# Patient Record
Sex: MALE | Race: WHITE | NOT HISPANIC OR LATINO | Employment: OTHER | URBAN - NONMETROPOLITAN AREA
[De-identification: names, ages, dates, MRNs, and addresses within clinical notes are randomized per-mention and may not be internally consistent; named-entity substitution may affect disease eponyms.]

---

## 2017-01-10 RX ORDER — ALPRAZOLAM 0.25 MG/1
0.25 TABLET ORAL 2 TIMES DAILY PRN
Qty: 60 TABLET | Refills: 0
Start: 2017-01-10 | End: 2017-02-13 | Stop reason: SDUPTHER

## 2017-01-10 RX ORDER — HYDROCODONE BITARTRATE AND ACETAMINOPHEN 7.5; 325 MG/1; MG/1
1 TABLET ORAL 3 TIMES DAILY PRN
Qty: 90 TABLET | Refills: 0
Start: 2017-01-10 | End: 2017-02-13 | Stop reason: SDUPTHER

## 2017-01-10 NOTE — TELEPHONE ENCOUNTER
----- Message from Shauna Guzman sent at 1/10/2017 12:06 PM EST -----  Regarding: REFILL   WRITTEN  NORCO 7.5/325 MG TID PRN  XANAX 0.25 MG BID PRN

## 2017-01-12 ENCOUNTER — CLINICAL SUPPORT (OUTPATIENT)
Dept: CARDIOLOGY | Facility: CLINIC | Age: 72
End: 2017-01-12

## 2017-01-12 DIAGNOSIS — Z23 IMMUNIZATION DUE: Primary | ICD-10-CM

## 2017-01-12 PROCEDURE — 90670 PCV13 VACCINE IM: CPT | Performed by: INTERNAL MEDICINE

## 2017-01-12 PROCEDURE — G0009 ADMIN PNEUMOCOCCAL VACCINE: HCPCS | Performed by: INTERNAL MEDICINE

## 2017-01-23 ENCOUNTER — OFFICE VISIT (OUTPATIENT)
Dept: CARDIOLOGY | Facility: CLINIC | Age: 72
End: 2017-01-23

## 2017-01-23 VITALS
HEART RATE: 64 BPM | WEIGHT: 229 LBS | HEIGHT: 71 IN | BODY MASS INDEX: 32.06 KG/M2 | DIASTOLIC BLOOD PRESSURE: 78 MMHG | SYSTOLIC BLOOD PRESSURE: 130 MMHG | OXYGEN SATURATION: 96 %

## 2017-01-23 DIAGNOSIS — N18.2 TYPE 2 DIABETES MELLITUS WITH STAGE 2 CHRONIC KIDNEY DISEASE, WITHOUT LONG-TERM CURRENT USE OF INSULIN (HCC): ICD-10-CM

## 2017-01-23 DIAGNOSIS — E11.22 TYPE 2 DIABETES MELLITUS WITH STAGE 2 CHRONIC KIDNEY DISEASE, WITHOUT LONG-TERM CURRENT USE OF INSULIN (HCC): ICD-10-CM

## 2017-01-23 DIAGNOSIS — E78.2 MIXED HYPERLIPIDEMIA: Primary | ICD-10-CM

## 2017-01-23 DIAGNOSIS — R39.14 BENIGN PROSTATIC HYPERTROPHY (BPH) WITH INCOMPLETE BLADDER EMPTYING: ICD-10-CM

## 2017-01-23 DIAGNOSIS — N40.1 BENIGN PROSTATIC HYPERTROPHY (BPH) WITH INCOMPLETE BLADDER EMPTYING: ICD-10-CM

## 2017-01-23 DIAGNOSIS — N19 RENAL FAILURE: ICD-10-CM

## 2017-01-23 DIAGNOSIS — K21.9 GASTROESOPHAGEAL REFLUX DISEASE WITHOUT ESOPHAGITIS: ICD-10-CM

## 2017-01-23 DIAGNOSIS — I10 ESSENTIAL HYPERTENSION: ICD-10-CM

## 2017-01-23 DIAGNOSIS — F41.9 ANXIETY: ICD-10-CM

## 2017-01-23 PROCEDURE — 99214 OFFICE O/P EST MOD 30 MIN: CPT | Performed by: INTERNAL MEDICINE

## 2017-01-23 NOTE — MR AVS SNAPSHOT
Lei Stapleton   1/23/2017 10:15 AM   Office Visit    Dept Phone:  [phone]   Encounter #:  [id number]    Provider:  Antione De Santiago MD   Department:  [facility] CARDIOLOGY                Your Full Care Plan              Your Updated Medication List          This list is accurate as of: 1/23/17 10:54 AM.  Always use your most recent med list.                ALPRAZolam 0.25 MG tablet   Commonly known as:  XANAX   Take 1 tablet by mouth 2 (Two) Times a Day As Needed (as needed for anxiety).       amLODIPine 10 MG tablet   Commonly known as:  NORVASC       aspirin 81 MG tablet       cholecalciferol 1000 UNITS tablet   Commonly known as:  VITAMIN D3       CLARITIN 10 MG tablet   Generic drug:  loratadine       CloNIDine 0.1 MG tablet   Commonly known as:  CATAPRES       FLUoxetine 20 MG capsule   Commonly known as:  PROzac       folic acid 1 MG tablet   Commonly known as:  FOLVITE       hydrALAZINE 50 MG tablet   Commonly known as:  APRESOLINE       HYDROcodone-acetaminophen 7.5-325 MG per tablet   Commonly known as:  NORCO   Take 1 tablet by mouth 3 (Three) Times a Day As Needed for moderate pain (4-6).       hydrOXYzine 25 MG tablet   Commonly known as:  ATARAX       lansoprazole 15 MG capsule   Commonly known as:  PREVACID       levoFLOXacin 500 MG tablet   Commonly known as:  LEVAQUIN   Take 1 tablet by mouth Daily.       meclizine 25 MG tablet   Commonly known as:  ANTIVERT   Take 1 tablet by mouth 3 (three) times a day as needed for dizziness.       metoprolol tartrate 25 MG tablet   Commonly known as:  LOPRESSOR       pantoprazole 40 MG EC tablet   Commonly known as:  PROTONIX       rosuvastatin 10 MG tablet   Commonly known as:  CRESTOR       tamsulosin 0.4 MG capsule 24 hr capsule   Commonly known as:  FLOMAX       VITAMIN B-6 PO       VITAMIN B12 PO               You Were Diagnosed With        Codes Comments    Mixed hyperlipidemia    -  Primary ICD-10-CM:  "E78.2  ICD-9-CM: 272.2     Essential hypertension     ICD-10-CM: I10  ICD-9-CM: 401.9     Gastroesophageal reflux disease without esophagitis     ICD-10-CM: K21.9  ICD-9-CM: 530.81     Renal failure     ICD-10-CM: N19  ICD-9-CM: 586     Benign prostatic hypertrophy (BPH) with incomplete bladder emptying     ICD-10-CM: N40.1, R39.14  ICD-9-CM: 600.01, 788.21     Anxiety     ICD-10-CM: F41.9  ICD-9-CM: 300.00       Instructions     None    Patient Instructions History      Upcoming Appointments     Visit Type Date Time Department    FOLLOW UP 1/23/2017 10:15 AM [facility] CARDIOLOGY [place]    LAB 4/11/2017  8:50 AM [facility] CARDIOLOGY [place]    FOLLOW UP 4/12/2017 10:15 AM [facility] CARDIOLOGY [place]      MyChart Signup     Our records indicate that you have declined [facility] "[employer]t signup. If you would like to sign up for "[employer]t, please email [email or web address] or call [phone] to obtain an activation code.             Other Info from Your Visit           Your Appointments     Apr 11, 2017  8:50 AM EDT   Lab with [facility]   [facility] CARDIOLOGY (--)    [address] [place] [address]   [phone]            Apr 12, 2017 10:15 AM EDT   Follow Up with Antione De Santiago MD   [facility] CARDIOLOGY (--)    [address] [place] [address]   [phone]           Arrive 15 minutes prior to appointment.              Allergies     Bactrim [Sulfamethoxazole-trimethoprim]        Reason for Visit     Hypertension     Hyperlipidemia     Heartburn           Vital Signs     Blood Pressure Pulse Height Weight Oxygen Saturation Body Mass Index    130/78 (BP Location: Left arm, Patient Position: Sitting) 64 71\" (180.3 cm) 229 lb (104 kg) 96% 31.94 kg/m2    Smoking Status                   Never Smoker           Problems and Diagnoses Noted     Anxiety problem    Benign prostatic hypertrophy (BPH) with incomplete bladder emptying    High blood pressure    Acid " reflux disease    High cholesterol or triglycerides    Kidney failure

## 2017-01-23 NOTE — PROGRESS NOTES
subjective     Chief Complaint   Patient presents with   • Hypertension   • Hyperlipidemia   • Heartburn     History of Present Illness    HYPERTENSION  Lei Stapleton has long-standing history of essential hypertension.  he is taking medications regularly.  There are no medication side effects.  Blood pressure is very well controlled.  There has been no headache, nausea or chest pain.  There has been no syncopal or presyncopal episode.  he denies episodes of hypo-tension or accelerated hypertension.    Hyperlipidemia  Lei Stapleton has long-standing history of hyperlipidemia.  Has been trying to lose weight and trying to follow diet and activity recommandations.  Patient is tolerating medications very well.  There has been no side effects.  Latest lipid levels have been fluctuating.    GERD  Lei Stapleton has long-standing history of gastroesophageal reflux disorder however he significantly better on medications.  There is no nausea or vomiting.  No hematemesis or melena.  No abdominal pain.  Mild epigastric heartburns are noted.  Appetite is good bowel movements are normal.    Chronic pain syndrome  Back pain controlled with low-dose hydrocodone.     Renal failure  Creatinine is around 1.7 last visit .  patient has been drinking a lot of fluids and he has been avoiding NSAID therapy. Follow-up BMP will be checkedagain.    Hyperglycemia  Patient has no prior history of diabetes mellitus however lately his sugar has been running quite high.  Patient thinks that he can controlled with diet alone  Will check his sugar again.    Patient Active Problem List   Diagnosis   • Essential hypertension, labile   • GERD (gastroesophageal reflux disease)   • Renal failure   • Hyperlipidemia   • Anxiety   • Depression   • Benign prostatic hypertrophy (BPH) with incomplete bladder emptying       Social History   Substance Use Topics   • Smoking status: Never Smoker   • Smokeless tobacco: None   • Alcohol use Yes      Comment:  SOCIAL       Allergies   Allergen Reactions   • Bactrim [Sulfamethoxazole-Trimethoprim]          Current Outpatient Prescriptions:   •  ALPRAZolam (XANAX) 0.25 MG tablet, Take 1 tablet by mouth 2 (Two) Times a Day As Needed (as needed for anxiety)., Disp: 60 tablet, Rfl: 0  •  amLODIPine (NORVASC) 10 MG tablet, Take 10 mg by mouth daily., Disp: , Rfl:   •  aspirin 81 MG tablet, Take 81 mg by mouth daily., Disp: , Rfl:   •  cholecalciferol (VITAMIN D3) 1000 UNITS tablet, Take 1,000 Units by mouth daily., Disp: , Rfl:   •  cloNIDine (CATAPRES) 0.1 MG tablet, Take 0.05 mg by mouth As Needed. TID TO QID PRN , Disp: , Rfl:   •  Cyanocobalamin (VITAMIN B12 PO), Take  by mouth daily., Disp: , Rfl:   •  FLUoxetine (PROzac) 20 MG capsule, Take 20 mg by mouth daily., Disp: , Rfl:   •  folic acid (FOLVITE) 1 MG tablet, Take 1 mg by mouth daily., Disp: , Rfl:   •  hydrALAZINE (APRESOLINE) 50 MG tablet, Take 75 mg by mouth 3 (three) times a day., Disp: , Rfl:   •  HYDROcodone-acetaminophen (NORCO) 7.5-325 MG per tablet, Take 1 tablet by mouth 3 (Three) Times a Day As Needed for moderate pain (4-6)., Disp: 90 tablet, Rfl: 0  •  hydrOXYzine (ATARAX) 25 MG tablet, Take 25 mg by mouth at night as needed., Disp: , Rfl:   •  lansoprazole (PREVACID) 15 MG capsule, Take 15 mg by mouth As Needed., Disp: , Rfl:   •  levoFLOXacin (LEVAQUIN) 500 MG tablet, Take 1 tablet by mouth Daily., Disp: 10 tablet, Rfl: 0  •  loratadine (CLARITIN) 10 MG tablet, Take 10 mg by mouth daily as needed., Disp: , Rfl:   •  meclizine (ANTIVERT) 25 MG tablet, Take 1 tablet by mouth 3 (three) times a day as needed for dizziness., Disp: 90 tablet, Rfl: 1  •  metoprolol tartrate (LOPRESSOR) 25 MG tablet, Take 25 mg by mouth 2 (two) times a day., Disp: , Rfl:   •  pantoprazole (PROTONIX) 40 MG EC tablet, Take 40 mg by mouth 2 (Two) Times a Day., Disp: , Rfl:   •  Pyridoxine HCl (VITAMIN B-6 PO), Take  by mouth daily., Disp: , Rfl:   •  rosuvastatin (CRESTOR) 10 MG  "tablet, Take 10 mg by mouth daily., Disp: , Rfl:   •  tamsulosin (FLOMAX) 0.4 MG capsule 24 hr capsule, Take 1 capsule by mouth daily., Disp: , Rfl:       The following portions of the patient's history were reviewed and updated as appropriate: allergies, current medications, past family history, past medical history, past social history, past surgical history and problem list.    Review of Systems   Constitution: Negative.   HENT: Positive for congestion.    Eyes: Negative.    Cardiovascular: Negative.    Respiratory: Negative.    Hematologic/Lymphatic: Negative.    Musculoskeletal: Positive for arthritis and back pain.   Gastrointestinal: Negative.    Neurological: Negative.           Objective:     Visit Vitals   • /78 (BP Location: Left arm, Patient Position: Sitting)   • Pulse 64   • Ht 71\" (180.3 cm)   • Wt 229 lb (104 kg)   • SpO2 96%   • BMI 31.94 kg/m2     Physical Exam   Constitutional: He appears well-developed and well-nourished.   HENT:   Head: Normocephalic and atraumatic.   Mouth/Throat: Oropharynx is clear and moist.   Eyes: Conjunctivae and EOM are normal. Pupils are equal, round, and reactive to light. No scleral icterus.   Neck: Normal range of motion. Neck supple. No JVD present. No tracheal deviation present. No thyromegaly present.   Cardiovascular: Normal rate, regular rhythm, normal heart sounds and intact distal pulses.  Exam reveals no friction rub.    No murmur heard.  Pulmonary/Chest: Effort normal and breath sounds normal. No respiratory distress. He has no wheezes. He has no rales. He exhibits no tenderness.   Abdominal: Soft. Bowel sounds are normal. He exhibits no distension and no mass. There is no tenderness. There is no rebound and no guarding.   Musculoskeletal: Normal range of motion. He exhibits no edema, tenderness or deformity.   Lymphadenopathy:     He has no cervical adenopathy.   Neurological: He is alert. He has normal reflexes. No cranial nerve deficit. He exhibits " normal muscle tone. Coordination normal.   Skin: Skin is warm and dry.   Psychiatric: He has a normal mood and affect. His behavior is normal. Judgment and thought content normal.         Lab Review  Lab Results   Component Value Date     12/06/2016    K 4.2 12/06/2016     12/06/2016    BUN 22 (H) 12/06/2016    CREATININE 1.47 (H) 12/06/2016    GLUCOSE 137 (H) 12/06/2016    CALCIUM 9.4 12/06/2016    ALT 32 08/19/2016    ALKPHOS 47 08/19/2016    LABIL2 2.0 08/19/2016     Lab Results   Component Value Date    CKTOTAL 200 08/19/2016     Lab Results   Component Value Date    WBC 8.18 08/19/2016    HGB 14.3 08/19/2016    HCT 42.6 08/19/2016     08/19/2016     No results found for: INR  No results found for: MG  Lab Results   Component Value Date    PSA 3.00 05/26/2016    TSH 2.807 09/28/2015     No results found for: BNP  Lab Results   Component Value Date    CHLPL 227 (H) 03/07/2016     Lab Results   Component Value Date    CHOL 163 08/19/2016    TRIG 232 (H) 08/19/2016    HDL 43 (L) 08/19/2016    LDLCALC 74 08/19/2016    VLDL 46.4 08/19/2016    LDLHDL 1.71 08/19/2016         Procedures     I personally viewed and interpreted the patient's LAB data         Assessment:     1. Mixed hyperlipidemia    2. Essential hypertension    3. Gastroesophageal reflux disease without esophagitis    4. Renal failure    5. Benign prostatic hypertrophy (BPH) with incomplete bladder emptying    6. Anxiety          Plan:      Blood pressure is very well controlled with current medications  Lipids uncontrolled.  Triglyceride is significantly elevated.  However her cholesterol is better.  This is probably due to obesity and elevated blood sugar.  Healthy lifestyle dietary restrictions and exercise program was discussed.    Hyperglycemia  Patient has not had diabetes mellitus before but lately his sugar has been quite a bit high.  He wants to control it with diet.  Blood sugar is already significantly better.      Renal  failure  Stable.  Creatinine is 1.47  Pain is controlled with current medications      No Follow-up on file.

## 2017-01-28 PROBLEM — E11.9 TYPE 2 DIABETES MELLITUS (HCC): Status: ACTIVE | Noted: 2017-01-28

## 2017-02-13 RX ORDER — ALPRAZOLAM 0.25 MG/1
0.25 TABLET ORAL 2 TIMES DAILY PRN
Qty: 60 TABLET | Refills: 0 | Status: SHIPPED | OUTPATIENT
Start: 2017-02-13 | End: 2017-08-04 | Stop reason: SDUPTHER

## 2017-02-13 RX ORDER — HYDROCODONE BITARTRATE AND ACETAMINOPHEN 7.5; 325 MG/1; MG/1
1 TABLET ORAL 3 TIMES DAILY PRN
Qty: 90 TABLET | Refills: 0 | Status: SHIPPED | OUTPATIENT
Start: 2017-02-13 | End: 2017-03-15 | Stop reason: SDUPTHER

## 2017-02-13 NOTE — TELEPHONE ENCOUNTER
----- Message from Shauna Guzman sent at 2/13/2017  3:25 PM EST -----  Regarding: WRITTEN RX   NORCO 7.5/325 MG TID PRN  ALPRAZOLAM 0.25 MG BID PRN  FOR  TOMORROW

## 2017-02-28 RX ORDER — AMLODIPINE BESYLATE 10 MG/1
TABLET ORAL
Qty: 90 TABLET | Refills: 2 | Status: SHIPPED | OUTPATIENT
Start: 2017-02-28 | End: 2017-03-15 | Stop reason: SDUPTHER

## 2017-02-28 RX ORDER — TAMSULOSIN HYDROCHLORIDE 0.4 MG/1
CAPSULE ORAL
Qty: 90 CAPSULE | Refills: 2 | Status: SHIPPED | OUTPATIENT
Start: 2017-02-28 | End: 2017-03-15 | Stop reason: SDUPTHER

## 2017-02-28 RX ORDER — FLUOXETINE HYDROCHLORIDE 20 MG/1
CAPSULE ORAL
Qty: 90 CAPSULE | Refills: 2 | Status: SHIPPED | OUTPATIENT
Start: 2017-02-28 | End: 2017-03-15 | Stop reason: SDUPTHER

## 2017-02-28 RX ORDER — FOLIC ACID 1 MG/1
TABLET ORAL
Qty: 90 TABLET | Refills: 2 | Status: SHIPPED | OUTPATIENT
Start: 2017-02-28 | End: 2017-03-15 | Stop reason: SDUPTHER

## 2017-03-15 RX ORDER — FLUOXETINE HYDROCHLORIDE 20 MG/1
20 CAPSULE ORAL DAILY
Qty: 90 CAPSULE | Refills: 2 | Status: SHIPPED | OUTPATIENT
Start: 2017-03-15 | End: 2017-06-05 | Stop reason: SDUPTHER

## 2017-03-15 RX ORDER — HYDRALAZINE HYDROCHLORIDE 50 MG/1
75 TABLET, FILM COATED ORAL 3 TIMES DAILY
Qty: 405 TABLET | Refills: 2 | Status: CANCELLED | OUTPATIENT
Start: 2017-03-15

## 2017-03-15 RX ORDER — AMLODIPINE BESYLATE 10 MG/1
10 TABLET ORAL DAILY
Qty: 90 TABLET | Refills: 2 | Status: SHIPPED | OUTPATIENT
Start: 2017-03-15 | End: 2017-06-05 | Stop reason: SDUPTHER

## 2017-03-15 RX ORDER — HYDROCODONE BITARTRATE AND ACETAMINOPHEN 7.5; 325 MG/1; MG/1
1 TABLET ORAL 3 TIMES DAILY PRN
Qty: 90 TABLET | Refills: 0 | Status: SHIPPED | OUTPATIENT
Start: 2017-03-15 | End: 2017-04-18 | Stop reason: SDUPTHER

## 2017-03-15 RX ORDER — TAMSULOSIN HYDROCHLORIDE 0.4 MG/1
1 CAPSULE ORAL DAILY
Qty: 90 CAPSULE | Refills: 2 | Status: SHIPPED | OUTPATIENT
Start: 2017-03-15 | End: 2017-06-05 | Stop reason: SDUPTHER

## 2017-03-15 RX ORDER — FOLIC ACID 1 MG/1
1 TABLET ORAL DAILY
Qty: 90 TABLET | Refills: 2 | Status: SHIPPED | OUTPATIENT
Start: 2017-03-15 | End: 2017-06-05 | Stop reason: SDUPTHER

## 2017-03-15 NOTE — TELEPHONE ENCOUNTER
----- Message from Shauna Guzman sent at 3/14/2017  2:22 PM EDT -----  Regarding: REFILLS   MAIL ORDER  NORVASC 10 MG QD  FLUOXETINE 20 MG QD  FOLIC ACID 1 MG QD  METOPROLOL TART. 25 MG BID  HYDRALAZINE 50 MG TID  FLOMAX 0.4 MG QD     WRITTEN  NORCO 7.5/325 MG TID PRN

## 2017-04-11 ENCOUNTER — LAB (OUTPATIENT)
Dept: CARDIOLOGY | Facility: CLINIC | Age: 72
End: 2017-04-11

## 2017-04-11 DIAGNOSIS — E78.2 MIXED HYPERLIPIDEMIA: ICD-10-CM

## 2017-04-11 LAB
ALBUMIN SERPL-MCNC: 4.8 G/DL (ref 3.4–4.8)
ALBUMIN/GLOB SERPL: 2.3 G/DL (ref 1.5–2.5)
ALP SERPL-CCNC: 48 U/L (ref 40–129)
ALT SERPL W P-5'-P-CCNC: 39 U/L (ref 10–44)
ANION GAP SERPL CALCULATED.3IONS-SCNC: 3.4 MMOL/L (ref 3.6–11.2)
AST SERPL-CCNC: 34 U/L (ref 10–34)
BASOPHILS # BLD AUTO: 0.06 10*3/MM3 (ref 0–0.3)
BASOPHILS NFR BLD AUTO: 0.9 % (ref 0–2)
BILIRUB SERPL-MCNC: 0.6 MG/DL (ref 0.2–1.8)
BUN BLD-MCNC: 18 MG/DL (ref 7–21)
BUN/CREAT SERPL: 11.5 (ref 7–25)
CALCIUM SPEC-SCNC: 9.6 MG/DL (ref 7.7–10)
CHLORIDE SERPL-SCNC: 112 MMOL/L (ref 99–112)
CHOLEST SERPL-MCNC: 178 MG/DL (ref 0–200)
CK SERPL-CCNC: 372 U/L (ref 24–204)
CO2 SERPL-SCNC: 31.6 MMOL/L (ref 24.3–31.9)
CREAT BLD-MCNC: 1.56 MG/DL (ref 0.43–1.29)
DEPRECATED RDW RBC AUTO: 45.9 FL (ref 37–54)
EOSINOPHIL # BLD AUTO: 0.83 10*3/MM3 (ref 0–0.7)
EOSINOPHIL NFR BLD AUTO: 11.8 % (ref 0–7)
ERYTHROCYTE [DISTWIDTH] IN BLOOD BY AUTOMATED COUNT: 14 % (ref 11.5–14.5)
GFR SERPL CREATININE-BSD FRML MDRD: 44 ML/MIN/1.73
GLOBULIN UR ELPH-MCNC: 2.1 GM/DL
GLUCOSE BLD-MCNC: 107 MG/DL (ref 70–110)
HCT VFR BLD AUTO: 44.5 % (ref 42–52)
HDLC SERPL-MCNC: 45 MG/DL (ref 60–100)
HGB BLD-MCNC: 15 G/DL (ref 14–18)
IMM GRANULOCYTES # BLD: 0 10*3/MM3 (ref 0–0.03)
IMM GRANULOCYTES NFR BLD: 0 % (ref 0–0.5)
LDLC SERPL CALC-MCNC: 93 MG/DL (ref 0–100)
LDLC/HDLC SERPL: 2.07 {RATIO}
LYMPHOCYTES # BLD AUTO: 2.62 10*3/MM3 (ref 1–3)
LYMPHOCYTES NFR BLD AUTO: 37.4 % (ref 16–46)
MCH RBC QN AUTO: 31.5 PG (ref 27–33)
MCHC RBC AUTO-ENTMCNC: 33.7 G/DL (ref 33–37)
MCV RBC AUTO: 93.5 FL (ref 80–94)
MONOCYTES # BLD AUTO: 0.75 10*3/MM3 (ref 0.1–0.9)
MONOCYTES NFR BLD AUTO: 10.7 % (ref 0–12)
NEUTROPHILS # BLD AUTO: 2.75 10*3/MM3 (ref 1.4–6.5)
NEUTROPHILS NFR BLD AUTO: 39.2 % (ref 40–75)
OSMOLALITY SERPL CALC.SUM OF ELEC: 294.8 MOSM/KG (ref 273–305)
PLATELET # BLD AUTO: 123 10*3/MM3 (ref 130–400)
PMV BLD AUTO: 12.4 FL (ref 6–10)
POTASSIUM BLD-SCNC: 4.3 MMOL/L (ref 3.5–5.3)
PROT SERPL-MCNC: 6.9 G/DL (ref 6–8)
RBC # BLD AUTO: 4.76 10*6/MM3 (ref 4.7–6.1)
SODIUM BLD-SCNC: 147 MMOL/L (ref 135–153)
TRIGL SERPL-MCNC: 199 MG/DL (ref 0–150)
VLDLC SERPL-MCNC: 39.8 MG/DL
WBC NRBC COR # BLD: 7.01 10*3/MM3 (ref 4.5–12.5)

## 2017-04-11 PROCEDURE — 82550 ASSAY OF CK (CPK): CPT | Performed by: INTERNAL MEDICINE

## 2017-04-11 PROCEDURE — 85025 COMPLETE CBC W/AUTO DIFF WBC: CPT | Performed by: INTERNAL MEDICINE

## 2017-04-11 PROCEDURE — 80053 COMPREHEN METABOLIC PANEL: CPT | Performed by: INTERNAL MEDICINE

## 2017-04-11 PROCEDURE — 80061 LIPID PANEL: CPT | Performed by: INTERNAL MEDICINE

## 2017-04-12 ENCOUNTER — OFFICE VISIT (OUTPATIENT)
Dept: CARDIOLOGY | Facility: CLINIC | Age: 72
End: 2017-04-12

## 2017-04-12 VITALS
HEIGHT: 71 IN | DIASTOLIC BLOOD PRESSURE: 64 MMHG | OXYGEN SATURATION: 92 % | WEIGHT: 227.4 LBS | HEART RATE: 52 BPM | SYSTOLIC BLOOD PRESSURE: 122 MMHG | BODY MASS INDEX: 31.84 KG/M2

## 2017-04-12 DIAGNOSIS — E78.2 MIXED HYPERLIPIDEMIA: ICD-10-CM

## 2017-04-12 DIAGNOSIS — F41.9 ANXIETY: ICD-10-CM

## 2017-04-12 DIAGNOSIS — K21.9 GASTROESOPHAGEAL REFLUX DISEASE WITHOUT ESOPHAGITIS: ICD-10-CM

## 2017-04-12 DIAGNOSIS — N40.1 BENIGN PROSTATIC HYPERTROPHY (BPH) WITH INCOMPLETE BLADDER EMPTYING: ICD-10-CM

## 2017-04-12 DIAGNOSIS — N19 RENAL FAILURE: ICD-10-CM

## 2017-04-12 DIAGNOSIS — I10 ESSENTIAL HYPERTENSION: Primary | ICD-10-CM

## 2017-04-12 DIAGNOSIS — R39.14 BENIGN PROSTATIC HYPERTROPHY (BPH) WITH INCOMPLETE BLADDER EMPTYING: ICD-10-CM

## 2017-04-12 DIAGNOSIS — N18.2 TYPE 2 DIABETES MELLITUS WITH STAGE 2 CHRONIC KIDNEY DISEASE, WITHOUT LONG-TERM CURRENT USE OF INSULIN (HCC): ICD-10-CM

## 2017-04-12 DIAGNOSIS — E11.22 TYPE 2 DIABETES MELLITUS WITH STAGE 2 CHRONIC KIDNEY DISEASE, WITHOUT LONG-TERM CURRENT USE OF INSULIN (HCC): ICD-10-CM

## 2017-04-12 PROCEDURE — 99214 OFFICE O/P EST MOD 30 MIN: CPT | Performed by: INTERNAL MEDICINE

## 2017-04-12 NOTE — PROGRESS NOTES
subjective     Chief Complaint   Patient presents with   • Follow-up     LAB RESULTS   • Diabetes   • Hypertension     History of Present Illness    HYPERTENSION  Lei Stapleton has long-standing history of essential hypertension. he is taking medications regularly. There are no medication side effects. Blood pressure is very well controlled. There has been no headache, nausea or chest pain. There has been no syncopal or presyncopal episode. he denies episodes of hypo-tension or accelerated hypertension.     Hyperlipidemia  Lei Stapleton has long-standing history of hyperlipidemia. Has been trying to lose weight and trying to follow diet and activity recommandations. Patient is tolerating medications very well. There has been no side effects.  Lipids are slightly worse .  Total cholesterol is 178 with LDL of 193.  Triglyceride 199.    GERD  Lei Stapleton has long-standing history of gastroesophageal reflux disorder however he significantly better on medications. There is no nausea or vomiting. No hematemesis or melena. No abdominal pain. Mild epigastric heartburns are noted. Appetite is good bowel movements are normal.     Chronic pain syndrome  Back pain controlled with low-dose hydrocodone.      Renal failure  Creatinine is around 1.56 this visit . patient has been drinking a lot of fluids and he has been avoiding NSAID therapy. Follow-up BMP will be checked again.     Hyperglycemia  Patient has no prior history of diabetes mellitus however lately his sugar has been running quite high. Patient thinks that he can controlled with diet alone  Will check his sugar again.     Patient Active Problem List   Diagnosis   • Essential hypertension, labile   • GERD (gastroesophageal reflux disease)   • Renal failure   • Hyperlipidemia   • Anxiety   • Depression   • Benign prostatic hypertrophy (BPH) with incomplete bladder emptying   • Type 2 diabetes mellitus       Social History   Substance Use Topics   • Smoking  status: Never Smoker   • Smokeless tobacco: None   • Alcohol use Yes      Comment: SOCIAL       Allergies   Allergen Reactions   • Bactrim [Sulfamethoxazole-Trimethoprim]          Current Outpatient Prescriptions:   •  ALPRAZolam (XANAX) 0.25 MG tablet, Take 1 tablet by mouth 2 (Two) Times a Day As Needed (as needed for anxiety)., Disp: 60 tablet, Rfl: 0  •  amLODIPine (NORVASC) 10 MG tablet, Take 1 tablet by mouth Daily., Disp: 90 tablet, Rfl: 2  •  aspirin 81 MG tablet, Take 81 mg by mouth daily., Disp: , Rfl:   •  cholecalciferol (VITAMIN D3) 1000 UNITS tablet, Take 1,000 Units by mouth daily., Disp: , Rfl:   •  cloNIDine (CATAPRES) 0.1 MG tablet, Take 0.05 mg by mouth As Needed. TID TO QID PRN , Disp: , Rfl:   •  Cyanocobalamin (VITAMIN B12 PO), Take  by mouth daily., Disp: , Rfl:   •  FLUoxetine (PROzac) 20 MG capsule, Take 1 capsule by mouth Daily., Disp: 90 capsule, Rfl: 2  •  folic acid (FOLVITE) 1 MG tablet, Take 1 tablet by mouth Daily., Disp: 90 tablet, Rfl: 2  •  hydrALAZINE (APRESOLINE) 50 MG tablet, Take 75 mg by mouth 3 (three) times a day., Disp: , Rfl:   •  HYDROcodone-acetaminophen (NORCO) 7.5-325 MG per tablet, Take 1 tablet by mouth 3 (Three) Times a Day As Needed for Moderate Pain (4-6)., Disp: 90 tablet, Rfl: 0  •  hydrOXYzine (ATARAX) 25 MG tablet, Take 25 mg by mouth at night as needed., Disp: , Rfl:   •  lansoprazole (PREVACID) 15 MG capsule, Take 15 mg by mouth As Needed., Disp: , Rfl:   •  levoFLOXacin (LEVAQUIN) 500 MG tablet, Take 1 tablet by mouth Daily., Disp: 10 tablet, Rfl: 0  •  loratadine (CLARITIN) 10 MG tablet, Take 10 mg by mouth daily as needed., Disp: , Rfl:   •  meclizine (ANTIVERT) 25 MG tablet, Take 1 tablet by mouth 3 (three) times a day as needed for dizziness., Disp: 90 tablet, Rfl: 1  •  metoprolol tartrate (LOPRESSOR) 25 MG tablet, Take 1 tablet by mouth 2 (Two) Times a Day., Disp: 180 tablet, Rfl: 2  •  pantoprazole (PROTONIX) 40 MG EC tablet, Take 40 mg by mouth 2  "(Two) Times a Day., Disp: , Rfl:   •  Pyridoxine HCl (VITAMIN B-6 PO), Take  by mouth daily., Disp: , Rfl:   •  rosuvastatin (CRESTOR) 10 MG tablet, Take 10 mg by mouth daily., Disp: , Rfl:   •  tamsulosin (FLOMAX) 0.4 MG capsule 24 hr capsule, Take 1 capsule by mouth Daily., Disp: 90 capsule, Rfl: 2      The following portions of the patient's history were reviewed and updated as appropriate: allergies, current medications, past family history, past medical history, past social history, past surgical history and problem list.    Review of Systems   Constitution: Positive for malaise/fatigue.   HENT: Negative.    Eyes: Negative.    Cardiovascular: Negative.    Respiratory: Negative.    Hematologic/Lymphatic: Negative.    Musculoskeletal: Positive for arthritis and back pain.   Gastrointestinal: Positive for heartburn.   Neurological: Negative.    Psychiatric/Behavioral: The patient is nervous/anxious.           Objective:     /64 (BP Location: Left arm, Patient Position: Sitting)  Pulse 52  Ht 71\" (180.3 cm)  Wt 227 lb 6.4 oz (103 kg)  SpO2 92%  BMI 31.72 kg/m2  Physical Exam   Constitutional: He appears well-developed and well-nourished.   HENT:   Head: Normocephalic and atraumatic.   Mouth/Throat: Oropharynx is clear and moist.   Eyes: Conjunctivae and EOM are normal. Pupils are equal, round, and reactive to light. No scleral icterus.   Neck: Normal range of motion. Neck supple. No JVD present. No tracheal deviation present. No thyromegaly present.   Cardiovascular: Normal rate, regular rhythm, normal heart sounds and intact distal pulses.  Exam reveals no friction rub.    No murmur heard.  Pulmonary/Chest: Effort normal and breath sounds normal. No respiratory distress. He has no wheezes. He has no rales. He exhibits no tenderness.   Abdominal: Soft. Bowel sounds are normal. He exhibits no distension and no mass. There is no tenderness. There is no rebound and no guarding.   Musculoskeletal: Normal " range of motion. He exhibits no edema, tenderness or deformity.   Lymphadenopathy:     He has no cervical adenopathy.   Neurological: He is alert. He has normal reflexes. No cranial nerve deficit. He exhibits normal muscle tone. Coordination normal.   Skin: Skin is warm and dry.   Psychiatric: He has a normal mood and affect. His behavior is normal. Judgment and thought content normal.         Lab Review  Lab Results   Component Value Date     04/11/2017    K 4.3 04/11/2017     04/11/2017    BUN 18 04/11/2017    CREATININE 1.56 (H) 04/11/2017    GLUCOSE 107 04/11/2017    CALCIUM 9.6 04/11/2017    ALT 39 04/11/2017    ALKPHOS 48 04/11/2017    LABIL2 2.3 04/11/2017     Lab Results   Component Value Date    CKTOTAL 372 (C) 04/11/2017     Lab Results   Component Value Date    WBC 7.01 04/11/2017    HGB 15.0 04/11/2017    HCT 44.5 04/11/2017     (L) 04/11/2017     No results found for: INR  No results found for: MG  Lab Results   Component Value Date    PSA 3.00 05/26/2016    TSH 2.807 09/28/2015     No results found for: BNP  Lab Results   Component Value Date    CHLPL 227 (H) 03/07/2016     Lab Results   Component Value Date    CHOL 178 04/11/2017    TRIG 199 (H) 04/11/2017    HDL 45 (L) 04/11/2017    LDLCALC 93 04/11/2017    VLDL 39.8 04/11/2017    LDLHDL 2.07 04/11/2017         Procedures     I personally viewed and interpreted the patient's LAB data         Assessment:     1. Essential hypertension    2. Mixed hyperlipidemia    3. Gastroesophageal reflux disease without esophagitis    4. Type 2 diabetes mellitus with stage 2 chronic kidney disease, without long-term current use of insulin    5. Benign prostatic hypertrophy (BPH) with incomplete bladder emptying    6. Renal failure    7. Anxiety          Plan:      Blood pressure is well controlled with current medications.  No change in therapy.    Hyperlipidemia slightly worse.  LDL is 93 which is still acceptable but patient was advised to try  to bring it down.  Healthy lifestyle discussed however no change in medication he will continue Crestor 10 mg daily    Patient is very well controlled with Norco therapy    Anxiety is controlled with Xanax and Prozac.  No change in therapy is needed at this time.  GI symptoms are also controlled.    Refills of medications were given  Healthy lifestyle discussed again    Follow-up scheduled.  Labs discussed in detail with the patient              No Follow-up on file.

## 2017-04-18 RX ORDER — HYDROCODONE BITARTRATE AND ACETAMINOPHEN 7.5; 325 MG/1; MG/1
1 TABLET ORAL 3 TIMES DAILY PRN
Qty: 90 TABLET | Refills: 0 | Status: SHIPPED | OUTPATIENT
Start: 2017-04-19 | End: 2017-06-09 | Stop reason: SDUPTHER

## 2017-05-11 ENCOUNTER — HOSPITAL ENCOUNTER (OUTPATIENT)
Dept: GENERAL RADIOLOGY | Facility: HOSPITAL | Age: 72
Discharge: HOME | End: 2017-05-11
Attending: INTERNAL MEDICINE

## 2017-05-11 ENCOUNTER — LAB (OUTPATIENT)
Dept: LAB | Facility: HOSPITAL | Age: 72
End: 2017-05-11
Attending: INTERNAL MEDICINE

## 2017-05-11 ENCOUNTER — OFFICE VISIT (OUTPATIENT)
Dept: CARDIOLOGY | Facility: CLINIC | Age: 72
End: 2017-05-11

## 2017-05-11 ENCOUNTER — HOSPITAL ENCOUNTER (OUTPATIENT)
Dept: CT IMAGING | Facility: HOSPITAL | Age: 72
Discharge: HOME | End: 2017-05-11
Attending: INTERNAL MEDICINE | Admitting: INTERNAL MEDICINE

## 2017-05-11 VITALS
DIASTOLIC BLOOD PRESSURE: 85 MMHG | HEART RATE: 107 BPM | BODY MASS INDEX: 31.36 KG/M2 | SYSTOLIC BLOOD PRESSURE: 138 MMHG | WEIGHT: 224 LBS | OXYGEN SATURATION: 97 % | TEMPERATURE: 98.5 F | HEIGHT: 71 IN

## 2017-05-11 DIAGNOSIS — E78.2 MIXED HYPERLIPIDEMIA: ICD-10-CM

## 2017-05-11 DIAGNOSIS — K59.04 CHRONIC IDIOPATHIC CONSTIPATION: ICD-10-CM

## 2017-05-11 DIAGNOSIS — R10.33 PERIUMBILICAL ABDOMINAL PAIN: ICD-10-CM

## 2017-05-11 DIAGNOSIS — N19 RENAL FAILURE: ICD-10-CM

## 2017-05-11 DIAGNOSIS — R10.33 PERIUMBILICAL ABDOMINAL PAIN: Primary | ICD-10-CM

## 2017-05-11 PROBLEM — J02.9 PHARYNGITIS: Status: ACTIVE | Noted: 2017-05-11

## 2017-05-11 LAB
ALBUMIN SERPL-MCNC: 4.5 G/DL (ref 3.4–4.8)
ALBUMIN/GLOB SERPL: 1.7 G/DL (ref 1.5–2.5)
ALP SERPL-CCNC: 60 U/L (ref 40–129)
ALT SERPL W P-5'-P-CCNC: 35 U/L (ref 10–44)
AMYLASE SERPL-CCNC: 45 U/L (ref 28–100)
ANION GAP SERPL CALCULATED.3IONS-SCNC: 9.9 MMOL/L (ref 3.6–11.2)
AST SERPL-CCNC: 25 U/L (ref 10–34)
BASOPHILS # BLD AUTO: 0.06 10*3/MM3 (ref 0–0.3)
BASOPHILS NFR BLD AUTO: 0.5 % (ref 0–2)
BILIRUB SERPL-MCNC: 0.9 MG/DL (ref 0.2–1.8)
BUN BLD-MCNC: 15 MG/DL (ref 7–21)
BUN/CREAT SERPL: 9.7 (ref 7–25)
CALCIUM SPEC-SCNC: 9.4 MG/DL (ref 7.7–10)
CHLORIDE SERPL-SCNC: 108 MMOL/L (ref 99–112)
CO2 SERPL-SCNC: 26.1 MMOL/L (ref 24.3–31.9)
CREAT BLD-MCNC: 1.54 MG/DL (ref 0.43–1.29)
DEPRECATED RDW RBC AUTO: 47.8 FL (ref 37–54)
EOSINOPHIL # BLD AUTO: 0.94 10*3/MM3 (ref 0–0.7)
EOSINOPHIL NFR BLD AUTO: 7.2 % (ref 0–7)
ERYTHROCYTE [DISTWIDTH] IN BLOOD BY AUTOMATED COUNT: 14.1 % (ref 11.5–14.5)
GFR SERPL CREATININE-BSD FRML MDRD: 45 ML/MIN/1.73
GLOBULIN UR ELPH-MCNC: 2.6 GM/DL
GLUCOSE BLD-MCNC: 132 MG/DL (ref 70–110)
HCT VFR BLD AUTO: 48.3 % (ref 42–52)
HGB BLD-MCNC: 16.7 G/DL (ref 14–18)
IMM GRANULOCYTES # BLD: 0.04 10*3/MM3 (ref 0–0.03)
IMM GRANULOCYTES NFR BLD: 0.3 % (ref 0–0.5)
LIPASE SERPL-CCNC: 41 U/L (ref 13–60)
LYMPHOCYTES # BLD AUTO: 3 10*3/MM3 (ref 1–3)
LYMPHOCYTES NFR BLD AUTO: 23.1 % (ref 16–46)
MCH RBC QN AUTO: 32.4 PG (ref 27–33)
MCHC RBC AUTO-ENTMCNC: 34.6 G/DL (ref 33–37)
MCV RBC AUTO: 93.8 FL (ref 80–94)
MONOCYTES # BLD AUTO: 1.23 10*3/MM3 (ref 0.1–0.9)
MONOCYTES NFR BLD AUTO: 9.5 % (ref 0–12)
NEUTROPHILS # BLD AUTO: 7.7 10*3/MM3 (ref 1.4–6.5)
NEUTROPHILS NFR BLD AUTO: 59.4 % (ref 40–75)
OSMOLALITY SERPL CALC.SUM OF ELEC: 289.5 MOSM/KG (ref 273–305)
PLATELET # BLD AUTO: 179 10*3/MM3 (ref 130–400)
PMV BLD AUTO: 13.1 FL (ref 6–10)
POTASSIUM BLD-SCNC: 3.5 MMOL/L (ref 3.5–5.3)
PROT SERPL-MCNC: 7.1 G/DL (ref 6–8)
RBC # BLD AUTO: 5.15 10*6/MM3 (ref 4.7–6.1)
SODIUM BLD-SCNC: 144 MMOL/L (ref 135–153)
WBC NRBC COR # BLD: 12.97 10*3/MM3 (ref 4.5–12.5)

## 2017-05-11 PROCEDURE — 74150 CT ABDOMEN W/O CONTRAST: CPT | Performed by: RADIOLOGY

## 2017-05-11 PROCEDURE — 85025 COMPLETE CBC W/AUTO DIFF WBC: CPT | Performed by: INTERNAL MEDICINE

## 2017-05-11 PROCEDURE — 82150 ASSAY OF AMYLASE: CPT | Performed by: INTERNAL MEDICINE

## 2017-05-11 PROCEDURE — 74020 HC XR ABDOMEN FLAT & UPRIGHT: CPT

## 2017-05-11 PROCEDURE — 83690 ASSAY OF LIPASE: CPT | Performed by: INTERNAL MEDICINE

## 2017-05-11 PROCEDURE — 74020 XR ABDOMEN FLAT AND UPRIGHT: CPT | Performed by: RADIOLOGY

## 2017-05-11 PROCEDURE — 80053 COMPREHEN METABOLIC PANEL: CPT | Performed by: INTERNAL MEDICINE

## 2017-05-11 PROCEDURE — 99214 OFFICE O/P EST MOD 30 MIN: CPT | Performed by: INTERNAL MEDICINE

## 2017-05-11 PROCEDURE — 74150 CT ABDOMEN W/O CONTRAST: CPT

## 2017-05-11 PROCEDURE — 36415 COLL VENOUS BLD VENIPUNCTURE: CPT

## 2017-05-11 RX ORDER — CIPROFLOXACIN 250 MG/1
250 TABLET, FILM COATED ORAL 2 TIMES DAILY
Qty: 14 TABLET | Refills: 0 | Status: SHIPPED | OUTPATIENT
Start: 2017-05-11 | End: 2017-07-12

## 2017-05-11 RX ORDER — METRONIDAZOLE 500 MG/1
500 TABLET ORAL 3 TIMES DAILY
Qty: 21 TABLET | Refills: 0 | Status: SHIPPED | OUTPATIENT
Start: 2017-05-11 | End: 2017-07-12

## 2017-06-05 RX ORDER — FOLIC ACID 1 MG/1
1 TABLET ORAL DAILY
Qty: 90 TABLET | Refills: 2 | Status: SHIPPED | OUTPATIENT
Start: 2017-06-05 | End: 2017-06-12 | Stop reason: SDUPTHER

## 2017-06-05 RX ORDER — TAMSULOSIN HYDROCHLORIDE 0.4 MG/1
1 CAPSULE ORAL DAILY
Qty: 90 CAPSULE | Refills: 2 | Status: SHIPPED | OUTPATIENT
Start: 2017-06-05 | End: 2017-06-12 | Stop reason: SDUPTHER

## 2017-06-05 RX ORDER — AMLODIPINE BESYLATE 10 MG/1
10 TABLET ORAL DAILY
Qty: 90 TABLET | Refills: 2 | Status: SHIPPED | OUTPATIENT
Start: 2017-06-05 | End: 2017-06-12 | Stop reason: SDUPTHER

## 2017-06-05 RX ORDER — HYDRALAZINE HYDROCHLORIDE 50 MG/1
75 TABLET, FILM COATED ORAL 3 TIMES DAILY
Qty: 270 TABLET | Refills: 0 | Status: SHIPPED | OUTPATIENT
Start: 2017-06-05 | End: 2017-06-12 | Stop reason: SDUPTHER

## 2017-06-05 RX ORDER — FLUOXETINE HYDROCHLORIDE 20 MG/1
20 CAPSULE ORAL DAILY
Qty: 90 CAPSULE | Refills: 2 | Status: SHIPPED | OUTPATIENT
Start: 2017-06-05 | End: 2017-06-12 | Stop reason: SDUPTHER

## 2017-06-09 RX ORDER — HYDROCODONE BITARTRATE AND ACETAMINOPHEN 7.5; 325 MG/1; MG/1
1 TABLET ORAL 3 TIMES DAILY PRN
Qty: 90 TABLET | Refills: 0 | Status: SHIPPED | OUTPATIENT
Start: 2017-06-09 | End: 2017-08-04 | Stop reason: SDUPTHER

## 2017-06-12 RX ORDER — TAMSULOSIN HYDROCHLORIDE 0.4 MG/1
1 CAPSULE ORAL DAILY
Qty: 90 CAPSULE | Refills: 2 | Status: SHIPPED | OUTPATIENT
Start: 2017-06-12 | End: 2017-08-23 | Stop reason: SDUPTHER

## 2017-06-12 RX ORDER — FLUOXETINE HYDROCHLORIDE 20 MG/1
20 CAPSULE ORAL DAILY
Qty: 90 CAPSULE | Refills: 2 | Status: SHIPPED | OUTPATIENT
Start: 2017-06-12 | End: 2017-08-23 | Stop reason: SDUPTHER

## 2017-06-12 RX ORDER — FOLIC ACID 1 MG/1
1 TABLET ORAL DAILY
Qty: 90 TABLET | Refills: 2 | Status: SHIPPED | OUTPATIENT
Start: 2017-06-12 | End: 2017-08-23 | Stop reason: SDUPTHER

## 2017-06-12 RX ORDER — AMLODIPINE BESYLATE 10 MG/1
10 TABLET ORAL DAILY
Qty: 90 TABLET | Refills: 2 | Status: SHIPPED | OUTPATIENT
Start: 2017-06-12 | End: 2017-08-23 | Stop reason: SDUPTHER

## 2017-06-12 RX ORDER — HYDRALAZINE HYDROCHLORIDE 50 MG/1
75 TABLET, FILM COATED ORAL 3 TIMES DAILY
Qty: 270 TABLET | Refills: 0 | Status: SHIPPED | OUTPATIENT
Start: 2017-06-12 | End: 2017-07-10 | Stop reason: SDUPTHER

## 2017-06-12 RX ORDER — LORATADINE 10 MG/1
10 TABLET ORAL DAILY
Qty: 90 TABLET | Refills: 0 | Status: SHIPPED | OUTPATIENT
Start: 2017-06-12 | End: 2020-07-22 | Stop reason: SDUPTHER

## 2017-07-10 RX ORDER — HYDRALAZINE HYDROCHLORIDE 50 MG/1
TABLET, FILM COATED ORAL
Qty: 270 TABLET | Refills: 0 | Status: SHIPPED | OUTPATIENT
Start: 2017-07-10 | End: 2017-08-23 | Stop reason: SDUPTHER

## 2017-07-12 ENCOUNTER — OFFICE VISIT (OUTPATIENT)
Dept: CARDIOLOGY | Facility: CLINIC | Age: 72
End: 2017-07-12

## 2017-07-12 VITALS
RESPIRATION RATE: 18 BRPM | HEART RATE: 73 BPM | HEIGHT: 71 IN | WEIGHT: 222 LBS | OXYGEN SATURATION: 95 % | DIASTOLIC BLOOD PRESSURE: 80 MMHG | BODY MASS INDEX: 31.08 KG/M2 | SYSTOLIC BLOOD PRESSURE: 130 MMHG

## 2017-07-12 DIAGNOSIS — N18.2 TYPE 2 DIABETES MELLITUS WITH STAGE 2 CHRONIC KIDNEY DISEASE, WITHOUT LONG-TERM CURRENT USE OF INSULIN (HCC): ICD-10-CM

## 2017-07-12 DIAGNOSIS — N19 RENAL FAILURE: ICD-10-CM

## 2017-07-12 DIAGNOSIS — E78.2 MIXED HYPERLIPIDEMIA: Primary | ICD-10-CM

## 2017-07-12 DIAGNOSIS — E11.22 TYPE 2 DIABETES MELLITUS WITH STAGE 2 CHRONIC KIDNEY DISEASE, WITHOUT LONG-TERM CURRENT USE OF INSULIN (HCC): ICD-10-CM

## 2017-07-12 DIAGNOSIS — I10 ESSENTIAL HYPERTENSION: ICD-10-CM

## 2017-07-12 PROCEDURE — 99214 OFFICE O/P EST MOD 30 MIN: CPT | Performed by: INTERNAL MEDICINE

## 2017-07-12 RX ORDER — ALLOPURINOL 300 MG/1
TABLET ORAL
COMMUNITY
Start: 2017-06-10 | End: 2018-01-04 | Stop reason: SDUPTHER

## 2017-07-12 NOTE — PROGRESS NOTES
subjective     Chief Complaint   Patient presents with   • Hypertension   • Hyperlipidemia   • Heartburn     History of Present Illness  Patient recently had vague abdominal discomfort and possible partial small bowel obstruction he is doing much better.  Bowel movements are normal he denies any nausea or vomiting.  Patient had CT scan of the abdomen which showed trace amount of ascites and small bowels are mildly dilated horseshoe kidney noted, no obstruction.    HYPERTENSION  Lei Stapleton has long-standing history of essential hypertension. he is taking medications regularly. There are no medication side effects. Blood pressure is very well controlled. There has been no headache, nausea or chest pain. There has been no syncopal or presyncopal episode. he denies episodes of hypo-tension or accelerated hypertension.     Hyperlipidemia  Lei Stapleton has long-standing history of hyperlipidemia. Has been trying to lose weight and trying to follow diet and activity recommandations. Patient is tolerating medications very well. There has been no side effects. Latest lipid levels showed normal cholesterol with elevated triglyceride probably related to weight and diabetes.  CPK has been elevated also which may be related to Crestor therapy will watch closely    GERD  Lei Stapleton has long-standing history of gastroesophageal reflux disorder however he significantly better on medications. There is no nausea or vomiting. No hematemesis or melena. No abdominal pain. Mild epigastric heartburns are noted. Appetite is good bowel movements are normal.     Chronic pain syndrome  Back pain controlled with low-dose hydrocodone.      Renal failure  Creatinine is around 1. 54 last visit . patient has been drinking a lot of fluids and he has been avoiding NSAID therapy. Follow-up BMP will be checkedagain.     Hyperglycemia  Patient has no prior history of diabetes mellitus however lately his sugar has been elevated is around  130. Patient thinks that he can controlled with diet alone    Past Surgical History:   Procedure Laterality Date   • COLONOSCOPY  09/2009   • EYE SURGERY     • FOOT SURGERY     • HERNIA REPAIR     • HYDROCELECTOMY  06/15/2015   • RHINOPLASTY     • UPPER GASTROINTESTINAL ENDOSCOPY  03/24/2014    WITH BIOPSIES AND DILATION     Family History   Problem Relation Age of Onset   • Kidney disease Other    • Other Other      CHRONIC DISABLING DISEASES URINARY TRACT DISEASE   • Diabetes Other    • Hypertension Other    • Other Mother    • Heart failure Father    • Stroke Sister    • Arthritis Sister    • Heart disease Brother    • No Known Problems Brother    • No Known Problems Brother      Past Medical History:   Diagnosis Date   • Anxiety    • Depression    • GERD (gastroesophageal reflux disease)    • History of EKG 12/22/2015    NORMAL   • Hyperlipidemia    • Hypertension    • Obesity    • Renal failure     MILD     Patient Active Problem List   Diagnosis   • Essential hypertension, labile   • GERD (gastroesophageal reflux disease)   • Renal failure   • Hyperlipidemia   • Anxiety   • Depression   • Benign prostatic hypertrophy (BPH) with incomplete bladder emptying   • Type 2 diabetes mellitus   • Periumbilical abdominal pain   • Pharyngitis       Social History   Substance Use Topics   • Smoking status: Never Smoker   • Smokeless tobacco: None   • Alcohol use Yes      Comment: SOCIAL       Allergies   Allergen Reactions   • Bactrim [Sulfamethoxazole-Trimethoprim]        Current Outpatient Prescriptions on File Prior to Visit   Medication Sig   • ALPRAZolam (XANAX) 0.25 MG tablet Take 1 tablet by mouth 2 (Two) Times a Day As Needed (as needed for anxiety).   • amLODIPine (NORVASC) 10 MG tablet Take 1 tablet by mouth Daily.   • aspirin 81 MG tablet Take 81 mg by mouth daily.   • cholecalciferol (VITAMIN D3) 1000 UNITS tablet Take 1,000 Units by mouth daily.   • ciprofloxacin (CIPRO) 250 MG tablet Take 1 tablet by mouth 2  (Two) Times a Day.   • cloNIDine (CATAPRES) 0.1 MG tablet Take 0.05 mg by mouth As Needed. TID TO QID PRN    • Cyanocobalamin (VITAMIN B12 PO) Take  by mouth daily.   • FLUoxetine (PROzac) 20 MG capsule Take 1 capsule by mouth Daily.   • folic acid (FOLVITE) 1 MG tablet Take 1 tablet by mouth Daily.   • hydrALAZINE (APRESOLINE) 50 MG tablet TAKE 1.5 TABLETS BY MOUTH 3 TIMES A DAY.   • HYDROcodone-acetaminophen (NORCO) 7.5-325 MG per tablet Take 1 tablet by mouth 3 (Three) Times a Day As Needed for Moderate Pain (4-6).   • hydrOXYzine (ATARAX) 25 MG tablet Take 25 mg by mouth at night as needed.   • lansoprazole (PREVACID) 15 MG capsule Take 15 mg by mouth As Needed.   • loratadine (CLARITIN) 10 MG tablet Take 1 tablet by mouth Daily.   • meclizine (ANTIVERT) 25 MG tablet Take 1 tablet by mouth 3 (three) times a day as needed for dizziness.   • metoprolol tartrate (LOPRESSOR) 25 MG tablet Take 1 tablet by mouth 2 (Two) Times a Day.   • metroNIDAZOLE (FLAGYL) 500 MG tablet Take 1 tablet by mouth 3 (Three) Times a Day.   • Naloxegol Oxalate 12.5 MG tablet Take 1 tablet by mouth Daily.   • pantoprazole (PROTONIX) 40 MG EC tablet Take 40 mg by mouth 2 (Two) Times a Day.   • Pyridoxine HCl (VITAMIN B-6 PO) Take  by mouth daily.   • rosuvastatin (CRESTOR) 10 MG tablet Take 10 mg by mouth daily.   • tamsulosin (FLOMAX) 0.4 MG capsule 24 hr capsule Take 1 capsule by mouth Daily.     No current facility-administered medications on file prior to visit.          The following portions of the patient's history were reviewed and updated as appropriate: allergies, current medications, past family history, past medical history, past social history, past surgical history and problem list.    Review of Systems   Constitution: Positive for malaise/fatigue.   HENT: Negative.    Eyes: Negative.    Cardiovascular: Negative.    Respiratory: Positive for shortness of breath.    Endocrine: Negative.    Skin: Negative.    Musculoskeletal:  "Positive for arthritis, back pain, myalgias and stiffness.   Gastrointestinal: Negative for heartburn, nausea and vomiting.   Genitourinary: Negative.    Neurological: Negative.    Psychiatric/Behavioral: Negative.    Allergic/Immunologic: Negative.           Objective:     /80 (BP Location: Left arm, Patient Position: Sitting, Cuff Size: Adult)  Pulse 73  Resp 18  Ht 71\" (180.3 cm)  Wt 222 lb (101 kg)  SpO2 95%  BMI 30.96 kg/m2  Physical Exam   Constitutional: He appears well-developed and well-nourished.   HENT:   Head: Normocephalic and atraumatic.   Mouth/Throat: Oropharynx is clear and moist.   Eyes: Conjunctivae and EOM are normal. Pupils are equal, round, and reactive to light. No scleral icterus.   Neck: Normal range of motion. Neck supple. No JVD present. No tracheal deviation present. No thyromegaly present.   Cardiovascular: Normal rate, regular rhythm, normal heart sounds and intact distal pulses.  Exam reveals no friction rub.    No murmur heard.  Pulmonary/Chest: Effort normal and breath sounds normal. No respiratory distress. He has no wheezes. He has no rales. He exhibits no tenderness.   Abdominal: Soft. Bowel sounds are normal. He exhibits no distension and no mass. There is no tenderness. There is no rebound and no guarding.   Musculoskeletal: Normal range of motion. He exhibits no edema, tenderness or deformity.   Lymphadenopathy:     He has no cervical adenopathy.   Neurological: He is alert. He has normal reflexes. No cranial nerve deficit. He exhibits normal muscle tone. Coordination normal.   Skin: Skin is warm and dry.   Psychiatric: He has a normal mood and affect. His behavior is normal. Judgment and thought content normal.         Lab Review  Lab Results   Component Value Date     05/11/2017    K 3.5 05/11/2017     05/11/2017    BUN 15 05/11/2017    CREATININE 1.54 (H) 05/11/2017    GLUCOSE 132 (H) 05/11/2017    CALCIUM 9.4 05/11/2017    ALT 35 05/11/2017    " ALKPHOS 60 05/11/2017    LABIL2 1.7 05/11/2017     Lab Results   Component Value Date    CKTOTAL 372 (C) 04/11/2017     Lab Results   Component Value Date    WBC 12.97 (H) 05/11/2017    HGB 16.7 05/11/2017    HCT 48.3 05/11/2017     05/11/2017     No results found for: INR  No results found for: MG  Lab Results   Component Value Date    PSA 3.00 05/26/2016    TSH 2.807 09/28/2015     No results found for: BNP  Lab Results   Component Value Date    CHOL 178 04/11/2017    CHLPL 227 (H) 03/07/2016    TRIG 199 (H) 04/11/2017    HDL 45 (L) 04/11/2017    LDLCALC 93 04/11/2017    VLDL 39.8 04/11/2017    LDLHDL 2.07 04/11/2017         Procedures       I personally viewed and interpreted the patient's LAB data         Assessment:     1. Mixed hyperlipidemia    2. Essential hypertension    3. Type 2 diabetes mellitus with stage 2 chronic kidney disease, without long-term current use of insulin    4. Renal failure          Plan:      Abdominal symptoms are all better.  Patient is having normal bowel movements and no abdominal pain.    Renal functions are better creatinine has improved from 1.7 down to 1.54.    Lipids showed normal cholesterol but triglyceride is elevated.  HDL is 45 LDL is still elevated at 93.  However CPK is elevated patient is taking Crestor 10 mg daily and CABG would need to watch closely Crestor dose could not be increased.    Blood pressure is very well controlled.  Renal functions as mentioned are better patient was advised to drink more fluids.    Diabetic control is poor    Lab work and follow-up scheduled          No Follow-up on file.

## 2017-08-04 RX ORDER — HYDROCODONE BITARTRATE AND ACETAMINOPHEN 7.5; 325 MG/1; MG/1
1 TABLET ORAL 3 TIMES DAILY PRN
Qty: 90 TABLET | Refills: 0 | Status: SHIPPED | OUTPATIENT
Start: 2017-08-04 | End: 2017-11-17 | Stop reason: SDUPTHER

## 2017-08-04 RX ORDER — ALPRAZOLAM 0.25 MG/1
0.25 TABLET ORAL 2 TIMES DAILY PRN
Qty: 60 TABLET | Refills: 2 | Status: SHIPPED | OUTPATIENT
Start: 2017-08-04 | End: 2018-01-04 | Stop reason: SDUPTHER

## 2017-08-23 RX ORDER — HYDRALAZINE HYDROCHLORIDE 50 MG/1
50 TABLET, FILM COATED ORAL 3 TIMES DAILY
Qty: 270 TABLET | Refills: 0 | Status: SHIPPED | OUTPATIENT
Start: 2017-08-23 | End: 2017-10-11 | Stop reason: SDUPTHER

## 2017-08-23 RX ORDER — FLUOXETINE HYDROCHLORIDE 20 MG/1
20 CAPSULE ORAL DAILY
Qty: 90 CAPSULE | Refills: 2 | Status: SHIPPED | OUTPATIENT
Start: 2017-08-23 | End: 2018-01-04 | Stop reason: SDUPTHER

## 2017-08-23 RX ORDER — TAMSULOSIN HYDROCHLORIDE 0.4 MG/1
1 CAPSULE ORAL DAILY
Qty: 90 CAPSULE | Refills: 2 | Status: SHIPPED | OUTPATIENT
Start: 2017-08-23 | End: 2018-01-04 | Stop reason: SDUPTHER

## 2017-08-23 RX ORDER — FOLIC ACID 1 MG/1
1 TABLET ORAL DAILY
Qty: 90 TABLET | Refills: 2 | Status: SHIPPED | OUTPATIENT
Start: 2017-08-23 | End: 2018-01-04 | Stop reason: SDUPTHER

## 2017-08-23 RX ORDER — AMLODIPINE BESYLATE 10 MG/1
10 TABLET ORAL DAILY
Qty: 90 TABLET | Refills: 2 | Status: SHIPPED | OUTPATIENT
Start: 2017-08-23 | End: 2018-01-04 | Stop reason: SDUPTHER

## 2017-09-19 RX ORDER — PANTOPRAZOLE SODIUM 40 MG/1
40 TABLET, DELAYED RELEASE ORAL 2 TIMES DAILY
Qty: 180 TABLET | Refills: 0 | Status: SHIPPED | OUTPATIENT
Start: 2017-09-19 | End: 2018-07-16 | Stop reason: SDUPTHER

## 2017-10-09 ENCOUNTER — LAB (OUTPATIENT)
Dept: LAB | Facility: HOSPITAL | Age: 72
End: 2017-10-09

## 2017-10-09 DIAGNOSIS — N18.2 TYPE 2 DIABETES MELLITUS WITH STAGE 2 CHRONIC KIDNEY DISEASE, WITHOUT LONG-TERM CURRENT USE OF INSULIN (HCC): ICD-10-CM

## 2017-10-09 DIAGNOSIS — N19 RENAL FAILURE: ICD-10-CM

## 2017-10-09 DIAGNOSIS — E78.2 MIXED HYPERLIPIDEMIA: ICD-10-CM

## 2017-10-09 DIAGNOSIS — E11.22 TYPE 2 DIABETES MELLITUS WITH STAGE 2 CHRONIC KIDNEY DISEASE, WITHOUT LONG-TERM CURRENT USE OF INSULIN (HCC): ICD-10-CM

## 2017-10-09 LAB
ALBUMIN SERPL-MCNC: 4.6 G/DL (ref 3.4–4.8)
ALBUMIN UR-MCNC: 11.1 MG/L
ALBUMIN/GLOB SERPL: 2.1 G/DL (ref 1.5–2.5)
ALP SERPL-CCNC: 55 U/L (ref 40–129)
ALT SERPL W P-5'-P-CCNC: 38 U/L (ref 10–44)
ANION GAP SERPL CALCULATED.3IONS-SCNC: 8.6 MMOL/L (ref 3.6–11.2)
AST SERPL-CCNC: 27 U/L (ref 10–34)
BASOPHILS # BLD AUTO: 0.05 10*3/MM3 (ref 0–0.3)
BASOPHILS NFR BLD AUTO: 0.6 % (ref 0–2)
BILIRUB SERPL-MCNC: 0.7 MG/DL (ref 0.2–1.8)
BUN BLD-MCNC: 16 MG/DL (ref 7–21)
BUN/CREAT SERPL: 9.8 (ref 7–25)
CALCIUM SPEC-SCNC: 9.5 MG/DL (ref 7.7–10)
CHLORIDE SERPL-SCNC: 109 MMOL/L (ref 99–112)
CHOLEST SERPL-MCNC: 165 MG/DL (ref 0–200)
CK SERPL-CCNC: 165 U/L (ref 24–204)
CO2 SERPL-SCNC: 27.4 MMOL/L (ref 24.3–31.9)
CREAT BLD-MCNC: 1.64 MG/DL (ref 0.43–1.29)
DEPRECATED RDW RBC AUTO: 46.3 FL (ref 37–54)
EOSINOPHIL # BLD AUTO: 0.97 10*3/MM3 (ref 0–0.7)
EOSINOPHIL NFR BLD AUTO: 12.2 % (ref 0–7)
ERYTHROCYTE [DISTWIDTH] IN BLOOD BY AUTOMATED COUNT: 13.7 % (ref 11.5–14.5)
GFR SERPL CREATININE-BSD FRML MDRD: 42 ML/MIN/1.73
GLOBULIN UR ELPH-MCNC: 2.2 GM/DL
GLUCOSE BLD-MCNC: 125 MG/DL (ref 70–110)
HBA1C MFR BLD: 6.1 % (ref 4.5–5.7)
HCT VFR BLD AUTO: 43.5 % (ref 42–52)
HDLC SERPL-MCNC: 48 MG/DL (ref 60–100)
HGB BLD-MCNC: 15.3 G/DL (ref 14–18)
IMM GRANULOCYTES # BLD: 0.02 10*3/MM3 (ref 0–0.03)
IMM GRANULOCYTES NFR BLD: 0.3 % (ref 0–0.5)
LDLC SERPL CALC-MCNC: 85 MG/DL (ref 0–100)
LDLC/HDLC SERPL: 1.77 {RATIO}
LYMPHOCYTES # BLD AUTO: 2.3 10*3/MM3 (ref 1–3)
LYMPHOCYTES NFR BLD AUTO: 29 % (ref 16–46)
MCH RBC QN AUTO: 32.8 PG (ref 27–33)
MCHC RBC AUTO-ENTMCNC: 35.2 G/DL (ref 33–37)
MCV RBC AUTO: 93.3 FL (ref 80–94)
MONOCYTES # BLD AUTO: 0.73 10*3/MM3 (ref 0.1–0.9)
MONOCYTES NFR BLD AUTO: 9.2 % (ref 0–12)
NEUTROPHILS # BLD AUTO: 3.85 10*3/MM3 (ref 1.4–6.5)
NEUTROPHILS NFR BLD AUTO: 48.7 % (ref 40–75)
OSMOLALITY SERPL CALC.SUM OF ELEC: 291.4 MOSM/KG (ref 273–305)
PLATELET # BLD AUTO: 147 10*3/MM3 (ref 130–400)
PMV BLD AUTO: 12.6 FL (ref 6–10)
POTASSIUM BLD-SCNC: 4 MMOL/L (ref 3.5–5.3)
PROT SERPL-MCNC: 6.8 G/DL (ref 6–8)
RBC # BLD AUTO: 4.66 10*6/MM3 (ref 4.7–6.1)
SODIUM BLD-SCNC: 145 MMOL/L (ref 135–153)
TRIGL SERPL-MCNC: 161 MG/DL (ref 0–150)
VLDLC SERPL-MCNC: 32.2 MG/DL
WBC NRBC COR # BLD: 7.92 10*3/MM3 (ref 4.5–12.5)

## 2017-10-09 PROCEDURE — 85025 COMPLETE CBC W/AUTO DIFF WBC: CPT | Performed by: INTERNAL MEDICINE

## 2017-10-09 PROCEDURE — 80061 LIPID PANEL: CPT | Performed by: INTERNAL MEDICINE

## 2017-10-09 PROCEDURE — 83036 HEMOGLOBIN GLYCOSYLATED A1C: CPT | Performed by: INTERNAL MEDICINE

## 2017-10-09 PROCEDURE — 82043 UR ALBUMIN QUANTITATIVE: CPT | Performed by: INTERNAL MEDICINE

## 2017-10-09 PROCEDURE — 80053 COMPREHEN METABOLIC PANEL: CPT | Performed by: INTERNAL MEDICINE

## 2017-10-09 PROCEDURE — 82550 ASSAY OF CK (CPK): CPT | Performed by: INTERNAL MEDICINE

## 2017-10-11 ENCOUNTER — OFFICE VISIT (OUTPATIENT)
Dept: CARDIOLOGY | Facility: CLINIC | Age: 72
End: 2017-10-11

## 2017-10-11 VITALS
DIASTOLIC BLOOD PRESSURE: 60 MMHG | HEIGHT: 71 IN | BODY MASS INDEX: 31.64 KG/M2 | HEART RATE: 60 BPM | OXYGEN SATURATION: 96 % | SYSTOLIC BLOOD PRESSURE: 132 MMHG | WEIGHT: 226 LBS

## 2017-10-11 DIAGNOSIS — N40.1 BENIGN PROSTATIC HYPERPLASIA WITH INCOMPLETE BLADDER EMPTYING: ICD-10-CM

## 2017-10-11 DIAGNOSIS — I10 ESSENTIAL HYPERTENSION: Primary | ICD-10-CM

## 2017-10-11 DIAGNOSIS — K21.9 GASTROESOPHAGEAL REFLUX DISEASE WITHOUT ESOPHAGITIS: ICD-10-CM

## 2017-10-11 DIAGNOSIS — E78.2 MIXED HYPERLIPIDEMIA: ICD-10-CM

## 2017-10-11 DIAGNOSIS — Z23 IMMUNIZATION DUE: ICD-10-CM

## 2017-10-11 DIAGNOSIS — R39.14 BENIGN PROSTATIC HYPERPLASIA WITH INCOMPLETE BLADDER EMPTYING: ICD-10-CM

## 2017-10-11 DIAGNOSIS — E11.22 TYPE 2 DIABETES MELLITUS WITH STAGE 2 CHRONIC KIDNEY DISEASE, WITHOUT LONG-TERM CURRENT USE OF INSULIN (HCC): ICD-10-CM

## 2017-10-11 DIAGNOSIS — N18.2 STAGE 2 CHRONIC KIDNEY DISEASE: ICD-10-CM

## 2017-10-11 DIAGNOSIS — N18.2 TYPE 2 DIABETES MELLITUS WITH STAGE 2 CHRONIC KIDNEY DISEASE, WITHOUT LONG-TERM CURRENT USE OF INSULIN (HCC): ICD-10-CM

## 2017-10-11 PROCEDURE — G0008 ADMIN INFLUENZA VIRUS VAC: HCPCS | Performed by: INTERNAL MEDICINE

## 2017-10-11 PROCEDURE — 99214 OFFICE O/P EST MOD 30 MIN: CPT | Performed by: INTERNAL MEDICINE

## 2017-10-11 PROCEDURE — 90686 IIV4 VACC NO PRSV 0.5 ML IM: CPT | Performed by: INTERNAL MEDICINE

## 2017-10-11 RX ORDER — HYDRALAZINE HYDROCHLORIDE 50 MG/1
TABLET, FILM COATED ORAL
Qty: 270 TABLET | Refills: 0 | Status: SHIPPED | OUTPATIENT
Start: 2017-10-11 | End: 2018-01-04 | Stop reason: SDUPTHER

## 2017-10-11 NOTE — PROGRESS NOTES
subjective     Chief Complaint   Patient presents with   • Hypertension   • Hyperlipidemia     History of Present Illness   A she is here for follow-up.  He seems to be doing very well    HYPERTENSION  Lei Stapleton has long-standing history of essential hypertension. he is taking medications regularly. There are no medication side effects. Blood pressure is very well controlled. There has been no headache, nausea or chest pain. There has been no syncopal or presyncopal episode. he denies episodes of hypo-tension or accelerated hypertension.      Hyperlipidemia  Lei Stapleton has long-standing history of hyperlipidemia. Has been trying to lose weight and trying to follow diet and activity recommandations. Patient is tolerating medications very well. There has been no side effects. Latest lipid levels showed normal cholesterol with elevated triglyceride probably related to weight and diabetes.  CPK has been elevated also which may be related to Crestor therapy will watch closely     GERD  Lei Stapleton has long-standing history of gastroesophageal reflux disorder however he significantly better on medications. There is no nausea or vomiting. No hematemesis or melena. No abdominal pain. Mild epigastric heartburns are noted. Appetite is good bowel movements are normal.      Chronic pain syndrome  Back pain controlled with low-dose hydrocodone.      Renal failure  Creatinine is around 1. 54 last visit . patient has been drinking a lot of fluids and he has been avoiding NSAID therapy. Follow-up BMP will be checkedagain.      Hyperglycemia  Patient has no prior history of diabetes mellitus however lately his sugar has been elevated is around 130. Patient thinks that he can controlled with diet alone       Past Surgical History:   Procedure Laterality Date   • COLONOSCOPY  09/2009   • EYE SURGERY     • FOOT SURGERY     • HERNIA REPAIR     • HYDROCELECTOMY  06/15/2015   • RHINOPLASTY     • UPPER GASTROINTESTINAL  ENDOSCOPY  03/24/2014    WITH BIOPSIES AND DILATION     Family History   Problem Relation Age of Onset   • Kidney disease Other    • Other Other      CHRONIC DISABLING DISEASES URINARY TRACT DISEASE   • Diabetes Other    • Hypertension Other    • Other Mother    • Heart failure Father    • Stroke Sister    • Arthritis Sister    • Heart disease Brother    • No Known Problems Brother    • No Known Problems Brother      Past Medical History:   Diagnosis Date   • Anxiety    • Depression    • GERD (gastroesophageal reflux disease)    • History of EKG 12/22/2015    NORMAL   • Hyperlipidemia    • Hypertension    • Obesity    • Renal failure     MILD     Patient Active Problem List   Diagnosis   • Essential hypertension, labile   • GERD (gastroesophageal reflux disease)   • Renal failure   • Hyperlipidemia   • Anxiety   • Depression   • Benign prostatic hypertrophy (BPH) with incomplete bladder emptying   • Type 2 diabetes mellitus   • Periumbilical abdominal pain   • Pharyngitis       Social History   Substance Use Topics   • Smoking status: Never Smoker   • Smokeless tobacco: Never Used   • Alcohol use 4.2 oz/week     7 Shots of liquor per week      Comment: SOCIAL       Allergies   Allergen Reactions   • Bactrim [Sulfamethoxazole-Trimethoprim]        Current Outpatient Prescriptions on File Prior to Visit   Medication Sig   • allopurinol (ZYLOPRIM) 300 MG tablet    • ALPRAZolam (XANAX) 0.25 MG tablet Take 1 tablet by mouth 2 (Two) Times a Day As Needed (as needed for anxiety).   • amLODIPine (NORVASC) 10 MG tablet Take 1 tablet by mouth Daily.   • aspirin 81 MG tablet Take 81 mg by mouth daily.   • cholecalciferol (VITAMIN D3) 1000 UNITS tablet Take 1,000 Units by mouth daily.   • cloNIDine (CATAPRES) 0.1 MG tablet Take 0.05 mg by mouth As Needed. TID TO QID PRN    • Cyanocobalamin (VITAMIN B12 PO) Take  by mouth daily.   • FLUoxetine (PROzac) 20 MG capsule Take 1 capsule by mouth Daily.   • folic acid (FOLVITE) 1 MG  tablet Take 1 tablet by mouth Daily.   • HYDROcodone-acetaminophen (NORCO) 7.5-325 MG per tablet Take 1 tablet by mouth 3 (Three) Times a Day As Needed for Moderate Pain (4-6).   • hydrOXYzine (ATARAX) 25 MG tablet Take 25 mg by mouth at night as needed.   • lansoprazole (PREVACID) 15 MG capsule Take 15 mg by mouth As Needed.   • loratadine (CLARITIN) 10 MG tablet Take 1 tablet by mouth Daily.   • meclizine (ANTIVERT) 25 MG tablet Take 1 tablet by mouth 3 (three) times a day as needed for dizziness.   • metoprolol tartrate (LOPRESSOR) 25 MG tablet Take 1 tablet by mouth 2 (Two) Times a Day.   • Naloxegol Oxalate 12.5 MG tablet Take 1 tablet by mouth Daily.   • pantoprazole (PROTONIX) 40 MG EC tablet Take 1 tablet by mouth 2 (Two) Times a Day.   • Pyridoxine HCl (VITAMIN B-6 PO) Take  by mouth daily.   • rosuvastatin (CRESTOR) 10 MG tablet Take 10 mg by mouth daily.   • tamsulosin (FLOMAX) 0.4 MG capsule 24 hr capsule Take 1 capsule by mouth Daily.   • [DISCONTINUED] hydrALAZINE (APRESOLINE) 50 MG tablet Take 1 tablet by mouth 3 (Three) Times a Day.     No current facility-administered medications on file prior to visit.          The following portions of the patient's history were reviewed and updated as appropriate: allergies, current medications, past family history, past medical history, past social history, past surgical history and problem list.    Review of Systems   Constitution: Negative.   HENT: Negative.  Negative for congestion.    Eyes: Negative.    Cardiovascular: Negative.  Negative for chest pain, cyanosis, dyspnea on exertion, irregular heartbeat, leg swelling, near-syncope, orthopnea, palpitations, paroxysmal nocturnal dyspnea and syncope.   Respiratory: Negative.  Negative for shortness of breath.    Hematologic/Lymphatic: Negative.    Musculoskeletal: Negative.    Gastrointestinal: Negative.    Neurological: Negative.  Negative for headaches.          Objective:     /60 (BP Location: Left  "arm, Patient Position: Sitting)  Pulse 60  Ht 71\" (180.3 cm)  Wt 226 lb (103 kg)  SpO2 96%  BMI 31.52 kg/m2  Physical Exam   Constitutional: He appears well-developed and well-nourished. No distress.   HENT:   Head: Normocephalic and atraumatic.   Mouth/Throat: Oropharynx is clear and moist. No oropharyngeal exudate.   Eyes: Conjunctivae and EOM are normal. Pupils are equal, round, and reactive to light. No scleral icterus.   Neck: Normal range of motion. Neck supple. No JVD present. No tracheal deviation present. No thyromegaly present.   Cardiovascular: Normal rate, regular rhythm, normal heart sounds and intact distal pulses.  PMI is not displaced.  Exam reveals no gallop, no friction rub and no decreased pulses.    No murmur heard.  Pulses:       Carotid pulses are 3+ on the right side, and 3+ on the left side.       Radial pulses are 3+ on the right side, and 3+ on the left side.   Pulmonary/Chest: Effort normal and breath sounds normal. No respiratory distress. He has no wheezes. He has no rales. He exhibits no tenderness.   Abdominal: Soft. Bowel sounds are normal. He exhibits no distension, no abdominal bruit and no mass. There is no splenomegaly or hepatomegaly. There is no tenderness. There is no rebound and no guarding.   Musculoskeletal: Normal range of motion. He exhibits no edema, tenderness or deformity.   Lymphadenopathy:     He has no cervical adenopathy.   Neurological: He is alert. He has normal reflexes. No cranial nerve deficit. He exhibits normal muscle tone. Coordination normal.   Skin: Skin is warm and dry. No rash noted. He is not diaphoretic. No erythema.   Psychiatric: He has a normal mood and affect. His behavior is normal. Judgment and thought content normal.         Lab Review  Lab Results   Component Value Date     10/09/2017    K 4.0 10/09/2017     10/09/2017    BUN 16 10/09/2017    CREATININE 1.64 (H) 10/09/2017    GLUCOSE 125 (H) 10/09/2017    CALCIUM 9.5 10/09/2017 "    ALT 38 10/09/2017    ALKPHOS 55 10/09/2017    LABIL2 2.1 10/09/2017     Lab Results   Component Value Date    CKTOTAL 165 10/09/2017     Lab Results   Component Value Date    WBC 7.92 10/09/2017    HGB 15.3 10/09/2017    HCT 43.5 10/09/2017     10/09/2017     No results found for: INR  No results found for: MG  Lab Results   Component Value Date    PSA 3.00 05/26/2016    TSH 2.807 09/28/2015     No results found for: BNP  Lab Results   Component Value Date    CHLPL 227 (H) 03/07/2016    CHOL 165 10/09/2017    TRIG 161 (H) 10/09/2017    HDL 48 (L) 10/09/2017    LDLCALC 85 10/09/2017    VLDL 32.2 10/09/2017    LDLHDL 1.77 10/09/2017         Procedures       I personally viewed and interpreted the patient's LAB data         Assessment:     1. Essential hypertension    2. Mixed hyperlipidemia    3. Gastroesophageal reflux disease without esophagitis    4. Type 2 diabetes mellitus with stage 2 chronic kidney disease, without long-term current use of insulin    5. Stage 2 chronic kidney disease    6. Benign prostatic hypertrophy (BPH) with incomplete bladder emptying          Plan:   Labs reviewed and discussed with the patient  Creatinine is elevated to 1.64.  Kidney failure is slightly worse.  Blood sugar and triglycerides are mildly elevated.  Cholesterol is well controlled.  Patient was encouraged to drink more fluids.  Nephrotoxic drugs were discussed with the patient    He is tolerating statin therapy very well.  CPK is not elevated and liver functions are normal.  Blood pressure is also very well controlled.  Flu shot was given.  Follow-up scheduled      No Follow-up on file.

## 2017-11-20 RX ORDER — HYDROCODONE BITARTRATE AND ACETAMINOPHEN 7.5; 325 MG/1; MG/1
1 TABLET ORAL 3 TIMES DAILY PRN
Qty: 90 TABLET | Refills: 0
Start: 2017-11-20 | End: 2018-02-01 | Stop reason: SDUPTHER

## 2017-12-04 ENCOUNTER — TELEPHONE (OUTPATIENT)
Dept: CARDIOLOGY | Facility: CLINIC | Age: 72
End: 2017-12-04

## 2017-12-04 RX ORDER — CIPROFLOXACIN 250 MG/1
250 TABLET, FILM COATED ORAL 2 TIMES DAILY
Qty: 20 TABLET | Refills: 0 | Status: SHIPPED | OUTPATIENT
Start: 2017-12-04 | End: 2018-04-05

## 2017-12-04 NOTE — TELEPHONE ENCOUNTER
----- Message from Antione De Santiago MD sent at 12/4/2017  4:46 PM EST -----  Regarding: RE: antibiotic  Cipro 250 twice a day 7 days  ----- Message -----     From: Shauna Guzman     Sent: 12/4/2017   3:25 PM       To: Antione De Santiago MD  Subject: antibiotic                                        Patient c/o sinus infection want cipro called in

## 2017-12-26 ENCOUNTER — LAB (OUTPATIENT)
Dept: LAB | Facility: HOSPITAL | Age: 72
End: 2017-12-26
Attending: INTERNAL MEDICINE

## 2017-12-26 ENCOUNTER — HOSPITAL ENCOUNTER (OUTPATIENT)
Dept: GENERAL RADIOLOGY | Facility: HOSPITAL | Age: 72
Discharge: HOME | End: 2017-12-26
Attending: INTERNAL MEDICINE | Admitting: INTERNAL MEDICINE

## 2017-12-26 DIAGNOSIS — M79.672 FOOT PAIN, LEFT: ICD-10-CM

## 2017-12-26 DIAGNOSIS — M10.9 GOUT, UNSPECIFIED CAUSE, UNSPECIFIED CHRONICITY, UNSPECIFIED SITE: ICD-10-CM

## 2017-12-26 DIAGNOSIS — M10.9 GOUT, UNSPECIFIED CAUSE, UNSPECIFIED CHRONICITY, UNSPECIFIED SITE: Primary | ICD-10-CM

## 2017-12-26 LAB — URATE SERPL-MCNC: 5.7 MG/DL (ref 3.7–7)

## 2017-12-26 PROCEDURE — 84550 ASSAY OF BLOOD/URIC ACID: CPT

## 2017-12-26 PROCEDURE — 73620 X-RAY EXAM OF FOOT: CPT

## 2017-12-26 PROCEDURE — 73620 X-RAY EXAM OF FOOT: CPT | Performed by: RADIOLOGY

## 2017-12-26 RX ORDER — INDOMETHACIN 25 MG/1
25 CAPSULE ORAL 3 TIMES DAILY PRN
Qty: 90 CAPSULE | Refills: 0 | Status: SHIPPED | OUTPATIENT
Start: 2017-12-26 | End: 2019-01-24

## 2017-12-26 NOTE — TELEPHONE ENCOUNTER
Patient c/o gout flaring up again would like something called in,  Left foot swollen and painful.      V/o patient need to get foot xray and uric acid level checked per Dr De Santiago

## 2017-12-27 ENCOUNTER — TELEPHONE (OUTPATIENT)
Dept: CARDIOLOGY | Facility: CLINIC | Age: 72
End: 2017-12-27

## 2017-12-27 NOTE — TELEPHONE ENCOUNTER
MD Cece Olivia MA                   X-rays good, uric acid is also good.  Continue Indocin as prescribed

## 2018-01-04 RX ORDER — FOLIC ACID 1 MG/1
1 TABLET ORAL DAILY
Qty: 90 TABLET | Refills: 2 | Status: SHIPPED | OUTPATIENT
Start: 2018-01-04 | End: 2018-06-04 | Stop reason: SDUPTHER

## 2018-01-04 RX ORDER — ALPRAZOLAM 0.25 MG/1
0.25 TABLET ORAL 2 TIMES DAILY PRN
Qty: 60 TABLET | Refills: 2 | OUTPATIENT
Start: 2018-01-04 | End: 2018-06-28 | Stop reason: SDUPTHER

## 2018-01-04 RX ORDER — HYDRALAZINE HYDROCHLORIDE 50 MG/1
50 TABLET, FILM COATED ORAL 3 TIMES DAILY
Qty: 270 TABLET | Refills: 2 | Status: SHIPPED | OUTPATIENT
Start: 2018-01-04 | End: 2018-10-24 | Stop reason: SDUPTHER

## 2018-01-04 RX ORDER — TAMSULOSIN HYDROCHLORIDE 0.4 MG/1
1 CAPSULE ORAL DAILY
Qty: 90 CAPSULE | Refills: 2 | Status: SHIPPED | OUTPATIENT
Start: 2018-01-04 | End: 2018-06-04 | Stop reason: SDUPTHER

## 2018-01-04 RX ORDER — FLUOXETINE HYDROCHLORIDE 20 MG/1
20 CAPSULE ORAL DAILY
Qty: 90 CAPSULE | Refills: 2 | Status: SHIPPED | OUTPATIENT
Start: 2018-01-04 | End: 2018-06-04 | Stop reason: SDUPTHER

## 2018-01-04 RX ORDER — ALLOPURINOL 300 MG/1
300 TABLET ORAL DAILY
Qty: 90 TABLET | Refills: 0 | Status: SHIPPED | OUTPATIENT
Start: 2018-01-04 | End: 2019-07-24 | Stop reason: SDUPTHER

## 2018-01-04 RX ORDER — AMLODIPINE BESYLATE 10 MG/1
10 TABLET ORAL DAILY
Qty: 90 TABLET | Refills: 2 | Status: SHIPPED | OUTPATIENT
Start: 2018-01-04 | End: 2018-06-04 | Stop reason: SDUPTHER

## 2018-01-10 ENCOUNTER — OFFICE VISIT (OUTPATIENT)
Dept: CARDIOLOGY | Facility: CLINIC | Age: 73
End: 2018-01-10

## 2018-01-10 VITALS
HEART RATE: 64 BPM | DIASTOLIC BLOOD PRESSURE: 78 MMHG | BODY MASS INDEX: 31.86 KG/M2 | HEIGHT: 71 IN | WEIGHT: 227.6 LBS | SYSTOLIC BLOOD PRESSURE: 142 MMHG | OXYGEN SATURATION: 97 %

## 2018-01-10 DIAGNOSIS — I10 ESSENTIAL HYPERTENSION: Primary | ICD-10-CM

## 2018-01-10 DIAGNOSIS — K21.9 GASTROESOPHAGEAL REFLUX DISEASE WITHOUT ESOPHAGITIS: ICD-10-CM

## 2018-01-10 DIAGNOSIS — N18.2 TYPE 2 DIABETES MELLITUS WITH STAGE 2 CHRONIC KIDNEY DISEASE, WITHOUT LONG-TERM CURRENT USE OF INSULIN (HCC): ICD-10-CM

## 2018-01-10 DIAGNOSIS — E11.22 TYPE 2 DIABETES MELLITUS WITH STAGE 2 CHRONIC KIDNEY DISEASE, WITHOUT LONG-TERM CURRENT USE OF INSULIN (HCC): ICD-10-CM

## 2018-01-10 DIAGNOSIS — E78.2 MIXED HYPERLIPIDEMIA: ICD-10-CM

## 2018-01-10 DIAGNOSIS — F41.9 ANXIETY: ICD-10-CM

## 2018-01-10 PROBLEM — J02.9 PHARYNGITIS: Status: RESOLVED | Noted: 2017-05-11 | Resolved: 2018-01-10

## 2018-01-10 PROCEDURE — 99214 OFFICE O/P EST MOD 30 MIN: CPT | Performed by: INTERNAL MEDICINE

## 2018-01-10 NOTE — PROGRESS NOTES
subjective     Chief Complaint   Patient presents with   • Follow-up   • Essential Hypertension   • Hyperlipidemia   • Heartburn     History of Present Illness  Patient is 72 years old white male who is here for follow-up.  Patient has essential hypertension.  He is taking his medications regularly.  Clonidine is causing some dry mouth otherwise is doing very well.  Blood pressure most of the time is good.    Patient also has hyperlipidemia he is taking Crestor 10 mg daily.  There are no drug side effects.  Lipid panel has been normal lab work scheduled for next visit.    Patient also has history of diabetes mellitus he is trying to diet and exercise.  Patient complains of mild epigastric discomfort.    He has diabetic nephropathy and mild renal failure with renal functions are stable.  Lab work scheduled for next visit.    Past Surgical History:   Procedure Laterality Date   • COLONOSCOPY  09/2009   • EYE SURGERY     • FOOT SURGERY     • HERNIA REPAIR     • HYDROCELECTOMY  06/15/2015   • RHINOPLASTY     • UPPER GASTROINTESTINAL ENDOSCOPY  03/24/2014    WITH BIOPSIES AND DILATION     Family History   Problem Relation Age of Onset   • Kidney disease Other    • Other Other      CHRONIC DISABLING DISEASES URINARY TRACT DISEASE   • Diabetes Other    • Hypertension Other    • Other Mother    • Heart failure Father    • Stroke Sister    • Arthritis Sister    • Heart disease Brother    • No Known Problems Brother    • No Known Problems Brother      Past Medical History:   Diagnosis Date   • Anxiety    • Depression    • GERD (gastroesophageal reflux disease)    • History of EKG 12/22/2015    NORMAL   • Hyperlipidemia    • Hypertension    • Obesity    • Renal failure     MILD     Patient Active Problem List   Diagnosis   • Essential hypertension, labile   • GERD (gastroesophageal reflux disease)   • Renal failure   • Hyperlipidemia   • Anxiety   • Depression   • Benign prostatic hypertrophy (BPH) with incomplete bladder  emptying   • Type 2 diabetes mellitus   • Periumbilical abdominal pain       Social History   Substance Use Topics   • Smoking status: Never Smoker   • Smokeless tobacco: Never Used   • Alcohol use 4.2 oz/week     7 Shots of liquor per week      Comment: SOCIAL       Allergies   Allergen Reactions   • Bactrim [Sulfamethoxazole-Trimethoprim]        Current Outpatient Prescriptions on File Prior to Visit   Medication Sig   • allopurinol (ZYLOPRIM) 300 MG tablet Take 1 tablet by mouth Daily.   • ALPRAZolam (XANAX) 0.25 MG tablet Take 1 tablet by mouth 2 (Two) Times a Day As Needed (as needed for anxiety).   • amLODIPine (NORVASC) 10 MG tablet Take 1 tablet by mouth Daily.   • aspirin 81 MG tablet Take 81 mg by mouth daily.   • cholecalciferol (VITAMIN D3) 1000 UNITS tablet Take 1,000 Units by mouth daily.   • Cyanocobalamin (VITAMIN B12 PO) Take  by mouth daily.   • FLUoxetine (PROzac) 20 MG capsule Take 1 capsule by mouth Daily.   • folic acid (FOLVITE) 1 MG tablet Take 1 tablet by mouth Daily.   • hydrALAZINE (APRESOLINE) 50 MG tablet Take 1 tablet by mouth 3 (Three) Times a Day.   • HYDROcodone-acetaminophen (NORCO) 7.5-325 MG per tablet Take 1 tablet by mouth 3 (Three) Times a Day As Needed for Moderate Pain .   • hydrOXYzine (ATARAX) 25 MG tablet Take 25 mg by mouth at night as needed.   • indomethacin (INDOCIN) 25 MG capsule Take 1 capsule by mouth 3 (Three) Times a Day As Needed for Mild Pain .   • loratadine (CLARITIN) 10 MG tablet Take 1 tablet by mouth Daily.   • meclizine (ANTIVERT) 25 MG tablet Take 1 tablet by mouth 3 (three) times a day as needed for dizziness.   • metoprolol tartrate (LOPRESSOR) 25 MG tablet Take 1 tablet by mouth 2 (Two) Times a Day.   • Naloxegol Oxalate 12.5 MG tablet Take 1 tablet by mouth Daily.   • pantoprazole (PROTONIX) 40 MG EC tablet Take 1 tablet by mouth 2 (Two) Times a Day.   • Pyridoxine HCl (VITAMIN B-6 PO) Take  by mouth daily.   • rosuvastatin (CRESTOR) 10 MG tablet Take  "10 mg by mouth daily.   • tamsulosin (FLOMAX) 0.4 MG capsule 24 hr capsule Take 1 capsule by mouth Daily.   • ciprofloxacin (CIPRO) 250 MG tablet Take 1 tablet by mouth 2 (Two) Times a Day.   • cloNIDine (CATAPRES) 0.1 MG tablet Take 0.05 mg by mouth As Needed. TID TO QID PRN    • lansoprazole (PREVACID) 15 MG capsule Take 15 mg by mouth As Needed.     No current facility-administered medications on file prior to visit.          The following portions of the patient's history were reviewed and updated as appropriate: allergies, current medications, past family history, past medical history, past social history, past surgical history and problem list.    Review of Systems   Constitution: Negative.   HENT: Negative.  Negative for congestion.    Eyes: Negative.    Cardiovascular: Negative.  Negative for chest pain, cyanosis, dyspnea on exertion, irregular heartbeat, leg swelling, near-syncope, orthopnea, palpitations, paroxysmal nocturnal dyspnea and syncope.   Respiratory: Negative.  Negative for shortness of breath.    Hematologic/Lymphatic: Negative.    Musculoskeletal: Negative.    Gastrointestinal: Negative.    Neurological: Negative.  Negative for headaches.          Objective:     /78 (BP Location: Left arm, Patient Position: Sitting, Cuff Size: Adult)  Pulse 64  Ht 180.3 cm (71\")  Wt 103 kg (227 lb 9.6 oz)  SpO2 97%  BMI 31.74 kg/m2  Physical Exam   Constitutional: He appears well-developed and well-nourished. No distress.   HENT:   Head: Normocephalic and atraumatic.   Mouth/Throat: Oropharynx is clear and moist. No oropharyngeal exudate.   Eyes: Conjunctivae and EOM are normal. Pupils are equal, round, and reactive to light. No scleral icterus.   Neck: Normal range of motion. Neck supple. No JVD present. No tracheal deviation present. No thyromegaly present.   Cardiovascular: Normal rate, regular rhythm, normal heart sounds and intact distal pulses.  PMI is not displaced.  Exam reveals no gallop, " no friction rub and no decreased pulses.    No murmur heard.  Pulses:       Carotid pulses are 3+ on the right side, and 3+ on the left side.       Radial pulses are 3+ on the right side, and 3+ on the left side.   Pulmonary/Chest: Effort normal and breath sounds normal. No respiratory distress. He has no wheezes. He has no rales. He exhibits no tenderness.   Abdominal: Soft. Bowel sounds are normal. He exhibits no distension, no abdominal bruit and no mass. There is no splenomegaly or hepatomegaly. There is no tenderness. There is no rebound and no guarding.   Musculoskeletal: Normal range of motion. He exhibits no edema, tenderness or deformity.   Lymphadenopathy:     He has no cervical adenopathy.   Neurological: He is alert. He has normal reflexes. No cranial nerve deficit. He exhibits normal muscle tone. Coordination normal.   Skin: Skin is warm and dry. No rash noted. He is not diaphoretic. No erythema.   Psychiatric: He has a normal mood and affect. His behavior is normal. Judgment and thought content normal.         Lab Review  Lab Results   Component Value Date     10/09/2017    K 4.0 10/09/2017     10/09/2017    BUN 16 10/09/2017    CREATININE 1.64 (H) 10/09/2017    GLUCOSE 125 (H) 10/09/2017    CALCIUM 9.5 10/09/2017    ALT 38 10/09/2017    ALKPHOS 55 10/09/2017    LABIL2 2.1 10/09/2017     Lab Results   Component Value Date    CKTOTAL 165 10/09/2017     Lab Results   Component Value Date    WBC 7.92 10/09/2017    HGB 15.3 10/09/2017    HCT 43.5 10/09/2017     10/09/2017     No results found for: INR  No results found for: MG  Lab Results   Component Value Date    PSA 3.00 05/26/2016    TSH 2.807 09/28/2015     No results found for: BNP  Lab Results   Component Value Date    CHLPL 227 (H) 03/07/2016    CHOL 165 10/09/2017    TRIG 161 (H) 10/09/2017    HDL 48 (L) 10/09/2017    LDLCALC 85 10/09/2017    VLDL 32.2 10/09/2017    LDLHDL 1.77 10/09/2017         Procedures       I personally  viewed and interpreted the patient's LAB data         Assessment:     1. Essential hypertension    2. Mixed hyperlipidemia    3. Gastroesophageal reflux disease without esophagitis    4. Type 2 diabetes mellitus with stage 2 chronic kidney disease, without long-term current use of insulin    5. Anxiety          Plan:   Blood pressure appears to be very well controlled.  Patient was advised to continue Norvasc 10 mg daily.  Clonidine along with hydralazine.  He will check his blood pressure closely and will adjust hydralazine dose.  Clonidine causes him to have dry mouth and dry eyes.  However he will continue current dose of clonidine also    Prozac and Xanax was continued for anxiety.  Patient has hyperlipidemia lab work is significantly better.  Cholesterol used to be 227 and last one 3 months ago was 165.  Aggressive risk factor modification and healthy lifestyle was discussed.    Patient has multiple cardiac risk factors.  He is not having any cardiac symptoms however it was recommended that patient should have a stress test because he has hypertension and hyperlipidemia and diabetes mellitus with advanced age.  Patient will consider and let me know at next visit.  Follow-up scheduled           No Follow-up on file.

## 2018-02-01 RX ORDER — HYDROCODONE BITARTRATE AND ACETAMINOPHEN 7.5; 325 MG/1; MG/1
1 TABLET ORAL 3 TIMES DAILY PRN
Qty: 84 TABLET | Refills: 0 | Status: SHIPPED | OUTPATIENT
Start: 2018-02-01 | End: 2018-04-05 | Stop reason: SDUPTHER

## 2018-02-13 RX ORDER — HYDROXYZINE HYDROCHLORIDE 25 MG/1
25 TABLET, FILM COATED ORAL NIGHTLY PRN
Qty: 90 TABLET | Refills: 2 | Status: SHIPPED | OUTPATIENT
Start: 2018-02-13 | End: 2018-06-11 | Stop reason: SDUPTHER

## 2018-02-22 RX ORDER — ALLOPURINOL 300 MG/1
TABLET ORAL
Qty: 90 TABLET | OUTPATIENT
Start: 2018-02-22

## 2018-03-26 RX ORDER — ROSUVASTATIN CALCIUM 10 MG/1
10 TABLET, COATED ORAL NIGHTLY
Qty: 90 TABLET | Refills: 0 | Status: SHIPPED | OUTPATIENT
Start: 2018-03-26 | End: 2018-10-15 | Stop reason: SDUPTHER

## 2018-04-02 ENCOUNTER — LAB (OUTPATIENT)
Dept: LAB | Facility: HOSPITAL | Age: 73
End: 2018-04-02
Attending: INTERNAL MEDICINE

## 2018-04-02 DIAGNOSIS — N18.2 TYPE 2 DIABETES MELLITUS WITH STAGE 2 CHRONIC KIDNEY DISEASE, WITHOUT LONG-TERM CURRENT USE OF INSULIN (HCC): ICD-10-CM

## 2018-04-02 DIAGNOSIS — E78.2 MIXED HYPERLIPIDEMIA: ICD-10-CM

## 2018-04-02 DIAGNOSIS — E11.22 TYPE 2 DIABETES MELLITUS WITH STAGE 2 CHRONIC KIDNEY DISEASE, WITHOUT LONG-TERM CURRENT USE OF INSULIN (HCC): ICD-10-CM

## 2018-04-02 LAB
ALBUMIN SERPL-MCNC: 4.4 G/DL (ref 3.4–4.8)
ALBUMIN UR-MCNC: 16.8 MG/L
ALBUMIN/GLOB SERPL: 2 G/DL (ref 1.5–2.5)
ALP SERPL-CCNC: 55 U/L (ref 40–129)
ALT SERPL W P-5'-P-CCNC: 38 U/L (ref 10–44)
ANION GAP SERPL CALCULATED.3IONS-SCNC: 7.2 MMOL/L (ref 3.6–11.2)
AST SERPL-CCNC: 24 U/L (ref 10–34)
BASOPHILS # BLD AUTO: 0.09 10*3/MM3 (ref 0–0.3)
BASOPHILS NFR BLD AUTO: 0.9 % (ref 0–2)
BILIRUB SERPL-MCNC: 0.7 MG/DL (ref 0.2–1.8)
BUN BLD-MCNC: 18 MG/DL (ref 7–21)
BUN/CREAT SERPL: 11.9 (ref 7–25)
CALCIUM SPEC-SCNC: 9.4 MG/DL (ref 7.7–10)
CHLORIDE SERPL-SCNC: 107 MMOL/L (ref 99–112)
CHOLEST SERPL-MCNC: 153 MG/DL (ref 0–200)
CK SERPL-CCNC: 97 U/L (ref 24–204)
CO2 SERPL-SCNC: 29.8 MMOL/L (ref 24.3–31.9)
CREAT BLD-MCNC: 1.51 MG/DL (ref 0.43–1.29)
DEPRECATED RDW RBC AUTO: 43.7 FL (ref 37–54)
EOSINOPHIL # BLD AUTO: 1.1 10*3/MM3 (ref 0–0.7)
EOSINOPHIL NFR BLD AUTO: 11.3 % (ref 0–7)
ERYTHROCYTE [DISTWIDTH] IN BLOOD BY AUTOMATED COUNT: 13.3 % (ref 11.5–14.5)
GFR SERPL CREATININE-BSD FRML MDRD: 46 ML/MIN/1.73
GLOBULIN UR ELPH-MCNC: 2.2 GM/DL
GLUCOSE BLD-MCNC: 134 MG/DL (ref 70–110)
HBA1C MFR BLD: 6.5 % (ref 4.5–5.7)
HCT VFR BLD AUTO: 46.3 % (ref 42–52)
HDLC SERPL-MCNC: 45 MG/DL (ref 60–100)
HGB BLD-MCNC: 16.2 G/DL (ref 14–18)
IMM GRANULOCYTES # BLD: 0.02 10*3/MM3 (ref 0–0.03)
IMM GRANULOCYTES NFR BLD: 0.2 % (ref 0–0.5)
LDLC SERPL CALC-MCNC: 67 MG/DL (ref 0–100)
LDLC/HDLC SERPL: 1.48 {RATIO}
LYMPHOCYTES # BLD AUTO: 3.34 10*3/MM3 (ref 1–3)
LYMPHOCYTES NFR BLD AUTO: 34.4 % (ref 16–46)
MCH RBC QN AUTO: 32.6 PG (ref 27–33)
MCHC RBC AUTO-ENTMCNC: 35 G/DL (ref 33–37)
MCV RBC AUTO: 93.2 FL (ref 80–94)
MONOCYTES # BLD AUTO: 0.68 10*3/MM3 (ref 0.1–0.9)
MONOCYTES NFR BLD AUTO: 7 % (ref 0–12)
NEUTROPHILS # BLD AUTO: 4.49 10*3/MM3 (ref 1.4–6.5)
NEUTROPHILS NFR BLD AUTO: 46.2 % (ref 40–75)
OSMOLALITY SERPL CALC.SUM OF ELEC: 290.7 MOSM/KG (ref 273–305)
PLATELET # BLD AUTO: 159 10*3/MM3 (ref 130–400)
PMV BLD AUTO: 12.2 FL (ref 6–10)
POTASSIUM BLD-SCNC: 4.3 MMOL/L (ref 3.5–5.3)
PROT SERPL-MCNC: 6.6 G/DL (ref 6–8)
RBC # BLD AUTO: 4.97 10*6/MM3 (ref 4.7–6.1)
SODIUM BLD-SCNC: 144 MMOL/L (ref 135–153)
TRIGL SERPL-MCNC: 206 MG/DL (ref 0–150)
VLDLC SERPL-MCNC: 41.2 MG/DL
WBC NRBC COR # BLD: 9.72 10*3/MM3 (ref 4.5–12.5)

## 2018-04-02 PROCEDURE — 85025 COMPLETE CBC W/AUTO DIFF WBC: CPT

## 2018-04-02 PROCEDURE — 82043 UR ALBUMIN QUANTITATIVE: CPT

## 2018-04-02 PROCEDURE — 83036 HEMOGLOBIN GLYCOSYLATED A1C: CPT

## 2018-04-02 PROCEDURE — 82550 ASSAY OF CK (CPK): CPT

## 2018-04-02 PROCEDURE — 80061 LIPID PANEL: CPT

## 2018-04-02 PROCEDURE — 80053 COMPREHEN METABOLIC PANEL: CPT

## 2018-04-05 ENCOUNTER — OFFICE VISIT (OUTPATIENT)
Dept: CARDIOLOGY | Facility: CLINIC | Age: 73
End: 2018-04-05

## 2018-04-05 VITALS
DIASTOLIC BLOOD PRESSURE: 76 MMHG | HEART RATE: 65 BPM | OXYGEN SATURATION: 94 % | SYSTOLIC BLOOD PRESSURE: 154 MMHG | BODY MASS INDEX: 31.5 KG/M2 | HEIGHT: 71 IN | WEIGHT: 225 LBS

## 2018-04-05 DIAGNOSIS — N18.2 TYPE 2 DIABETES MELLITUS WITH STAGE 2 CHRONIC KIDNEY DISEASE, WITHOUT LONG-TERM CURRENT USE OF INSULIN (HCC): ICD-10-CM

## 2018-04-05 DIAGNOSIS — F41.9 ANXIETY: ICD-10-CM

## 2018-04-05 DIAGNOSIS — N18.2 STAGE 2 CHRONIC KIDNEY DISEASE: ICD-10-CM

## 2018-04-05 DIAGNOSIS — R07.9 CHEST PAIN IN ADULT: Primary | ICD-10-CM

## 2018-04-05 DIAGNOSIS — E11.22 TYPE 2 DIABETES MELLITUS WITH STAGE 2 CHRONIC KIDNEY DISEASE, WITHOUT LONG-TERM CURRENT USE OF INSULIN (HCC): ICD-10-CM

## 2018-04-05 DIAGNOSIS — I10 ESSENTIAL HYPERTENSION: ICD-10-CM

## 2018-04-05 DIAGNOSIS — E78.2 MIXED HYPERLIPIDEMIA: ICD-10-CM

## 2018-04-05 DIAGNOSIS — M51.36 DDD (DEGENERATIVE DISC DISEASE), LUMBAR: ICD-10-CM

## 2018-04-05 PROBLEM — M51.369 DDD (DEGENERATIVE DISC DISEASE), LUMBAR: Status: ACTIVE | Noted: 2018-04-05

## 2018-04-05 PROCEDURE — 99213 OFFICE O/P EST LOW 20 MIN: CPT | Performed by: INTERNAL MEDICINE

## 2018-04-05 RX ORDER — HYDROCODONE BITARTRATE AND ACETAMINOPHEN 7.5; 325 MG/1; MG/1
1 TABLET ORAL 3 TIMES DAILY PRN
Qty: 90 TABLET | Refills: 0 | Status: SHIPPED | OUTPATIENT
Start: 2018-04-05 | End: 2018-06-28 | Stop reason: SDUPTHER

## 2018-04-05 NOTE — PROGRESS NOTES
subjective     Chief Complaint   Patient presents with   • Hyperlipidemia   • Diabetes   • Results     Lab   4/2/18      History of Present Illness  Patient is 72 years old white male who has essential hypertension which has been difficult to control.  Patient is taking Norvasc 10 mg daily, hydralazine 50 mg 3 times a day and Lopressor 25 twice a day.  Patient also has clonidine which he takes on when necessary basis.  He states that his blood pressure at home is around 1:30 systolic and is doing very well.  Today in the office blood pressure is elevated.    Patient has diabetes mellitus type 2 which is fairly well controlled    Renal functions are also better.  Creatinine is 1.51 with a GFR of 46%.    Patient complains of substernal chest tightness and pressure sensation which is not particularly related to exertion.  It is 4-5 out of 10.  There is no nausea or vomiting or diaphoresis.  Pain is bothering him quite a bit and patient wants to know what is causing it.  Patient has multiple cardiac risk factors.      He has degenerative disc disease of lumbar spine and an complains of lot of back pain.  He is taking Narco which is helping the pain.    Patient also has anxiety disorder.    Past Surgical History:   Procedure Laterality Date   • COLONOSCOPY  09/2009   • EYE SURGERY     • FOOT SURGERY     • HERNIA REPAIR     • HYDROCELECTOMY  06/15/2015   • RHINOPLASTY     • UPPER GASTROINTESTINAL ENDOSCOPY  03/24/2014    WITH BIOPSIES AND DILATION     Family History   Problem Relation Age of Onset   • Kidney disease Other    • Other Other      CHRONIC DISABLING DISEASES URINARY TRACT DISEASE   • Diabetes Other    • Hypertension Other    • Other Mother    • Heart failure Father    • Stroke Sister    • Arthritis Sister    • Heart disease Brother    • No Known Problems Brother    • No Known Problems Brother      Past Medical History:   Diagnosis Date   • Anxiety    • Depression    • GERD (gastroesophageal reflux disease)     • History of EKG 12/22/2015    NORMAL   • Hyperlipidemia    • Hypertension    • Obesity    • Renal failure     MILD     Patient Active Problem List   Diagnosis   • Essential hypertension, labile   • GERD (gastroesophageal reflux disease)   • Renal failure   • Hyperlipidemia   • Anxiety   • Depression   • Benign prostatic hypertrophy (BPH) with incomplete bladder emptying   • Type 2 diabetes mellitus   • Periumbilical abdominal pain   • DDD (degenerative disc disease), lumbar       Social History   Substance Use Topics   • Smoking status: Never Smoker   • Smokeless tobacco: Never Used   • Alcohol use 4.2 oz/week     7 Shots of liquor per week      Comment: SOCIAL       Allergies   Allergen Reactions   • Bactrim [Sulfamethoxazole-Trimethoprim]        Current Outpatient Prescriptions on File Prior to Visit   Medication Sig   • allopurinol (ZYLOPRIM) 300 MG tablet Take 1 tablet by mouth Daily.   • ALPRAZolam (XANAX) 0.25 MG tablet Take 1 tablet by mouth 2 (Two) Times a Day As Needed (as needed for anxiety).   • amLODIPine (NORVASC) 10 MG tablet Take 1 tablet by mouth Daily.   • aspirin 81 MG tablet Take 81 mg by mouth daily.   • cholecalciferol (VITAMIN D3) 1000 UNITS tablet Take 1,000 Units by mouth daily.   • ciprofloxacin (CIPRO) 250 MG tablet Take 1 tablet by mouth 2 (Two) Times a Day.   • cloNIDine (CATAPRES) 0.1 MG tablet Take 0.05 mg by mouth As Needed. TID TO QID PRN    • Cyanocobalamin (VITAMIN B12 PO) Take  by mouth daily.   • FLUoxetine (PROzac) 20 MG capsule Take 1 capsule by mouth Daily.   • folic acid (FOLVITE) 1 MG tablet Take 1 tablet by mouth Daily.   • hydrALAZINE (APRESOLINE) 50 MG tablet Take 1 tablet by mouth 3 (Three) Times a Day.   • hydrOXYzine (ATARAX) 25 MG tablet Take 1 tablet by mouth At Night As Needed (PRN).   • indomethacin (INDOCIN) 25 MG capsule Take 1 capsule by mouth 3 (Three) Times a Day As Needed for Mild Pain .   • lansoprazole (PREVACID) 15 MG capsule Take 15 mg by mouth As  "Needed.   • loratadine (CLARITIN) 10 MG tablet Take 1 tablet by mouth Daily.   • meclizine (ANTIVERT) 25 MG tablet Take 1 tablet by mouth 3 (three) times a day as needed for dizziness.   • metoprolol tartrate (LOPRESSOR) 25 MG tablet Take 1 tablet by mouth 2 (Two) Times a Day.   • Naloxegol Oxalate 12.5 MG tablet Take 1 tablet by mouth Daily.   • pantoprazole (PROTONIX) 40 MG EC tablet Take 1 tablet by mouth 2 (Two) Times a Day.   • Pyridoxine HCl (VITAMIN B-6 PO) Take  by mouth daily.   • rosuvastatin (CRESTOR) 10 MG tablet Take 1 tablet by mouth Every Night.   • tamsulosin (FLOMAX) 0.4 MG capsule 24 hr capsule Take 1 capsule by mouth Daily.   • [DISCONTINUED] HYDROcodone-acetaminophen (NORCO) 7.5-325 MG per tablet Take 1 tablet by mouth 3 (Three) Times a Day As Needed for Moderate Pain .     No current facility-administered medications on file prior to visit.          The following portions of the patient's history were reviewed and updated as appropriate: allergies, current medications, past family history, past medical history, past social history, past surgical history and problem list.    Review of Systems   Constitution: Negative.   HENT: Negative.  Negative for congestion.    Eyes: Negative.    Cardiovascular: Positive for chest pain. Negative for cyanosis, dyspnea on exertion, irregular heartbeat, leg swelling, near-syncope, orthopnea, palpitations, paroxysmal nocturnal dyspnea and syncope.   Respiratory: Negative.  Negative for shortness of breath.    Hematologic/Lymphatic: Negative.    Musculoskeletal: Positive for arthritis and back pain.   Gastrointestinal: Negative.    Neurological: Negative.  Negative for headaches.   Psychiatric/Behavioral: The patient is nervous/anxious.           Objective:     /76 (BP Location: Left arm, Patient Position: Sitting)   Pulse 65   Ht 180.3 cm (71\")   Wt 102 kg (225 lb)   SpO2 94%   BMI 31.38 kg/m²   Physical Exam   Constitutional: He appears well-developed " and well-nourished. No distress.   HENT:   Head: Normocephalic and atraumatic.   Mouth/Throat: Oropharynx is clear and moist. No oropharyngeal exudate.   Eyes: Conjunctivae and EOM are normal. Pupils are equal, round, and reactive to light. No scleral icterus.   Neck: Normal range of motion. Neck supple. No JVD present. No tracheal deviation present. No thyromegaly present.   Cardiovascular: Normal rate, regular rhythm, normal heart sounds and intact distal pulses.  PMI is not displaced.  Exam reveals no gallop, no friction rub and no decreased pulses.    No murmur heard.  Pulses:       Carotid pulses are 3+ on the right side, and 3+ on the left side.       Radial pulses are 3+ on the right side, and 3+ on the left side.   Pulmonary/Chest: Effort normal and breath sounds normal. No respiratory distress. He has no wheezes. He has no rales. He exhibits no tenderness.   Abdominal: Soft. Bowel sounds are normal. He exhibits no distension, no abdominal bruit and no mass. There is no splenomegaly or hepatomegaly. There is no tenderness. There is no rebound and no guarding.   Musculoskeletal: Normal range of motion. He exhibits no edema, tenderness or deformity.   Lymphadenopathy:     He has no cervical adenopathy.   Neurological: He is alert. He has normal reflexes. No cranial nerve deficit. He exhibits normal muscle tone. Coordination normal.   Skin: Skin is warm and dry. No rash noted. He is not diaphoretic. No erythema.   Psychiatric: He has a normal mood and affect. His behavior is normal. Judgment and thought content normal.         Lab Review  Lab Results   Component Value Date     04/02/2018    K 4.3 04/02/2018     04/02/2018    BUN 18 04/02/2018    CREATININE 1.51 (H) 04/02/2018    GLUCOSE 134 (H) 04/02/2018    CALCIUM 9.4 04/02/2018    ALT 38 04/02/2018    ALKPHOS 55 04/02/2018    LABIL2 2.0 04/02/2018     Lab Results   Component Value Date    CKTOTAL 97 04/02/2018     Lab Results   Component Value  Date    WBC 9.72 04/02/2018    HGB 16.2 04/02/2018    HCT 46.3 04/02/2018     04/02/2018     No results found for: INR  No results found for: MG  Lab Results   Component Value Date    PSA 3.00 05/26/2016    TSH 2.807 09/28/2015     No results found for: BNP  Lab Results   Component Value Date    CHLPL 227 (H) 03/07/2016    CHOL 153 04/02/2018    TRIG 206 (H) 04/02/2018    HDL 45 (L) 04/02/2018    VLDL 41.2 04/02/2018    LDLHDL 1.48 04/02/2018     Lab Results   Component Value Date    LDL 67 04/02/2018    LDL 85 10/09/2017       Procedures       I personally viewed and interpreted the patient's LAB data         Assessment:     1. Chest pain in adult    2. Essential hypertension    3. Mixed hyperlipidemia    4. Stage 2 chronic kidney disease    5. Type 2 diabetes mellitus with stage 2 chronic kidney disease, without long-term current use of insulin    6. Anxiety    7. DDD (degenerative disc disease), lumbar          Plan:      Because of chest pain stress test was scheduled.  Healthy lifestyle weight loss and aggressive risk factor modification stressed.    Blood pressure is elevated today patient however feels that his blood pressure is very good at home it is around 130/70.  He was advised to check his blood pressure closely and let me know medication needs to be adjusted.  Pain medication were given.  Lab work reviewed and discussed with the patient  Follow-up scheduled        No Follow-up on file.

## 2018-04-27 ENCOUNTER — APPOINTMENT (OUTPATIENT)
Dept: NUCLEAR MEDICINE | Facility: HOSPITAL | Age: 73
End: 2018-04-27
Attending: INTERNAL MEDICINE

## 2018-04-27 ENCOUNTER — APPOINTMENT (OUTPATIENT)
Dept: CARDIOLOGY | Facility: HOSPITAL | Age: 73
End: 2018-04-27
Attending: INTERNAL MEDICINE

## 2018-05-03 ENCOUNTER — HOSPITAL ENCOUNTER (OUTPATIENT)
Dept: CARDIOLOGY | Facility: HOSPITAL | Age: 73
Discharge: HOME | End: 2018-05-03
Attending: INTERNAL MEDICINE

## 2018-05-03 ENCOUNTER — HOSPITAL ENCOUNTER (OUTPATIENT)
Dept: NUCLEAR MEDICINE | Facility: HOSPITAL | Age: 73
Discharge: HOME | End: 2018-05-03
Attending: INTERNAL MEDICINE

## 2018-05-03 DIAGNOSIS — R07.9 CHEST PAIN IN ADULT: ICD-10-CM

## 2018-05-03 PROCEDURE — 0 TECHNETIUM SESTAMIBI: Performed by: INTERNAL MEDICINE

## 2018-05-03 PROCEDURE — A9500 TC99M SESTAMIBI: HCPCS | Performed by: INTERNAL MEDICINE

## 2018-05-03 PROCEDURE — 93017 CV STRESS TEST TRACING ONLY: CPT

## 2018-05-03 PROCEDURE — 78452 HT MUSCLE IMAGE SPECT MULT: CPT

## 2018-05-03 PROCEDURE — 93018 CV STRESS TEST I&R ONLY: CPT | Performed by: INTERNAL MEDICINE

## 2018-05-03 PROCEDURE — 78452 HT MUSCLE IMAGE SPECT MULT: CPT | Performed by: INTERNAL MEDICINE

## 2018-05-03 RX ADMIN — TECHNETIUM TC 99M SESTAMIBI 1 DOSE: 1 INJECTION INTRAVENOUS at 10:15

## 2018-05-03 RX ADMIN — TECHNETIUM TC 99M SESTAMIBI 1 DOSE: 1 INJECTION INTRAVENOUS at 08:35

## 2018-05-04 ENCOUNTER — TELEPHONE (OUTPATIENT)
Dept: CARDIOLOGY | Facility: CLINIC | Age: 73
End: 2018-05-04

## 2018-05-04 LAB
BH CV NUCLEAR PRIOR STUDY: 3
BH CV STRESS BP STAGE 1: NORMAL
BH CV STRESS BP STAGE 2: NORMAL
BH CV STRESS DURATION MIN STAGE 1: 3
BH CV STRESS DURATION MIN STAGE 2: 3
BH CV STRESS DURATION SEC STAGE 1: 0
BH CV STRESS DURATION SEC STAGE 2: 0
BH CV STRESS GRADE STAGE 1: 10
BH CV STRESS GRADE STAGE 2: 12
BH CV STRESS HR STAGE 1: 106
BH CV STRESS HR STAGE 2: 129
BH CV STRESS METS STAGE 1: 5
BH CV STRESS METS STAGE 2: 7.5
BH CV STRESS PROTOCOL 1: NORMAL
BH CV STRESS RECOVERY BP: NORMAL MMHG
BH CV STRESS RECOVERY HR: 80 BPM
BH CV STRESS SPEED STAGE 1: 1.7
BH CV STRESS SPEED STAGE 2: 2.5
BH CV STRESS STAGE 1: 1
BH CV STRESS STAGE 2: 2
LV EF NUC BP: 67 %
MAXIMAL PREDICTED HEART RATE: 148 BPM
PERCENT MAX PREDICTED HR: 87.16 %
STRESS BASELINE BP: NORMAL MMHG
STRESS BASELINE HR: 75 BPM
STRESS PERCENT HR: 103 %
STRESS POST ESTIMATED WORKLOAD: 7 METS
STRESS POST EXERCISE DUR MIN: 5 MIN
STRESS POST EXERCISE DUR SEC: 45 SEC
STRESS POST PEAK BP: NORMAL MMHG
STRESS POST PEAK HR: 129 BPM
STRESS TARGET HR: 126 BPM

## 2018-05-18 RX ORDER — DOXYCYCLINE HYCLATE 100 MG
100 TABLET ORAL 2 TIMES DAILY
Qty: 20 TABLET | Refills: 0 | Status: SHIPPED | OUTPATIENT
Start: 2018-05-18 | End: 2018-08-30

## 2018-06-04 RX ORDER — AMLODIPINE BESYLATE 10 MG/1
10 TABLET ORAL DAILY
Qty: 90 TABLET | Refills: 0 | Status: SHIPPED | OUTPATIENT
Start: 2018-06-04 | End: 2018-06-11 | Stop reason: SDUPTHER

## 2018-06-04 RX ORDER — FLUOXETINE HYDROCHLORIDE 20 MG/1
20 CAPSULE ORAL DAILY
Qty: 90 CAPSULE | Refills: 0 | Status: SHIPPED | OUTPATIENT
Start: 2018-06-04 | End: 2018-06-11 | Stop reason: SDUPTHER

## 2018-06-04 RX ORDER — FOLIC ACID 1 MG/1
1 TABLET ORAL DAILY
Qty: 90 TABLET | Refills: 0 | Status: SHIPPED | OUTPATIENT
Start: 2018-06-04 | End: 2018-06-11 | Stop reason: SDUPTHER

## 2018-06-04 RX ORDER — TAMSULOSIN HYDROCHLORIDE 0.4 MG/1
1 CAPSULE ORAL DAILY
Qty: 90 CAPSULE | Refills: 0 | Status: SHIPPED | OUTPATIENT
Start: 2018-06-04 | End: 2018-06-11 | Stop reason: SDUPTHER

## 2018-06-11 RX ORDER — HYDROXYZINE HYDROCHLORIDE 25 MG/1
25 TABLET, FILM COATED ORAL NIGHTLY PRN
Qty: 90 TABLET | Refills: 3 | Status: SHIPPED | OUTPATIENT
Start: 2018-06-11 | End: 2020-06-26 | Stop reason: SDUPTHER

## 2018-06-11 RX ORDER — FOLIC ACID 1 MG/1
1 TABLET ORAL DAILY
Qty: 90 TABLET | Refills: 3 | Status: SHIPPED | OUTPATIENT
Start: 2018-06-11 | End: 2018-10-15 | Stop reason: SDUPTHER

## 2018-06-11 RX ORDER — AMLODIPINE BESYLATE 10 MG/1
10 TABLET ORAL DAILY
Qty: 90 TABLET | Refills: 3 | Status: SHIPPED | OUTPATIENT
Start: 2018-06-11 | End: 2018-07-16 | Stop reason: SDUPTHER

## 2018-06-11 RX ORDER — FLUOXETINE HYDROCHLORIDE 20 MG/1
20 CAPSULE ORAL DAILY
Qty: 90 CAPSULE | Refills: 3 | Status: SHIPPED | OUTPATIENT
Start: 2018-06-11 | End: 2018-07-16 | Stop reason: SDUPTHER

## 2018-06-11 RX ORDER — TAMSULOSIN HYDROCHLORIDE 0.4 MG/1
1 CAPSULE ORAL DAILY
Qty: 90 CAPSULE | Refills: 3 | Status: SHIPPED | OUTPATIENT
Start: 2018-06-11 | End: 2018-07-16 | Stop reason: SDUPTHER

## 2018-06-28 ENCOUNTER — OFFICE VISIT (OUTPATIENT)
Dept: CARDIOLOGY | Facility: CLINIC | Age: 73
End: 2018-06-28

## 2018-06-28 VITALS
HEIGHT: 71 IN | DIASTOLIC BLOOD PRESSURE: 78 MMHG | HEART RATE: 68 BPM | OXYGEN SATURATION: 95 % | WEIGHT: 222 LBS | BODY MASS INDEX: 31.08 KG/M2 | SYSTOLIC BLOOD PRESSURE: 140 MMHG

## 2018-06-28 DIAGNOSIS — E11.22 TYPE 2 DIABETES MELLITUS WITH STAGE 2 CHRONIC KIDNEY DISEASE, WITHOUT LONG-TERM CURRENT USE OF INSULIN (HCC): ICD-10-CM

## 2018-06-28 DIAGNOSIS — M51.36 DDD (DEGENERATIVE DISC DISEASE), LUMBAR: ICD-10-CM

## 2018-06-28 DIAGNOSIS — N18.2 TYPE 2 DIABETES MELLITUS WITH STAGE 2 CHRONIC KIDNEY DISEASE, WITHOUT LONG-TERM CURRENT USE OF INSULIN (HCC): ICD-10-CM

## 2018-06-28 DIAGNOSIS — E78.2 MIXED HYPERLIPIDEMIA: ICD-10-CM

## 2018-06-28 DIAGNOSIS — N18.2 STAGE 2 CHRONIC KIDNEY DISEASE: ICD-10-CM

## 2018-06-28 DIAGNOSIS — I10 ESSENTIAL HYPERTENSION: Primary | ICD-10-CM

## 2018-06-28 PROCEDURE — 99213 OFFICE O/P EST LOW 20 MIN: CPT | Performed by: INTERNAL MEDICINE

## 2018-06-28 RX ORDER — HYDROCODONE BITARTRATE AND ACETAMINOPHEN 7.5; 325 MG/1; MG/1
1 TABLET ORAL 3 TIMES DAILY PRN
Qty: 90 TABLET | Refills: 0 | Status: SHIPPED | OUTPATIENT
Start: 2018-06-28 | End: 2018-09-12 | Stop reason: SDUPTHER

## 2018-06-28 RX ORDER — ALPRAZOLAM 0.25 MG/1
0.25 TABLET ORAL 2 TIMES DAILY PRN
Qty: 60 TABLET | Refills: 2 | OUTPATIENT
Start: 2018-06-28 | End: 2018-11-07 | Stop reason: SDUPTHER

## 2018-06-28 NOTE — PROGRESS NOTES
subjective     Chief Complaint   Patient presents with   • Hyperlipidemia   • Hypertension   • Anxiety   • Diabetes   • Follow-up     History of Present Illness  Patient is 72 years old white male who is here for routine follow-up.  Patient states that he has no new complaints.  Patient has history of hypertension.  Blood pressure has been difficult to control but he is taking it and her 140.  He is taking Norvasc and clonidine, Lopressor and hydralazine.  There has been no drug side effects.    PSA was elevated patient is seeing Dr. Mendez in [place] and is planning to have biopsy done.    He also has a obesity and hyperlipidemia he is trying to lose weight.  He is taking Crestor 10 mg daily.  No drug side effects.    Diabetes is controlled blood sugar is running around 120 at home.  Patient is trying to lose weight but he still quite heavy weighing around 222 pounds.    He also has lot of back pain degenerative disc disease he is taking Indocin and the hydrocodone which he takes on when necessary basis.    Past Surgical History:   Procedure Laterality Date   • COLONOSCOPY  09/2009   • EYE SURGERY     • FOOT SURGERY     • HERNIA REPAIR     • HYDROCELECTOMY  06/15/2015   • RHINOPLASTY     • UPPER GASTROINTESTINAL ENDOSCOPY  03/24/2014    WITH BIOPSIES AND DILATION     Family History   Problem Relation Age of Onset   • Kidney disease Other    • Other Other         CHRONIC DISABLING DISEASES URINARY TRACT DISEASE   • Diabetes Other    • Hypertension Other    • Other Mother    • Heart failure Father    • Stroke Sister    • Arthritis Sister    • Heart disease Brother    • No Known Problems Brother    • No Known Problems Brother      Past Medical History:   Diagnosis Date   • Anxiety    • Depression    • GERD (gastroesophageal reflux disease)    • History of EKG 12/22/2015    NORMAL   • Hyperlipidemia    • Hypertension    • Obesity    • Renal failure     MILD     Patient Active Problem List   Diagnosis   • Essential  hypertension, labile   • GERD (gastroesophageal reflux disease)   • Renal failure   • Hyperlipidemia   • Anxiety   • Depression   • Benign prostatic hypertrophy (BPH) with incomplete bladder emptying   • Type 2 diabetes mellitus   • Periumbilical abdominal pain   • DDD (degenerative disc disease), lumbar       Social History   Substance Use Topics   • Smoking status: Never Smoker   • Smokeless tobacco: Never Used   • Alcohol use 4.2 oz/week     7 Shots of liquor per week      Comment: SOCIAL       Allergies   Allergen Reactions   • Bactrim [Sulfamethoxazole-Trimethoprim]        Current Outpatient Prescriptions on File Prior to Visit   Medication Sig   • allopurinol (ZYLOPRIM) 300 MG tablet Take 1 tablet by mouth Daily.   • amLODIPine (NORVASC) 10 MG tablet Take 1 tablet by mouth Daily.   • aspirin 81 MG tablet Take 81 mg by mouth daily.   • cholecalciferol (VITAMIN D3) 1000 UNITS tablet Take 1,000 Units by mouth daily.   • cloNIDine (CATAPRES) 0.1 MG tablet Take 0.05 mg by mouth As Needed. TID TO QID PRN    • Cyanocobalamin (VITAMIN B12 PO) Take  by mouth daily.   • doxycycline (VIBRAMYICN) 100 MG tablet Take 1 tablet by mouth 2 (Two) Times a Day.   • FLUoxetine (PROzac) 20 MG capsule Take 1 capsule by mouth Daily.   • folic acid (FOLVITE) 1 MG tablet Take 1 tablet by mouth Daily.   • hydrALAZINE (APRESOLINE) 50 MG tablet Take 1 tablet by mouth 3 (Three) Times a Day.   • hydrOXYzine (ATARAX) 25 MG tablet Take 1 tablet by mouth At Night As Needed (PRN).   • indomethacin (INDOCIN) 25 MG capsule Take 1 capsule by mouth 3 (Three) Times a Day As Needed for Mild Pain .   • lansoprazole (PREVACID) 15 MG capsule Take 15 mg by mouth As Needed.   • loratadine (CLARITIN) 10 MG tablet Take 1 tablet by mouth Daily.   • meclizine (ANTIVERT) 25 MG tablet Take 1 tablet by mouth 3 (three) times a day as needed for dizziness.   • metoprolol tartrate (LOPRESSOR) 25 MG tablet Take 1 tablet by mouth 2 (Two) Times a Day.   • pantoprazole  "(PROTONIX) 40 MG EC tablet Take 1 tablet by mouth 2 (Two) Times a Day.   • Pyridoxine HCl (VITAMIN B-6 PO) Take  by mouth daily.   • rosuvastatin (CRESTOR) 10 MG tablet Take 1 tablet by mouth Every Night.   • tamsulosin (FLOMAX) 0.4 MG capsule 24 hr capsule Take 1 capsule by mouth Daily.   • [DISCONTINUED] ALPRAZolam (XANAX) 0.25 MG tablet Take 1 tablet by mouth 2 (Two) Times a Day As Needed (as needed for anxiety).   • [DISCONTINUED] HYDROcodone-acetaminophen (NORCO) 7.5-325 MG per tablet Take 1 tablet by mouth 3 (Three) Times a Day As Needed for Moderate Pain .     No current facility-administered medications on file prior to visit.          The following portions of the patient's history were reviewed and updated as appropriate: allergies, current medications, past family history, past medical history, past social history, past surgical history and problem list.    Review of Systems   Constitution: Negative.   HENT: Negative.  Negative for congestion.    Eyes: Negative.    Cardiovascular: Negative.  Negative for chest pain, cyanosis, dyspnea on exertion, irregular heartbeat, leg swelling, near-syncope, orthopnea, palpitations, paroxysmal nocturnal dyspnea and syncope.   Respiratory: Negative.  Negative for shortness of breath.    Hematologic/Lymphatic: Negative.    Musculoskeletal: Positive for arthritis, back pain, myalgias and stiffness.   Gastrointestinal: Negative.    Neurological: Negative.  Negative for headaches.   Psychiatric/Behavioral: The patient is nervous/anxious.           Objective:     /78   Pulse 68   Ht 180.3 cm (71\")   Wt 101 kg (222 lb)   SpO2 95%   BMI 30.96 kg/m²   Physical Exam   Constitutional: He appears well-developed and well-nourished. No distress.   HENT:   Head: Normocephalic and atraumatic.   Mouth/Throat: Oropharynx is clear and moist. No oropharyngeal exudate.   Eyes: Conjunctivae and EOM are normal. Pupils are equal, round, and reactive to light. No scleral icterus. "   Neck: Normal range of motion. Neck supple. No JVD present. No tracheal deviation present. No thyromegaly present.   Cardiovascular: Normal rate, regular rhythm, normal heart sounds and intact distal pulses.  PMI is not displaced.  Exam reveals no gallop, no friction rub and no decreased pulses.    No murmur heard.  Pulses:       Carotid pulses are 3+ on the right side, and 3+ on the left side.       Radial pulses are 3+ on the right side, and 3+ on the left side.   Pulmonary/Chest: Effort normal and breath sounds normal. No respiratory distress. He has no wheezes. He has no rales. He exhibits no tenderness.   Abdominal: Soft. Bowel sounds are normal. He exhibits no distension, no abdominal bruit and no mass. There is no splenomegaly or hepatomegaly. There is no tenderness. There is no rebound and no guarding.   Musculoskeletal: Normal range of motion. He exhibits no edema, tenderness or deformity.   Lymphadenopathy:     He has no cervical adenopathy.   Neurological: He is alert. He has normal reflexes. No cranial nerve deficit. He exhibits normal muscle tone. Coordination normal.   Skin: Skin is warm and dry. No rash noted. He is not diaphoretic. No erythema.   Psychiatric: He has a normal mood and affect. His behavior is normal. Judgment and thought content normal.         Lab Review  Lab Results   Component Value Date     04/02/2018    K 4.3 04/02/2018     04/02/2018    BUN 18 04/02/2018    CREATININE 1.51 (H) 04/02/2018    GLUCOSE 134 (H) 04/02/2018    CALCIUM 9.4 04/02/2018    ALT 38 04/02/2018    ALKPHOS 55 04/02/2018    LABIL2 2.0 04/02/2018     Lab Results   Component Value Date    CKTOTAL 97 04/02/2018     Lab Results   Component Value Date    WBC 9.72 04/02/2018    HGB 16.2 04/02/2018    HCT 46.3 04/02/2018     04/02/2018     No results found for: INR  No results found for: MG  No results found for: BNP  Lab Results   Component Value Date    CHLPL 227 (H) 03/07/2016    CHOL 153  04/02/2018    TRIG 206 (H) 04/02/2018    HDL 45 (L) 04/02/2018    VLDL 41.2 04/02/2018    LDLHDL 1.48 04/02/2018     Lab Results   Component Value Date    LDL 67 04/02/2018    LDL 85 10/09/2017       Procedures       I personally viewed and interpreted the patient's LAB data         Assessment:     1. Essential hypertension    2. Mixed hyperlipidemia    3. DDD (degenerative disc disease), lumbar    4. Stage 2 chronic kidney disease    5. Type 2 diabetes mellitus with stage 2 chronic kidney disease, without long-term current use of insulin          Plan:      Blood pressure is very well controlled.  He will continue current medications.  Blood sugar is still markedly elevated weight loss was emphasized.  Renal functions are abnormal but stable.  Patient was advised to drink more fluids.  Lipids show significant improvement in cholesterol but triglyceride is still elevated.    Healthy lifestyle emphasized.  Refills of medications were given.  Side effects of hydrocodone therapy was again discussed.  Follow-up scheduled          No Follow-up on file.

## 2018-06-28 NOTE — PATIENT INSTRUCTIONS
Obesity, Adult  Obesity is the condition of having too much total body fat. Being overweight or obese means that your weight is greater than what is considered healthy for your body size. Obesity is determined by a measurement called BMI. BMI is an estimate of body fat and is calculated from height and weight. For adults, a BMI of 30 or higher is considered obese.  Obesity can eventually lead to other health concerns and major illnesses, including:  · Stroke.  · Coronary artery disease (CAD).  · Type 2 diabetes.  · Some types of cancer, including cancers of the colon, breast, uterus, and gallbladder.  · Osteoarthritis.  · High blood pressure (hypertension).  · High cholesterol.  · Sleep apnea.  · Gallbladder stones.  · Infertility problems.    What are the causes?  The main cause of obesity is taking in (consuming) more calories than your body uses for energy. Other factors that contribute to this condition may include:  · Being born with genes that make you more likely to become obese.  · Having a medical condition that causes obesity. These conditions include:  ? Hypothyroidism.  ? Polycystic ovarian syndrome (PCOS).  ? Binge-eating disorder.  ? Cushing syndrome.  · Taking certain medicines, such as steroids, antidepressants, and seizure medicines.  · Not being physically active (sedentary lifestyle).  · Living where there are limited places to exercise safely or buy healthy foods.  · Not getting enough sleep.    What increases the risk?  The following factors may increase your risk of this condition:  · Having a family history of obesity.  · Being a woman of -American descent.  · Being a man of  descent.    What are the signs or symptoms?  Having excessive body fat is the main symptom of this condition.  How is this diagnosed?  This condition may be diagnosed based on:  · Your symptoms.  · Your medical history.  · A physical exam. Your health care provider may measure:  ? Your BMI. If you are  an adult with a BMI between 25 and less than 30, you are considered overweight. If you are an adult with a BMI of 30 or higher, you are considered obese.  ? The distances around your hips and your waist (circumferences). These may be compared to each other to help diagnose your condition.  ? Your skinfold thickness. Your health care provider may gently pinch a fold of your skin and measure it.    How is this treated?  Treatment for this condition often includes changing your lifestyle. Treatment may include some or all of the following:  · Dietary changes. Work with your health care provider and a dietitian to set a weight-loss goal that is healthy and reasonable for you. Dietary changes may include eating:  ? Smaller portions. A portion size is the amount of a particular food that is healthy for you to eat at one time. This varies from person to person.  ? Low-calorie or low-fat options.  ? More whole grains, fruits, and vegetables.  · Regular physical activity. This may include aerobic activity (cardio) and strength training.  · Medicine to help you lose weight. Your health care provider may prescribe medicine if you are unable to lose 1 pound a week after 6 weeks of eating more healthily and doing more physical activity.  · Surgery. Surgical options may include gastric banding and gastric bypass. Surgery may be done if:  ? Other treatments have not helped to improve your condition.  ? You have a BMI of 40 or higher.  ? You have life-threatening health problems related to obesity.    Follow these instructions at home:    Eating and drinking    · Follow recommendations from your health care provider about what you eat and drink. Your health care provider may advise you to:  ? Limit fast foods, sweets, and processed snack foods.  ? Choose low-fat options, such as low-fat milk instead of whole milk.  ? Eat 5 or more servings of fruits or vegetables every day.  ? Eat at home more often. This gives you more control  over what you eat.  ? Choose healthy foods when you eat out.  ? Learn what a healthy portion size is.  ? Keep low-fat snacks on hand.  ? Avoid sugary drinks, such as soda, fruit juice, iced tea sweetened with sugar, and flavored milk.  ? Eat a healthy breakfast.  · Drink enough water to keep your urine clear or pale yellow.  · Do not go without eating for long periods of time (do not fast) or follow a fad diet. Fasting and fad diets can be unhealthy and even dangerous.  Physical Activity  · Exercise regularly, as told by your health care provider. Ask your health care provider what types of exercise are safe for you and how often you should exercise.  · Warm up and stretch before being active.  · Cool down and stretch after being active.  · Rest between periods of activity.  Lifestyle  · Limit the time that you spend in front of your TV, computer, or video game system.  · Find ways to reward yourself that do not involve food.  · Limit alcohol intake to no more than 1 drink a day for nonpregnant women and 2 drinks a day for men. One drink equals 12 oz of beer, 5 oz of wine, or 1½ oz of hard liquor.  General instructions  · Keep a weight loss journal to keep track of the food you eat and how much you exercise you get.  · Take over-the-counter and prescription medicines only as told by your health care provider.  · Take vitamins and supplements only as told by your health care provider.  · Consider joining a support group. Your health care provider may be able to recommend a support group.  · Keep all follow-up visits as told by your health care provider. This is important.  Contact a health care provider if:  · You are unable to meet your weight loss goal after 6 weeks of dietary and lifestyle changes.  This information is not intended to replace advice given to you by your health care provider. Make sure you discuss any questions you have with your health care provider.  Document Released: 01/25/2006 Document  Revised: 05/22/2017 Document Reviewed: 10/05/2016  [employer] Patient Education © 2018 [employer]      Exercising to Lose Weight  Exercising can help you to lose weight. In order to lose weight through exercise, you need to do vigorous-intensity exercise. You can tell that you are exercising with vigorous intensity if you are breathing very hard and fast and cannot hold a conversation while exercising.  Moderate-intensity exercise helps to maintain your current weight. You can tell that you are exercising at a moderate level if you have a higher heart rate and faster breathing, but you are still able to hold a conversation.  How often should I exercise?  Choose an activity that you enjoy and set realistic goals. Your health care provider can help you to make an activity plan that works for you. Exercise regularly as directed by your health care provider. This may include:  · Doing resistance training twice each week, such as:  ? Push-ups.  ? Sit-ups.  ? Lifting weights.  ? Using resistance bands.  · Doing a given intensity of exercise for a given amount of time. Choose from these options:  ? 150 minutes of moderate-intensity exercise every week.  ? 75 minutes of vigorous-intensity exercise every week.  ? A mix of moderate-intensity and vigorous-intensity exercise every week.    Children, pregnant women, people who are out of shape, people who are overweight, and older adults may need to consult a health care provider for individual recommendations. If you have any sort of medical condition, be sure to consult your health care provider before starting a new exercise program.  What are some activities that can help me to lose weight?  · Walking at a rate of at least 4.5 miles an hour.  · Jogging or running at a rate of 5 miles per hour.  · Biking at a rate of at least 10 miles per hour.  · Lap swimming.  · Roller-skating or in-line skating.  · Cross-country skiing.  · Vigorous competitive sports, such as  football, basketball, and soccer.  · Jumping rope.  · Aerobic dancing.  How can I be more active in my day-to-day activities?  · Use the stairs instead of the elevator.  · Take a walk during your lunch break.  · If you drive, park your car farther away from work or school.  · If you take public transportation, get off one stop early and walk the rest of the way.  · Make all of your phone calls while standing up and walking around.  · Get up, stretch, and walk around every 30 minutes throughout the day.  What guidelines should I follow while exercising?  · Do not exercise so much that you hurt yourself, feel dizzy, or get very short of breath.  · Consult your health care provider prior to starting a new exercise program.  · Wear comfortable clothes and shoes with good support.  · Drink plenty of water while you exercise to prevent dehydration or heat stroke. Body water is lost during exercise and must be replaced.  · Work out until you breathe faster and your heart beats faster.  This information is not intended to replace advice given to you by your health care provider. Make sure you discuss any questions you have with your health care provider.  Document Released: 01/20/2012 Document Revised: 05/25/2017 Document Reviewed: 05/21/2015  [employer] Patient Education © 2018 [employer]      MyPlate from [employer]  The general, healthful diet is based on the 2010 Dietary Guidelines for Americans. The amount of food you need to eat from each food group depends on your age, sex, and level of physical activity and can be individualized by a dietitian. Go to ChooseMyPlate.gov for more information.  What do I need to know about the MyPlate plan?  · Enjoy your food, but eat less.  · Avoid oversized portions.  ? ½ of your plate should include fruits and vegetables.  ? ¼ of your plate should be grains.  ? ¼ of your plate should be protein.  Grains  · Make at least half of your grains whole grains.  · For a 2,000 calorie  daily food plan, eat 6 oz every day.  · 1 oz is about 1 slice bread, 1 cup cereal, or ½ cup cooked rice, cereal, or pasta.  Vegetables  · Make half your plate fruits and vegetables.  · For a 2,000 calorie daily food plan, eat 2½ cups every day.  · 1 cup is about 1 cup raw or cooked vegetables or vegetable juice or 2 cups raw leafy greens.  Fruits  · Make half your plate fruits and vegetables.  · For a 2,000 calorie daily food plan, eat 2 cups every day.  · 1 cup is about 1 cup fruit or 100% fruit juice or ½ cup dried fruit.  Protein  · For a 2,000 calorie daily food plan, eat 5½ oz every day.  · 1 oz is about 1 oz meat, poultry, or fish, ¼ cup cooked beans, 1 egg, 1 Tbsp peanut butter, or ½ oz nuts or seeds.  Dairy  · Switch to fat-free or low-fat (1%) milk.  · For a 2,000 calorie daily food plan, eat 3 cups every day.  · 1 cup is about 1 cup milk or yogurt or soy milk (soy beverage), 1½ oz natural cheese, or 2 oz processed cheese.  Fats, Oils, and Empty Calories  · Only small amounts of oils are recommended.  · Empty calories are calories from solid fats or added sugars.  · Compare sodium in foods like soup, bread, and frozen meals. Choose the foods with lower numbers.  · Drink water instead of sugary drinks.  What foods can I eat?  Grains  Whole grains such as whole wheat, quinoa, millet, and bulgur. Bread, rolls, and pasta made from whole grains. Brown or wild rice. Hot or cold cereals made from whole grains and without added sugar.  Vegetables  All fresh vegetables, especially fresh red, dark green, or orange vegetables. Peas and beans. Low-sodium frozen or canned vegetables prepared without added salt. Low-sodium vegetable juices.  Fruits  All fresh, frozen, and dried fruits. Canned fruit packed in water or fruit juice without added sugar. Fruit juices without added sugar.  Meats and Other Protein Sources  Boiled, baked, or grilled lean meat trimmed of fat. Skinless poultry. Fresh seafood and shellfish. Canned  seafood packed in water. Unsalted nuts and unsalted nut butters. Tofu. Dried beans and pea. Eggs.  Dairy  Low-fat or fat-free milk, yogurt, and cheeses.  Sweets and Desserts  Frozen desserts made from low-fat milk.  Fats and Oils  Olive, peanut, and canola oils and margarine. Salad dressing and mayonnaise made from these oils.  Other  Soups and casseroles made from allowed ingredients and without added fat or salt.  The items listed above may not be a complete list of recommended foods or beverages. Contact your dietitian for more options.  What foods are not recommended?  Grains  Sweetened, low-fiber cereals. Packaged baked goods. Snack crackers and chips. Cheese crackers, butter crackers, and biscuits. Frozen waffles, sweet breads, doughnuts, pastries, packaged baking mixes, pancakes, cakes, and cookies.  Vegetables  Regular canned or frozen vegetables or vegetables prepared with salt. Canned tomatoes. Canned tomato sauce. Fried vegetables. Vegetables in cream sauce or cheese sauce.  Fruits  Fruits packed in syrup or made with added sugar.  Meats and Other Protein Sources  Marbled or fatty meats such as ribs. Poultry with skin. Fried meats, poultry, eggs, or fish. Sausages, hot dogs, and deli meats such as pastrami, bologna, or salami.  Dairy  Whole milk, cream, cheeses made from whole milk, sour cream. Ice cream or yogurt made from whole milk or with added sugar.  Beverages  For adults, no more than one alcoholic drink per day. Regular soft drinks or other sugary beverages. Juice drinks.  Sweets and Desserts  Sugary or fatty desserts, candy, and other sweets.  Fats and Oils  Solid shortening or partially hydrogenated oils. Solid margarine. Margarine that contains trans fats. Butter.  The items listed above may not be a complete list of foods and beverages to avoid. Contact your dietitian for more information.  This information is not intended to replace advice given to you by your health care provider. Make sure  you discuss any questions you have with your health care provider.  Document Released: 01/06/2009 Document Revised: 05/25/2017 Document Reviewed: 11/26/2014  [employer] Patient Education © 2018 [employer]      Calorie Counting for Weight Loss  Calories are units of energy. Your body needs a certain amount of calories from food to keep you going throughout the day. When you eat more calories than your body needs, your body stores the extra calories as fat. When you eat fewer calories than your body needs, your body burns fat to get the energy it needs.  Calorie counting means keeping track of how many calories you eat and drink each day. Calorie counting can be helpful if you need to lose weight. If you make sure to eat fewer calories than your body needs, you should lose weight. Ask your health care provider what a healthy weight is for you.  For calorie counting to work, you will need to eat the right number of calories in a day in order to lose a healthy amount of weight per week. A dietitian can help you determine how many calories you need in a day and will give you suggestions on how to reach your calorie goal.  · A healthy amount of weight to lose per week is usually 1-2 lb (0.5-0.9 kg). This usually means that your daily calorie intake should be reduced by 500-750 calories.  · Eating 1,200 - 1,500 calories per day can help most women lose weight.  · Eating 1,500 - 1,800 calories per day can help most men lose weight.    What is my plan?  My goal is to have __________ calories per day.  If I have this many calories per day, I should lose around __________ pounds per week.  What do I need to know about calorie counting?  In order to meet your daily calorie goal, you will need to:  · Find out how many calories are in each food you would like to eat. Try to do this before you eat.  · Decide how much of the food you plan to eat.  · Write down what you ate and how many calories it had. Doing this is  called keeping a food log.    To successfully lose weight, it is important to balance calorie counting with a healthy lifestyle that includes regular activity. Aim for 150 minutes of moderate exercise (such as walking) or 75 minutes of vigorous exercise (such as running) each week.  Where do I find calorie information?    The number of calories in a food can be found on a Nutrition Facts label. If a food does not have a Nutrition Facts label, try to look up the calories online or ask your dietitian for help.  Remember that calories are listed per serving. If you choose to have more than one serving of a food, you will have to multiply the calories per serving by the amount of servings you plan to eat. For example, the label on a package of bread might say that a serving size is 1 slice and that there are 90 calories in a serving. If you eat 1 slice, you will have eaten 90 calories. If you eat 2 slices, you will have eaten 180 calories.  How do I keep a food log?  Immediately after each meal, record the following information in your food log:  · What you ate. Don't forget to include toppings, sauces, and other extras on the food.  · How much you ate. This can be measured in cups, ounces, or number of items.  · How many calories each food and drink had.  · The total number of calories in the meal.    Keep your food log near you, such as in a small notebook in your pocket, or use a mobile ruthann or website. Some programs will calculate calories for you and show you how many calories you have left for the day to meet your goal.  What are some calorie counting tips?  · Use your calories on foods and drinks that will fill you up and not leave you hungry:  ? Some examples of foods that fill you up are nuts and nut butters, vegetables, lean proteins, and high-fiber foods like whole grains. High-fiber foods are foods with more than 5 g fiber per serving.  ? Drinks such as sodas, specialty coffee drinks, alcohol, and juices have  "a lot of calories, yet do not fill you up.  · Eat nutritious foods and avoid empty calories. Empty calories are calories you get from foods or beverages that do not have many vitamins or protein, such as candy, sweets, and soda. It is better to have a nutritious high-calorie food (such as an avocado) than a food with few nutrients (such as a bag of chips).  · Know how many calories are in the foods you eat most often. This will help you calculate calorie counts faster.  · Pay attention to calories in drinks. Low-calorie drinks include water and unsweetened drinks.  · Pay attention to nutrition labels for \"low fat\" or \"fat free\" foods. These foods sometimes have the same amount of calories or more calories than the full fat versions. They also often have added sugar, starch, or salt, to make up for flavor that was removed with the fat.  · Find a way of tracking calories that works for you. Get creative. Try different apps or programs if writing down calories does not work for you.  What are some portion control tips?  · Know how many calories are in a serving. This will help you know how many servings of a certain food you can have.  · Use a measuring cup to measure serving sizes. You could also try weighing out portions on a kitchen scale. With time, you will be able to estimate serving sizes for some foods.  · Take some time to put servings of different foods on your favorite plates, bowls, and cups so you know what a serving looks like.  · Try not to eat straight from a bag or box. Doing this can lead to overeating. Put the amount you would like to eat in a cup or on a plate to make sure you are eating the right portion.  · Use smaller plates, glasses, and bowls to prevent overeating.  · Try not to multitask (for example, watch TV or use your computer) while eating. If it is time to eat, sit down at a table and enjoy your food. This will help you to know when you are full. It will also help you to be aware of what " you are eating and how much you are eating.  What are tips for following this plan?  Reading food labels  · Check the calorie count compared to the serving size. The serving size may be smaller than what you are used to eating.  · Check the source of the calories. Make sure the food you are eating is high in vitamins and protein and low in saturated and trans fats.  Shopping  · Read nutrition labels while you shop. This will help you make healthy decisions before you decide to purchase your food.  · Make a grocery list and stick to it.  Cooking  · Try to cook your favorite foods in a healthier way. For example, try baking instead of frying.  · Use low-fat dairy products.  Meal planning  · Use more fruits and vegetables. Half of your plate should be fruits and vegetables.  · Include lean proteins like poultry and fish.  How do I count calories when eating out?  · Ask for smaller portion sizes.  · Consider sharing an entree and sides instead of getting your own entree.  · If you get your own entree, eat only half. Ask for a box at the beginning of your meal and put the rest of your entree in it so you are not tempted to eat it.  · If calories are listed on the menu, choose the lower calorie options.  · Choose dishes that include vegetables, fruits, whole grains, low-fat dairy products, and lean protein.  · Choose items that are boiled, broiled, grilled, or steamed. Stay away from items that are buttered, battered, fried, or served with cream sauce. Items labeled “crispy” are usually fried, unless stated otherwise.  · Choose water, low-fat milk, unsweetened iced tea, or other drinks without added sugar. If you want an alcoholic beverage, choose a lower calorie option such as a glass of wine or light beer.  · Ask for dressings, sauces, and syrups on the side. These are usually high in calories, so you should limit the amount you eat.  · If you want a salad, choose a garden salad and ask for grilled meats. Avoid extra  toppings like hood, cheese, or fried items. Ask for the dressing on the side, or ask for olive oil and vinegar or lemon to use as dressing.  · Estimate how many servings of a food you are given. For example, a serving of cooked rice is ½ cup or about the size of half a baseball. Knowing serving sizes will help you be aware of how much food you are eating at restaurants. The list below tells you how big or small some common portion sizes are based on everyday objects:  ? 1 oz--4 stacked dice.  ? 3 oz--1 deck of cards.  ? 1 tsp--1 die.  ? 1 Tbsp--½ a ping-pong ball.  ? 2 Tbsp--1 ping-pong ball.  ? ½ cup--½ baseball.  ? 1 cup--1 baseball.  Summary  · Calorie counting means keeping track of how many calories you eat and drink each day. If you eat fewer calories than your body needs, you should lose weight.  · A healthy amount of weight to lose per week is usually 1-2 lb (0.5-0.9 kg). This usually means reducing your daily calorie intake by 500-750 calories.  · The number of calories in a food can be found on a Nutrition Facts label. If a food does not have a Nutrition Facts label, try to look up the calories online or ask your dietitian for help.  · Use your calories on foods and drinks that will fill you up, and not on foods and drinks that will leave you hungry.  · Use smaller plates, glasses, and bowls to prevent overeating.  This information is not intended to replace advice given to you by your health care provider. Make sure you discuss any questions you have with your health care provider.  Document Released: 12/18/2006 Document Revised: 11/17/2017 Document Reviewed: 11/17/2017  [employer] Patient Education © 2018 [employer]    Serving Sizes  A serving size is a measured amount of food or drink, such as one slice of bread, that has an associated nutrient content. Knowing the serving size of a food or drink can help you determine how much of that food you should consume.  What is the size of one  serving?  The size of one healthy serving depends on the food or drink. To determine a serving size, read the food label. If the food or drink does not have a food label, try to find serving size information online. Or, use the following to estimate the size of one adult serving:  Grain  1 slice bread. ½ bagel. ½ cup pasta.  Vegetable  ½ cup cooked or canned vegetables. 1 cup raw, leafy greens.  Fruit  ½ cup canned fruit. 1 medium fruit. ¼ cup dried fruit.  Meat and Other Protein Sources  1 oz meat, poultry, or fish. ¼ cup cooked beans. 1 egg. ¼ cup nuts or seeds. 1 Tbsp nut butter. ¼ cup tofu or tempeh. 2 Tbsp hummus.  Dairy  An individual container of yogurt (6-8 oz). 1 piece of cheese the size of your thumb (1 oz). 1 cup (8 oz) milk or milk alternative.  Fat  A piece the size of one dice. 1 tsp soft margarine. 1 Tbsp mayonnaise. 1 tsp vegetable oil. 1 Tbsp regular salad dressing. 2 Tbsp low-fat salad dressing.  How many servings should I eat from each food group each day?  The following are the suggested number of servings to try and have every day from each food group. You can also look at your eating throughout the week and aim for meeting these requirements on most days for overall healthy eating.  Grain  6-8 servings. Try to have half of your grains from whole grains, such as whole wheat bread, corn tortillas, oatmeal, brown rice, whole wheat pasta, and bulgur.  Vegetable  At least 2½-3 servings.  Fruit  2 servings.  Meat and Other Protein Foods  5-6 servings. Aim to have lean proteins, such as chicken, turkey, fish, beans, or tofu.  Dairy  3 servings. Choose low-fat or nonfat if you are trying to control your weight.  Fat  2-3 servings.  Is a serving the same thing as a portion?  No. A portion is the actual amount you eat, which may be more than one serving. Knowing the specific serving size of a food and the nutritional information that goes with it can help you make a healthy decision on what size portion  to eat.  What are some tips to help me learn healthy serving sizes?  · Check food labels for serving sizes. Many foods that come as a single portion actually contain multiple servings.  · Determine the serving size of foods you commonly eat and figure out how large a portion you usually eat.  · Measure the number of servings that can be held by the bowls, glasses, cups, and plates you typically use. For example, pour your breakfast cereal into your regular bowl and then pour it into a measuring cup.  · For 1-2 days, measure the serving sizes of all the foods you eat.  · Practice estimating serving sizes and determining how big your portions should be.  This information is not intended to replace advice given to you by your health care provider. Make sure you discuss any questions you have with your health care provider.  Document Released: 09/15/2004 Document Revised: 08/12/2017 Document Reviewed: 03/17/2015  Elsevier Interactive Patient Education © 2018 [employer]

## 2018-07-16 RX ORDER — AMLODIPINE BESYLATE 10 MG/1
10 TABLET ORAL DAILY
Qty: 90 TABLET | Refills: 0 | Status: SHIPPED | OUTPATIENT
Start: 2018-07-16 | End: 2018-09-12 | Stop reason: SDUPTHER

## 2018-07-16 RX ORDER — FLUOXETINE HYDROCHLORIDE 20 MG/1
20 CAPSULE ORAL DAILY
Qty: 90 CAPSULE | Refills: 0 | Status: SHIPPED | OUTPATIENT
Start: 2018-07-16 | End: 2018-09-12 | Stop reason: SDUPTHER

## 2018-07-16 RX ORDER — TAMSULOSIN HYDROCHLORIDE 0.4 MG/1
1 CAPSULE ORAL DAILY
Qty: 90 CAPSULE | Refills: 0 | Status: SHIPPED | OUTPATIENT
Start: 2018-07-16 | End: 2018-09-12 | Stop reason: SDUPTHER

## 2018-07-16 RX ORDER — PANTOPRAZOLE SODIUM 40 MG/1
40 TABLET, DELAYED RELEASE ORAL DAILY
Qty: 90 TABLET | Refills: 0 | Status: SHIPPED | OUTPATIENT
Start: 2018-07-16 | End: 2018-09-03 | Stop reason: SDUPTHER

## 2018-08-30 ENCOUNTER — OFFICE VISIT (OUTPATIENT)
Dept: RADIATION ONCOLOGY | Facility: HOSPITAL | Age: 73
End: 2018-08-30

## 2018-08-30 ENCOUNTER — HOSPITAL ENCOUNTER (OUTPATIENT)
Dept: RADIATION ONCOLOGY | Facility: HOSPITAL | Age: 73
Setting detail: RADIATION/ONCOLOGY SERIES
Discharge: HOME | End: 2018-08-30

## 2018-08-30 VITALS
RESPIRATION RATE: 20 BRPM | BODY MASS INDEX: 31.26 KG/M2 | WEIGHT: 224.1 LBS | HEART RATE: 62 BPM | OXYGEN SATURATION: 94 % | SYSTOLIC BLOOD PRESSURE: 185 MMHG | DIASTOLIC BLOOD PRESSURE: 91 MMHG | TEMPERATURE: 97.6 F

## 2018-08-30 DIAGNOSIS — C61 PROSTATE CANCER (HCC): Primary | ICD-10-CM

## 2018-08-30 RX ORDER — BICALUTAMIDE 50 MG/1
TABLET, FILM COATED ORAL
COMMUNITY

## 2018-09-03 ENCOUNTER — TELEPHONE (OUTPATIENT)
Dept: SOCIAL WORK | Facility: HOSPITAL | Age: 73
End: 2018-09-03

## 2018-09-03 NOTE — TELEPHONE ENCOUNTER
SW called pt to address distress screening score of 6.  SW left a message requesting that pt call back at his convenience.  SW available for ongoing support and resource needs.

## 2018-09-04 RX ORDER — PANTOPRAZOLE SODIUM 40 MG/1
40 TABLET, DELAYED RELEASE ORAL DAILY
Qty: 90 TABLET | Refills: 0 | Status: SHIPPED | OUTPATIENT
Start: 2018-09-04 | End: 2019-04-08 | Stop reason: SDUPTHER

## 2018-09-06 ENCOUNTER — TELEPHONE (OUTPATIENT)
Dept: RADIATION ONCOLOGY | Facility: HOSPITAL | Age: 73
End: 2018-09-06

## 2018-09-06 DIAGNOSIS — C61 PROSTATE CANCER (HCC): Primary | ICD-10-CM

## 2018-09-06 NOTE — TELEPHONE ENCOUNTER
Spoke with pt.  He states he has appt for markers with Dr. Mendez on 9/14/18.  He also states he is getting LHRH injection on 9/18/18.    Informed him of appt with Dr. Zimmer on 10/3/18 @ 1000 for re-eval and CT simulation.    Pt verbalized understanding.

## 2018-09-12 ENCOUNTER — OFFICE VISIT (OUTPATIENT)
Dept: CARDIOLOGY | Facility: CLINIC | Age: 73
End: 2018-09-12

## 2018-09-12 VITALS
OXYGEN SATURATION: 97 % | BODY MASS INDEX: 30.8 KG/M2 | WEIGHT: 220 LBS | SYSTOLIC BLOOD PRESSURE: 138 MMHG | DIASTOLIC BLOOD PRESSURE: 72 MMHG | HEART RATE: 61 BPM | HEIGHT: 71 IN

## 2018-09-12 DIAGNOSIS — E79.0 HYPERURICEMIA: ICD-10-CM

## 2018-09-12 DIAGNOSIS — C61 PROSTATE CANCER (HCC): ICD-10-CM

## 2018-09-12 DIAGNOSIS — N18.2 STAGE 2 CHRONIC KIDNEY DISEASE: ICD-10-CM

## 2018-09-12 DIAGNOSIS — E11.22 TYPE 2 DIABETES MELLITUS WITH STAGE 2 CHRONIC KIDNEY DISEASE, WITHOUT LONG-TERM CURRENT USE OF INSULIN (HCC): ICD-10-CM

## 2018-09-12 DIAGNOSIS — N18.2 TYPE 2 DIABETES MELLITUS WITH STAGE 2 CHRONIC KIDNEY DISEASE, WITHOUT LONG-TERM CURRENT USE OF INSULIN (HCC): ICD-10-CM

## 2018-09-12 DIAGNOSIS — F41.9 ANXIETY: ICD-10-CM

## 2018-09-12 DIAGNOSIS — M51.36 DDD (DEGENERATIVE DISC DISEASE), LUMBAR: ICD-10-CM

## 2018-09-12 DIAGNOSIS — E78.2 MIXED HYPERLIPIDEMIA: ICD-10-CM

## 2018-09-12 DIAGNOSIS — K21.9 GASTROESOPHAGEAL REFLUX DISEASE WITHOUT ESOPHAGITIS: ICD-10-CM

## 2018-09-12 DIAGNOSIS — I10 ESSENTIAL HYPERTENSION: Primary | ICD-10-CM

## 2018-09-12 PROCEDURE — 99213 OFFICE O/P EST LOW 20 MIN: CPT | Performed by: INTERNAL MEDICINE

## 2018-09-12 RX ORDER — AMLODIPINE BESYLATE 10 MG/1
10 TABLET ORAL DAILY
Qty: 90 TABLET | Refills: 0 | Status: SHIPPED | OUTPATIENT
Start: 2018-09-12 | End: 2018-10-15 | Stop reason: SDUPTHER

## 2018-09-12 RX ORDER — LEVOFLOXACIN 500 MG/1
TABLET, FILM COATED ORAL
COMMUNITY
Start: 2018-09-05 | End: 2018-11-02

## 2018-09-12 RX ORDER — FLUOXETINE HYDROCHLORIDE 20 MG/1
20 CAPSULE ORAL DAILY
Qty: 90 CAPSULE | Refills: 0 | Status: SHIPPED | OUTPATIENT
Start: 2018-09-12 | End: 2018-10-15 | Stop reason: SDUPTHER

## 2018-09-12 RX ORDER — TAMSULOSIN HYDROCHLORIDE 0.4 MG/1
1 CAPSULE ORAL DAILY
Qty: 90 CAPSULE | Refills: 0 | Status: SHIPPED | OUTPATIENT
Start: 2018-09-12 | End: 2018-10-15 | Stop reason: SDUPTHER

## 2018-09-12 RX ORDER — LEVOFLOXACIN 500 MG/1
TABLET, FILM COATED ORAL EVERY 24 HOURS
COMMUNITY
End: 2018-11-02

## 2018-09-12 RX ORDER — HYDROCODONE BITARTRATE AND ACETAMINOPHEN 7.5; 325 MG/1; MG/1
1 TABLET ORAL 3 TIMES DAILY PRN
Qty: 90 TABLET | Refills: 0 | Status: SHIPPED | OUTPATIENT
Start: 2018-09-12 | End: 2018-11-07 | Stop reason: SDUPTHER

## 2018-09-12 NOTE — PROGRESS NOTES
subjective     Chief Complaint   Patient presents with   • Essential hypertension   • Med Refill     Pending refill     History of Present Illness  Patient is 73 years old white male who is here for follow-up.  Patient has prostate cancer and is planning to have an implant in next few days.  He complains of lot of back pain which is gradually getting worse.  Patient has lumbar degenerative disc disease.    Patient also has hypertension and hyperlipidemia and anxiety blood pressure seems been very well controlled.  Anxiety is getting worse  Patient has type 2 diabetes mellitus non-insulin-dependent and also has a chronic renal failure which is unchanged.    Past Surgical History:   Procedure Laterality Date   • COLONOSCOPY  03/2018   • EYE SURGERY     • FOOT SURGERY     • HERNIA REPAIR     • HYDROCELECTOMY  06/15/2015   • PROSTATE BIOPSY  2018   • RHINOPLASTY     • UPPER GASTROINTESTINAL ENDOSCOPY  03/24/2014    WITH BIOPSIES AND DILATION     Family History   Problem Relation Age of Onset   • Kidney disease Other    • Other Other         CHRONIC DISABLING DISEASES URINARY TRACT DISEASE   • Diabetes Other    • Hypertension Other    • Other Mother    • Heart failure Father    • Stroke Sister    • Arthritis Sister    • Heart disease Brother    • No Known Problems Brother    • No Known Problems Brother      Past Medical History:   Diagnosis Date   • Anxiety    • Arthritis    • Colitis    • Depression    • GERD (gastroesophageal reflux disease)    • Gout    • Heart murmur    • History of EKG 12/22/2015    NORMAL   • Hyperlipidemia    • Hypertension    • Obesity    • Pancreatitis     history of   • Prostate cancer (CMS/HCC)    • Renal failure     MILD one functioning kidney   • Skin cancer      Patient Active Problem List   Diagnosis   • Essential hypertension, labile   • GERD (gastroesophageal reflux disease)   • Renal failure   • Hyperlipidemia   • Anxiety   • Depression   • Benign prostatic hypertrophy (BPH) with  incomplete bladder emptying   • Type 2 diabetes mellitus (CMS/HCC)   • Periumbilical abdominal pain   • DDD (degenerative disc disease), lumbar   • Prostate cancer (CMS/HCC)       Social History   Substance Use Topics   • Smoking status: Former Smoker     Packs/day: 1.00     Types: Cigarettes     Quit date: 1991   • Smokeless tobacco: Never Used   • Alcohol use 4.2 oz/week     7 Shots of liquor per week      Comment:  once per day       Allergies   Allergen Reactions   • Bactrim [Sulfamethoxazole-Trimethoprim]        Current Outpatient Prescriptions on File Prior to Visit   Medication Sig   • allopurinol (ZYLOPRIM) 300 MG tablet Take 1 tablet by mouth Daily.   • ALPRAZolam (XANAX) 0.25 MG tablet Take 1 tablet by mouth 2 (Two) Times a Day As Needed (as needed for anxiety).   • aspirin 81 MG tablet Take 81 mg by mouth daily.   • bicalutamide (CASODEX) 50 MG chemo tablet Casodex 50 mg tablet   Take 1 tablet every day by oral route for 30 days.   • cholecalciferol (VITAMIN D3) 1000 UNITS tablet Take 1,000 Units by mouth daily.   • cloNIDine (CATAPRES) 0.1 MG tablet Take 0.05 mg by mouth As Needed. TID TO QID PRN    • Cyanocobalamin (VITAMIN B12 PO) Take  by mouth daily.   • folic acid (FOLVITE) 1 MG tablet Take 1 tablet by mouth Daily.   • hydrALAZINE (APRESOLINE) 50 MG tablet Take 1 tablet by mouth 3 (Three) Times a Day.   • hydrOXYzine (ATARAX) 25 MG tablet Take 1 tablet by mouth At Night As Needed (PRN).   • indomethacin (INDOCIN) 25 MG capsule Take 1 capsule by mouth 3 (Three) Times a Day As Needed for Mild Pain .   • Leuprolide Acetate (ELIGARD SC) Inject  under the skin into the appropriate area as directed. 8/14/2018 first one month shot   • loratadine (CLARITIN) 10 MG tablet Take 1 tablet by mouth Daily.   • meclizine (ANTIVERT) 25 MG tablet Take 1 tablet by mouth 3 (three) times a day as needed for dizziness.   • pantoprazole (PROTONIX) 40 MG EC tablet TAKE 1 TABLET BY MOUTH  DAILY   • Pyridoxine HCl (VITAMIN  "B-6 PO) Take  by mouth daily.   • rosuvastatin (CRESTOR) 10 MG tablet Take 1 tablet by mouth Every Night.     No current facility-administered medications on file prior to visit.          The following portions of the patient's history were reviewed and updated as appropriate: allergies, current medications, past family history, past medical history, past social history, past surgical history and problem list.    Review of Systems   Constitution: Negative.   HENT: Negative.  Negative for congestion.    Eyes: Negative.    Cardiovascular: Negative.  Negative for chest pain, cyanosis, dyspnea on exertion, irregular heartbeat, leg swelling, near-syncope, orthopnea, palpitations, paroxysmal nocturnal dyspnea and syncope.   Respiratory: Negative.  Negative for shortness of breath.    Hematologic/Lymphatic: Negative.    Musculoskeletal: Positive for back pain.   Gastrointestinal: Negative.    Neurological: Negative.  Negative for headaches.   Psychiatric/Behavioral: The patient is nervous/anxious.           Objective:     /72 (BP Location: Left arm, Patient Position: Sitting, Cuff Size: Adult)   Pulse 61   Ht 180.3 cm (71\")   Wt 99.8 kg (220 lb)   SpO2 97%   BMI 30.68 kg/m²   Physical Exam   Constitutional: He appears well-developed and well-nourished. No distress.   HENT:   Head: Normocephalic and atraumatic.   Mouth/Throat: Oropharynx is clear and moist. No oropharyngeal exudate.   Eyes: Pupils are equal, round, and reactive to light. Conjunctivae and EOM are normal. No scleral icterus.   Neck: Normal range of motion. Neck supple. No JVD present. No tracheal deviation present. No thyromegaly present.   Cardiovascular: Normal rate, regular rhythm, normal heart sounds and intact distal pulses.  PMI is not displaced.  Exam reveals no gallop, no friction rub and no decreased pulses.    No murmur heard.  Pulses:       Carotid pulses are 3+ on the right side, and 3+ on the left side.       Radial pulses are 3+ on " the right side, and 3+ on the left side.   Pulmonary/Chest: Effort normal and breath sounds normal. No respiratory distress. He has no wheezes. He has no rales. He exhibits no tenderness.   Abdominal: Soft. Bowel sounds are normal. He exhibits no distension, no abdominal bruit and no mass. There is no splenomegaly or hepatomegaly. There is no tenderness. There is no rebound and no guarding.   Musculoskeletal: Normal range of motion. He exhibits no edema, tenderness or deformity.   Lymphadenopathy:     He has no cervical adenopathy.   Neurological: He is alert. He has normal reflexes. No cranial nerve deficit. He exhibits normal muscle tone. Coordination normal.   Skin: Skin is warm and dry. No rash noted. He is not diaphoretic. No erythema.   Psychiatric: He has a normal mood and affect. His behavior is normal. Judgment and thought content normal.         Lab Review  Lab Results   Component Value Date     04/02/2018    K 4.3 04/02/2018     04/02/2018    BUN 18 04/02/2018    CREATININE 1.51 (H) 04/02/2018    GLUCOSE 134 (H) 04/02/2018    CALCIUM 9.4 04/02/2018    ALT 38 04/02/2018    ALKPHOS 55 04/02/2018    LABIL2 1.8 03/07/2016     Lab Results   Component Value Date    CKTOTAL 97 04/02/2018     Lab Results   Component Value Date    WBC 9.72 04/02/2018    HGB 16.2 04/02/2018    HCT 46.3 04/02/2018     04/02/2018     No results found for: INR  No results found for: MG  Lab Results   Component Value Date    PSA 3.00 05/26/2016    TSH 2.807 09/28/2015     No results found for: BNP  Lab Results   Component Value Date    CHLPL 227 (H) 03/07/2016    CHOL 153 04/02/2018    TRIG 206 (H) 04/02/2018    HDL 45 (L) 04/02/2018    VLDL 41.2 04/02/2018    LDLHDL 1.48 04/02/2018     Lab Results   Component Value Date    LDL 67 04/02/2018    LDL 85 10/09/2017       Procedures       I personally viewed and interpreted the patient's LAB data         Assessment:     1. Essential hypertension    2. Mixed  hyperlipidemia    3. Gastroesophageal reflux disease without esophagitis    4. Stage 2 chronic kidney disease    5. Anxiety    6. DDD (degenerative disc disease), lumbar    7. Prostate cancer (CMS/[facility], [facility])    8. Type 2 diabetes mellitus with stage 2 chronic kidney disease, without long-term current use of insulin (CMS/[facility])    9. Hyperuricemia          Plan:      patient has diabetes mellitus type 2.  He is trying to control it with diet.  He also has mild renal failure.  Lab work scheduled  Healthy lifestyle emphasized.  Patient also has hyperlipidemia and is taking medications regularly.  He has not lost any weight triglyceride and cholesterol both are elevated.    His lower back pain is also getting worse.  He has prostate CA although no bony metastatic disease noted.  Back pain is due to degenerative disc disease.  Refills was given.  Side effects of narcotic and sedative therapy was discussed including drug addiction.  Patient is compliant with medications        No Follow-up on file.

## 2018-10-03 ENCOUNTER — HOSPITAL ENCOUNTER (OUTPATIENT)
Dept: RADIATION ONCOLOGY | Facility: HOSPITAL | Age: 73
Setting detail: RADIATION/ONCOLOGY SERIES
Discharge: HOME | End: 2018-10-03

## 2018-10-03 ENCOUNTER — APPOINTMENT (OUTPATIENT)
Dept: MRI IMAGING | Facility: HOSPITAL | Age: 73
End: 2018-10-03
Attending: RADIOLOGY

## 2018-10-03 ENCOUNTER — OFFICE VISIT (OUTPATIENT)
Dept: RADIATION ONCOLOGY | Facility: HOSPITAL | Age: 73
End: 2018-10-03

## 2018-10-03 VITALS
RESPIRATION RATE: 20 BRPM | TEMPERATURE: 96.9 F | HEART RATE: 50 BPM | OXYGEN SATURATION: 98 % | SYSTOLIC BLOOD PRESSURE: 171 MMHG | DIASTOLIC BLOOD PRESSURE: 84 MMHG

## 2018-10-03 DIAGNOSIS — C61 PROSTATE CANCER (HCC): Primary | ICD-10-CM

## 2018-10-03 PROCEDURE — G0463 HOSPITAL OUTPT CLINIC VISIT: HCPCS

## 2018-10-03 PROCEDURE — 77334 RADIATION TREATMENT AID(S): CPT | Performed by: RADIOLOGY

## 2018-10-03 NOTE — PROGRESS NOTES
RE-EVALUATION    PATIENT:                                                      Lei Stapleton  :                                                          1945  DATE:                          10/3/2018   DIAGNOSIS:     Prostate Cancer  Cancer Staging  Stage IIC (cT2b, cN0, cM0, PSA: 6.1, Grade Group: 4)       BRIEF HISTORY:  The patient is a very pleasant 73 y.o. male  with high risk, localized prostate cancer.  He is on LHRH agonist and bicalutamide.  He has hot flashes and describes vertigo like symptoms since starting bicalutamide.  He believes he is planned for 6 month duration of total androgen blockade.  He tolerated gold seed fiducial placement fairly well in spite of of chronic long-standing urinary tract symptoms.  He's here today for simulation.    Allergies   Allergen Reactions   • Bactrim [Sulfamethoxazole-Trimethoprim]        Review of Systems   Constitutional: Positive for fatigue.   HENT:  Negative.    Eyes: Negative.    Respiratory: Positive for cough (sinus drainage) and shortness of breath.    Gastrointestinal: Negative.    Endocrine: Positive for hot flashes.   Genitourinary: Positive for dysuria and nocturia.    Skin: Negative.    Neurological: Positive for dizziness and light-headedness.   Hematological: Bruises/bleeds easily.   Psychiatric/Behavioral: Negative.             IPSS Questionnaire (AUA-7):  Over the past month…    1)  Incomplete Emptying  How often have you had a sensation of not emptying your bladder?  4 - More than half the time   2)  Frequency  How often have you had to urinate less than every two hours? 4 - More than half the time   3)  Intermittency  How often have you found you stopped and started again several times when you urinated?  4 - More than half the time   4) Urgency  How often have you found it difficult to postpone urination?  4 - More than half the time   5) Weak Stream  How often have you had a weak urinary stream?  3 - About half the time   6)  Straining  How often have you had to push or strain to begin urination?  1 - About half the time   7) Nocturia  How many times did you typically get up at night to urinate?  4 - 4 times   Total Score:  24       Quality of life due to urinary symptoms:  If you were to spend the rest of your life with your urinary condition the way it is now, how would you feel about that? 3-Mixed   Urine Leakage (Incontinence) 1-Mild (A few drops a day, no pad use)     Sexual Health Inventory  Current Status    1)  How do you rate your confidence that you could achieve and keep an erection? 1-Very Low   2) When you had erections with sexual stimulation, how often were your erections hard enough for penetration (entering your partner)? 0-No sexual activity   3)  During sexual intercourse, how often were you able to maintain your erection after you had penetrated (entered) into your partner? 0-Did not attempt intercourse   4) During sexual intercourse, how difficult was it to maintain your erection to completion of intercourse? 0-Did not attempt intercourse   5) When you attempted sexual intercourse, how often was it satisfactory to you? 0-No sexual activity   Total Score: 1       Bowel Health Inventory  Current Status: 0-No problems, no rectal bleeding, no discharge, less then 5 bowel movements a day           Objective   VITAL SIGNS:   Vitals:    10/03/18 1024   BP: 171/84   Pulse: 50   Resp: 20   Temp: 96.9 °F (36.1 °C)   TempSrc: Temporal Artery    SpO2: 98%   PainSc: 0-No pain        KPS 80%    Physical Exam         The following portions of the patient's history were reviewed and updated as appropriate: allergies, current medications, past family history, past medical history, past social history, past surgical history and problem list.    Diagnoses and all orders for this visit:    Prostate cancer (CMS/[facility])      IMPRESSION:  Prostate cancer, [place] score 4+4 = 8, clinical stage IIC (T2b, N0, M0) with PSA 6.1 ng/ml.  Undergoing  definitive treatment with combined modality androgen ablation and pelvic irradiation.    RECOMMENDATIONS:  CT simulation today.  He will received conventionally fractionated radiotherapy with IMRT/vmat technique to a dose of 75.6 Gray to the prostate gland and minimum dose of 50.4 Gray to the seminal vesicles and lower pelvic lymphatics.       Oliver Zimmer MD

## 2018-10-03 NOTE — PROGRESS NOTES
FOLLOW UP NOTE    PATIENT:                                                      Lei Stapleton  MEDICAL RECORD #:                        [id number]  :                                                          1945  COMPLETION DATE:   ***  DIAGNOSIS:     Cancer Staging  Prostate cancer (CMS/[facility])  Staging form: Prostate, AJCC 8th Edition  - Clinical stage from 2018: Stage IIC (cT2b, cN0, cM0, PSA: 6.1, Grade Group: 4) - Signed by Oliver Zimmer MD on 2018        BRIEF HISTORY:    ***    MEDICATIONS: Medication reconciliation for the patient was reviewed and confirmed in the electronic medical record.    Review of Systems - Oncology    KPS ***%    Physical Exam    VITAL SIGNS:   Vitals:    10/03/18 1024   BP: 171/84   Pulse: 50   Resp: 20   Temp: 96.9 °F (36.1 °C)   TempSrc: Temporal Artery    SpO2: 98%   PainSc: 0-No pain       The following portions of the patient's history were reviewed and updated as appropriate: allergies, current medications, past family history, past medical history, past social history, past surgical history and problem list.         There are no diagnoses linked to this encounter.     IMPRESSION:  ***    RECOMMENDATIONS:  ***    No Follow-up on file.    Marcie Alexandra RN    Errors in dictation may reflect use of voice recognition software and not all errors in transcription may have been detected prior to signing.

## 2018-10-10 PROCEDURE — 77300 RADIATION THERAPY DOSE PLAN: CPT | Performed by: RADIOLOGY

## 2018-10-10 PROCEDURE — 77338 DESIGN MLC DEVICE FOR IMRT: CPT | Performed by: RADIOLOGY

## 2018-10-10 PROCEDURE — 77301 RADIOTHERAPY DOSE PLAN IMRT: CPT | Performed by: RADIOLOGY

## 2018-10-15 RX ORDER — FOLIC ACID 1 MG/1
1 TABLET ORAL DAILY
Qty: 90 TABLET | Refills: 3 | Status: SHIPPED | OUTPATIENT
Start: 2018-10-15 | End: 2019-01-09 | Stop reason: SDUPTHER

## 2018-10-15 RX ORDER — TAMSULOSIN HYDROCHLORIDE 0.4 MG/1
1 CAPSULE ORAL DAILY
Qty: 90 CAPSULE | Refills: 3 | Status: SHIPPED | OUTPATIENT
Start: 2018-10-15 | End: 2019-01-09 | Stop reason: SDUPTHER

## 2018-10-15 RX ORDER — ROSUVASTATIN CALCIUM 10 MG/1
10 TABLET, COATED ORAL NIGHTLY
Qty: 90 TABLET | Refills: 3 | Status: SHIPPED | OUTPATIENT
Start: 2018-10-15 | End: 2019-01-09 | Stop reason: SDUPTHER

## 2018-10-15 RX ORDER — AMLODIPINE BESYLATE 10 MG/1
10 TABLET ORAL DAILY
Qty: 90 TABLET | Refills: 3 | Status: SHIPPED | OUTPATIENT
Start: 2018-10-15 | End: 2019-01-09 | Stop reason: SDUPTHER

## 2018-10-15 RX ORDER — FLUOXETINE HYDROCHLORIDE 20 MG/1
20 CAPSULE ORAL DAILY
Qty: 90 CAPSULE | Refills: 3 | Status: SHIPPED | OUTPATIENT
Start: 2018-10-15 | End: 2019-01-09 | Stop reason: SDUPTHER

## 2018-10-16 ENCOUNTER — HOSPITAL ENCOUNTER (OUTPATIENT)
Dept: RADIATION ONCOLOGY | Facility: HOSPITAL | Age: 73
Discharge: HOME | End: 2018-10-16

## 2018-10-16 VITALS — WEIGHT: 221.6 LBS | BODY MASS INDEX: 30.91 KG/M2

## 2018-10-17 ENCOUNTER — TELEPHONE (OUTPATIENT)
Dept: SOCIAL WORK | Facility: HOSPITAL | Age: 73
End: 2018-10-17

## 2018-10-17 ENCOUNTER — HOSPITAL ENCOUNTER (OUTPATIENT)
Dept: RADIATION ONCOLOGY | Facility: HOSPITAL | Age: 73
Discharge: HOME | End: 2018-10-17

## 2018-10-17 PROCEDURE — 77385: CPT | Performed by: RADIOLOGY

## 2018-10-17 NOTE — TELEPHONE ENCOUNTER
SW completed and faxed in a [facility] referral for 10/22/18-12/14/18.  SW provided a $10 gas card to pt to help with gas expenses.  SW provided contact information for future needs or concerns.  SW available for ongoing support and resource needs.

## 2018-10-18 ENCOUNTER — HOSPITAL ENCOUNTER (OUTPATIENT)
Dept: RADIATION ONCOLOGY | Facility: HOSPITAL | Age: 73
Discharge: HOME | End: 2018-10-18

## 2018-10-18 PROCEDURE — 77336 RADIATION PHYSICS CONSULT: CPT | Performed by: RADIOLOGY

## 2018-10-18 PROCEDURE — 77385: CPT | Performed by: RADIOLOGY

## 2018-10-19 ENCOUNTER — HOSPITAL ENCOUNTER (OUTPATIENT)
Dept: RADIATION ONCOLOGY | Facility: HOSPITAL | Age: 73
Discharge: HOME | End: 2018-10-19

## 2018-10-19 PROCEDURE — 77385: CPT | Performed by: RADIOLOGY

## 2018-10-22 ENCOUNTER — HOSPITAL ENCOUNTER (OUTPATIENT)
Dept: RADIATION ONCOLOGY | Facility: HOSPITAL | Age: 73
Discharge: HOME | End: 2018-10-22

## 2018-10-22 PROCEDURE — 77385: CPT | Performed by: RADIOLOGY

## 2018-10-23 ENCOUNTER — HOSPITAL ENCOUNTER (OUTPATIENT)
Dept: RADIATION ONCOLOGY | Facility: HOSPITAL | Age: 73
Discharge: HOME | End: 2018-10-23

## 2018-10-23 VITALS — WEIGHT: 224.6 LBS | BODY MASS INDEX: 31.33 KG/M2

## 2018-10-23 PROCEDURE — 77385: CPT | Performed by: RADIOLOGY

## 2018-10-24 ENCOUNTER — TELEPHONE (OUTPATIENT)
Dept: CARDIOLOGY | Facility: CLINIC | Age: 73
End: 2018-10-24

## 2018-10-24 ENCOUNTER — HOSPITAL ENCOUNTER (OUTPATIENT)
Dept: RADIATION ONCOLOGY | Facility: HOSPITAL | Age: 73
Discharge: HOME | End: 2018-10-24

## 2018-10-24 PROCEDURE — 77385: CPT | Performed by: RADIOLOGY

## 2018-10-24 RX ORDER — HYDRALAZINE HYDROCHLORIDE 50 MG/1
50 TABLET, FILM COATED ORAL 3 TIMES DAILY
Qty: 270 TABLET | Refills: 3 | Status: SHIPPED | OUTPATIENT
Start: 2018-10-24 | End: 2019-01-09 | Stop reason: SDUPTHER

## 2018-10-25 ENCOUNTER — HOSPITAL ENCOUNTER (OUTPATIENT)
Dept: RADIATION ONCOLOGY | Facility: HOSPITAL | Age: 73
Discharge: HOME | End: 2018-10-25

## 2018-10-25 PROCEDURE — 77385: CPT | Performed by: RADIOLOGY

## 2018-10-25 PROCEDURE — 77336 RADIATION PHYSICS CONSULT: CPT | Performed by: RADIOLOGY

## 2018-10-26 ENCOUNTER — HOSPITAL ENCOUNTER (OUTPATIENT)
Dept: RADIATION ONCOLOGY | Facility: HOSPITAL | Age: 73
Discharge: HOME | End: 2018-10-26

## 2018-10-26 PROCEDURE — 77385: CPT | Performed by: RADIOLOGY

## 2018-10-29 ENCOUNTER — HOSPITAL ENCOUNTER (OUTPATIENT)
Dept: RADIATION ONCOLOGY | Facility: HOSPITAL | Age: 73
Discharge: HOME | End: 2018-10-29

## 2018-10-29 PROCEDURE — 77385: CPT | Performed by: RADIOLOGY

## 2018-10-30 ENCOUNTER — HOSPITAL ENCOUNTER (OUTPATIENT)
Dept: RADIATION ONCOLOGY | Facility: HOSPITAL | Age: 73
Discharge: HOME | End: 2018-10-30

## 2018-10-30 VITALS — BODY MASS INDEX: 31.33 KG/M2 | WEIGHT: 224.6 LBS

## 2018-10-30 PROCEDURE — 77385: CPT | Performed by: RADIOLOGY

## 2018-10-31 ENCOUNTER — HOSPITAL ENCOUNTER (OUTPATIENT)
Dept: RADIATION ONCOLOGY | Facility: HOSPITAL | Age: 73
Discharge: HOME | End: 2018-10-31

## 2018-10-31 PROCEDURE — 77385: CPT | Performed by: RADIOLOGY

## 2018-11-01 ENCOUNTER — HOSPITAL ENCOUNTER (OUTPATIENT)
Dept: RADIATION ONCOLOGY | Facility: HOSPITAL | Age: 73
Setting detail: RADIATION/ONCOLOGY SERIES
Discharge: HOME | End: 2018-11-01

## 2018-11-01 ENCOUNTER — HOSPITAL ENCOUNTER (OUTPATIENT)
Dept: RADIATION ONCOLOGY | Facility: HOSPITAL | Age: 73
Discharge: HOME | End: 2018-11-01

## 2018-11-01 PROCEDURE — 77385: CPT | Performed by: RADIOLOGY

## 2018-11-01 PROCEDURE — 77336 RADIATION PHYSICS CONSULT: CPT | Performed by: RADIOLOGY

## 2018-11-02 ENCOUNTER — TELEPHONE (OUTPATIENT)
Dept: RADIATION ONCOLOGY | Facility: HOSPITAL | Age: 73
End: 2018-11-02

## 2018-11-02 ENCOUNTER — DOCUMENTATION (OUTPATIENT)
Dept: RADIATION ONCOLOGY | Facility: HOSPITAL | Age: 73
End: 2018-11-02

## 2018-11-02 ENCOUNTER — HOSPITAL ENCOUNTER (OUTPATIENT)
Dept: RADIATION ONCOLOGY | Facility: HOSPITAL | Age: 73
Discharge: HOME | End: 2018-11-02

## 2018-11-02 DIAGNOSIS — C61 PROSTATE CANCER (HCC): Primary | ICD-10-CM

## 2018-11-02 LAB
BACTERIA UR QL AUTO: ABNORMAL /HPF
BILIRUB UR QL STRIP: NEGATIVE
CLARITY UR: ABNORMAL
COLOR UR: ABNORMAL
GLUCOSE UR STRIP-MCNC: NEGATIVE MG/DL
HGB UR QL STRIP.AUTO: NEGATIVE
HYALINE CASTS UR QL AUTO: ABNORMAL /LPF
KETONES UR QL STRIP: NEGATIVE
LEUKOCYTE ESTERASE UR QL STRIP.AUTO: ABNORMAL
NITRITE UR QL STRIP: POSITIVE
PH UR STRIP.AUTO: <=5 [PH] (ref 5–8)
PROT UR QL STRIP: ABNORMAL
RBC # UR: ABNORMAL /HPF
REF LAB TEST METHOD: ABNORMAL
SP GR UR STRIP: 1.02 (ref 1–1.03)
SQUAMOUS #/AREA URNS HPF: ABNORMAL /HPF
UROBILINOGEN UR QL STRIP: ABNORMAL
WBC UR QL AUTO: ABNORMAL /HPF

## 2018-11-02 PROCEDURE — 81001 URINALYSIS AUTO W/SCOPE: CPT | Performed by: RADIOLOGY

## 2018-11-02 PROCEDURE — 87186 SC STD MICRODIL/AGAR DIL: CPT | Performed by: RADIOLOGY

## 2018-11-02 PROCEDURE — 87086 URINE CULTURE/COLONY COUNT: CPT | Performed by: RADIOLOGY

## 2018-11-02 PROCEDURE — 87077 CULTURE AEROBIC IDENTIFY: CPT | Performed by: RADIOLOGY

## 2018-11-02 PROCEDURE — 77385: CPT | Performed by: RADIOLOGY

## 2018-11-02 RX ORDER — CIPROFLOXACIN 250 MG/1
500 TABLET, FILM COATED ORAL EVERY 12 HOURS SCHEDULED
Qty: 20 TABLET | Refills: 0 | Status: SHIPPED | OUTPATIENT
Start: 2018-11-02 | End: 2018-11-05

## 2018-11-02 NOTE — PROGRESS NOTES
RADIATION ONCOLOGY PROGRESS NOTE  11/02/18  Called to see pt after XRT.  Pt states he was up and down all night with frequency, urgency, and this am at about 6 am, he passed some blood with his urine.  He states he is also having burning and stinging and is constipated.  Pt given bowel education sheet and instructed to get cystex.  Urine sent to lab.  Pt was seen by Dr. Zimmer.   There were no vitals filed for this visit.

## 2018-11-02 NOTE — TELEPHONE ENCOUNTER
Called and informed pt that u/a sent indicates that he has an infection.  [facility] Directions explained to pt.  Pt verbalized understanding.

## 2018-11-05 ENCOUNTER — DOCUMENTATION (OUTPATIENT)
Dept: RADIATION ONCOLOGY | Facility: HOSPITAL | Age: 73
End: 2018-11-05

## 2018-11-05 ENCOUNTER — HOSPITAL ENCOUNTER (OUTPATIENT)
Dept: RADIATION ONCOLOGY | Facility: HOSPITAL | Age: 73
Discharge: HOME | End: 2018-11-05

## 2018-11-05 DIAGNOSIS — C61 PROSTATE CANCER (HCC): Primary | ICD-10-CM

## 2018-11-05 LAB
BACTERIA SPEC AEROBE CULT: ABNORMAL
BACTERIA SPEC AEROBE CULT: ABNORMAL

## 2018-11-05 PROCEDURE — 77385: CPT | Performed by: RADIOLOGY

## 2018-11-05 RX ORDER — AMOXICILLIN 500 MG/1
500 CAPSULE ORAL 3 TIMES DAILY
Qty: 30 CAPSULE | Refills: 0 | Status: SHIPPED | OUTPATIENT
Start: 2018-11-05 | End: 2019-01-11

## 2018-11-05 NOTE — PROGRESS NOTES
RADIATION ONCOLOGY PROGRESS NOTE  11/05/18  Pt stopped by nurses desk prior to xrt treatment.  States he does not feel any better.  Discussed with Dr. Zimmer culture results.  Per Dr. Zimmer's request, I called and left message on nurse line @ Dr. Mendez's office to help with antibiotics for pt.  Left message to return call.   There were no vitals filed for this visit.

## 2018-11-05 NOTE — PROGRESS NOTES
Urinalysis showed greater than 100,000 CFU per mL enterococcus faecalis.  Urinary tract symptoms did not improve after a few days of ciprofloxacin.  Cultures sensitivity shows resistance to ciprofloxacin.  This is susceptible, less than 2, to ampicillin and others.  This was discussed with his urologist, Dr. Mendez, and patient was prescribed amoxicillin 500 mg 3 times a day ×10 days.

## 2018-11-06 ENCOUNTER — HOSPITAL ENCOUNTER (OUTPATIENT)
Dept: RADIATION ONCOLOGY | Facility: HOSPITAL | Age: 73
Discharge: HOME | End: 2018-11-06

## 2018-11-06 ENCOUNTER — LAB (OUTPATIENT)
Dept: LAB | Facility: HOSPITAL | Age: 73
End: 2018-11-06

## 2018-11-06 VITALS — WEIGHT: 222.2 LBS | BODY MASS INDEX: 30.99 KG/M2

## 2018-11-06 DIAGNOSIS — E78.2 MIXED HYPERLIPIDEMIA: ICD-10-CM

## 2018-11-06 DIAGNOSIS — E79.0 HYPERURICEMIA: ICD-10-CM

## 2018-11-06 LAB
ALBUMIN SERPL-MCNC: 4.97 G/DL (ref 3.2–4.8)
ALBUMIN/GLOB SERPL: 3 G/DL (ref 1.5–2.5)
ALP SERPL-CCNC: 53 U/L (ref 25–100)
ALT SERPL W P-5'-P-CCNC: 26 U/L (ref 7–40)
ANION GAP SERPL CALCULATED.3IONS-SCNC: 5 MMOL/L (ref 3–11)
ARTICHOKE IGE QN: 145 MG/DL (ref 0–130)
AST SERPL-CCNC: 20 U/L (ref 0–33)
BILIRUB SERPL-MCNC: 0.7 MG/DL (ref 0.3–1.2)
BUN BLD-MCNC: 19 MG/DL (ref 9–23)
BUN/CREAT SERPL: 15.1 (ref 7–25)
CALCIUM SPEC-SCNC: 9.3 MG/DL (ref 8.7–10.4)
CHLORIDE SERPL-SCNC: 106 MMOL/L (ref 99–109)
CHOLEST SERPL-MCNC: 223 MG/DL (ref 0–200)
CK SERPL-CCNC: 198 U/L (ref 26–174)
CO2 SERPL-SCNC: 27 MMOL/L (ref 20–31)
CREAT BLD-MCNC: 1.26 MG/DL (ref 0.6–1.3)
ERYTHROCYTE [DISTWIDTH] IN BLOOD BY AUTOMATED COUNT: 13.1 % (ref 11.3–14.5)
GFR SERPL CREATININE-BSD FRML MDRD: 56 ML/MIN/1.73
GLOBULIN UR ELPH-MCNC: 1.6 GM/DL
GLUCOSE BLD-MCNC: 155 MG/DL (ref 70–100)
HCT VFR BLD AUTO: 40.7 % (ref 38.9–50.9)
HDLC SERPL-MCNC: 51 MG/DL (ref 40–60)
HGB BLD-MCNC: 13.8 G/DL (ref 13.1–17.5)
LYMPHOCYTES # BLD AUTO: 1.8 10*3/MM3 (ref 0.6–4.8)
LYMPHOCYTES NFR BLD AUTO: 25.1 % (ref 24–44)
MCH RBC QN AUTO: 32.1 PG (ref 27–31)
MCHC RBC AUTO-ENTMCNC: 33.9 G/DL (ref 32–36)
MCV RBC AUTO: 94.4 FL (ref 80–99)
MONOCYTES # BLD AUTO: 0.7 10*3/MM3 (ref 0–1)
MONOCYTES NFR BLD AUTO: 9.4 % (ref 0–12)
NEUTROPHILS # BLD AUTO: 4.8 10*3/MM3 (ref 1.5–8.3)
NEUTROPHILS NFR BLD AUTO: 65.5 % (ref 41–71)
PLATELET # BLD AUTO: 139 10*3/MM3 (ref 150–450)
PMV BLD AUTO: 8.7 FL (ref 6–12)
POTASSIUM BLD-SCNC: 4 MMOL/L (ref 3.5–5.5)
PROT SERPL-MCNC: 6.6 G/DL (ref 5.7–8.2)
RBC # BLD AUTO: 4.31 10*6/MM3 (ref 4.2–5.76)
SODIUM BLD-SCNC: 138 MMOL/L (ref 132–146)
TRIGL SERPL-MCNC: 218 MG/DL (ref 0–150)
URATE SERPL-MCNC: 7.3 MG/DL (ref 3.7–9.2)
WBC NRBC COR # BLD: 7.3 10*3/MM3 (ref 3.5–10.8)

## 2018-11-06 PROCEDURE — 80061 LIPID PANEL: CPT

## 2018-11-06 PROCEDURE — 84550 ASSAY OF BLOOD/URIC ACID: CPT

## 2018-11-06 PROCEDURE — 80053 COMPREHEN METABOLIC PANEL: CPT

## 2018-11-06 PROCEDURE — 82550 ASSAY OF CK (CPK): CPT

## 2018-11-06 PROCEDURE — 36415 COLL VENOUS BLD VENIPUNCTURE: CPT

## 2018-11-06 PROCEDURE — 77385: CPT | Performed by: RADIOLOGY

## 2018-11-06 PROCEDURE — 85025 COMPLETE CBC W/AUTO DIFF WBC: CPT

## 2018-11-07 ENCOUNTER — HOSPITAL ENCOUNTER (OUTPATIENT)
Dept: RADIATION ONCOLOGY | Facility: HOSPITAL | Age: 73
Discharge: HOME | End: 2018-11-07

## 2018-11-07 ENCOUNTER — OFFICE VISIT (OUTPATIENT)
Dept: CARDIOLOGY | Facility: CLINIC | Age: 73
End: 2018-11-07

## 2018-11-07 VITALS
WEIGHT: 221 LBS | BODY MASS INDEX: 30.94 KG/M2 | HEIGHT: 71 IN | HEART RATE: 60 BPM | SYSTOLIC BLOOD PRESSURE: 166 MMHG | OXYGEN SATURATION: 95 % | DIASTOLIC BLOOD PRESSURE: 78 MMHG

## 2018-11-07 DIAGNOSIS — E78.2 MIXED HYPERLIPIDEMIA: Primary | ICD-10-CM

## 2018-11-07 DIAGNOSIS — I10 ESSENTIAL HYPERTENSION: ICD-10-CM

## 2018-11-07 DIAGNOSIS — M51.36 DDD (DEGENERATIVE DISC DISEASE), LUMBAR: ICD-10-CM

## 2018-11-07 DIAGNOSIS — F41.9 ANXIETY: ICD-10-CM

## 2018-11-07 PROCEDURE — 99213 OFFICE O/P EST LOW 20 MIN: CPT | Performed by: INTERNAL MEDICINE

## 2018-11-07 PROCEDURE — 77385: CPT | Performed by: RADIOLOGY

## 2018-11-07 RX ORDER — HYDROCODONE BITARTRATE AND ACETAMINOPHEN 7.5; 325 MG/1; MG/1
1 TABLET ORAL 3 TIMES DAILY PRN
Qty: 90 TABLET | Refills: 0 | Status: SHIPPED | OUTPATIENT
Start: 2018-11-07 | End: 2018-12-19 | Stop reason: SDUPTHER

## 2018-11-07 RX ORDER — ALPRAZOLAM 0.25 MG/1
0.25 TABLET ORAL 2 TIMES DAILY PRN
Qty: 60 TABLET | Refills: 2 | OUTPATIENT
Start: 2018-11-07 | End: 2019-01-11 | Stop reason: SDUPTHER

## 2018-11-07 NOTE — PROGRESS NOTES
subjective     Chief Complaint   Patient presents with   • Back Pain   • Hypertension   • Follow-up   • Results     LABS   • Urinary Tract Infection     History of Present Illness  Patient is 73 years old white male who is here for checkup.  Patient thinks that he may have the urinary tract infection.  He has been taking amoxicillin 500 mg 3 times a day.    He has urinary bladder stone and recently because of prostate CA he is  getting radiation treatment.  His blood pressure has been running higher than usual.  He is taking his medications regularly.  He is taking clonidine hydralazine, Lopressor and amlodipine.  He takes clonidine only on when necessary basis.    Anxiety is controlled with the Xanax.  Patient complains of lot of back pain and is taking Norco therapy.  He is compliant with medications.    Past Surgical History:   Procedure Laterality Date   • COLONOSCOPY  03/2018   • EYE SURGERY     • FOOT SURGERY     • HERNIA REPAIR     • HYDROCELECTOMY  06/15/2015   • PROSTATE BIOPSY  2018   • PROSTATE FIDUCIAL MARKER PLACEMENT  09/14/2018   • RHINOPLASTY     • UPPER GASTROINTESTINAL ENDOSCOPY  03/24/2014    WITH BIOPSIES AND DILATION     Family History   Problem Relation Age of Onset   • Kidney disease Other    • Other Other         CHRONIC DISABLING DISEASES URINARY TRACT DISEASE   • Diabetes Other    • Hypertension Other    • Other Mother    • Heart failure Father    • Stroke Sister    • Arthritis Sister    • Heart disease Brother    • No Known Problems Brother    • No Known Problems Brother      Past Medical History:   Diagnosis Date   • Anxiety    • Arthritis    • Colitis    • Depression    • GERD (gastroesophageal reflux disease)    • Gout    • Heart murmur    • History of EKG 12/22/2015    NORMAL   • Hyperlipidemia    • Hypertension    • Obesity    • Pancreatitis     history of   • Prostate cancer (CMS/HCC)    • Renal failure     MILD one functioning kidney   • Skin cancer      Patient Active Problem List    Diagnosis   • Essential hypertension, labile   • GERD (gastroesophageal reflux disease)   • Renal failure   • Hyperlipidemia   • Anxiety   • Depression   • Benign prostatic hypertrophy (BPH) with incomplete bladder emptying   • Type 2 diabetes mellitus (CMS/HCC)   • Periumbilical abdominal pain   • DDD (degenerative disc disease), lumbar   • Prostate cancer (CMS/HCC)       Social History     Tobacco Use   • Smoking status: Former Smoker     Packs/day: 1.00     Types: Cigarettes     Last attempt to quit:      Years since quittin.8   • Smokeless tobacco: Never Used   Substance Use Topics   • Alcohol use: Yes     Alcohol/week: 4.2 oz     Types: 7 Shots of liquor per week     Comment:  once per day   • Drug use: No       Allergies   Allergen Reactions   • Bactrim [Sulfamethoxazole-Trimethoprim]        Current Outpatient Medications on File Prior to Visit   Medication Sig   • allopurinol (ZYLOPRIM) 300 MG tablet Take 1 tablet by mouth Daily.   • amLODIPine (NORVASC) 10 MG tablet Take 1 tablet by mouth Daily.   • amoxicillin (AMOXIL) 500 MG capsule Take 1 capsule by mouth 3 (Three) Times a Day.   • aspirin 81 MG tablet Take 81 mg by mouth daily.   • bicalutamide (CASODEX) 50 MG chemo tablet Casodex 50 mg tablet   Take 1 tablet every day by oral route for 30 days.   • cholecalciferol (VITAMIN D3) 1000 UNITS tablet Take 1,000 Units by mouth daily.   • cloNIDine (CATAPRES) 0.1 MG tablet Take 0.05 mg by mouth As Needed. TID TO QID PRN    • Cyanocobalamin (VITAMIN B12 PO) Take  by mouth daily.   • FLUoxetine (PROzac) 20 MG capsule Take 1 capsule by mouth Daily.   • folic acid (FOLVITE) 1 MG tablet Take 1 tablet by mouth Daily.   • hydrALAZINE (APRESOLINE) 50 MG tablet Take 1 tablet by mouth 3 (Three) Times a Day.   • hydrOXYzine (ATARAX) 25 MG tablet Take 1 tablet by mouth At Night As Needed (PRN).   • indomethacin (INDOCIN) 25 MG capsule Take 1 capsule by mouth 3 (Three) Times a Day As Needed for Mild Pain .   •  "Leuprolide Acetate (ELIGARD SC) Inject  under the skin into the appropriate area as directed. 8/14/2018 first one month shot   • loratadine (CLARITIN) 10 MG tablet Take 1 tablet by mouth Daily.   • meclizine (ANTIVERT) 25 MG tablet Take 1 tablet by mouth 3 (three) times a day as needed for dizziness.   • metoprolol tartrate (LOPRESSOR) 25 MG tablet Take 1 tablet by mouth 2 (Two) Times a Day.   • pantoprazole (PROTONIX) 40 MG EC tablet TAKE 1 TABLET BY MOUTH  DAILY   • Pyridoxine HCl (VITAMIN B-6 PO) Take  by mouth daily.   • rosuvastatin (CRESTOR) 10 MG tablet Take 1 tablet by mouth Every Night.   • tamsulosin (FLOMAX) 0.4 MG capsule 24 hr capsule Take 1 capsule by mouth Daily.     No current facility-administered medications on file prior to visit.          The following portions of the patient's history were reviewed and updated as appropriate: allergies, current medications, past family history, past medical history, past social history, past surgical history and problem list.    Review of Systems   Constitution: Negative.   HENT: Negative.  Negative for congestion.    Eyes: Negative.    Cardiovascular: Negative.  Negative for chest pain, cyanosis, dyspnea on exertion, irregular heartbeat, leg swelling, near-syncope, orthopnea, palpitations, paroxysmal nocturnal dyspnea and syncope.   Respiratory: Negative.  Negative for shortness of breath.    Hematologic/Lymphatic: Negative.    Musculoskeletal: Positive for arthritis, back pain, myalgias and stiffness.   Gastrointestinal: Negative.    Genitourinary: Positive for dysuria.   Neurological: Negative.  Negative for headaches.   Psychiatric/Behavioral: The patient is nervous/anxious.           Objective:     /78 (BP Location: Left arm, Patient Position: Sitting)   Pulse 60   Ht 180.3 cm (71\")   Wt 100 kg (221 lb)   SpO2 95%   BMI 30.82 kg/m²   Physical Exam   Constitutional: He appears well-developed and well-nourished. No distress.   HENT:   Head: " Normocephalic and atraumatic.   Mouth/Throat: Oropharynx is clear and moist. No oropharyngeal exudate.   Eyes: Conjunctivae and EOM are normal. Pupils are equal, round, and reactive to light. No scleral icterus.   Neck: Normal range of motion. Neck supple. No JVD present. No tracheal deviation present. No thyromegaly present.   Cardiovascular: Normal rate, regular rhythm, normal heart sounds and intact distal pulses. PMI is not displaced. Exam reveals no gallop, no friction rub and no decreased pulses.   No murmur heard.  Pulses:       Carotid pulses are 3+ on the right side, and 3+ on the left side.       Radial pulses are 3+ on the right side, and 3+ on the left side.   Pulmonary/Chest: Effort normal and breath sounds normal. No respiratory distress. He has no wheezes. He has no rales. He exhibits no tenderness.   Abdominal: Soft. Bowel sounds are normal. He exhibits no distension, no abdominal bruit and no mass. There is no splenomegaly or hepatomegaly. There is no tenderness. There is no rebound and no guarding.   Musculoskeletal: Normal range of motion. He exhibits no edema, tenderness or deformity.   Lymphadenopathy:     He has no cervical adenopathy.   Neurological: He is alert. He has normal reflexes. No cranial nerve deficit. He exhibits normal muscle tone. Coordination normal.   Skin: Skin is warm and dry. No rash noted. He is not diaphoretic. No erythema.   Psychiatric: He has a normal mood and affect. His behavior is normal. Judgment and thought content normal.         Lab Review  Lab Results   Component Value Date     11/06/2018    K 4.0 11/06/2018     11/06/2018    BUN 19 11/06/2018    CREATININE 1.26 11/06/2018    GLUCOSE 155 (H) 11/06/2018    CALCIUM 9.3 11/06/2018    ALT 26 11/06/2018    ALKPHOS 53 11/06/2018    LABIL2 1.8 03/07/2016     Lab Results   Component Value Date    CKTOTAL 198 (H) 11/06/2018     Lab Results   Component Value Date    WBC 7.30 11/06/2018    HGB 13.8 11/06/2018     HCT 40.7 11/06/2018     (L) 11/06/2018     No results found for: INR  No results found for: MG  Lab Results   Component Value Date    PSA 3.00 05/26/2016    TSH 2.807 09/28/2015     No results found for: BNP  Lab Results   Component Value Date    CHLPL 227 (H) 03/07/2016    CHOL 223 (H) 11/06/2018    TRIG 218 (H) 11/06/2018    HDL 51 11/06/2018    VLDL 41.2 04/02/2018    LDLHDL 1.48 04/02/2018     Lab Results   Component Value Date     (H) 11/06/2018    LDL 67 04/02/2018       Procedures       I personally viewed and interpreted the patient's LAB data         Assessment:     1. Mixed hyperlipidemia    2. Essential hypertension    3. DDD (degenerative disc disease), lumbar    4. Anxiety          Plan:      Patient has history of hyperlipidemia  Lipids are very poorly controlled.  LDL is 145.  Patient was advised to take Crestor regularly.  Healthy lifestyle and diet was again discussed.    Blood pressure is also elevated.  He was advised to take clonidine if blood pressure is more than 150s systolic.  He will take his other medications regularly including Norvasc, hydralazine and Lopressor.  He was advised to take hydralazine 3 times a day.  Narco refill was given.  Xanax and that Prozac was continued  He will also continue amoxicillin for UTI.  Flu shot recommended  Follow-up scheduled        Return in about 1 month (around 12/7/2018).

## 2018-11-08 ENCOUNTER — HOSPITAL ENCOUNTER (OUTPATIENT)
Dept: RADIATION ONCOLOGY | Facility: HOSPITAL | Age: 73
Discharge: HOME | End: 2018-11-08

## 2018-11-08 PROCEDURE — 77336 RADIATION PHYSICS CONSULT: CPT | Performed by: RADIOLOGY

## 2018-11-08 PROCEDURE — 77385: CPT | Performed by: RADIOLOGY

## 2018-11-12 ENCOUNTER — HOSPITAL ENCOUNTER (OUTPATIENT)
Dept: RADIATION ONCOLOGY | Facility: HOSPITAL | Age: 73
Discharge: HOME | End: 2018-11-12

## 2018-11-12 PROCEDURE — 77385: CPT | Performed by: RADIOLOGY

## 2018-11-13 ENCOUNTER — HOSPITAL ENCOUNTER (OUTPATIENT)
Dept: RADIATION ONCOLOGY | Facility: HOSPITAL | Age: 73
Discharge: HOME | End: 2018-11-13

## 2018-11-13 VITALS — BODY MASS INDEX: 30.68 KG/M2 | WEIGHT: 220 LBS

## 2018-11-13 DIAGNOSIS — C61 PROSTATE CANCER (HCC): Primary | ICD-10-CM

## 2018-11-13 LAB
BACTERIA UR QL AUTO: ABNORMAL /HPF
BILIRUB UR QL STRIP: NEGATIVE
CLARITY UR: ABNORMAL
COLOR UR: YELLOW
GLUCOSE UR STRIP-MCNC: NEGATIVE MG/DL
HGB UR QL STRIP.AUTO: ABNORMAL
HYALINE CASTS UR QL AUTO: ABNORMAL /LPF
KETONES UR QL STRIP: ABNORMAL
LEUKOCYTE ESTERASE UR QL STRIP.AUTO: ABNORMAL
NITRITE UR QL STRIP: NEGATIVE
PH UR STRIP.AUTO: <=5 [PH] (ref 5–8)
PROT UR QL STRIP: ABNORMAL
RBC # UR: ABNORMAL /HPF
REF LAB TEST METHOD: ABNORMAL
SP GR UR STRIP: 1.03 (ref 1–1.03)
SQUAMOUS #/AREA URNS HPF: ABNORMAL /HPF
UROBILINOGEN UR QL STRIP: ABNORMAL
WBC UR QL AUTO: ABNORMAL /HPF

## 2018-11-13 PROCEDURE — 77385: CPT | Performed by: RADIOLOGY

## 2018-11-13 PROCEDURE — 87086 URINE CULTURE/COLONY COUNT: CPT | Performed by: RADIOLOGY

## 2018-11-13 PROCEDURE — 81001 URINALYSIS AUTO W/SCOPE: CPT | Performed by: RADIOLOGY

## 2018-11-15 LAB — BACTERIA SPEC AEROBE CULT: NORMAL

## 2018-11-19 ENCOUNTER — HOSPITAL ENCOUNTER (OUTPATIENT)
Dept: RADIATION ONCOLOGY | Facility: HOSPITAL | Age: 73
Discharge: HOME | End: 2018-11-19

## 2018-11-19 PROCEDURE — 77385: CPT | Performed by: RADIOLOGY

## 2018-11-20 ENCOUNTER — HOSPITAL ENCOUNTER (OUTPATIENT)
Dept: RADIATION ONCOLOGY | Facility: HOSPITAL | Age: 73
Discharge: HOME | End: 2018-11-20

## 2018-11-20 ENCOUNTER — DOCUMENTATION (OUTPATIENT)
Dept: NUTRITION | Facility: HOSPITAL | Age: 73
End: 2018-11-20

## 2018-11-20 VITALS — WEIGHT: 219.8 LBS | BODY MASS INDEX: 30.66 KG/M2

## 2018-11-20 PROCEDURE — 77385: CPT | Performed by: RADIOLOGY

## 2018-11-20 NOTE — PROGRESS NOTES
[facility]    Diagnosis: Clinical Stage IIC Prostate Cancer  Treatment:  Definitive treatment with combined modality androgen ablation and pel    Weight 219.8 lbs     Discussed with patient diet modification for kidney stones.  Patient has never had the stones evaluated for composition, so difficult to advise on specific diet modification at this time.  Patient also states that the PCP has been monitoring his blood sugars and has recommended diet modification, but he has not taken action to pursue obtaining education.  Provided patient with options available for diabetes diet education, but he is resistant to referral at this time.      HgBA1C = 6.5% (April 2, 2018)    Will continue to follow as indicated.

## 2018-11-21 ENCOUNTER — HOSPITAL ENCOUNTER (OUTPATIENT)
Dept: RADIATION ONCOLOGY | Facility: HOSPITAL | Age: 73
Discharge: HOME | End: 2018-11-21

## 2018-11-21 PROCEDURE — 77385: CPT | Performed by: RADIOLOGY

## 2018-11-23 ENCOUNTER — HOSPITAL ENCOUNTER (OUTPATIENT)
Dept: RADIATION ONCOLOGY | Facility: HOSPITAL | Age: 73
Discharge: HOME | End: 2018-11-23

## 2018-11-23 PROCEDURE — 77385: CPT | Performed by: RADIOLOGY

## 2018-11-23 PROCEDURE — 77336 RADIATION PHYSICS CONSULT: CPT | Performed by: RADIOLOGY

## 2018-11-26 ENCOUNTER — HOSPITAL ENCOUNTER (OUTPATIENT)
Dept: RADIATION ONCOLOGY | Facility: HOSPITAL | Age: 73
Discharge: HOME | End: 2018-11-26

## 2018-11-26 PROCEDURE — 77385: CPT | Performed by: RADIOLOGY

## 2018-11-27 ENCOUNTER — HOSPITAL ENCOUNTER (OUTPATIENT)
Dept: RADIATION ONCOLOGY | Facility: HOSPITAL | Age: 73
Discharge: HOME | End: 2018-11-27

## 2018-11-27 VITALS — BODY MASS INDEX: 30.66 KG/M2 | WEIGHT: 219.8 LBS

## 2018-11-27 PROCEDURE — 77385: CPT | Performed by: RADIOLOGY

## 2018-11-28 ENCOUNTER — HOSPITAL ENCOUNTER (OUTPATIENT)
Dept: RADIATION ONCOLOGY | Facility: HOSPITAL | Age: 73
Discharge: HOME | End: 2018-11-28

## 2018-11-28 PROCEDURE — 77385: CPT | Performed by: RADIOLOGY

## 2018-11-29 ENCOUNTER — HOSPITAL ENCOUNTER (OUTPATIENT)
Dept: RADIATION ONCOLOGY | Facility: HOSPITAL | Age: 73
Discharge: HOME | End: 2018-11-29

## 2018-11-29 PROCEDURE — 77385: CPT | Performed by: RADIOLOGY

## 2018-11-29 PROCEDURE — 77336 RADIATION PHYSICS CONSULT: CPT | Performed by: RADIOLOGY

## 2018-11-30 ENCOUNTER — HOSPITAL ENCOUNTER (OUTPATIENT)
Dept: RADIATION ONCOLOGY | Facility: HOSPITAL | Age: 73
Discharge: HOME | End: 2018-11-30

## 2018-11-30 PROCEDURE — 77385: CPT | Performed by: RADIOLOGY

## 2018-12-03 ENCOUNTER — HOSPITAL ENCOUNTER (OUTPATIENT)
Dept: RADIATION ONCOLOGY | Facility: HOSPITAL | Age: 73
Setting detail: RADIATION/ONCOLOGY SERIES
Discharge: HOME | End: 2018-12-03

## 2018-12-03 ENCOUNTER — HOSPITAL ENCOUNTER (OUTPATIENT)
Dept: RADIATION ONCOLOGY | Facility: HOSPITAL | Age: 73
Discharge: HOME | End: 2018-12-03

## 2018-12-03 PROCEDURE — 77385: CPT | Performed by: RADIOLOGY

## 2018-12-04 ENCOUNTER — HOSPITAL ENCOUNTER (OUTPATIENT)
Dept: RADIATION ONCOLOGY | Facility: HOSPITAL | Age: 73
Discharge: HOME | End: 2018-12-04

## 2018-12-04 VITALS — WEIGHT: 217.6 LBS | BODY MASS INDEX: 30.35 KG/M2

## 2018-12-05 ENCOUNTER — HOSPITAL ENCOUNTER (OUTPATIENT)
Dept: RADIATION ONCOLOGY | Facility: HOSPITAL | Age: 73
Discharge: HOME | End: 2018-12-05

## 2018-12-05 PROCEDURE — 77385: CPT | Performed by: RADIOLOGY

## 2018-12-06 ENCOUNTER — HOSPITAL ENCOUNTER (OUTPATIENT)
Dept: RADIATION ONCOLOGY | Facility: HOSPITAL | Age: 73
Discharge: HOME | End: 2018-12-06

## 2018-12-06 PROCEDURE — 77385: CPT | Performed by: RADIOLOGY

## 2018-12-06 PROCEDURE — 77336 RADIATION PHYSICS CONSULT: CPT | Performed by: RADIOLOGY

## 2018-12-07 ENCOUNTER — HOSPITAL ENCOUNTER (OUTPATIENT)
Dept: RADIATION ONCOLOGY | Facility: HOSPITAL | Age: 73
Discharge: HOME | End: 2018-12-07

## 2018-12-07 PROCEDURE — 77385: CPT | Performed by: RADIOLOGY

## 2018-12-10 ENCOUNTER — HOSPITAL ENCOUNTER (OUTPATIENT)
Dept: RADIATION ONCOLOGY | Facility: HOSPITAL | Age: 73
Discharge: HOME | End: 2018-12-10

## 2018-12-10 PROCEDURE — 77385: CPT | Performed by: RADIOLOGY

## 2018-12-11 ENCOUNTER — HOSPITAL ENCOUNTER (OUTPATIENT)
Dept: RADIATION ONCOLOGY | Facility: HOSPITAL | Age: 73
Discharge: HOME | End: 2018-12-11

## 2018-12-11 VITALS — WEIGHT: 217.7 LBS | BODY MASS INDEX: 30.36 KG/M2

## 2018-12-11 PROCEDURE — 77385: CPT | Performed by: RADIOLOGY

## 2018-12-12 ENCOUNTER — HOSPITAL ENCOUNTER (OUTPATIENT)
Dept: RADIATION ONCOLOGY | Facility: HOSPITAL | Age: 73
Discharge: HOME | End: 2018-12-12

## 2018-12-12 PROCEDURE — 77385: CPT | Performed by: RADIOLOGY

## 2018-12-13 ENCOUNTER — HOSPITAL ENCOUNTER (OUTPATIENT)
Dept: RADIATION ONCOLOGY | Facility: HOSPITAL | Age: 73
Discharge: HOME | End: 2018-12-13

## 2018-12-13 PROCEDURE — 77385: CPT | Performed by: RADIOLOGY

## 2018-12-14 ENCOUNTER — HOSPITAL ENCOUNTER (OUTPATIENT)
Dept: RADIATION ONCOLOGY | Facility: HOSPITAL | Age: 73
Discharge: HOME | End: 2018-12-14

## 2018-12-14 PROCEDURE — 77385: CPT | Performed by: RADIOLOGY

## 2018-12-14 PROCEDURE — 77336 RADIATION PHYSICS CONSULT: CPT | Performed by: RADIOLOGY

## 2018-12-17 ENCOUNTER — HOSPITAL ENCOUNTER (OUTPATIENT)
Dept: RADIATION ONCOLOGY | Facility: HOSPITAL | Age: 73
Discharge: HOME | End: 2018-12-17

## 2018-12-17 PROCEDURE — 77385: CPT | Performed by: RADIOLOGY

## 2018-12-18 ENCOUNTER — HOSPITAL ENCOUNTER (OUTPATIENT)
Dept: RADIATION ONCOLOGY | Facility: HOSPITAL | Age: 73
Discharge: HOME | End: 2018-12-18

## 2018-12-18 ENCOUNTER — DOCUMENTATION (OUTPATIENT)
Dept: NUTRITION | Facility: HOSPITAL | Age: 73
End: 2018-12-18

## 2018-12-18 VITALS — WEIGHT: 216.2 LBS | BODY MASS INDEX: 30.15 KG/M2

## 2018-12-18 PROCEDURE — 77385: CPT | Performed by: RADIOLOGY

## 2018-12-18 NOTE — PROGRESS NOTES
[facility]    Patient had analysis of his kidney stones; composition results 80% calcium oxalate, 20% uric acid.  Provided and reviewed with patient nutritional information on diet modification with this composition type. Reviewed his intake of nutritional supplements which identified an area for modification also.  All questions addressed; written information provided. Per recall of nutritional intake, patient will have several areas requiring modification of his diet.

## 2018-12-19 ENCOUNTER — HOSPITAL ENCOUNTER (OUTPATIENT)
Dept: RADIATION ONCOLOGY | Facility: HOSPITAL | Age: 73
Discharge: HOME | End: 2018-12-19

## 2018-12-19 ENCOUNTER — OFFICE VISIT (OUTPATIENT)
Dept: CARDIOLOGY | Facility: CLINIC | Age: 73
End: 2018-12-19

## 2018-12-19 VITALS
BODY MASS INDEX: 30.24 KG/M2 | WEIGHT: 216 LBS | HEIGHT: 71 IN | DIASTOLIC BLOOD PRESSURE: 72 MMHG | OXYGEN SATURATION: 97 % | HEART RATE: 61 BPM | SYSTOLIC BLOOD PRESSURE: 140 MMHG

## 2018-12-19 DIAGNOSIS — E78.2 MIXED HYPERLIPIDEMIA: Primary | ICD-10-CM

## 2018-12-19 DIAGNOSIS — I10 ESSENTIAL HYPERTENSION: ICD-10-CM

## 2018-12-19 DIAGNOSIS — K21.9 GASTROESOPHAGEAL REFLUX DISEASE WITHOUT ESOPHAGITIS: ICD-10-CM

## 2018-12-19 DIAGNOSIS — M51.36 DDD (DEGENERATIVE DISC DISEASE), LUMBAR: ICD-10-CM

## 2018-12-19 DIAGNOSIS — N18.2 TYPE 2 DIABETES MELLITUS WITH STAGE 2 CHRONIC KIDNEY DISEASE, WITHOUT LONG-TERM CURRENT USE OF INSULIN (HCC): ICD-10-CM

## 2018-12-19 DIAGNOSIS — F41.9 ANXIETY: ICD-10-CM

## 2018-12-19 DIAGNOSIS — E11.22 TYPE 2 DIABETES MELLITUS WITH STAGE 2 CHRONIC KIDNEY DISEASE, WITHOUT LONG-TERM CURRENT USE OF INSULIN (HCC): ICD-10-CM

## 2018-12-19 PROCEDURE — G0008 ADMIN INFLUENZA VIRUS VAC: HCPCS | Performed by: INTERNAL MEDICINE

## 2018-12-19 PROCEDURE — 90662 IIV NO PRSV INCREASED AG IM: CPT | Performed by: INTERNAL MEDICINE

## 2018-12-19 PROCEDURE — 77385: CPT | Performed by: RADIOLOGY

## 2018-12-19 PROCEDURE — 99213 OFFICE O/P EST LOW 20 MIN: CPT | Performed by: INTERNAL MEDICINE

## 2018-12-19 RX ORDER — HYDROCODONE BITARTRATE AND ACETAMINOPHEN 7.5; 325 MG/1; MG/1
1 TABLET ORAL 3 TIMES DAILY PRN
Qty: 90 TABLET | Refills: 0 | Status: SHIPPED | OUTPATIENT
Start: 2018-12-19 | End: 2019-01-11 | Stop reason: SDUPTHER

## 2018-12-19 NOTE — PROGRESS NOTES
subjective     Chief Complaint   Patient presents with   • Hypertension   • Hyperlipidemia   • Back Pain   • Follow-up     History of Present Illness  Patient has history of essential hypertension.  Blood pressure is very well controlled with current medications.  He is taking Norvasc, hydralazine, Lopressor and clonidine.  No drug side effects.    Patient also has a hyperlipidemia he has been taking Crestor 10 mg daily.  We will check lab work in 2 more months.    Patient has lot of back pain and has prostate cancer.  He is taking Narco which will be continued.  Anxiety is better.  Patient has difficulty bladder control has urgent continence.    Diabetes mellitus type 2.  Not requiring any medications at this time.  Renal functions are better.    Past Surgical History:   Procedure Laterality Date   • COLONOSCOPY  03/2018   • EYE SURGERY     • FOOT SURGERY     • HERNIA REPAIR     • HYDROCELECTOMY  06/15/2015   • PROSTATE BIOPSY  2018   • PROSTATE FIDUCIAL MARKER PLACEMENT  09/14/2018   • RHINOPLASTY     • UPPER GASTROINTESTINAL ENDOSCOPY  03/24/2014    WITH BIOPSIES AND DILATION     Family History   Problem Relation Age of Onset   • Kidney disease Other    • Other Other         CHRONIC DISABLING DISEASES URINARY TRACT DISEASE   • Diabetes Other    • Hypertension Other    • Other Mother    • Heart failure Father    • Stroke Sister    • Arthritis Sister    • Heart disease Brother    • No Known Problems Brother    • No Known Problems Brother      Past Medical History:   Diagnosis Date   • Anxiety    • Arthritis    • Colitis    • Depression    • GERD (gastroesophageal reflux disease)    • Gout    • Heart murmur    • History of EKG 12/22/2015    NORMAL   • Hyperlipidemia    • Hypertension    • Obesity    • Pancreatitis     history of   • Prostate cancer (CMS/HCC)    • Renal failure     MILD one functioning kidney   • Skin cancer      Patient Active Problem List   Diagnosis   • Essential hypertension, labile   • GERD  (gastroesophageal reflux disease)   • Renal failure   • Hyperlipidemia   • Anxiety   • Depression   • Type 2 diabetes mellitus (CMS/HCC)   • Periumbilical abdominal pain   • DDD (degenerative disc disease), lumbar   • Prostate cancer (CMS/HCC)       Social History     Tobacco Use   • Smoking status: Former Smoker     Packs/day: 1.00     Types: Cigarettes     Last attempt to quit:      Years since quittin.9   • Smokeless tobacco: Never Used   Substance Use Topics   • Alcohol use: Yes     Alcohol/week: 4.2 oz     Types: 7 Shots of liquor per week     Comment:  once per day   • Drug use: No       Allergies   Allergen Reactions   • Bactrim [Sulfamethoxazole-Trimethoprim]        Current Outpatient Medications on File Prior to Visit   Medication Sig   • allopurinol (ZYLOPRIM) 300 MG tablet Take 1 tablet by mouth Daily.   • ALPRAZolam (XANAX) 0.25 MG tablet Take 1 tablet by mouth 2 (Two) Times a Day As Needed (as needed for anxiety).   • amLODIPine (NORVASC) 10 MG tablet Take 1 tablet by mouth Daily.   • amoxicillin (AMOXIL) 500 MG capsule Take 1 capsule by mouth 3 (Three) Times a Day.   • aspirin 81 MG tablet Take 81 mg by mouth daily.   • bicalutamide (CASODEX) 50 MG chemo tablet Casodex 50 mg tablet   Take 1 tablet every day by oral route for 30 days.   • cholecalciferol (VITAMIN D3) 1000 UNITS tablet Take 1,000 Units by mouth daily.   • cloNIDine (CATAPRES) 0.1 MG tablet Take 0.05 mg by mouth As Needed. TID TO QID PRN    • Cyanocobalamin (VITAMIN B12 PO) Take  by mouth daily.   • FLUoxetine (PROzac) 20 MG capsule Take 1 capsule by mouth Daily.   • folic acid (FOLVITE) 1 MG tablet Take 1 tablet by mouth Daily.   • hydrALAZINE (APRESOLINE) 50 MG tablet Take 1 tablet by mouth 3 (Three) Times a Day.   • hydrOXYzine (ATARAX) 25 MG tablet Take 1 tablet by mouth At Night As Needed (PRN).   • indomethacin (INDOCIN) 25 MG capsule Take 1 capsule by mouth 3 (Three) Times a Day As Needed for Mild Pain .   • Leuprolide  "Acetate (ELIGARD SC) Inject  under the skin into the appropriate area as directed. 8/14/2018 first one month shot   • loratadine (CLARITIN) 10 MG tablet Take 1 tablet by mouth Daily.   • meclizine (ANTIVERT) 25 MG tablet Take 1 tablet by mouth 3 (three) times a day as needed for dizziness.   • metoprolol tartrate (LOPRESSOR) 25 MG tablet Take 1 tablet by mouth 2 (Two) Times a Day.   • pantoprazole (PROTONIX) 40 MG EC tablet TAKE 1 TABLET BY MOUTH  DAILY   • Pyridoxine HCl (VITAMIN B-6 PO) Take  by mouth daily.   • rosuvastatin (CRESTOR) 10 MG tablet Take 1 tablet by mouth Every Night.   • tamsulosin (FLOMAX) 0.4 MG capsule 24 hr capsule Take 1 capsule by mouth Daily.   • [DISCONTINUED] HYDROcodone-acetaminophen (NORCO) 7.5-325 MG per tablet Take 1 tablet by mouth 3 (Three) Times a Day As Needed for Moderate Pain .     No current facility-administered medications on file prior to visit.          The following portions of the patient's history were reviewed and updated as appropriate: allergies, current medications, past family history, past medical history, past social history, past surgical history and problem list.    Review of Systems   Constitution: Negative.   HENT: Negative.  Negative for congestion.    Eyes: Negative.    Cardiovascular: Negative.  Negative for chest pain, cyanosis, dyspnea on exertion, irregular heartbeat, leg swelling, near-syncope, orthopnea, palpitations, paroxysmal nocturnal dyspnea and syncope.   Respiratory: Negative.  Negative for shortness of breath.    Hematologic/Lymphatic: Negative.    Musculoskeletal: Positive for arthritis, back pain, myalgias and stiffness.   Gastrointestinal: Negative.    Genitourinary: Positive for bladder incontinence.   Neurological: Negative.  Negative for headaches.   Psychiatric/Behavioral: The patient has insomnia and is nervous/anxious.           Objective:     /72 (BP Location: Left arm, Patient Position: Sitting)   Pulse 61   Ht 180.3 cm (71\")  "  Wt 98 kg (216 lb)   SpO2 97%   BMI 30.13 kg/m²   Physical Exam   Constitutional: He appears well-developed and well-nourished. No distress.   HENT:   Head: Normocephalic and atraumatic.   Mouth/Throat: Oropharynx is clear and moist. No oropharyngeal exudate.   Eyes: Conjunctivae and EOM are normal. Pupils are equal, round, and reactive to light. No scleral icterus.   Neck: Normal range of motion. Neck supple. No JVD present. No tracheal deviation present. No thyromegaly present.   Cardiovascular: Normal rate, regular rhythm, normal heart sounds and intact distal pulses. PMI is not displaced. Exam reveals no gallop, no friction rub and no decreased pulses.   No murmur heard.  Pulses:       Carotid pulses are 3+ on the right side, and 3+ on the left side.       Radial pulses are 3+ on the right side, and 3+ on the left side.   Pulmonary/Chest: Effort normal and breath sounds normal. No respiratory distress. He has no wheezes. He has no rales. He exhibits no tenderness.   Abdominal: Soft. Bowel sounds are normal. He exhibits no distension, no abdominal bruit and no mass. There is no splenomegaly or hepatomegaly. There is no tenderness. There is no rebound and no guarding.   Musculoskeletal: Normal range of motion. He exhibits no edema, tenderness or deformity.   Lymphadenopathy:     He has no cervical adenopathy.   Neurological: He is alert. He has normal reflexes. No cranial nerve deficit. He exhibits normal muscle tone. Coordination normal.   Skin: Skin is warm and dry. No rash noted. He is not diaphoretic. No erythema.   Psychiatric: He has a normal mood and affect. His behavior is normal. Judgment and thought content normal.         Lab Review  Lab Results   Component Value Date     11/06/2018    K 4.0 11/06/2018     11/06/2018    BUN 19 11/06/2018    CREATININE 1.26 11/06/2018    GLUCOSE 155 (H) 11/06/2018    CALCIUM 9.3 11/06/2018    ALT 26 11/06/2018    ALKPHOS 53 11/06/2018    LABIL2 1.8  03/07/2016     Lab Results   Component Value Date    CKTOTAL 198 (H) 11/06/2018     Lab Results   Component Value Date    WBC 7.30 11/06/2018    HGB 13.8 11/06/2018    HCT 40.7 11/06/2018     (L) 11/06/2018     No results found for: INR  No results found for: MG  Lab Results   Component Value Date    PSA 3.00 05/26/2016    TSH 2.807 09/28/2015     No results found for: BNP  Lab Results   Component Value Date    CHLPL 227 (H) 03/07/2016    CHOL 223 (H) 11/06/2018    TRIG 218 (H) 11/06/2018    HDL 51 11/06/2018    VLDL 41.2 04/02/2018    LDLHDL 1.48 04/02/2018     Lab Results   Component Value Date     (H) 11/06/2018    LDL 67 04/02/2018       Procedures       I personally viewed and interpreted the patient's LAB data         Assessment:     1. Mixed hyperlipidemia    2. Essential hypertension    3. Type 2 diabetes mellitus with stage 2 chronic kidney disease, without long-term current use of insulin (CMS/[facility])    4. DDD (degenerative disc disease), lumbar    5. Anxiety          Plan:      Hyperlipidemia with LDL of 144.  Now patient is taking Crestor lab work will be checked in 2-3 months.  No drug side effects.  All medications were continued.  Blood sugar was elevated.  Patient is trying to diet and lose weight he has lost some weight.  Blood pressure medications will be continued.  Pain medications and nerve medications were written.    Follow-up scheduled        No Follow-up on file.

## 2018-12-20 ENCOUNTER — HOSPITAL ENCOUNTER (OUTPATIENT)
Dept: RADIATION ONCOLOGY | Facility: HOSPITAL | Age: 73
Discharge: HOME | End: 2018-12-20

## 2018-12-20 PROCEDURE — 77385: CPT | Performed by: RADIOLOGY

## 2018-12-21 ENCOUNTER — HOSPITAL ENCOUNTER (OUTPATIENT)
Dept: RADIATION ONCOLOGY | Facility: HOSPITAL | Age: 73
Discharge: HOME | End: 2018-12-21

## 2018-12-21 PROCEDURE — 77385: CPT | Performed by: RADIOLOGY

## 2018-12-31 NOTE — RADIATION COMPLETION NOTES
RADIATION ONCOLOGY COMPLETION NOTE    PATIENT:   Lei Stapleton  MEDICAL RECORD:  [id number]  :    1945  COMPLETION DATE: 2018  DIAGNOSIS:  Prostate cancer  Cancer Staging  Stage IIC (cT2b, cN0, cM0, PSA: 6.1, Grade Group: 4)      BRIEF HISTORY:  This 73 y.o. patient completed radiotherapy.  He has prostate cancer, Casa score 4+4 = 8, clinical stage IIC (T2b, N0, M0) with PSA 6.1 ng/ml.  He is undergoing definitive treatment with combined modality androgen ablation and pelvic irradiation.        TREATMENT COURSE:  The prostate gland and seminal vesicles initially received a dose of 50.4 Gray in 28 daily fractions of 1.8 Gray using 10 MV photons with IMRT/vmat technique.  The prostate received an additional boost of 25.2 Gray 14 fractions with similar technique using 6 MV photons to a total dose of 75.6 Gy     DATES OF TREATMENT:  10/17/2018 through 2018    TOLERANCE:   experienced symptoms of  urinary tract irritative outlet symptoms, aggravated by urinary tract/bladder stones.     STATUS:  too early to determine response    DISPOSITION:  Follow up in Radiation Oncology in approximately 1 month.        Oliver Zimmer MD

## 2019-01-09 RX ORDER — FOLIC ACID 1 MG/1
1 TABLET ORAL DAILY
Qty: 90 TABLET | Refills: 3 | Status: SHIPPED | OUTPATIENT
Start: 2019-01-09 | End: 2019-03-14 | Stop reason: SDUPTHER

## 2019-01-09 RX ORDER — AMLODIPINE BESYLATE 10 MG/1
10 TABLET ORAL DAILY
Qty: 90 TABLET | Refills: 3 | Status: SHIPPED | OUTPATIENT
Start: 2019-01-09 | End: 2019-03-14 | Stop reason: SDUPTHER

## 2019-01-09 RX ORDER — ROSUVASTATIN CALCIUM 10 MG/1
10 TABLET, COATED ORAL NIGHTLY
Qty: 90 TABLET | Refills: 3 | Status: SHIPPED | OUTPATIENT
Start: 2019-01-09 | End: 2020-12-11

## 2019-01-09 RX ORDER — FLUOXETINE HYDROCHLORIDE 20 MG/1
20 CAPSULE ORAL DAILY
Qty: 90 CAPSULE | Refills: 3 | Status: SHIPPED | OUTPATIENT
Start: 2019-01-09 | End: 2019-03-14 | Stop reason: SDUPTHER

## 2019-01-09 RX ORDER — TAMSULOSIN HYDROCHLORIDE 0.4 MG/1
1 CAPSULE ORAL DAILY
Qty: 90 CAPSULE | Refills: 3 | Status: SHIPPED | OUTPATIENT
Start: 2019-01-09 | End: 2019-03-14 | Stop reason: SDUPTHER

## 2019-01-09 RX ORDER — HYDRALAZINE HYDROCHLORIDE 50 MG/1
50 TABLET, FILM COATED ORAL 3 TIMES DAILY
Qty: 270 TABLET | Refills: 3 | Status: SHIPPED | OUTPATIENT
Start: 2019-01-09 | End: 2019-05-21 | Stop reason: SDUPTHER

## 2019-01-11 ENCOUNTER — OFFICE VISIT (OUTPATIENT)
Dept: CARDIOLOGY | Facility: CLINIC | Age: 74
End: 2019-01-11

## 2019-01-11 VITALS
BODY MASS INDEX: 30.66 KG/M2 | OXYGEN SATURATION: 98 % | DIASTOLIC BLOOD PRESSURE: 62 MMHG | HEART RATE: 56 BPM | SYSTOLIC BLOOD PRESSURE: 138 MMHG | WEIGHT: 219 LBS | HEIGHT: 71 IN

## 2019-01-11 DIAGNOSIS — I10 ESSENTIAL HYPERTENSION: ICD-10-CM

## 2019-01-11 DIAGNOSIS — N18.2 TYPE 2 DIABETES MELLITUS WITH STAGE 2 CHRONIC KIDNEY DISEASE, WITHOUT LONG-TERM CURRENT USE OF INSULIN (HCC): ICD-10-CM

## 2019-01-11 DIAGNOSIS — M51.36 DDD (DEGENERATIVE DISC DISEASE), LUMBAR: ICD-10-CM

## 2019-01-11 DIAGNOSIS — E78.2 MIXED HYPERLIPIDEMIA: Primary | ICD-10-CM

## 2019-01-11 DIAGNOSIS — F41.9 ANXIETY: ICD-10-CM

## 2019-01-11 DIAGNOSIS — E11.22 TYPE 2 DIABETES MELLITUS WITH STAGE 2 CHRONIC KIDNEY DISEASE, WITHOUT LONG-TERM CURRENT USE OF INSULIN (HCC): ICD-10-CM

## 2019-01-11 PROCEDURE — 99213 OFFICE O/P EST LOW 20 MIN: CPT | Performed by: INTERNAL MEDICINE

## 2019-01-11 RX ORDER — HYDROCODONE BITARTRATE AND ACETAMINOPHEN 7.5; 325 MG/1; MG/1
1 TABLET ORAL 3 TIMES DAILY PRN
Qty: 90 TABLET | Refills: 0 | Status: SHIPPED | OUTPATIENT
Start: 2019-01-11 | End: 2019-02-25 | Stop reason: SDUPTHER

## 2019-01-11 RX ORDER — ALPRAZOLAM 0.25 MG/1
0.25 TABLET ORAL 2 TIMES DAILY PRN
Qty: 60 TABLET | Refills: 2 | Status: SHIPPED | OUTPATIENT
Start: 2019-01-11 | End: 2019-02-25 | Stop reason: SDUPTHER

## 2019-01-11 NOTE — PROGRESS NOTES
subjective     Chief Complaint   Patient presents with   • Med Refill     Pending   • Anxiety     Follow up   • Pain     Follow up     History of Present Illness  Patient is 73 years old white male who recently was diagnosed to have prostate cancer.  Patient has completed therapy in [place].  He also has lot of stones in the urinary bladder and is still passing stones.  Blood pressure is very well controlled.  He is taking Norvasc 10 mg daily along with the clonidine hydralazine and Lopressor.  He manages his blood pressure very closely.    Patient also has a hyperlipidemia.  He is taking Crestor 10 mg daily.  Last LDL was markedly elevated.  He is trying to diet and taking medications regularly.    He is diabetic and has hyperuricemia.  Anxiety is controlled with Prozac and Xanax.  Because of prostate cancer he has lot of back pain is taking [place] therapy.  He is compliant with his medications    Past Surgical History:   Procedure Laterality Date   • COLONOSCOPY  03/2018   • EYE SURGERY     • FOOT SURGERY     • HERNIA REPAIR     • HYDROCELECTOMY  06/15/2015   • PROSTATE BIOPSY  2018   • PROSTATE FIDUCIAL MARKER PLACEMENT  09/14/2018   • RHINOPLASTY     • UPPER GASTROINTESTINAL ENDOSCOPY  03/24/2014    WITH BIOPSIES AND DILATION     Family History   Problem Relation Age of Onset   • Kidney disease Other    • Other Other         CHRONIC DISABLING DISEASES URINARY TRACT DISEASE   • Diabetes Other    • Hypertension Other    • Other Mother    • Heart failure Father    • Stroke Sister    • Arthritis Sister    • Heart disease Brother    • No Known Problems Brother    • No Known Problems Brother      Past Medical History:   Diagnosis Date   • Anxiety    • Arthritis    • Colitis    • Depression    • GERD (gastroesophageal reflux disease)    • Gout    • Heart murmur    • History of EKG 12/22/2015    NORMAL   • Hyperlipidemia    • Hypertension    • Obesity    • Pancreatitis     history of   • Prostate cancer (CMS/HCC)    •  Renal failure     MILD one functioning kidney   • Skin cancer      Patient Active Problem List   Diagnosis   • Essential hypertension, labile   • GERD (gastroesophageal reflux disease)   • Renal failure   • Hyperlipidemia   • Anxiety   • Depression   • Type 2 diabetes mellitus (CMS/HCC)   • Periumbilical abdominal pain   • DDD (degenerative disc disease), lumbar   • Prostate cancer (CMS/HCC)       Social History     Tobacco Use   • Smoking status: Former Smoker     Packs/day: 1.00     Types: Cigarettes     Last attempt to quit:      Years since quittin.0   • Smokeless tobacco: Never Used   Substance Use Topics   • Alcohol use: Yes     Alcohol/week: 4.2 oz     Types: 7 Shots of liquor per week     Comment:  once per day   • Drug use: No       Allergies   Allergen Reactions   • Bactrim [Sulfamethoxazole-Trimethoprim]        Current Outpatient Medications on File Prior to Visit   Medication Sig   • allopurinol (ZYLOPRIM) 300 MG tablet Take 1 tablet by mouth Daily.   • amLODIPine (NORVASC) 10 MG tablet Take 1 tablet by mouth Daily.   • aspirin 81 MG tablet Take 81 mg by mouth daily.   • bicalutamide (CASODEX) 50 MG chemo tablet Casodex 50 mg tablet   Take 1 tablet every day by oral route for 30 days.   • cholecalciferol (VITAMIN D3) 1000 UNITS tablet Take 1,000 Units by mouth daily.   • cloNIDine (CATAPRES) 0.1 MG tablet Take 0.05 mg by mouth As Needed. TID TO QID PRN    • Cyanocobalamin (VITAMIN B12 PO) Take  by mouth daily.   • FLUoxetine (PROzac) 20 MG capsule Take 1 capsule by mouth Daily.   • folic acid (FOLVITE) 1 MG tablet Take 1 tablet by mouth Daily.   • hydrALAZINE (APRESOLINE) 50 MG tablet Take 1 tablet by mouth 3 (Three) Times a Day.   • hydrOXYzine (ATARAX) 25 MG tablet Take 1 tablet by mouth At Night As Needed (PRN).   • indomethacin (INDOCIN) 25 MG capsule Take 1 capsule by mouth 3 (Three) Times a Day As Needed for Mild Pain .   • Leuprolide Acetate (ELIGARD SC) Inject  under the skin into the  appropriate area as directed. 8/14/2018 first one month shot   • loratadine (CLARITIN) 10 MG tablet Take 1 tablet by mouth Daily.   • meclizine (ANTIVERT) 25 MG tablet Take 1 tablet by mouth 3 (three) times a day as needed for dizziness.   • metoprolol tartrate (LOPRESSOR) 25 MG tablet Take 1 tablet by mouth 2 (Two) Times a Day.   • pantoprazole (PROTONIX) 40 MG EC tablet TAKE 1 TABLET BY MOUTH  DAILY   • Pyridoxine HCl (VITAMIN B-6 PO) Take  by mouth daily.   • rosuvastatin (CRESTOR) 10 MG tablet Take 1 tablet by mouth Every Night.   • tamsulosin (FLOMAX) 0.4 MG capsule 24 hr capsule Take 1 capsule by mouth Daily.   • [DISCONTINUED] ALPRAZolam (XANAX) 0.25 MG tablet Take 1 tablet by mouth 2 (Two) Times a Day As Needed (as needed for anxiety).   • [DISCONTINUED] HYDROcodone-acetaminophen (NORCO) 7.5-325 MG per tablet Take 1 tablet by mouth 3 (Three) Times a Day As Needed for Moderate Pain .   • [DISCONTINUED] amoxicillin (AMOXIL) 500 MG capsule Take 1 capsule by mouth 3 (Three) Times a Day.     No current facility-administered medications on file prior to visit.          The following portions of the patient's history were reviewed and updated as appropriate: allergies, current medications, past family history, past medical history, past social history, past surgical history and problem list.    Review of Systems   Constitution: Negative.   HENT: Negative.  Negative for congestion.    Eyes: Negative.    Cardiovascular: Negative.  Negative for chest pain, cyanosis, dyspnea on exertion, irregular heartbeat, leg swelling, near-syncope, orthopnea, palpitations, paroxysmal nocturnal dyspnea and syncope.   Respiratory: Negative.  Negative for shortness of breath.    Hematologic/Lymphatic: Negative.    Musculoskeletal: Positive for arthritis, back pain, myalgias and stiffness.   Gastrointestinal: Negative.    Genitourinary: Positive for flank pain and frequency.   Neurological: Negative.  Negative for headaches.  "  Psychiatric/Behavioral: The patient has insomnia and is nervous/anxious.           Objective:     /62 (BP Location: Left arm, Patient Position: Sitting, Cuff Size: Adult)   Pulse 56   Ht 180.3 cm (71\")   Wt 99.3 kg (219 lb)   SpO2 98%   BMI 30.54 kg/m²   Physical Exam   Constitutional: He appears well-developed and well-nourished. No distress.   HENT:   Head: Normocephalic and atraumatic.   Mouth/Throat: Oropharynx is clear and moist. No oropharyngeal exudate.   Eyes: Conjunctivae and EOM are normal. Pupils are equal, round, and reactive to light. No scleral icterus.   Neck: Normal range of motion. Neck supple. No JVD present. No tracheal deviation present. No thyromegaly present.   Cardiovascular: Normal rate, regular rhythm, normal heart sounds and intact distal pulses. PMI is not displaced. Exam reveals no gallop, no friction rub and no decreased pulses.   No murmur heard.  Pulses:       Carotid pulses are 3+ on the right side, and 3+ on the left side.       Radial pulses are 3+ on the right side, and 3+ on the left side.   Pulmonary/Chest: Effort normal and breath sounds normal. No respiratory distress. He has no wheezes. He has no rales. He exhibits no tenderness.   Abdominal: Soft. Bowel sounds are normal. He exhibits no distension, no abdominal bruit and no mass. There is no splenomegaly or hepatomegaly. There is no tenderness. There is no rebound and no guarding.   Musculoskeletal: Normal range of motion. He exhibits no edema, tenderness or deformity.   Lymphadenopathy:     He has no cervical adenopathy.   Neurological: He is alert. He has normal reflexes. No cranial nerve deficit. He exhibits normal muscle tone. Coordination normal.   Skin: Skin is warm and dry. No rash noted. He is not diaphoretic. No erythema.   Psychiatric: He has a normal mood and affect. His behavior is normal. Judgment and thought content normal.         Lab Review  Lab Results   Component Value Date     11/06/2018 "    K 4.0 11/06/2018     11/06/2018    BUN 19 11/06/2018    CREATININE 1.26 11/06/2018    GLUCOSE 155 (H) 11/06/2018    CALCIUM 9.3 11/06/2018    ALT 26 11/06/2018    ALKPHOS 53 11/06/2018    LABIL2 1.8 03/07/2016     Lab Results   Component Value Date    CKTOTAL 198 (H) 11/06/2018     Lab Results   Component Value Date    WBC 7.30 11/06/2018    HGB 13.8 11/06/2018    HCT 40.7 11/06/2018     (L) 11/06/2018     No results found for: INR  No results found for: MG  Lab Results   Component Value Date    PSA 3.00 05/26/2016    TSH 2.807 09/28/2015     No results found for: BNP  Lab Results   Component Value Date    CHLPL 227 (H) 03/07/2016    CHOL 223 (H) 11/06/2018    TRIG 218 (H) 11/06/2018    HDL 51 11/06/2018    VLDL 41.2 04/02/2018    LDLHDL 1.48 04/02/2018     Lab Results   Component Value Date     (H) 11/06/2018    LDL 67 04/02/2018       Procedures       I personally viewed and interpreted the patient's LAB data         Assessment:     1. Mixed hyperlipidemia    2. Essential hypertension    3. Type 2 diabetes mellitus with stage 2 chronic kidney disease, without long-term current use of insulin (CMS/[facility])    4. DDD (degenerative disc disease), lumbar    5. Anxiety          Plan:      Last lab work of November was again reviewed and discussed with the patient LDL is markedly elevated at 145.  Patient was advised to take Crestor regularly dietary restrictions were again emphasized.  Blood sugar is also mildly elevated.  Blood pressure is very well controlled we will continue current medications.  Refills for pain medications and her medications were given.  Side effects were discussed.  Follow-up scheduled        No Follow-up on file.

## 2019-01-15 ENCOUNTER — TELEPHONE (OUTPATIENT)
Dept: RADIATION ONCOLOGY | Facility: HOSPITAL | Age: 74
End: 2019-01-15

## 2019-01-15 NOTE — TELEPHONE ENCOUNTER
Pt's PCP called asking if pt could get Hepatitis A vaccine.  I explained that he needed to wait a couple of months from completion of radiation.   She verbalized understanding.

## 2019-01-24 ENCOUNTER — HOSPITAL ENCOUNTER (OUTPATIENT)
Dept: RADIATION ONCOLOGY | Facility: HOSPITAL | Age: 74
Setting detail: RADIATION/ONCOLOGY SERIES
Discharge: HOME | End: 2019-01-24

## 2019-01-24 ENCOUNTER — OFFICE VISIT (OUTPATIENT)
Dept: RADIATION ONCOLOGY | Facility: HOSPITAL | Age: 74
End: 2019-01-24

## 2019-01-24 VITALS
OXYGEN SATURATION: 96 % | HEART RATE: 64 BPM | BODY MASS INDEX: 30.4 KG/M2 | SYSTOLIC BLOOD PRESSURE: 144 MMHG | RESPIRATION RATE: 20 BRPM | WEIGHT: 218 LBS | TEMPERATURE: 97.5 F | DIASTOLIC BLOOD PRESSURE: 78 MMHG

## 2019-01-24 DIAGNOSIS — C61 PROSTATE CANCER (HCC): Primary | ICD-10-CM

## 2019-01-24 PROCEDURE — G0463 HOSPITAL OUTPT CLINIC VISIT: HCPCS

## 2019-01-24 NOTE — PROGRESS NOTES
FOLLOW UP NOTE    PATIENT:                                                      Lei Stapleton  MEDICAL RECORD #:                        [id number]  :                                                          1945  COMPLETION DATE:   18  DIAGNOSIS:     Prostate cancer (CMS/[facility])  Staging form: Prostate, AJCC 8th Edition  - Clinical stage from 2018: Stage IIC (cT2b, cN0, cM0, PSA: 6.1, Grade Group: 4) - Signed by Oliver Zimmer MD on 2018      BRIEF HISTORY:    Initial follow-up visit.  He has localized, high risk prostate cancer treated with androgen ablation and pelvic irradiation.  He has a long history of BPH, UTIs and nephrolithiasis/bladder stones.  He continues to experience urinary frequency, urgency and dysuria.  Energy is improving.  He is fairly sedentary.  He has chronic constipation.  Diet and fluid intake are not optimal.  He continues to experience hot flashes.  He's concerned that he may have a urinary tract infection.    MEDICATIONS: Medication reconciliation for the patient was reviewed and confirmed in the electronic medical record.    Review of Systems   Constitutional: Positive for fatigue.   HENT:  Negative.    Eyes: Negative.    Respiratory: Negative.    Cardiovascular: Negative.    Gastrointestinal: Positive for constipation.   Endocrine: Positive for hot flashes.   Genitourinary: Positive for dysuria, frequency and nocturia.    Musculoskeletal: Positive for back pain.   Skin: Negative.    Neurological: Positive for dizziness and light-headedness.   Hematological: Negative.    Psychiatric/Behavioral: Negative.        IPSS Questionnaire (AUA-7):  Over the past month…     1)  Incomplete Emptying  How often have you had a sensation of not emptying your bladder?  4 - More than half the time   2)  Frequency  How often have you had to urinate less than every two hours? 4 - More than half the time   3)  Intermittency  How often have you found you stopped and started again  several times when you urinated?  4 - More than half the time   4) Urgency  How often have you found it difficult to postpone urination?  4 - More than half the time   5) Weak Stream  How often have you had a weak urinary stream?  3 - About half the time   6) Straining  How often have you had to push or strain to begin urination?  1 - About half the time   7) Nocturia  How many times did you typically get up at night to urinate?  4 - 4 times   Total Score:  24         Quality of life due to urinary symptoms:  If you were to spend the rest of your life with your urinary condition the way it is now, how would you feel about that? 3-Mixed   Urine Leakage (Incontinence) 1-Mild (A few drops a day, no pad use)      Sexual Health Inventory  Current Status     1)  How do you rate your confidence that you could achieve and keep an erection? 1-Very Low   2) When you had erections with sexual stimulation, how often were your erections hard enough for penetration (entering your partner)? 0-No sexual activity   3)  During sexual intercourse, how often were you able to maintain your erection after you had penetrated (entered) into your partner? 0-Did not attempt intercourse   4) During sexual intercourse, how difficult was it to maintain your erection to completion of intercourse? 0-Did not attempt intercourse   5) When you attempted sexual intercourse, how often was it satisfactory to you? 0-No sexual activity   Total Score: 1         Bowel Health Inventory  Current Status: 0-No problems, no rectal bleeding, no discharge, less then 5 bowel movements a day                    KPS 80%    Physical Exam   Constitutional: He is oriented to person, place, and time. He appears well-developed and well-nourished.   HENT:   Head: Normocephalic and atraumatic.   Neck: Normal range of motion. Neck supple.   Cardiovascular: Normal rate, regular rhythm and normal heart sounds.   No murmur heard.  Pulmonary/Chest: Effort normal and breath  sounds normal. He has no wheezes. He has no rales.   Abdominal: Soft. Bowel sounds are normal. He exhibits no distension. There is no hepatosplenomegaly. There is no tenderness.   Musculoskeletal: Normal range of motion. He exhibits no edema or tenderness.   Lymphadenopathy:     He has no cervical adenopathy.     He has no axillary adenopathy.        Right: No supraclavicular adenopathy present.        Left: No supraclavicular adenopathy present.   Neurological: He is alert and oriented to person, place, and time. He has normal strength. No sensory deficit.   Skin: Skin is warm and dry.   Psychiatric: He has a normal mood and affect. His behavior is normal. Judgment and thought content normal.   Nursing note and vitals reviewed.      VITAL SIGNS:   Vitals:    01/24/19 1008   BP: 144/78   Pulse: 64   Resp: 20   Temp: 97.5 °F (36.4 °C)   TempSrc: Temporal   SpO2: 96%   Weight: 98.9 kg (218 lb)   PainSc:   3       The following portions of the patient's history were reviewed and updated as appropriate: allergies, current medications, past family history, past medical history, past social history, past surgical history and problem list.         Lei was seen today for prostate cancer.    Diagnoses and all orders for this visit:    Prostate cancer (CMS/[facility])         IMPRESSION:  Prostate cancer, Casa score 4+4 = 8, clinical stage IIC (T2b, N0, M0) with PSA 6.1 ng/ml.  He tolerated pelvic irradiation reasonably well, in spite of of significant bladder stones and urinary tract infection, with pre-existing BPH.  He continues to experience persistent urinary outflow irritative symptoms.    RECOMMENDATIONS:  He will return to Dr. Mendez's offfice today, while he is in town, for urinalysis to make sure he does not also have a urinary tract infection.  I gave bladder sheet with instructions and he will try over-the-counter Cystex for symptom relief.  He will continue Flomax and Casodex at least until he sees Dr. Mendez and we  have a follow-up PSA evaluation.  He will work on diet to avoid stone-forming foods and increase healthier foods/fiber/hydration.  He has follow-up with Dr. Mendez 3/27/2019 with repeat PSA testing.    Return in about 6 months (around 7/24/2019) for Office Visit.    Oliver Zimmer MD    Errors in dictation may reflect use of voice recognition software and not all errors in transcription may have been detected prior to signing.

## 2019-02-25 ENCOUNTER — OFFICE VISIT (OUTPATIENT)
Dept: CARDIOLOGY | Facility: CLINIC | Age: 74
End: 2019-02-25

## 2019-02-25 VITALS
OXYGEN SATURATION: 98 % | DIASTOLIC BLOOD PRESSURE: 70 MMHG | BODY MASS INDEX: 30.66 KG/M2 | HEIGHT: 71 IN | WEIGHT: 219 LBS | SYSTOLIC BLOOD PRESSURE: 128 MMHG | HEART RATE: 62 BPM

## 2019-02-25 DIAGNOSIS — E78.2 MIXED HYPERLIPIDEMIA: Primary | ICD-10-CM

## 2019-02-25 DIAGNOSIS — N18.2 STAGE 2 CHRONIC KIDNEY DISEASE: ICD-10-CM

## 2019-02-25 DIAGNOSIS — N18.2 TYPE 2 DIABETES MELLITUS WITH STAGE 2 CHRONIC KIDNEY DISEASE, WITHOUT LONG-TERM CURRENT USE OF INSULIN (HCC): ICD-10-CM

## 2019-02-25 DIAGNOSIS — E11.22 TYPE 2 DIABETES MELLITUS WITH STAGE 2 CHRONIC KIDNEY DISEASE, WITHOUT LONG-TERM CURRENT USE OF INSULIN (HCC): ICD-10-CM

## 2019-02-25 PROCEDURE — 99213 OFFICE O/P EST LOW 20 MIN: CPT | Performed by: INTERNAL MEDICINE

## 2019-02-25 RX ORDER — ALPRAZOLAM 0.25 MG/1
0.25 TABLET ORAL 2 TIMES DAILY PRN
Qty: 60 TABLET | Refills: 2 | Status: SHIPPED | OUTPATIENT
Start: 2019-02-25 | End: 2019-05-17 | Stop reason: SDUPTHER

## 2019-02-25 RX ORDER — HYDROCODONE BITARTRATE AND ACETAMINOPHEN 7.5; 325 MG/1; MG/1
1 TABLET ORAL 3 TIMES DAILY PRN
Qty: 90 TABLET | Refills: 0 | Status: SHIPPED | OUTPATIENT
Start: 2019-02-25 | End: 2019-04-08 | Stop reason: SDUPTHER

## 2019-02-25 RX ORDER — AMOXICILLIN AND CLAVULANATE POTASSIUM 875; 125 MG/1; MG/1
1 TABLET, FILM COATED ORAL 2 TIMES DAILY
Qty: 14 TABLET | Refills: 0 | Status: SHIPPED | OUTPATIENT
Start: 2019-02-25 | End: 2019-04-08 | Stop reason: ALTCHOICE

## 2019-02-25 NOTE — PROGRESS NOTES
subjective     Chief Complaint   Patient presents with   • Hyperlipidemia   • Back Pain   • Anxiety     History of Present Illness  Patient is 73 years old white male who states that he has been having chest cold coughing and wheezing for last for 5 days.  He denies any fever but complains of chills and warm feeling.    Patient has prostate cancer and recently finished his treatment.  He is not taking Flomax and Casodex.    Patient also complains of lot of back pain.  He is taking hydrocodone on when necessary basis.  He also has lot of anxiety and is taking Xanax.  Patient needs refills.    Hyperlipidemia has been well controlled on Crestor.  Blood pressure control is also much better.    Past Surgical History:   Procedure Laterality Date   • COLONOSCOPY  03/2018   • EYE SURGERY     • FOOT SURGERY     • HERNIA REPAIR     • HYDROCELECTOMY  06/15/2015   • PROSTATE BIOPSY  2018   • PROSTATE FIDUCIAL MARKER PLACEMENT  09/14/2018   • RHINOPLASTY     • UPPER GASTROINTESTINAL ENDOSCOPY  03/24/2014    WITH BIOPSIES AND DILATION     Family History   Problem Relation Age of Onset   • Kidney disease Other    • Other Other         CHRONIC DISABLING DISEASES URINARY TRACT DISEASE   • Diabetes Other    • Hypertension Other    • Other Mother    • Heart failure Father    • Stroke Sister    • Arthritis Sister    • Heart disease Brother    • No Known Problems Brother    • No Known Problems Brother      Past Medical History:   Diagnosis Date   • Anxiety    • Arthritis    • Colitis    • Depression    • GERD (gastroesophageal reflux disease)    • Gout    • Heart murmur    • History of EKG 12/22/2015    NORMAL   • Hyperlipidemia    • Hypertension    • Obesity    • Pancreatitis     history of   • Prostate cancer (CMS/HCC)    • Renal failure     MILD one functioning kidney   • Skin cancer      Patient Active Problem List   Diagnosis   • Essential hypertension, labile   • GERD (gastroesophageal reflux disease)   • Renal failure   •  Hyperlipidemia   • Anxiety   • Depression   • Type 2 diabetes mellitus (CMS/HCC)   • Periumbilical abdominal pain   • DDD (degenerative disc disease), lumbar   • Prostate cancer (CMS/HCC)       Social History     Tobacco Use   • Smoking status: Former Smoker     Packs/day: 1.00     Types: Cigarettes     Last attempt to quit:      Years since quittin.1   • Smokeless tobacco: Never Used   Substance Use Topics   • Alcohol use: Yes     Alcohol/week: 4.2 oz     Types: 7 Shots of liquor per week     Comment:  once per day   • Drug use: No       Allergies   Allergen Reactions   • Bactrim [Sulfamethoxazole-Trimethoprim]        Current Outpatient Medications on File Prior to Visit   Medication Sig   • allopurinol (ZYLOPRIM) 300 MG tablet Take 1 tablet by mouth Daily.   • amLODIPine (NORVASC) 10 MG tablet Take 1 tablet by mouth Daily.   • aspirin 81 MG tablet Take 81 mg by mouth daily.   • bicalutamide (CASODEX) 50 MG chemo tablet Casodex 50 mg tablet   Take 1 tablet every day by oral route for 30 days.   • cholecalciferol (VITAMIN D3) 1000 UNITS tablet Take 1,000 Units by mouth daily.   • cloNIDine (CATAPRES) 0.1 MG tablet Take 0.05 mg by mouth As Needed. TID TO QID PRN    • Cyanocobalamin (VITAMIN B12 PO) Take  by mouth daily.   • FLUoxetine (PROzac) 20 MG capsule Take 1 capsule by mouth Daily.   • folic acid (FOLVITE) 1 MG tablet Take 1 tablet by mouth Daily.   • hydrALAZINE (APRESOLINE) 50 MG tablet Take 1 tablet by mouth 3 (Three) Times a Day.   • hydrOXYzine (ATARAX) 25 MG tablet Take 1 tablet by mouth At Night As Needed (PRN).   • Leuprolide Acetate (ELIGARD SC) Inject  under the skin into the appropriate area as directed. 2018 first one month shot   • loratadine (CLARITIN) 10 MG tablet Take 1 tablet by mouth Daily.   • meclizine (ANTIVERT) 25 MG tablet Take 1 tablet by mouth 3 (three) times a day as needed for dizziness.   • metoprolol tartrate (LOPRESSOR) 25 MG tablet Take 1 tablet by mouth 2 (Two)  "Times a Day.   • pantoprazole (PROTONIX) 40 MG EC tablet TAKE 1 TABLET BY MOUTH  DAILY   • Pyridoxine HCl (VITAMIN B-6 PO) Take  by mouth daily.   • rosuvastatin (CRESTOR) 10 MG tablet Take 1 tablet by mouth Every Night.   • tamsulosin (FLOMAX) 0.4 MG capsule 24 hr capsule Take 1 capsule by mouth Daily.   • [DISCONTINUED] ALPRAZolam (XANAX) 0.25 MG tablet Take 1 tablet by mouth 2 (Two) Times a Day As Needed (as needed for anxiety).   • [DISCONTINUED] HYDROcodone-acetaminophen (NORCO) 7.5-325 MG per tablet Take 1 tablet by mouth 3 (Three) Times a Day As Needed for Moderate Pain .     No current facility-administered medications on file prior to visit.          The following portions of the patient's history were reviewed and updated as appropriate: allergies, current medications, past family history, past medical history, past social history, past surgical history and problem list.    Review of Systems   Constitution: Negative.   HENT: Positive for congestion.    Eyes: Negative.    Cardiovascular: Negative.  Negative for chest pain, cyanosis, dyspnea on exertion, irregular heartbeat, leg swelling, near-syncope, orthopnea, palpitations, paroxysmal nocturnal dyspnea and syncope.   Respiratory: Positive for wheezing. Negative for shortness of breath.    Hematologic/Lymphatic: Negative.    Musculoskeletal: Negative.    Gastrointestinal: Negative.    Neurological: Negative.  Negative for headaches.          Objective:     /70 (BP Location: Left arm, Patient Position: Sitting)   Pulse 62   Ht 180.3 cm (71\")   Wt 99.3 kg (219 lb)   SpO2 98%   BMI 30.54 kg/m²   Physical Exam   Constitutional: He appears well-developed and well-nourished. No distress.   HENT:   Head: Normocephalic and atraumatic.   Mouth/Throat: Oropharynx is clear and moist. No oropharyngeal exudate.   Eyes: Conjunctivae and EOM are normal. Pupils are equal, round, and reactive to light. No scleral icterus.   Neck: Normal range of motion. Neck " supple. No JVD present. No tracheal deviation present. No thyromegaly present.   Cardiovascular: Normal rate, regular rhythm, normal heart sounds and intact distal pulses. PMI is not displaced. Exam reveals no gallop, no friction rub and no decreased pulses.   No murmur heard.  Pulses:       Carotid pulses are 3+ on the right side, and 3+ on the left side.       Radial pulses are 3+ on the right side, and 3+ on the left side.   Pulmonary/Chest: Effort normal and breath sounds normal. No respiratory distress. He has no wheezes. He has no rales. He exhibits no tenderness.   Abdominal: Soft. Bowel sounds are normal. He exhibits no distension, no abdominal bruit and no mass. There is no splenomegaly or hepatomegaly. There is no tenderness. There is no rebound and no guarding.   Musculoskeletal: Normal range of motion. He exhibits no edema, tenderness or deformity.   Lymphadenopathy:     He has no cervical adenopathy.   Neurological: He is alert. He has normal reflexes. No cranial nerve deficit. He exhibits normal muscle tone. Coordination normal.   Skin: Skin is warm and dry. No rash noted. He is not diaphoretic. No erythema.   Psychiatric: He has a normal mood and affect. His behavior is normal. Judgment and thought content normal.         Lab Review  Lab Results   Component Value Date     11/06/2018    K 4.0 11/06/2018     11/06/2018    BUN 19 11/06/2018    CREATININE 1.26 11/06/2018    GLUCOSE 155 (H) 11/06/2018    CALCIUM 9.3 11/06/2018    ALT 26 11/06/2018    ALKPHOS 53 11/06/2018    LABIL2 1.8 03/07/2016     Lab Results   Component Value Date    CKTOTAL 198 (H) 11/06/2018     Lab Results   Component Value Date    WBC 7.30 11/06/2018    HGB 13.8 11/06/2018    HCT 40.7 11/06/2018     (L) 11/06/2018     No results found for: INR  No results found for: MG  Lab Results   Component Value Date    PSA 3.00 05/26/2016    TSH 2.807 09/28/2015     No results found for: BNP  Lab Results   Component Value  Date    CHLPL 227 (H) 03/07/2016    CHOL 223 (H) 11/06/2018    TRIG 218 (H) 11/06/2018    HDL 51 11/06/2018    VLDL 41.2 04/02/2018    LDLHDL 1.48 04/02/2018     Lab Results   Component Value Date     (H) 11/06/2018    LDL 67 04/02/2018       Procedures       I personally viewed and interpreted the patient's LAB data         Assessment:     1. Mixed hyperlipidemia    2. Type 2 diabetes mellitus with stage 2 chronic kidney disease, without long-term current use of insulin (CMS/[facility])    3. Stage 2 chronic kidney disease          Plan:      Patient seems to have acute bronchitis.  He was started on Augmentin 875 twice a day.  Refills of pain medications were given side effects explained.  Refill of note medication were given.  Patient was encouraged to drink more fluids.  He is now taking Crestor hopefully lab work will be better visit.  Lab work scheduled        No Follow-up on file.

## 2019-03-07 ENCOUNTER — TELEPHONE (OUTPATIENT)
Dept: CARDIOLOGY | Facility: CLINIC | Age: 74
End: 2019-03-07

## 2019-03-14 RX ORDER — FLUOXETINE HYDROCHLORIDE 20 MG/1
20 CAPSULE ORAL DAILY
Qty: 90 CAPSULE | Refills: 3 | Status: SHIPPED | OUTPATIENT
Start: 2019-03-14 | End: 2019-03-15 | Stop reason: SDUPTHER

## 2019-03-14 RX ORDER — TAMSULOSIN HYDROCHLORIDE 0.4 MG/1
1 CAPSULE ORAL DAILY
Qty: 90 CAPSULE | Refills: 3 | Status: SHIPPED | OUTPATIENT
Start: 2019-03-14 | End: 2019-03-15 | Stop reason: SDUPTHER

## 2019-03-14 RX ORDER — FOLIC ACID 1 MG/1
1 TABLET ORAL DAILY
Qty: 90 TABLET | Refills: 3 | Status: SHIPPED | OUTPATIENT
Start: 2019-03-14 | End: 2019-03-15 | Stop reason: SDUPTHER

## 2019-03-14 RX ORDER — AMLODIPINE BESYLATE 10 MG/1
10 TABLET ORAL DAILY
Qty: 90 TABLET | Refills: 3 | Status: SHIPPED | OUTPATIENT
Start: 2019-03-14 | End: 2019-03-15 | Stop reason: SDUPTHER

## 2019-03-15 RX ORDER — AMLODIPINE BESYLATE 10 MG/1
10 TABLET ORAL DAILY
Qty: 90 TABLET | Refills: 3 | Status: SHIPPED | OUTPATIENT
Start: 2019-03-15 | End: 2019-05-21 | Stop reason: SDUPTHER

## 2019-03-15 RX ORDER — FOLIC ACID 1 MG/1
1 TABLET ORAL DAILY
Qty: 90 TABLET | Refills: 3 | Status: SHIPPED | OUTPATIENT
Start: 2019-03-15 | End: 2019-05-21 | Stop reason: SDUPTHER

## 2019-03-15 RX ORDER — FLUOXETINE HYDROCHLORIDE 20 MG/1
20 CAPSULE ORAL DAILY
Qty: 90 CAPSULE | Refills: 3 | Status: SHIPPED | OUTPATIENT
Start: 2019-03-15 | End: 2019-05-21 | Stop reason: SDUPTHER

## 2019-03-15 RX ORDER — TAMSULOSIN HYDROCHLORIDE 0.4 MG/1
1 CAPSULE ORAL DAILY
Qty: 90 CAPSULE | Refills: 3 | Status: SHIPPED | OUTPATIENT
Start: 2019-03-15 | End: 2019-05-21 | Stop reason: SDUPTHER

## 2019-04-05 ENCOUNTER — LAB (OUTPATIENT)
Dept: LAB | Facility: HOSPITAL | Age: 74
End: 2019-04-05

## 2019-04-05 DIAGNOSIS — E11.22 TYPE 2 DIABETES MELLITUS WITH STAGE 2 CHRONIC KIDNEY DISEASE, WITHOUT LONG-TERM CURRENT USE OF INSULIN (HCC): ICD-10-CM

## 2019-04-05 DIAGNOSIS — N18.2 TYPE 2 DIABETES MELLITUS WITH STAGE 2 CHRONIC KIDNEY DISEASE, WITHOUT LONG-TERM CURRENT USE OF INSULIN (HCC): ICD-10-CM

## 2019-04-05 DIAGNOSIS — E78.2 MIXED HYPERLIPIDEMIA: ICD-10-CM

## 2019-04-05 DIAGNOSIS — N18.2 STAGE 2 CHRONIC KIDNEY DISEASE: ICD-10-CM

## 2019-04-05 LAB
ALBUMIN SERPL-MCNC: 4.5 G/DL (ref 3.5–5.2)
ALBUMIN/GLOB SERPL: 1.7 G/DL
ALP SERPL-CCNC: 51 U/L (ref 39–117)
ALT SERPL W P-5'-P-CCNC: 19 U/L (ref 1–41)
ANION GAP SERPL CALCULATED.3IONS-SCNC: 15.8 MMOL/L
AST SERPL-CCNC: 15 U/L (ref 1–40)
BASOPHILS # BLD AUTO: 0.14 10*3/MM3 (ref 0–0.2)
BASOPHILS NFR BLD AUTO: 1.4 % (ref 0–1.5)
BILIRUB SERPL-MCNC: 0.5 MG/DL (ref 0.2–1.2)
BUN BLD-MCNC: 22 MG/DL (ref 8–23)
BUN/CREAT SERPL: 15.8 (ref 7–25)
CALCIUM SPEC-SCNC: 10.1 MG/DL (ref 8.6–10.5)
CHLORIDE SERPL-SCNC: 101 MMOL/L (ref 98–107)
CHOLEST SERPL-MCNC: 158 MG/DL (ref 0–200)
CK SERPL-CCNC: 86 U/L (ref 20–200)
CO2 SERPL-SCNC: 26.2 MMOL/L (ref 22–29)
CREAT BLD-MCNC: 1.39 MG/DL (ref 0.76–1.27)
DEPRECATED RDW RBC AUTO: 52 FL (ref 37–54)
EOSINOPHIL # BLD AUTO: 2.64 10*3/MM3 (ref 0–0.4)
EOSINOPHIL NFR BLD AUTO: 26.3 % (ref 0.3–6.2)
ERYTHROCYTE [DISTWIDTH] IN BLOOD BY AUTOMATED COUNT: 14.3 % (ref 12.3–15.4)
GFR SERPL CREATININE-BSD FRML MDRD: 50 ML/MIN/1.73
GLOBULIN UR ELPH-MCNC: 2.7 GM/DL
GLUCOSE BLD-MCNC: 139 MG/DL (ref 65–99)
HBA1C MFR BLD: 6.3 % (ref 4.8–5.6)
HCT VFR BLD AUTO: 45.3 % (ref 37.5–51)
HDLC SERPL-MCNC: 50 MG/DL (ref 40–60)
HGB BLD-MCNC: 14.7 G/DL (ref 13–17.7)
IMM GRANULOCYTES # BLD AUTO: 0.04 10*3/MM3 (ref 0–0.05)
IMM GRANULOCYTES NFR BLD AUTO: 0.4 % (ref 0–0.5)
LDLC SERPL CALC-MCNC: 73 MG/DL (ref 0–100)
LDLC/HDLC SERPL: 1.46 {RATIO}
LYMPHOCYTES # BLD AUTO: 1.96 10*3/MM3 (ref 0.7–3.1)
LYMPHOCYTES NFR BLD AUTO: 19.5 % (ref 19.6–45.3)
MCH RBC QN AUTO: 32.2 PG (ref 26.6–33)
MCHC RBC AUTO-ENTMCNC: 32.5 G/DL (ref 31.5–35.7)
MCV RBC AUTO: 99.1 FL (ref 79–97)
MONOCYTES # BLD AUTO: 0.57 10*3/MM3 (ref 0.1–0.9)
MONOCYTES NFR BLD AUTO: 5.7 % (ref 5–12)
NEUTROPHILS # BLD AUTO: 4.68 10*3/MM3 (ref 1.4–7)
NEUTROPHILS NFR BLD AUTO: 46.7 % (ref 42.7–76)
NRBC BLD AUTO-RTO: 0 /100 WBC (ref 0–0)
PLATELET # BLD AUTO: 172 10*3/MM3 (ref 140–450)
PMV BLD AUTO: 12.2 FL (ref 6–12)
POTASSIUM BLD-SCNC: 4.2 MMOL/L (ref 3.5–5.2)
PROT SERPL-MCNC: 7.2 G/DL (ref 6–8.5)
RBC # BLD AUTO: 4.57 10*6/MM3 (ref 4.14–5.8)
SODIUM BLD-SCNC: 143 MMOL/L (ref 136–145)
TRIGL SERPL-MCNC: 176 MG/DL (ref 0–150)
VLDLC SERPL-MCNC: 35.2 MG/DL (ref 5–40)
WBC NRBC COR # BLD: 10.03 10*3/MM3 (ref 3.4–10.8)

## 2019-04-05 PROCEDURE — 80053 COMPREHEN METABOLIC PANEL: CPT

## 2019-04-05 PROCEDURE — 82550 ASSAY OF CK (CPK): CPT

## 2019-04-05 PROCEDURE — 85025 COMPLETE CBC W/AUTO DIFF WBC: CPT

## 2019-04-05 PROCEDURE — 83036 HEMOGLOBIN GLYCOSYLATED A1C: CPT

## 2019-04-05 PROCEDURE — 80061 LIPID PANEL: CPT

## 2019-04-05 PROCEDURE — 36415 COLL VENOUS BLD VENIPUNCTURE: CPT

## 2019-04-08 ENCOUNTER — OFFICE VISIT (OUTPATIENT)
Dept: CARDIOLOGY | Facility: CLINIC | Age: 74
End: 2019-04-08

## 2019-04-08 VITALS
HEART RATE: 55 BPM | BODY MASS INDEX: 30.94 KG/M2 | WEIGHT: 221 LBS | DIASTOLIC BLOOD PRESSURE: 76 MMHG | HEIGHT: 71 IN | SYSTOLIC BLOOD PRESSURE: 122 MMHG | OXYGEN SATURATION: 98 %

## 2019-04-08 DIAGNOSIS — C61 PROSTATE CANCER (HCC): ICD-10-CM

## 2019-04-08 DIAGNOSIS — E78.2 MIXED HYPERLIPIDEMIA: ICD-10-CM

## 2019-04-08 DIAGNOSIS — F41.9 ANXIETY: ICD-10-CM

## 2019-04-08 DIAGNOSIS — I10 ESSENTIAL HYPERTENSION: Primary | ICD-10-CM

## 2019-04-08 DIAGNOSIS — N18.2 STAGE 2 CHRONIC KIDNEY DISEASE: ICD-10-CM

## 2019-04-08 DIAGNOSIS — K21.9 GASTROESOPHAGEAL REFLUX DISEASE WITHOUT ESOPHAGITIS: ICD-10-CM

## 2019-04-08 PROCEDURE — 99213 OFFICE O/P EST LOW 20 MIN: CPT | Performed by: INTERNAL MEDICINE

## 2019-04-08 RX ORDER — ALPRAZOLAM 0.25 MG/1
0.25 TABLET ORAL 2 TIMES DAILY PRN
Qty: 60 TABLET | Refills: 2 | Status: CANCELLED | OUTPATIENT
Start: 2019-04-08

## 2019-04-08 RX ORDER — HYDROCODONE BITARTRATE AND ACETAMINOPHEN 7.5; 325 MG/1; MG/1
1 TABLET ORAL 3 TIMES DAILY PRN
Qty: 90 TABLET | Refills: 0 | Status: SHIPPED | OUTPATIENT
Start: 2019-04-08 | End: 2019-05-17 | Stop reason: SDUPTHER

## 2019-04-08 RX ORDER — PANTOPRAZOLE SODIUM 40 MG/1
40 TABLET, DELAYED RELEASE ORAL DAILY
Qty: 90 TABLET | Refills: 3 | Status: SHIPPED | OUTPATIENT
Start: 2019-04-08 | End: 2019-05-21 | Stop reason: SDUPTHER

## 2019-04-08 NOTE — PROGRESS NOTES
subjective     Chief Complaint   Patient presents with   • Hyperlipidemia   • Follow-up     History of Present Illness  Patient is 73 years old white male who has history of essential hypertension, hyperlipidemia.  Blood pressure is very well controlled with current medications.  He is taking hydralazine, Norvasc, Lopressor and clonidine on as needed basis.  Hyperlipidemia is well controlled with Crestor.  Patient also has GI symptoms GERD which is better with Protonix.  Patient has degenerative disc disease and prosthetic CA his last PSA is 0.04.  Back pain is controlled with the Norco.  Patient is compliant with medications    Past Surgical History:   Procedure Laterality Date   • COLONOSCOPY  03/2018   • EYE SURGERY     • FOOT SURGERY     • HERNIA REPAIR     • HYDROCELECTOMY  06/15/2015   • PROSTATE BIOPSY  2018   • PROSTATE FIDUCIAL MARKER PLACEMENT  09/14/2018   • RHINOPLASTY     • UPPER GASTROINTESTINAL ENDOSCOPY  03/24/2014    WITH BIOPSIES AND DILATION     Family History   Problem Relation Age of Onset   • Kidney disease Other    • Other Other         CHRONIC DISABLING DISEASES URINARY TRACT DISEASE   • Diabetes Other    • Hypertension Other    • Other Mother    • Heart failure Father    • Stroke Sister    • Arthritis Sister    • Heart disease Brother    • No Known Problems Brother    • No Known Problems Brother      Past Medical History:   Diagnosis Date   • Anxiety    • Arthritis    • Colitis    • Depression    • GERD (gastroesophageal reflux disease)    • Gout    • Heart murmur    • History of EKG 12/22/2015    NORMAL   • Hyperlipidemia    • Hypertension    • Obesity    • Pancreatitis     history of   • Prostate cancer (CMS/HCC)    • Renal failure     MILD one functioning kidney   • Skin cancer      Patient Active Problem List   Diagnosis   • Essential hypertension, labile   • GERD (gastroesophageal reflux disease)   • Renal failure   • Hyperlipidemia   • Anxiety   • Depression   • Type 2 diabetes  mellitus (CMS/HCC)   • Periumbilical abdominal pain   • DDD (degenerative disc disease), lumbar   • Prostate cancer (CMS/HCC)       Social History     Tobacco Use   • Smoking status: Former Smoker     Packs/day: 1.00     Types: Cigarettes     Last attempt to quit:      Years since quittin.2   • Smokeless tobacco: Never Used   Substance Use Topics   • Alcohol use: Yes     Alcohol/week: 4.2 oz     Types: 7 Shots of liquor per week     Comment:  once per day   • Drug use: No       Allergies   Allergen Reactions   • Bactrim [Sulfamethoxazole-Trimethoprim]        Current Outpatient Medications on File Prior to Visit   Medication Sig   • allopurinol (ZYLOPRIM) 300 MG tablet Take 1 tablet by mouth Daily.   • ALPRAZolam (XANAX) 0.25 MG tablet Take 1 tablet by mouth 2 (Two) Times a Day As Needed (as needed for anxiety).   • amLODIPine (NORVASC) 10 MG tablet Take 1 tablet by mouth Daily.   • aspirin 81 MG tablet Take 81 mg by mouth daily.   • bicalutamide (CASODEX) 50 MG chemo tablet Casodex 50 mg tablet   Take 1 tablet every day by oral route for 30 days.   • Blood Glucose Monitoring Suppl w/Device kit Check blood sugar once a day.    DX: E11.9   • cholecalciferol (VITAMIN D3) 1000 UNITS tablet Take 1,000 Units by mouth daily.   • cloNIDine (CATAPRES) 0.1 MG tablet Take 0.05 mg by mouth As Needed. TID TO QID PRN    • Cyanocobalamin (VITAMIN B12 PO) Take  by mouth daily.   • FLUoxetine (PROzac) 20 MG capsule Take 1 capsule by mouth Daily.   • folic acid (FOLVITE) 1 MG tablet Take 1 tablet by mouth Daily.   • hydrALAZINE (APRESOLINE) 50 MG tablet Take 1 tablet by mouth 3 (Three) Times a Day.   • hydrOXYzine (ATARAX) 25 MG tablet Take 1 tablet by mouth At Night As Needed (PRN).   • Leuprolide Acetate (ELIGARD SC) Inject  under the skin into the appropriate area as directed. 2018 first one month shot   • loratadine (CLARITIN) 10 MG tablet Take 1 tablet by mouth Daily.   • meclizine (ANTIVERT) 25 MG tablet Take 1  "tablet by mouth 3 (three) times a day as needed for dizziness.   • metoprolol tartrate (LOPRESSOR) 25 MG tablet Take 1 tablet by mouth 2 (Two) Times a Day.   • Pyridoxine HCl (VITAMIN B-6 PO) Take  by mouth daily.   • rosuvastatin (CRESTOR) 10 MG tablet Take 1 tablet by mouth Every Night.   • tamsulosin (FLOMAX) 0.4 MG capsule 24 hr capsule Take 1 capsule by mouth Daily.   • [DISCONTINUED] amoxicillin-clavulanate (AUGMENTIN) 875-125 MG per tablet Take 1 tablet by mouth 2 (Two) Times a Day.   • [DISCONTINUED] HYDROcodone-acetaminophen (NORCO) 7.5-325 MG per tablet Take 1 tablet by mouth 3 (Three) Times a Day As Needed for Moderate Pain .   • [DISCONTINUED] pantoprazole (PROTONIX) 40 MG EC tablet TAKE 1 TABLET BY MOUTH  DAILY     No current facility-administered medications on file prior to visit.          The following portions of the patient's history were reviewed and updated as appropriate: allergies, current medications, past family history, past medical history, past social history, past surgical history and problem list.    Review of Systems   Constitution: Negative.   HENT: Negative.  Negative for congestion.    Eyes: Negative.    Cardiovascular: Negative.  Negative for chest pain, cyanosis, dyspnea on exertion, irregular heartbeat, leg swelling, near-syncope, orthopnea, palpitations, paroxysmal nocturnal dyspnea and syncope.   Respiratory: Negative.  Negative for shortness of breath.    Hematologic/Lymphatic: Negative.    Musculoskeletal: Positive for back pain.   Gastrointestinal: Positive for heartburn.   Neurological: Negative.  Negative for headaches.          Objective:     /76 (BP Location: Left arm, Patient Position: Sitting)   Pulse 55   Ht 180.3 cm (71\")   Wt 100 kg (221 lb)   SpO2 98%   BMI 30.82 kg/m²   Physical Exam   Constitutional: He appears well-developed and well-nourished. No distress.   HENT:   Head: Normocephalic and atraumatic.   Mouth/Throat: Oropharynx is clear and moist. No " oropharyngeal exudate.   Eyes: Conjunctivae and EOM are normal. Pupils are equal, round, and reactive to light. No scleral icterus.   Neck: Normal range of motion. Neck supple. No JVD present. No tracheal deviation present. No thyromegaly present.   Cardiovascular: Normal rate, regular rhythm, normal heart sounds and intact distal pulses. PMI is not displaced. Exam reveals no gallop, no friction rub and no decreased pulses.   No murmur heard.  Pulses:       Carotid pulses are 3+ on the right side, and 3+ on the left side.       Radial pulses are 3+ on the right side, and 3+ on the left side.   Pulmonary/Chest: Effort normal and breath sounds normal. No respiratory distress. He has no wheezes. He has no rales. He exhibits no tenderness.   Abdominal: Soft. Bowel sounds are normal. He exhibits no distension, no abdominal bruit and no mass. There is no splenomegaly or hepatomegaly. There is no tenderness. There is no rebound and no guarding.   Musculoskeletal: Normal range of motion. He exhibits no edema, tenderness or deformity.   Lymphadenopathy:     He has no cervical adenopathy.   Neurological: He is alert. He has normal reflexes. No cranial nerve deficit. He exhibits normal muscle tone. Coordination normal.   Skin: Skin is warm and dry. No rash noted. He is not diaphoretic. No erythema.   Psychiatric: He has a normal mood and affect. His behavior is normal. Judgment and thought content normal.         Lab Review  Lab Results   Component Value Date     04/05/2019    K 4.2 04/05/2019     04/05/2019    BUN 22 04/05/2019    CREATININE 1.39 (H) 04/05/2019    GLUCOSE 139 (H) 04/05/2019    CALCIUM 10.1 04/05/2019    ALT 19 04/05/2019    ALKPHOS 51 04/05/2019    LABIL2 1.8 03/07/2016     Lab Results   Component Value Date    CKTOTAL 86 04/05/2019     Lab Results   Component Value Date    WBC 10.03 04/05/2019    HGB 14.7 04/05/2019    HCT 45.3 04/05/2019     04/05/2019     No results found for: BNP  Lab  Results   Component Value Date    CHLPL 227 (H) 03/07/2016    CHOL 158 04/05/2019    TRIG 176 (H) 04/05/2019    HDL 50 04/05/2019    VLDL 35.2 04/05/2019    LDLHDL 1.46 04/05/2019     Lab Results   Component Value Date    LDL 73 04/05/2019     (H) 11/06/2018       Procedures       I personally viewed and interpreted the patient's LAB data         Assessment:     1. Essential hypertension    2. Mixed hyperlipidemia    3. Anxiety    4. Prostate cancer (CMS/[facility])    5. Gastroesophageal reflux disease without esophagitis    6. Stage 2 chronic kidney disease          Plan:     Lab work reviewed and discussed with the patient  LDL is 73 he will continue Crestor.  Renal functions abnormal but stable.  PSA 0.04  Refills of medications were given.  Side effects of medical therapy was discussed.  Weight loss emphasized        No Follow-up on file.

## 2019-05-09 ENCOUNTER — TELEPHONE (OUTPATIENT)
Dept: CARDIOLOGY | Facility: CLINIC | Age: 74
End: 2019-05-09

## 2019-05-09 DIAGNOSIS — L98.9 SKIN ABNORMALITIES: Primary | ICD-10-CM

## 2019-05-17 ENCOUNTER — OFFICE VISIT (OUTPATIENT)
Dept: CARDIOLOGY | Facility: CLINIC | Age: 74
End: 2019-05-17

## 2019-05-17 ENCOUNTER — LAB (OUTPATIENT)
Dept: LAB | Facility: HOSPITAL | Age: 74
End: 2019-05-17

## 2019-05-17 VITALS
WEIGHT: 218 LBS | BODY MASS INDEX: 30.52 KG/M2 | OXYGEN SATURATION: 98 % | HEART RATE: 60 BPM | HEIGHT: 71 IN | DIASTOLIC BLOOD PRESSURE: 78 MMHG | SYSTOLIC BLOOD PRESSURE: 124 MMHG

## 2019-05-17 DIAGNOSIS — E78.2 MIXED HYPERLIPIDEMIA: ICD-10-CM

## 2019-05-17 DIAGNOSIS — R30.0 BURNING WITH URINATION: Primary | ICD-10-CM

## 2019-05-17 DIAGNOSIS — R30.0 BURNING WITH URINATION: ICD-10-CM

## 2019-05-17 DIAGNOSIS — N18.2 STAGE 2 CHRONIC KIDNEY DISEASE: ICD-10-CM

## 2019-05-17 DIAGNOSIS — M51.36 DDD (DEGENERATIVE DISC DISEASE), LUMBAR: ICD-10-CM

## 2019-05-17 DIAGNOSIS — F41.9 ANXIETY: ICD-10-CM

## 2019-05-17 DIAGNOSIS — I10 ESSENTIAL HYPERTENSION: ICD-10-CM

## 2019-05-17 LAB
BACTERIA UR QL AUTO: ABNORMAL /HPF
BILIRUB UR QL STRIP: NEGATIVE
CLARITY UR: CLEAR
COLOR UR: YELLOW
GLUCOSE UR STRIP-MCNC: NEGATIVE MG/DL
HGB UR QL STRIP.AUTO: NEGATIVE
HYALINE CASTS UR QL AUTO: ABNORMAL /LPF
KETONES UR QL STRIP: NEGATIVE
LEUKOCYTE ESTERASE UR QL STRIP.AUTO: ABNORMAL
NITRITE UR QL STRIP: POSITIVE
PH UR STRIP.AUTO: 6 [PH] (ref 5–8)
PROT UR QL STRIP: NEGATIVE
RBC # UR: ABNORMAL /HPF
REF LAB TEST METHOD: ABNORMAL
SP GR UR STRIP: 1.02 (ref 1–1.03)
SQUAMOUS #/AREA URNS HPF: ABNORMAL /HPF
UROBILINOGEN UR QL STRIP: ABNORMAL
WBC UR QL AUTO: ABNORMAL /HPF

## 2019-05-17 PROCEDURE — 81001 URINALYSIS AUTO W/SCOPE: CPT

## 2019-05-17 PROCEDURE — 99213 OFFICE O/P EST LOW 20 MIN: CPT | Performed by: INTERNAL MEDICINE

## 2019-05-17 PROCEDURE — 87086 URINE CULTURE/COLONY COUNT: CPT

## 2019-05-17 RX ORDER — ALPRAZOLAM 0.25 MG/1
0.25 TABLET ORAL 2 TIMES DAILY PRN
Qty: 60 TABLET | Refills: 2 | Status: SHIPPED | OUTPATIENT
Start: 2019-05-17 | End: 2019-06-28 | Stop reason: SDUPTHER

## 2019-05-17 RX ORDER — HYDROCODONE BITARTRATE AND ACETAMINOPHEN 7.5; 325 MG/1; MG/1
1 TABLET ORAL 3 TIMES DAILY PRN
Qty: 90 TABLET | Refills: 0 | Status: SHIPPED | OUTPATIENT
Start: 2019-05-17 | End: 2019-06-28 | Stop reason: SDUPTHER

## 2019-05-17 NOTE — PROGRESS NOTES
subjective     Chief Complaint   Patient presents with   • Fatigue     History of Present Illness  Patient is 73 years old white male who is here for follow-up.  Patient states that he may have urinary tract infection because he has been having dysuria.  He recently passed another stone.  Because of prostate CA he is taking Casodex and states there is giving him a lot of sweating.  Otherwise he seems to be doing well.    Blood pressure is very well controlled with current medications.  Patient also has hyperlipidemia and is taking Crestor 10 mg daily without any drug side effects.  Patient has mild GERD.  He is taking Protonix 40 mg daily which seems to be helping a lot.  Other problems consist of chronic back pain due to degenerative disc disease.  Also has a lot of anxiety.  Needs refills    Past Surgical History:   Procedure Laterality Date   • COLONOSCOPY  03/2018   • EYE SURGERY     • FOOT SURGERY     • HERNIA REPAIR     • HYDROCELECTOMY  06/15/2015   • PROSTATE BIOPSY  2018   • PROSTATE FIDUCIAL MARKER PLACEMENT  09/14/2018   • RHINOPLASTY     • UPPER GASTROINTESTINAL ENDOSCOPY  03/24/2014    WITH BIOPSIES AND DILATION     Family History   Problem Relation Age of Onset   • Kidney disease Other    • Other Other         CHRONIC DISABLING DISEASES URINARY TRACT DISEASE   • Diabetes Other    • Hypertension Other    • Other Mother    • Heart failure Father    • Stroke Sister    • Arthritis Sister    • Heart disease Brother    • No Known Problems Brother    • No Known Problems Brother      Past Medical History:   Diagnosis Date   • Anxiety    • Arthritis    • Colitis    • Depression    • GERD (gastroesophageal reflux disease)    • Gout    • Heart murmur    • History of EKG 12/22/2015    NORMAL   • Hyperlipidemia    • Hypertension    • Obesity    • Pancreatitis     history of   • Prostate cancer (CMS/HCC)    • Renal failure     MILD one functioning kidney   • Skin cancer      Patient Active Problem List   Diagnosis    • Essential hypertension, labile   • GERD (gastroesophageal reflux disease)   • Renal failure   • Hyperlipidemia   • Anxiety   • Depression   • Type 2 diabetes mellitus (CMS/HCC)   • Periumbilical abdominal pain   • DDD (degenerative disc disease), lumbar   • Prostate cancer (CMS/HCC)       Social History     Tobacco Use   • Smoking status: Former Smoker     Packs/day: 1.00     Types: Cigarettes     Last attempt to quit:      Years since quittin.3   • Smokeless tobacco: Never Used   Substance Use Topics   • Alcohol use: Yes     Alcohol/week: 4.2 oz     Types: 7 Shots of liquor per week     Comment:  once per day   • Drug use: No       Allergies   Allergen Reactions   • Bactrim [Sulfamethoxazole-Trimethoprim]        Current Outpatient Medications on File Prior to Visit   Medication Sig   • allopurinol (ZYLOPRIM) 300 MG tablet Take 1 tablet by mouth Daily.   • amLODIPine (NORVASC) 10 MG tablet Take 1 tablet by mouth Daily.   • aspirin 81 MG tablet Take 81 mg by mouth daily.   • bicalutamide (CASODEX) 50 MG chemo tablet Casodex 50 mg tablet   Take 1 tablet every day by oral route for 30 days.   • Blood Glucose Monitoring Suppl w/Device kit Check blood sugar once a day.    DX: E11.9   • cholecalciferol (VITAMIN D3) 1000 UNITS tablet Take 1,000 Units by mouth daily.   • cloNIDine (CATAPRES) 0.1 MG tablet Take 0.05 mg by mouth As Needed. TID TO QID PRN    • Cyanocobalamin (VITAMIN B12 PO) Take  by mouth daily.   • FLUoxetine (PROzac) 20 MG capsule Take 1 capsule by mouth Daily.   • folic acid (FOLVITE) 1 MG tablet Take 1 tablet by mouth Daily.   • hydrALAZINE (APRESOLINE) 50 MG tablet Take 1 tablet by mouth 3 (Three) Times a Day.   • hydrOXYzine (ATARAX) 25 MG tablet Take 1 tablet by mouth At Night As Needed (PRN).   • Leuprolide Acetate (ELIGARD SC) Inject  under the skin into the appropriate area as directed. 2018 first one month shot   • loratadine (CLARITIN) 10 MG tablet Take 1 tablet by mouth Daily.  "  • meclizine (ANTIVERT) 25 MG tablet Take 1 tablet by mouth 3 (three) times a day as needed for dizziness.   • metoprolol tartrate (LOPRESSOR) 25 MG tablet Take 1 tablet by mouth 2 (Two) Times a Day.   • pantoprazole (PROTONIX) 40 MG EC tablet Take 1 tablet by mouth Daily.   • Pyridoxine HCl (VITAMIN B-6 PO) Take  by mouth daily.   • rosuvastatin (CRESTOR) 10 MG tablet Take 1 tablet by mouth Every Night.   • tamsulosin (FLOMAX) 0.4 MG capsule 24 hr capsule Take 1 capsule by mouth Daily.   • [DISCONTINUED] ALPRAZolam (XANAX) 0.25 MG tablet Take 1 tablet by mouth 2 (Two) Times a Day As Needed (as needed for anxiety).   • [DISCONTINUED] HYDROcodone-acetaminophen (NORCO) 7.5-325 MG per tablet Take 1 tablet by mouth 3 (Three) Times a Day As Needed for Moderate Pain .     No current facility-administered medications on file prior to visit.          The following portions of the patient's history were reviewed and updated as appropriate: allergies, current medications, past family history, past medical history, past social history, past surgical history and problem list.    Review of Systems   Constitution: Negative.   HENT: Negative.  Negative for congestion.    Eyes: Negative.    Cardiovascular: Negative.  Negative for chest pain, cyanosis, dyspnea on exertion, irregular heartbeat, leg swelling, near-syncope, orthopnea, palpitations, paroxysmal nocturnal dyspnea and syncope.   Respiratory: Negative.  Negative for shortness of breath.    Hematologic/Lymphatic: Negative.    Musculoskeletal: Positive for back pain.   Gastrointestinal: Negative.    Genitourinary: Positive for dysuria.   Neurological: Negative.  Negative for headaches.   Psychiatric/Behavioral: The patient is nervous/anxious.           Objective:     /78 (BP Location: Left arm, Patient Position: Sitting)   Pulse 60   Ht 180.3 cm (71\")   Wt 98.9 kg (218 lb)   SpO2 98%   BMI 30.40 kg/m²   Physical Exam   Constitutional: He appears well-developed and " well-nourished. No distress.   HENT:   Head: Normocephalic and atraumatic.   Mouth/Throat: Oropharynx is clear and moist. No oropharyngeal exudate.   Eyes: Conjunctivae and EOM are normal. Pupils are equal, round, and reactive to light. No scleral icterus.   Neck: Normal range of motion. Neck supple. No JVD present. No tracheal deviation present. No thyromegaly present.   Cardiovascular: Normal rate, regular rhythm, normal heart sounds and intact distal pulses. PMI is not displaced. Exam reveals no gallop, no friction rub and no decreased pulses.   No murmur heard.  Pulses:       Carotid pulses are 3+ on the right side, and 3+ on the left side.       Radial pulses are 3+ on the right side, and 3+ on the left side.   Pulmonary/Chest: Effort normal and breath sounds normal. No respiratory distress. He has no wheezes. He has no rales. He exhibits no tenderness.   Abdominal: Soft. Bowel sounds are normal. He exhibits no distension, no abdominal bruit and no mass. There is no splenomegaly or hepatomegaly. There is no tenderness. There is no rebound and no guarding.   Musculoskeletal: Normal range of motion. He exhibits no edema, tenderness or deformity.   Lymphadenopathy:     He has no cervical adenopathy.   Neurological: He is alert. He has normal reflexes. No cranial nerve deficit. He exhibits normal muscle tone. Coordination normal.   Skin: Skin is warm and dry. No rash noted. He is not diaphoretic. No erythema.   Psychiatric: He has a normal mood and affect. His behavior is normal. Judgment and thought content normal.         Lab Review  Lab Results   Component Value Date     04/05/2019    K 4.2 04/05/2019     04/05/2019    BUN 22 04/05/2019    CREATININE 1.39 (H) 04/05/2019    GLUCOSE 139 (H) 04/05/2019    CALCIUM 10.1 04/05/2019    ALT 19 04/05/2019    ALKPHOS 51 04/05/2019    LABIL2 1.8 03/07/2016     Lab Results   Component Value Date    CKTOTAL 86 04/05/2019     Lab Results   Component Value Date     WBC 10.03 04/05/2019    HGB 14.7 04/05/2019    HCT 45.3 04/05/2019     04/05/2019     No results found for: INR  No results found for: MG  Lab Results   Component Value Date    PSA 3.00 05/26/2016    TSH 2.807 09/28/2015     No results found for: BNP  Lab Results   Component Value Date    CHLPL 227 (H) 03/07/2016    CHOL 158 04/05/2019    TRIG 176 (H) 04/05/2019    HDL 50 04/05/2019    VLDL 35.2 04/05/2019    LDLHDL 1.46 04/05/2019     Lab Results   Component Value Date    LDL 73 04/05/2019     (H) 11/06/2018       Procedures       I personally viewed and interpreted the patient's LAB data         Assessment:     1. Burning with urination    2. Essential hypertension    3. Mixed hyperlipidemia    4. Stage 2 chronic kidney disease    5. DDD (degenerative disc disease), lumbar    6. Anxiety          Plan:     Patient has dysuria we will get urinalysis and culture.  Blood pressure is very well controlled.  No change in therapy was made.  Patient was given refill of medication.  Side effects including drug addiction were discussed.  Follow-up scheduled        No Follow-up on file.

## 2019-05-18 LAB — BACTERIA SPEC AEROBE CULT: NO GROWTH

## 2019-05-20 ENCOUNTER — TELEPHONE (OUTPATIENT)
Dept: CARDIOLOGY | Facility: CLINIC | Age: 74
End: 2019-05-20

## 2019-05-20 RX ORDER — SULFAMETHOXAZOLE AND TRIMETHOPRIM 800; 160 MG/1; MG/1
1 TABLET ORAL 2 TIMES DAILY
Qty: 14 TABLET | Refills: 0 | Status: SHIPPED | OUTPATIENT
Start: 2019-05-20 | End: 2019-06-28 | Stop reason: ALTCHOICE

## 2019-05-20 NOTE — TELEPHONE ENCOUNTER
----- Message from Antione De Santiago MD sent at 5/20/2019  9:55 AM EDT -----  Bactrim DS twice daily for 7 days

## 2019-05-21 RX ORDER — TAMSULOSIN HYDROCHLORIDE 0.4 MG/1
1 CAPSULE ORAL DAILY
Qty: 90 CAPSULE | Refills: 3 | Status: SHIPPED | OUTPATIENT
Start: 2019-05-21 | End: 2019-10-02 | Stop reason: SDUPTHER

## 2019-05-21 RX ORDER — PANTOPRAZOLE SODIUM 40 MG/1
40 TABLET, DELAYED RELEASE ORAL DAILY
Qty: 90 TABLET | Refills: 3 | Status: SHIPPED | OUTPATIENT
Start: 2019-05-21 | End: 2019-10-02 | Stop reason: SDUPTHER

## 2019-05-21 RX ORDER — FLUOXETINE HYDROCHLORIDE 20 MG/1
20 CAPSULE ORAL DAILY
Qty: 90 CAPSULE | Refills: 3 | Status: SHIPPED | OUTPATIENT
Start: 2019-05-21 | End: 2019-10-02 | Stop reason: SDUPTHER

## 2019-05-21 RX ORDER — AMLODIPINE BESYLATE 10 MG/1
10 TABLET ORAL DAILY
Qty: 90 TABLET | Refills: 3 | Status: SHIPPED | OUTPATIENT
Start: 2019-05-21 | End: 2019-10-02 | Stop reason: SDUPTHER

## 2019-05-21 RX ORDER — FOLIC ACID 1 MG/1
1 TABLET ORAL DAILY
Qty: 90 TABLET | Refills: 3 | Status: SHIPPED | OUTPATIENT
Start: 2019-05-21 | End: 2019-05-21 | Stop reason: SDUPTHER

## 2019-05-21 RX ORDER — TAMSULOSIN HYDROCHLORIDE 0.4 MG/1
1 CAPSULE ORAL DAILY
Qty: 90 CAPSULE | Refills: 3 | Status: SHIPPED | OUTPATIENT
Start: 2019-05-21 | End: 2019-05-21 | Stop reason: SDUPTHER

## 2019-05-21 RX ORDER — FLUOXETINE HYDROCHLORIDE 20 MG/1
20 CAPSULE ORAL DAILY
Qty: 90 CAPSULE | Refills: 3 | Status: SHIPPED | OUTPATIENT
Start: 2019-05-21 | End: 2019-05-21 | Stop reason: SDUPTHER

## 2019-05-21 RX ORDER — AMLODIPINE BESYLATE 10 MG/1
10 TABLET ORAL DAILY
Qty: 90 TABLET | Refills: 3 | Status: SHIPPED | OUTPATIENT
Start: 2019-05-21 | End: 2019-05-21 | Stop reason: SDUPTHER

## 2019-05-21 RX ORDER — HYDRALAZINE HYDROCHLORIDE 50 MG/1
50 TABLET, FILM COATED ORAL 3 TIMES DAILY
Qty: 270 TABLET | Refills: 3 | Status: SHIPPED | OUTPATIENT
Start: 2019-05-21 | End: 2019-10-02 | Stop reason: SDUPTHER

## 2019-05-21 RX ORDER — HYDRALAZINE HYDROCHLORIDE 50 MG/1
50 TABLET, FILM COATED ORAL 3 TIMES DAILY
Qty: 270 TABLET | Refills: 3 | Status: SHIPPED | OUTPATIENT
Start: 2019-05-21 | End: 2019-05-21 | Stop reason: SDUPTHER

## 2019-05-21 RX ORDER — FOLIC ACID 1 MG/1
1 TABLET ORAL DAILY
Qty: 90 TABLET | Refills: 3 | Status: SHIPPED | OUTPATIENT
Start: 2019-05-21 | End: 2019-10-02 | Stop reason: SDUPTHER

## 2019-06-28 ENCOUNTER — OFFICE VISIT (OUTPATIENT)
Dept: CARDIOLOGY | Facility: CLINIC | Age: 74
End: 2019-06-28

## 2019-06-28 VITALS
OXYGEN SATURATION: 96 % | HEIGHT: 71 IN | SYSTOLIC BLOOD PRESSURE: 120 MMHG | BODY MASS INDEX: 30.1 KG/M2 | WEIGHT: 215 LBS | HEART RATE: 59 BPM | DIASTOLIC BLOOD PRESSURE: 66 MMHG

## 2019-06-28 DIAGNOSIS — M51.36 DDD (DEGENERATIVE DISC DISEASE), LUMBAR: ICD-10-CM

## 2019-06-28 DIAGNOSIS — F41.9 ANXIETY: ICD-10-CM

## 2019-06-28 DIAGNOSIS — I10 ESSENTIAL HYPERTENSION: Primary | ICD-10-CM

## 2019-06-28 DIAGNOSIS — E79.0 HYPERURICEMIA: ICD-10-CM

## 2019-06-28 DIAGNOSIS — E78.2 MIXED HYPERLIPIDEMIA: ICD-10-CM

## 2019-06-28 DIAGNOSIS — N18.2 STAGE 2 CHRONIC KIDNEY DISEASE: ICD-10-CM

## 2019-06-28 DIAGNOSIS — K21.9 GASTROESOPHAGEAL REFLUX DISEASE WITHOUT ESOPHAGITIS: ICD-10-CM

## 2019-06-28 PROCEDURE — 99213 OFFICE O/P EST LOW 20 MIN: CPT | Performed by: INTERNAL MEDICINE

## 2019-06-28 RX ORDER — ALPRAZOLAM 0.25 MG/1
0.25 TABLET ORAL 2 TIMES DAILY PRN
Qty: 60 TABLET | Refills: 2 | Status: SHIPPED | OUTPATIENT
Start: 2019-06-28 | End: 2019-09-24 | Stop reason: SDUPTHER

## 2019-06-28 RX ORDER — HYDROCODONE BITARTRATE AND ACETAMINOPHEN 7.5; 325 MG/1; MG/1
1 TABLET ORAL 3 TIMES DAILY PRN
Qty: 90 TABLET | Refills: 0 | Status: SHIPPED | OUTPATIENT
Start: 2019-06-28 | End: 2019-08-14 | Stop reason: SDUPTHER

## 2019-07-18 ENCOUNTER — HOSPITAL ENCOUNTER (OUTPATIENT)
Dept: RADIATION ONCOLOGY | Facility: HOSPITAL | Age: 74
Setting detail: RADIATION/ONCOLOGY SERIES
Discharge: HOME | End: 2019-07-18

## 2019-07-18 ENCOUNTER — OFFICE VISIT (OUTPATIENT)
Dept: RADIATION ONCOLOGY | Facility: HOSPITAL | Age: 74
End: 2019-07-18

## 2019-07-18 VITALS
RESPIRATION RATE: 18 BRPM | BODY MASS INDEX: 31.22 KG/M2 | TEMPERATURE: 97.9 F | WEIGHT: 223 LBS | HEART RATE: 64 BPM | OXYGEN SATURATION: 97 % | HEIGHT: 71 IN | DIASTOLIC BLOOD PRESSURE: 77 MMHG | SYSTOLIC BLOOD PRESSURE: 167 MMHG

## 2019-07-18 DIAGNOSIS — C61 PROSTATE CANCER (HCC): Primary | ICD-10-CM

## 2019-07-18 PROCEDURE — G0463 HOSPITAL OUTPT CLINIC VISIT: HCPCS

## 2019-07-18 RX ORDER — CIPROFLOXACIN 500 MG/1
TABLET, FILM COATED ORAL EVERY 12 HOURS
COMMUNITY
End: 2019-11-04

## 2019-07-18 NOTE — PROGRESS NOTES
FOLLOW UP NOTE    PATIENT:                                                      Lei Stapleton  MEDICAL RECORD #:                        [id number]  :                                                          1945  COMPLETION DATE:   18  DIAGNOSIS:     Prostate cancer (CMS/[facility])  Staging form: Prostate, AJCC 8th Edition  - Clinical stage from 2018: Stage IIC (cT2b, cN0, cM0, PSA: 6.1, Grade Group: 4) - Signed by Oliver Zimmer MD on 2018    BRIEF HISTORY:    He has localized, high risk prostate cancer treated with androgen ablation and pelvic irradiation.  He has a long history of BPH, UTIs and nephrolithiasis/bladder stones.  Urinary symptoms have improved some since Dr. Mendez removed numerous bladder calculi.  These were reportedly uric acid and calcium stones.  He does have a history of gout and is not regularly taking allopurinol.  He has chronic constipation but no new bowel problems.  Energy is slowly improving.  He has had basal cell skin cancer recently removed from his back.    Most recent PSA was undetectable, less than 0.04 ng/ml 3/27/2019.    MEDICATIONS: Medication reconciliation for the patient was reviewed and confirmed in the electronic medical record.    Review of Systems   Constitutional: Positive for fatigue.   HENT:  Negative.    Eyes: Negative.    Respiratory: Positive for cough and shortness of breath.    Cardiovascular: Negative.    Gastrointestinal: Negative.    Endocrine: Positive for hot flashes.   Genitourinary: Positive for dysuria and pelvic pain.    Musculoskeletal: Negative.    Skin: Negative.    Neurological: Positive for dizziness and light-headedness.   Hematological: Bruises/bleeds easily.   Psychiatric/Behavioral: Negative.          IPSS Questionnaire (AUA-7):  Over the past month…     1)  Incomplete Emptying  How often have you had a sensation of not emptying your bladder?  4 - More than half the time   2)  Frequency  How often have you had to urinate less  than every two hours? 4 - More than half the time   3)  Intermittency  How often have you found you stopped and started again several times when you urinated?  4 - More than half the time   4) Urgency  How often have you found it difficult to postpone urination?  4 - More than half the time   5) Weak Stream  How often have you had a weak urinary stream?  3 - About half the time   6) Straining  How often have you had to push or strain to begin urination?  1 - About half the time   7) Nocturia  How many times did you typically get up at night to urinate?  4 - 4 times   Total Score:  24         Quality of life due to urinary symptoms:  If you were to spend the rest of your life with your urinary condition the way it is now, how would you feel about that? 3-Mixed   Urine Leakage (Incontinence) 1-Mild (A few drops a day, no pad use)      Sexual Health Inventory  Current Status     1)  How do you rate your confidence that you could achieve and keep an erection? 1-Very Low   2) When you had erections with sexual stimulation, how often were your erections hard enough for penetration (entering your partner)? 0-No sexual activity   3)  During sexual intercourse, how often were you able to maintain your erection after you had penetrated (entered) into your partner? 0-Did not attempt intercourse   4) During sexual intercourse, how difficult was it to maintain your erection to completion of intercourse? 0-Did not attempt intercourse   5) When you attempted sexual intercourse, how often was it satisfactory to you? 0-No sexual activity   Total Score: 1         Bowel Health Inventory  Current Status: 0-No problems, no rectal bleeding, no discharge, less then 5 bowel movements a day                    KPS 80%    Physical Exam   Constitutional: He is oriented to person, place, and time. He appears well-developed and well-nourished.   HENT:   Head: Normocephalic and atraumatic.   Neck: Normal range of motion. Neck supple.  "  Cardiovascular: Normal rate, regular rhythm and normal heart sounds.   No murmur heard.  Pulmonary/Chest: Effort normal and breath sounds normal. He has no wheezes. He has no rales.   Abdominal: Soft. Bowel sounds are normal. He exhibits no distension. There is no hepatosplenomegaly. There is no tenderness.   Musculoskeletal: Normal range of motion. He exhibits no edema or tenderness.   Lymphadenopathy:     He has no cervical adenopathy.     He has no axillary adenopathy.        Right: No supraclavicular adenopathy present.        Left: No supraclavicular adenopathy present.   Neurological: He is alert and oriented to person, place, and time. He has normal strength. No sensory deficit.   Skin: Skin is warm and dry.   Well-healing incision right mid back and smaller treated foci bilateral with no suspicious mass.   Psychiatric: He has a normal mood and affect. His behavior is normal. Judgment and thought content normal.   Nursing note and vitals reviewed.      VITAL SIGNS:   Vitals:    07/18/19 1359   BP: 167/77   Pulse: 64   Resp: 18   Temp: 97.9 °F (36.6 °C)   TempSrc: Temporal   SpO2: 97%  Comment: RA   Weight: 101 kg (223 lb)   Height: 180.3 cm (71\")   PainSc: 0-No pain       The following portions of the patient's history were reviewed and updated as appropriate: allergies, current medications, past family history, past medical history, past social history, past surgical history and problem list.         Lei was seen today for prostate cancer.    Diagnoses and all orders for this visit:    Prostate cancer (CMS/[facility])         IMPRESSION:  Prostate cancer, Casa score 4+4 = 8, clinical stage IIC (T2b, N0, M0) with PSA 6.1 ng/ml.  He continues to improve clinically and urinary symptoms are slowly improving since removal of multiple bladder calculi.    RECOMMENDATIONS: He plans to continue urologic surveillance under the care of Dr. Mendez.  He will resume daily allopurinol use to try to reduce the risk of " recurrent bladder stones, since these were uric acid calculi.    Return in about 1 year (around 7/18/2020).    Oliver Zimmer MD    Errors in dictation may reflect use of voice recognition software and not all errors in transcription may have been detected prior to signing.

## 2019-07-24 RX ORDER — ALLOPURINOL 300 MG/1
300 TABLET ORAL DAILY
Qty: 90 TABLET | Refills: 0 | Status: SHIPPED | OUTPATIENT
Start: 2019-07-24 | End: 2019-10-02 | Stop reason: SDUPTHER

## 2019-08-14 ENCOUNTER — OFFICE VISIT (OUTPATIENT)
Dept: CARDIOLOGY | Facility: CLINIC | Age: 74
End: 2019-08-14

## 2019-08-14 VITALS
WEIGHT: 220 LBS | HEIGHT: 71 IN | HEART RATE: 59 BPM | BODY MASS INDEX: 30.8 KG/M2 | DIASTOLIC BLOOD PRESSURE: 84 MMHG | OXYGEN SATURATION: 97 % | SYSTOLIC BLOOD PRESSURE: 132 MMHG

## 2019-08-14 DIAGNOSIS — E78.2 MIXED HYPERLIPIDEMIA: ICD-10-CM

## 2019-08-14 DIAGNOSIS — E11.22 TYPE 2 DIABETES MELLITUS WITH STAGE 2 CHRONIC KIDNEY DISEASE, WITHOUT LONG-TERM CURRENT USE OF INSULIN (HCC): ICD-10-CM

## 2019-08-14 DIAGNOSIS — N18.2 TYPE 2 DIABETES MELLITUS WITH STAGE 2 CHRONIC KIDNEY DISEASE, WITHOUT LONG-TERM CURRENT USE OF INSULIN (HCC): ICD-10-CM

## 2019-08-14 DIAGNOSIS — R06.09 DOE (DYSPNEA ON EXERTION): ICD-10-CM

## 2019-08-14 DIAGNOSIS — R00.1 BRADYCARDIA: ICD-10-CM

## 2019-08-14 DIAGNOSIS — N18.2 STAGE 2 CHRONIC KIDNEY DISEASE: ICD-10-CM

## 2019-08-14 DIAGNOSIS — I10 ESSENTIAL HYPERTENSION: Primary | ICD-10-CM

## 2019-08-14 DIAGNOSIS — M51.36 DDD (DEGENERATIVE DISC DISEASE), LUMBAR: ICD-10-CM

## 2019-08-14 PROCEDURE — 99213 OFFICE O/P EST LOW 20 MIN: CPT | Performed by: INTERNAL MEDICINE

## 2019-08-14 PROCEDURE — 93000 ELECTROCARDIOGRAM COMPLETE: CPT | Performed by: INTERNAL MEDICINE

## 2019-08-14 RX ORDER — HYDROCODONE BITARTRATE AND ACETAMINOPHEN 7.5; 325 MG/1; MG/1
1 TABLET ORAL 3 TIMES DAILY PRN
Qty: 90 TABLET | Refills: 0 | Status: SHIPPED | OUTPATIENT
Start: 2019-08-14 | End: 2019-09-24 | Stop reason: SDUPTHER

## 2019-08-14 RX ORDER — SULFAMETHOXAZOLE AND TRIMETHOPRIM 800; 160 MG/1; MG/1
TABLET ORAL EVERY 12 HOURS
COMMUNITY
End: 2019-09-24

## 2019-08-14 NOTE — PROGRESS NOTES
subjective     Chief Complaint   Patient presents with   • Hypertension   • Med Refill     Pending   • Neck Pain     x 3 days     History of Present Illness    Patient is 73 years old white male who is here for follow-up.  Patient has degenerative disc disease of lumbar spine and complains of back pain.  He is using Norco therapy.  Patient is compliant with his medications.  He also complains of pain in the neck.  Patient has essential hypertension which seems very well controlled.  He also has hyperlipidemia and is taking medications regularly.  Any chest tightness or pressure sensation.  He does complain of dyspnea on exertion.  Past Surgical History:   Procedure Laterality Date   • COLONOSCOPY  03/2018   • EYE SURGERY     • FOOT SURGERY     • HERNIA REPAIR     • HYDROCELECTOMY  06/15/2015   • PROSTATE BIOPSY  2018   • PROSTATE FIDUCIAL MARKER PLACEMENT  09/14/2018   • RHINOPLASTY     • UPPER GASTROINTESTINAL ENDOSCOPY  03/24/2014    WITH BIOPSIES AND DILATION     Family History   Problem Relation Age of Onset   • Kidney disease Other    • Other Other         CHRONIC DISABLING DISEASES URINARY TRACT DISEASE   • Diabetes Other    • Hypertension Other    • Other Mother    • Heart failure Father    • Stroke Sister    • Arthritis Sister    • Heart disease Brother    • No Known Problems Brother    • No Known Problems Brother      Past Medical History:   Diagnosis Date   • Anxiety    • Arthritis    • Colitis    • Depression    • GERD (gastroesophageal reflux disease)    • Gout    • Heart murmur    • History of EKG 12/22/2015    NORMAL   • Hyperlipidemia    • Hypertension    • Obesity    • Pancreatitis     history of   • Prostate cancer (CMS/HCC)    • Renal failure     MILD one functioning kidney   • Skin cancer     basal cell cancer on back     Patient Active Problem List   Diagnosis   • Essential hypertension, labile   • GERD (gastroesophageal reflux disease)   • Renal failure   • Hyperlipidemia   • Anxiety   •  Depression   • Type 2 diabetes mellitus (CMS/HCC)   • Periumbilical abdominal pain   • DDD (degenerative disc disease), lumbar   • Prostate cancer (CMS/HCC)   • Hyperuricemia   • BURKS (dyspnea on exertion)   • Bradycardia       Social History     Tobacco Use   • Smoking status: Former Smoker     Packs/day: 1.00     Types: Cigarettes     Last attempt to quit:      Years since quittin.6   • Smokeless tobacco: Never Used   Substance Use Topics   • Alcohol use: Yes     Alcohol/week: 4.2 oz     Types: 7 Shots of liquor per week     Comment:  once per day   • Drug use: No       No Known Allergies    Current Outpatient Medications on File Prior to Visit   Medication Sig   • allopurinol (ZYLOPRIM) 300 MG tablet Take 1 tablet by mouth Daily.   • ALPRAZolam (XANAX) 0.25 MG tablet Take 1 tablet by mouth 2 (Two) Times a Day As Needed (as needed for anxiety).   • amLODIPine (NORVASC) 10 MG tablet Take 1 tablet by mouth Daily.   • aspirin 81 MG tablet Take 81 mg by mouth daily.   • bicalutamide (CASODEX) 50 MG chemo tablet Casodex 50 mg tablet   Take 1 tablet every day by oral route for 30 days.   • Blood Glucose Monitoring Suppl w/Device kit Check blood sugar once a day.    DX: E11.9   • cholecalciferol (VITAMIN D3) 1000 UNITS tablet Take 1,000 Units by mouth daily.   • ciprofloxacin (CIPRO) 500 MG tablet Every 12 (Twelve) Hours.   • cloNIDine (CATAPRES) 0.1 MG tablet Take 0.05 mg by mouth As Needed. TID TO QID PRN    • Cyanocobalamin (VITAMIN B12 PO) Take  by mouth daily.   • FLUoxetine (PROzac) 20 MG capsule Take 1 capsule by mouth Daily.   • folic acid (FOLVITE) 1 MG tablet Take 1 tablet by mouth Daily.   • hydrALAZINE (APRESOLINE) 50 MG tablet Take 1 tablet by mouth 3 (Three) Times a Day.   • hydrOXYzine (ATARAX) 25 MG tablet Take 1 tablet by mouth At Night As Needed (PRN).   • Leuprolide Acetate (ELIGARD SC) Inject  under the skin into the appropriate area as directed. 3/2019 first one month shot   • loratadine  "(CLARITIN) 10 MG tablet Take 1 tablet by mouth Daily.   • meclizine (ANTIVERT) 25 MG tablet Take 1 tablet by mouth 3 (three) times a day as needed for dizziness.   • metoprolol tartrate (LOPRESSOR) 25 MG tablet Take 1 tablet by mouth 2 (Two) Times a Day.   • mupirocin (BACTROBAN) 2 % ointment    • pantoprazole (PROTONIX) 40 MG EC tablet Take 1 tablet by mouth Daily.   • Pyridoxine HCl (VITAMIN B-6 PO) Take  by mouth daily.   • rosuvastatin (CRESTOR) 10 MG tablet Take 1 tablet by mouth Every Night.   • tamsulosin (FLOMAX) 0.4 MG capsule 24 hr capsule Take 1 capsule by mouth Daily.   • [DISCONTINUED] HYDROcodone-acetaminophen (NORCO) 7.5-325 MG per tablet Take 1 tablet by mouth 3 (Three) Times a Day As Needed for Moderate Pain .   • sulfamethoxazole-trimethoprim (BACTRIM DS) 800-160 MG per tablet Every 12 (Twelve) Hours.     No current facility-administered medications on file prior to visit.          The following portions of the patient's history were reviewed and updated as appropriate: allergies, current medications, past family history, past medical history, past social history, past surgical history and problem list.    Review of Systems   Constitution: Negative.   HENT: Negative.  Negative for congestion.    Eyes: Negative.    Cardiovascular: Negative.  Negative for chest pain, cyanosis, dyspnea on exertion, irregular heartbeat, leg swelling, near-syncope, orthopnea, palpitations, paroxysmal nocturnal dyspnea and syncope.   Respiratory: Negative.  Negative for shortness of breath.    Hematologic/Lymphatic: Negative.    Musculoskeletal: Positive for arthritis, back pain and neck pain.   Gastrointestinal: Negative.    Neurological: Negative.  Negative for headaches.          Objective:     /84 (BP Location: Left arm, Patient Position: Sitting)   Pulse 59   Ht 180.3 cm (71\")   Wt 99.8 kg (220 lb)   SpO2 97%   BMI 30.68 kg/m²   Physical Exam   Constitutional: He appears well-developed and well-nourished. " No distress.   HENT:   Head: Normocephalic and atraumatic.   Mouth/Throat: Oropharynx is clear and moist. No oropharyngeal exudate.   Eyes: Conjunctivae and EOM are normal. Pupils are equal, round, and reactive to light. No scleral icterus.   Neck: Normal range of motion. Neck supple. No JVD present. No tracheal deviation present. No thyromegaly present.   Cardiovascular: Normal rate, regular rhythm, normal heart sounds and intact distal pulses. PMI is not displaced. Exam reveals no gallop, no friction rub and no decreased pulses.   No murmur heard.  Pulses:       Carotid pulses are 3+ on the right side, and 3+ on the left side.       Radial pulses are 3+ on the right side, and 3+ on the left side.   Pulmonary/Chest: Effort normal and breath sounds normal. No respiratory distress. He has no wheezes. He has no rales. He exhibits no tenderness.   Abdominal: Soft. Bowel sounds are normal. He exhibits no distension, no abdominal bruit and no mass. There is no splenomegaly or hepatomegaly. There is no tenderness. There is no rebound and no guarding.   Musculoskeletal: Normal range of motion. He exhibits no edema, tenderness or deformity.   Lymphadenopathy:     He has no cervical adenopathy.   Neurological: He is alert. He has normal reflexes. No cranial nerve deficit. He exhibits normal muscle tone. Coordination normal.   Skin: Skin is warm and dry. No rash noted. He is not diaphoretic. No erythema.   Psychiatric: He has a normal mood and affect. His behavior is normal. Judgment and thought content normal.         Lab Review  Lab Results   Component Value Date     04/05/2019    K 4.2 04/05/2019     04/05/2019    BUN 22 04/05/2019    CREATININE 1.39 (H) 04/05/2019    GLUCOSE 139 (H) 04/05/2019    CALCIUM 10.1 04/05/2019    ALT 19 04/05/2019    ALKPHOS 51 04/05/2019    LABIL2 1.8 03/07/2016     Lab Results   Component Value Date    CKTOTAL 86 04/05/2019     Lab Results   Component Value Date    WBC 10.03  04/05/2019    HGB 14.7 04/05/2019    HCT 45.3 04/05/2019     04/05/2019     No results found for: INR  No results found for: MG  Lab Results   Component Value Date    PSA 3.00 05/26/2016    TSH 2.807 09/28/2015     No results found for: BNP  Lab Results   Component Value Date    CHLPL 227 (H) 03/07/2016    CHOL 158 04/05/2019    TRIG 176 (H) 04/05/2019    HDL 50 04/05/2019    VLDL 35.2 04/05/2019    LDLHDL 1.46 04/05/2019     Lab Results   Component Value Date    LDL 73 04/05/2019     (H) 11/06/2018         ECG 12 Lead  Date/Time: 8/14/2019 3:51 PM  Performed by: Antione De Santiago MD  Authorized by: Antione De Santiago MD   Comparison: compared with previous ECG from 5/3/2018  Comparison to previous ECG: Heart rate is slower otherwise no significant change  Rhythm: sinus bradycardia  Rate: bradycardic  BPM: 53  Conduction: incomplete right bundle branch block  QRS axis: normal  Other findings: non-specific ST-T wave changes    Clinical impression: abnormal EKG               I personally viewed and interpreted the patient's LAB data         Assessment:     1. Essential hypertension    2. Mixed hyperlipidemia    3. Type 2 diabetes mellitus with stage 2 chronic kidney disease, without long-term current use of insulin (CMS/[facility])    4. BURKS (dyspnea on exertion)    5. DDD (degenerative disc disease), lumbar    6. Stage 2 chronic kidney disease    7. Bradycardia          Plan:     Patient complains of neck pain.  He also has a chronic low back pain and is taking Norco.  Patient is compliant with medications.  Heart rate is slow, EKG was done which does not show any acute changes except for sinus bradycardia.  Bradycardia is probably related to medications.  He denies any syncope or near syncope.  Refills were given.  Follow-up scheduled        No Follow-up on file.

## 2019-09-19 ENCOUNTER — LAB (OUTPATIENT)
Dept: LAB | Facility: HOSPITAL | Age: 74
End: 2019-09-19

## 2019-09-19 DIAGNOSIS — E78.2 MIXED HYPERLIPIDEMIA: ICD-10-CM

## 2019-09-19 DIAGNOSIS — N18.2 TYPE 2 DIABETES MELLITUS WITH STAGE 2 CHRONIC KIDNEY DISEASE, WITHOUT LONG-TERM CURRENT USE OF INSULIN (HCC): ICD-10-CM

## 2019-09-19 DIAGNOSIS — I10 ESSENTIAL HYPERTENSION: ICD-10-CM

## 2019-09-19 DIAGNOSIS — E11.22 TYPE 2 DIABETES MELLITUS WITH STAGE 2 CHRONIC KIDNEY DISEASE, WITHOUT LONG-TERM CURRENT USE OF INSULIN (HCC): ICD-10-CM

## 2019-09-19 PROCEDURE — 36415 COLL VENOUS BLD VENIPUNCTURE: CPT

## 2019-09-19 PROCEDURE — 80061 LIPID PANEL: CPT

## 2019-09-19 PROCEDURE — 85025 COMPLETE CBC W/AUTO DIFF WBC: CPT

## 2019-09-19 PROCEDURE — 85007 BL SMEAR W/DIFF WBC COUNT: CPT

## 2019-09-19 PROCEDURE — 82550 ASSAY OF CK (CPK): CPT

## 2019-09-19 PROCEDURE — 80053 COMPREHEN METABOLIC PANEL: CPT

## 2019-09-19 PROCEDURE — 83036 HEMOGLOBIN GLYCOSYLATED A1C: CPT

## 2019-09-20 LAB
ALBUMIN SERPL-MCNC: 5.1 G/DL (ref 3.5–5.2)
ALBUMIN/GLOB SERPL: 2.6 G/DL
ALP SERPL-CCNC: 56 U/L (ref 39–117)
ALT SERPL W P-5'-P-CCNC: 16 U/L (ref 1–41)
ANION GAP SERPL CALCULATED.3IONS-SCNC: 16.7 MMOL/L (ref 5–15)
AST SERPL-CCNC: 15 U/L (ref 1–40)
BILIRUB SERPL-MCNC: 0.6 MG/DL (ref 0.2–1.2)
BUN BLD-MCNC: 23 MG/DL (ref 8–23)
BUN/CREAT SERPL: 16.4 (ref 7–25)
CALCIUM SPEC-SCNC: 9.5 MG/DL (ref 8.6–10.5)
CHLORIDE SERPL-SCNC: 104 MMOL/L (ref 98–107)
CHOLEST SERPL-MCNC: 167 MG/DL (ref 0–200)
CK SERPL-CCNC: 180 U/L (ref 20–200)
CO2 SERPL-SCNC: 24.3 MMOL/L (ref 22–29)
CREAT BLD-MCNC: 1.4 MG/DL (ref 0.76–1.27)
DEPRECATED RDW RBC AUTO: 44.4 FL (ref 37–54)
EOSINOPHIL # BLD MANUAL: 2.58 10*3/MM3 (ref 0–0.4)
EOSINOPHIL NFR BLD MANUAL: 24 % (ref 0.3–6.2)
ERYTHROCYTE [DISTWIDTH] IN BLOOD BY AUTOMATED COUNT: 13 % (ref 12.3–15.4)
GFR SERPL CREATININE-BSD FRML MDRD: 50 ML/MIN/1.73
GLOBULIN UR ELPH-MCNC: 2 GM/DL
GLUCOSE BLD-MCNC: 152 MG/DL (ref 65–99)
HBA1C MFR BLD: 7.29 % (ref 4.8–5.6)
HCT VFR BLD AUTO: 39.9 % (ref 37.5–51)
HDLC SERPL-MCNC: 44 MG/DL (ref 40–60)
HGB BLD-MCNC: 13.5 G/DL (ref 13–17.7)
LDLC SERPL CALC-MCNC: 88 MG/DL (ref 0–100)
LDLC/HDLC SERPL: 2 {RATIO}
LYMPHOCYTES # BLD MANUAL: 1.83 10*3/MM3 (ref 0.7–3.1)
LYMPHOCYTES NFR BLD MANUAL: 17 % (ref 19.6–45.3)
LYMPHOCYTES NFR BLD MANUAL: 6 % (ref 5–12)
MCH RBC QN AUTO: 31.8 PG (ref 26.6–33)
MCHC RBC AUTO-ENTMCNC: 33.8 G/DL (ref 31.5–35.7)
MCV RBC AUTO: 93.9 FL (ref 79–97)
METAMYELOCYTES NFR BLD MANUAL: 1 % (ref 0–0)
MONOCYTES # BLD AUTO: 0.65 10*3/MM3 (ref 0.1–0.9)
NEUTROPHILS # BLD AUTO: 5.6 10*3/MM3 (ref 1.7–7)
NEUTROPHILS NFR BLD MANUAL: 52 % (ref 42.7–76)
PLAT MORPH BLD: NORMAL
PLATELET # BLD AUTO: 176 10*3/MM3 (ref 140–450)
PMV BLD AUTO: 12.9 FL (ref 6–12)
POTASSIUM BLD-SCNC: 4.7 MMOL/L (ref 3.5–5.2)
PROT SERPL-MCNC: 7.1 G/DL (ref 6–8.5)
RBC # BLD AUTO: 4.25 10*6/MM3 (ref 4.14–5.8)
RBC MORPH BLD: NORMAL
SODIUM BLD-SCNC: 145 MMOL/L (ref 136–145)
TRIGL SERPL-MCNC: 176 MG/DL (ref 0–150)
VLDLC SERPL-MCNC: 35.2 MG/DL (ref 5–40)
WBC MORPH BLD: NORMAL
WBC NRBC COR # BLD: 10.76 10*3/MM3 (ref 3.4–10.8)

## 2019-09-24 ENCOUNTER — OFFICE VISIT (OUTPATIENT)
Dept: CARDIOLOGY | Facility: CLINIC | Age: 74
End: 2019-09-24

## 2019-09-24 VITALS
OXYGEN SATURATION: 98 % | BODY MASS INDEX: 30.94 KG/M2 | HEART RATE: 55 BPM | WEIGHT: 221 LBS | DIASTOLIC BLOOD PRESSURE: 80 MMHG | HEIGHT: 71 IN | SYSTOLIC BLOOD PRESSURE: 120 MMHG

## 2019-09-24 DIAGNOSIS — N18.2 STAGE 2 CHRONIC KIDNEY DISEASE: ICD-10-CM

## 2019-09-24 DIAGNOSIS — N18.2 TYPE 2 DIABETES MELLITUS WITH STAGE 2 CHRONIC KIDNEY DISEASE, WITHOUT LONG-TERM CURRENT USE OF INSULIN (HCC): ICD-10-CM

## 2019-09-24 DIAGNOSIS — M51.36 DDD (DEGENERATIVE DISC DISEASE), LUMBAR: ICD-10-CM

## 2019-09-24 DIAGNOSIS — E11.22 TYPE 2 DIABETES MELLITUS WITH STAGE 2 CHRONIC KIDNEY DISEASE, WITHOUT LONG-TERM CURRENT USE OF INSULIN (HCC): ICD-10-CM

## 2019-09-24 DIAGNOSIS — E78.2 MIXED HYPERLIPIDEMIA: ICD-10-CM

## 2019-09-24 DIAGNOSIS — F41.9 ANXIETY: ICD-10-CM

## 2019-09-24 DIAGNOSIS — I10 ESSENTIAL HYPERTENSION: Primary | ICD-10-CM

## 2019-09-24 PROCEDURE — 99213 OFFICE O/P EST LOW 20 MIN: CPT | Performed by: INTERNAL MEDICINE

## 2019-09-24 RX ORDER — HYDROCODONE BITARTRATE AND ACETAMINOPHEN 7.5; 325 MG/1; MG/1
1 TABLET ORAL 3 TIMES DAILY PRN
Qty: 90 TABLET | Refills: 0 | Status: SHIPPED | OUTPATIENT
Start: 2019-09-24 | End: 2019-11-04 | Stop reason: SDUPTHER

## 2019-09-24 RX ORDER — ALPRAZOLAM 0.25 MG/1
0.25 TABLET ORAL 2 TIMES DAILY PRN
Qty: 60 TABLET | Refills: 2 | Status: SHIPPED | OUTPATIENT
Start: 2019-09-24 | End: 2019-12-16 | Stop reason: SDUPTHER

## 2019-09-24 NOTE — PROGRESS NOTES
subjective     Chief Complaint   Patient presents with   • Back Pain   • Dizziness   • Constipation         • Med Refill     pending     History of Present Illness  Patient is 74 years old white male who is here for follow-up.  Patient states that back hurts but medication is keeping it under control.  Is in the low back pain.  Patient is taking Norco 7.53 times a day as needed.  Patient is compliant with medications.  He also complains of being dizzy off and on.  He is taking Lopressor 25 twice daily hydralazine 50 3 times daily past 10 mg daily clonidine as needed    Past Surgical History:   Procedure Laterality Date   • COLONOSCOPY  03/2018   • EYE SURGERY     • FOOT SURGERY     • HERNIA REPAIR     • HYDROCELECTOMY  06/15/2015   • PROSTATE BIOPSY  2018   • PROSTATE FIDUCIAL MARKER PLACEMENT  09/14/2018   • RHINOPLASTY     • UPPER GASTROINTESTINAL ENDOSCOPY  03/24/2014    WITH BIOPSIES AND DILATION     Family History   Problem Relation Age of Onset   • Kidney disease Other    • Other Other         CHRONIC DISABLING DISEASES URINARY TRACT DISEASE   • Diabetes Other    • Hypertension Other    • Other Mother    • Heart failure Father    • Stroke Sister    • Arthritis Sister    • Heart disease Brother    • No Known Problems Brother    • No Known Problems Brother      Past Medical History:   Diagnosis Date   • Anxiety    • Arthritis    • Colitis    • Depression    • GERD (gastroesophageal reflux disease)    • Gout    • Heart murmur    • History of EKG 12/22/2015    NORMAL   • Hyperlipidemia    • Hypertension    • Obesity    • Pancreatitis     history of   • Prostate cancer (CMS/HCC)    • Renal failure     MILD one functioning kidney   • Skin cancer     basal cell cancer on back     Patient Active Problem List   Diagnosis   • Essential hypertension, labile   • GERD (gastroesophageal reflux disease)   • Renal failure   • Hyperlipidemia   • Anxiety   • Depression   • Type 2 diabetes mellitus (CMS/HCC)   • Periumbilical  abdominal pain   • DDD (degenerative disc disease), lumbar   • Prostate cancer (CMS/HCC)   • Hyperuricemia   • BURKS (dyspnea on exertion)   • Bradycardia       Social History     Tobacco Use   • Smoking status: Former Smoker     Packs/day: 1.00     Types: Cigarettes     Last attempt to quit:      Years since quittin.7   • Smokeless tobacco: Never Used   Substance Use Topics   • Alcohol use: Yes     Alcohol/week: 4.2 oz     Types: 7 Shots of liquor per week     Comment:  once per day   • Drug use: No       No Known Allergies    Current Outpatient Medications on File Prior to Visit   Medication Sig   • allopurinol (ZYLOPRIM) 300 MG tablet Take 1 tablet by mouth Daily.   • amLODIPine (NORVASC) 10 MG tablet Take 1 tablet by mouth Daily.   • aspirin 81 MG tablet Take 81 mg by mouth daily.   • bicalutamide (CASODEX) 50 MG chemo tablet Casodex 50 mg tablet   Take 1 tablet every day by oral route for 30 days.   • Blood Glucose Monitoring Suppl w/Device kit Check blood sugar once a day.    DX: E11.9   • cholecalciferol (VITAMIN D3) 1000 UNITS tablet Take 1,000 Units by mouth daily.   • ciprofloxacin (CIPRO) 500 MG tablet Every 12 (Twelve) Hours.   • cloNIDine (CATAPRES) 0.1 MG tablet Take 0.05 mg by mouth As Needed. TID TO QID PRN    • Cyanocobalamin (VITAMIN B12 PO) Take  by mouth daily.   • FLUoxetine (PROzac) 20 MG capsule Take 1 capsule by mouth Daily.   • folic acid (FOLVITE) 1 MG tablet Take 1 tablet by mouth Daily.   • hydrALAZINE (APRESOLINE) 50 MG tablet Take 1 tablet by mouth 3 (Three) Times a Day.   • hydrOXYzine (ATARAX) 25 MG tablet Take 1 tablet by mouth At Night As Needed (PRN).   • Leuprolide Acetate (ELIGARD SC) Inject  under the skin into the appropriate area as directed. 3/2019 first one month shot   • loratadine (CLARITIN) 10 MG tablet Take 1 tablet by mouth Daily.   • meclizine (ANTIVERT) 25 MG tablet Take 1 tablet by mouth 3 (three) times a day as needed for dizziness.   • metoprolol tartrate  "(LOPRESSOR) 25 MG tablet Take 1 tablet by mouth 2 (Two) Times a Day.   • mupirocin (BACTROBAN) 2 % ointment    • pantoprazole (PROTONIX) 40 MG EC tablet Take 1 tablet by mouth Daily.   • Pyridoxine HCl (VITAMIN B-6 PO) Take  by mouth daily.   • rosuvastatin (CRESTOR) 10 MG tablet Take 1 tablet by mouth Every Night.   • tamsulosin (FLOMAX) 0.4 MG capsule 24 hr capsule Take 1 capsule by mouth Daily.   • [DISCONTINUED] ALPRAZolam (XANAX) 0.25 MG tablet Take 1 tablet by mouth 2 (Two) Times a Day As Needed (as needed for anxiety).   • [DISCONTINUED] HYDROcodone-acetaminophen (NORCO) 7.5-325 MG per tablet Take 1 tablet by mouth 3 (Three) Times a Day As Needed for Moderate Pain .   • [DISCONTINUED] sulfamethoxazole-trimethoprim (BACTRIM DS) 800-160 MG per tablet Every 12 (Twelve) Hours.     No current facility-administered medications on file prior to visit.          The following portions of the patient's history were reviewed and updated as appropriate: allergies, current medications, past family history, past medical history, past social history, past surgical history and problem list.    Review of Systems   Constitution: Positive for weakness and malaise/fatigue.   HENT: Negative.  Negative for congestion.    Eyes: Negative.    Cardiovascular: Negative.  Negative for chest pain, cyanosis, dyspnea on exertion, irregular heartbeat, leg swelling, near-syncope, orthopnea, palpitations, paroxysmal nocturnal dyspnea and syncope.   Respiratory: Negative.  Negative for shortness of breath.    Hematologic/Lymphatic: Negative.    Musculoskeletal: Positive for back pain, myalgias and stiffness.   Gastrointestinal: Negative.    Neurological: Negative for headaches.   Psychiatric/Behavioral: The patient is nervous/anxious.           Objective:     /80   Pulse 55   Ht 180.3 cm (71\")   Wt 100 kg (221 lb)   SpO2 98%   BMI 30.82 kg/m²   Physical Exam   Constitutional: He appears well-developed and well-nourished. No " distress.   HENT:   Head: Normocephalic and atraumatic.   Mouth/Throat: Oropharynx is clear and moist. No oropharyngeal exudate.   Eyes: Conjunctivae and EOM are normal. Pupils are equal, round, and reactive to light. No scleral icterus.   Neck: Normal range of motion. Neck supple. No JVD present. No tracheal deviation present. No thyromegaly present.   Cardiovascular: Normal rate, regular rhythm, normal heart sounds and intact distal pulses. PMI is not displaced. Exam reveals no gallop, no friction rub and no decreased pulses.   No murmur heard.  Pulses:       Carotid pulses are 3+ on the right side, and 3+ on the left side.       Radial pulses are 3+ on the right side, and 3+ on the left side.   Pulmonary/Chest: Effort normal and breath sounds normal. No respiratory distress. He has no wheezes. He has no rales. He exhibits no tenderness.   Abdominal: Soft. Bowel sounds are normal. He exhibits no distension, no abdominal bruit and no mass. There is no splenomegaly or hepatomegaly. There is no tenderness. There is no rebound and no guarding.   Musculoskeletal: Normal range of motion. He exhibits no edema, tenderness or deformity.   Lymphadenopathy:     He has no cervical adenopathy.   Neurological: He is alert. He has normal reflexes. No cranial nerve deficit. He exhibits normal muscle tone. Coordination normal.   Skin: Skin is warm and dry. No rash noted. He is not diaphoretic. No erythema.   Psychiatric: He has a normal mood and affect. His behavior is normal. Judgment and thought content normal.         Lab Review  Lab Results   Component Value Date     09/19/2019    K 4.7 09/19/2019     09/19/2019    BUN 23 09/19/2019    CREATININE 1.40 (H) 09/19/2019    GLUCOSE 152 (H) 09/19/2019    CALCIUM 9.5 09/19/2019    ALT 16 09/19/2019    ALKPHOS 56 09/19/2019    LABIL2 1.8 03/07/2016     Lab Results   Component Value Date    CKTOTAL 180 09/19/2019     Lab Results   Component Value Date    WBC 10.76  09/19/2019    HGB 13.5 09/19/2019    HCT 39.9 09/19/2019     09/19/2019     No results found for: INR  No results found for: MG  Lab Results   Component Value Date    PSA 3.00 05/26/2016    TSH 2.807 09/28/2015     No results found for: BNP  Lab Results   Component Value Date    CHLPL 227 (H) 03/07/2016    CHOL 167 09/19/2019    TRIG 176 (H) 09/19/2019    HDL 44 09/19/2019    VLDL 35.2 09/19/2019    LDLHDL 2.00 09/19/2019     Lab Results   Component Value Date    LDL 88 09/19/2019    LDL 73 04/05/2019       Procedures       I personally viewed and interpreted the patient's LAB data         Assessment:     1. Essential hypertension    2. Mixed hyperlipidemia    3. Anxiety    4. Stage 2 chronic kidney disease    5. Type 2 diabetes mellitus with stage 2 chronic kidney disease, without long-term current use of insulin (CMS/[facility])    6. DDD (degenerative disc disease), lumbar          Plan:     Lab work reviewed.  Lipid panel shows normal cholesterol with elevated triglyceride of 176.  LDL is 88.  He will continue current medications.  Sugar and renal functions are mildly abnormal which was discussed with the patient  Refills of medications were given.  Side effect explained.  Follow-up scheduled        No Follow-up on file.

## 2019-10-02 RX ORDER — ALLOPURINOL 300 MG/1
300 TABLET ORAL DAILY
Qty: 90 TABLET | Refills: 0 | Status: SHIPPED | OUTPATIENT
Start: 2019-10-02 | End: 2019-11-20 | Stop reason: SDUPTHER

## 2019-10-02 RX ORDER — PANTOPRAZOLE SODIUM 40 MG/1
40 TABLET, DELAYED RELEASE ORAL DAILY
Qty: 90 TABLET | Refills: 3 | Status: SHIPPED | OUTPATIENT
Start: 2019-10-02 | End: 2020-03-19 | Stop reason: SDUPTHER

## 2019-10-02 RX ORDER — FLUOXETINE HYDROCHLORIDE 20 MG/1
20 CAPSULE ORAL DAILY
Qty: 90 CAPSULE | Refills: 3 | Status: SHIPPED | OUTPATIENT
Start: 2019-10-02 | End: 2020-03-19 | Stop reason: SDUPTHER

## 2019-10-02 RX ORDER — AMLODIPINE BESYLATE 10 MG/1
10 TABLET ORAL DAILY
Qty: 90 TABLET | Refills: 3 | Status: SHIPPED | OUTPATIENT
Start: 2019-10-02 | End: 2020-03-19 | Stop reason: SDUPTHER

## 2019-10-02 RX ORDER — FOLIC ACID 1 MG/1
1 TABLET ORAL DAILY
Qty: 90 TABLET | Refills: 3 | Status: SHIPPED | OUTPATIENT
Start: 2019-10-02 | End: 2020-03-19 | Stop reason: SDUPTHER

## 2019-10-02 RX ORDER — HYDRALAZINE HYDROCHLORIDE 50 MG/1
50 TABLET, FILM COATED ORAL 3 TIMES DAILY
Qty: 270 TABLET | Refills: 3 | Status: SHIPPED | OUTPATIENT
Start: 2019-10-02 | End: 2020-06-26 | Stop reason: SDUPTHER

## 2019-10-02 RX ORDER — TAMSULOSIN HYDROCHLORIDE 0.4 MG/1
1 CAPSULE ORAL DAILY
Qty: 90 CAPSULE | Refills: 3 | Status: SHIPPED | OUTPATIENT
Start: 2019-10-02 | End: 2020-03-19 | Stop reason: SDUPTHER

## 2019-10-15 ENCOUNTER — CLINICAL SUPPORT (OUTPATIENT)
Dept: CARDIOLOGY | Facility: CLINIC | Age: 74
End: 2019-10-15

## 2019-10-15 DIAGNOSIS — Z23 NEED FOR IMMUNIZATION AGAINST INFLUENZA: Primary | ICD-10-CM

## 2019-10-15 PROCEDURE — 90653 IIV ADJUVANT VACCINE IM: CPT | Performed by: INTERNAL MEDICINE

## 2019-10-15 PROCEDURE — G0008 ADMIN INFLUENZA VIRUS VAC: HCPCS | Performed by: INTERNAL MEDICINE

## 2019-11-04 ENCOUNTER — OFFICE VISIT (OUTPATIENT)
Dept: CARDIOLOGY | Facility: CLINIC | Age: 74
End: 2019-11-04

## 2019-11-04 VITALS
HEART RATE: 57 BPM | OXYGEN SATURATION: 96 % | BODY MASS INDEX: 30.66 KG/M2 | SYSTOLIC BLOOD PRESSURE: 120 MMHG | HEIGHT: 71 IN | DIASTOLIC BLOOD PRESSURE: 68 MMHG | WEIGHT: 219 LBS

## 2019-11-04 DIAGNOSIS — E78.2 MIXED HYPERLIPIDEMIA: ICD-10-CM

## 2019-11-04 DIAGNOSIS — I10 ESSENTIAL HYPERTENSION: Primary | ICD-10-CM

## 2019-11-04 DIAGNOSIS — F41.9 ANXIETY: ICD-10-CM

## 2019-11-04 DIAGNOSIS — N18.2 STAGE 2 CHRONIC KIDNEY DISEASE: ICD-10-CM

## 2019-11-04 DIAGNOSIS — M51.36 DDD (DEGENERATIVE DISC DISEASE), LUMBAR: ICD-10-CM

## 2019-11-04 PROCEDURE — 99213 OFFICE O/P EST LOW 20 MIN: CPT | Performed by: INTERNAL MEDICINE

## 2019-11-04 RX ORDER — HYDROCODONE BITARTRATE AND ACETAMINOPHEN 7.5; 325 MG/1; MG/1
1 TABLET ORAL 3 TIMES DAILY PRN
Qty: 90 TABLET | Refills: 0 | Status: SHIPPED | OUTPATIENT
Start: 2019-11-04 | End: 2019-12-16 | Stop reason: SDUPTHER

## 2019-11-04 NOTE — PROGRESS NOTES
subjective     Chief Complaint   Patient presents with   • Back Pain   • Med Refill     pending      History of Present Illness  Patient is 74 years old white male who is here for follow-up.  Patient has degenerative disc disease and chronic back pain.  He needs refill on pain medications.  Patient is compliant with medications.  He also has anxiety disorder and is taking Prozac and low-dose Xanax.  Blood pressure is very well controlled with Norvasc, hydralazine and Lopressor.  No drug side effects.  Patient also has hyperlipidemia on Crestor therapy.  GERD is controlled with Protonix.  Patient has history of prostate cancer doing very well.    Past Surgical History:   Procedure Laterality Date   • COLONOSCOPY  03/2018   • EYE SURGERY     • FOOT SURGERY     • HERNIA REPAIR     • HYDROCELECTOMY  06/15/2015   • PROSTATE BIOPSY  2018   • PROSTATE FIDUCIAL MARKER PLACEMENT  09/14/2018   • RHINOPLASTY     • UPPER GASTROINTESTINAL ENDOSCOPY  03/24/2014    WITH BIOPSIES AND DILATION     Family History   Problem Relation Age of Onset   • Kidney disease Other    • Other Other         CHRONIC DISABLING DISEASES URINARY TRACT DISEASE   • Diabetes Other    • Hypertension Other    • Other Mother    • Heart failure Father    • Stroke Sister    • Arthritis Sister    • Heart disease Brother    • No Known Problems Brother    • No Known Problems Brother      Past Medical History:   Diagnosis Date   • Anxiety    • Arthritis    • Colitis    • Depression    • GERD (gastroesophageal reflux disease)    • Gout    • Heart murmur    • History of EKG 12/22/2015    NORMAL   • Hyperlipidemia    • Hypertension    • Obesity    • Pancreatitis     history of   • Prostate cancer (CMS/HCC)    • Renal failure     MILD one functioning kidney   • Skin cancer     basal cell cancer on back     Patient Active Problem List   Diagnosis   • Essential hypertension, labile   • GERD (gastroesophageal reflux disease)   • Renal failure   • Hyperlipidemia   •  Anxiety   • Depression   • Type 2 diabetes mellitus (CMS/HCC)   • Periumbilical abdominal pain   • DDD (degenerative disc disease), lumbar   • Prostate cancer (CMS/HCC)   • Hyperuricemia   • BURKS (dyspnea on exertion)   • Bradycardia   • Lower urinary tract symptoms due to benign prostatic hyperplasia   • Raised prostate specific antigen       Social History     Tobacco Use   • Smoking status: Former Smoker     Packs/day: 1.00     Types: Cigarettes     Last attempt to quit:      Years since quittin.8   • Smokeless tobacco: Never Used   Substance Use Topics   • Alcohol use: Yes     Alcohol/week: 4.2 oz     Types: 7 Shots of liquor per week     Comment:  once per day   • Drug use: No       No Known Allergies    Current Outpatient Medications on File Prior to Visit   Medication Sig   • allopurinol (ZYLOPRIM) 300 MG tablet Take 1 tablet by mouth Daily.   • ALPRAZolam (XANAX) 0.25 MG tablet Take 1 tablet by mouth 2 (Two) Times a Day As Needed (as needed for anxiety).   • amLODIPine (NORVASC) 10 MG tablet Take 1 tablet by mouth Daily.   • aspirin 81 MG tablet Take 81 mg by mouth daily.   • bicalutamide (CASODEX) 50 MG chemo tablet Casodex 50 mg tablet   Take 1 tablet every day by oral route for 30 days.   • Blood Glucose Monitoring Suppl w/Device kit Check blood sugar once a day.    DX: E11.9   • cholecalciferol (VITAMIN D3) 1000 UNITS tablet Take 1,000 Units by mouth daily.   • cloNIDine (CATAPRES) 0.1 MG tablet Take 0.05 mg by mouth As Needed. TID TO QID PRN    • Cyanocobalamin (VITAMIN B12 PO) Take  by mouth daily.   • FLUoxetine (PROzac) 20 MG capsule Take 1 capsule by mouth Daily.   • folic acid (FOLVITE) 1 MG tablet Take 1 tablet by mouth Daily.   • hydrALAZINE (APRESOLINE) 50 MG tablet Take 1 tablet by mouth 3 (Three) Times a Day.   • hydrOXYzine (ATARAX) 25 MG tablet Take 1 tablet by mouth At Night As Needed (PRN).   • Leuprolide Acetate (ELIGARD SC) Inject  under the skin into the appropriate area as  "directed. 3/2019 first one month shot   • loratadine (CLARITIN) 10 MG tablet Take 1 tablet by mouth Daily.   • meclizine (ANTIVERT) 25 MG tablet Take 1 tablet by mouth 3 (three) times a day as needed for dizziness.   • metoprolol tartrate (LOPRESSOR) 25 MG tablet Take 1 tablet by mouth 2 (Two) Times a Day.   • mupirocin (BACTROBAN) 2 % ointment    • pantoprazole (PROTONIX) 40 MG EC tablet Take 1 tablet by mouth Daily.   • Pyridoxine HCl (VITAMIN B-6 PO) Take  by mouth daily.   • rosuvastatin (CRESTOR) 10 MG tablet Take 1 tablet by mouth Every Night.   • tamsulosin (FLOMAX) 0.4 MG capsule 24 hr capsule Take 1 capsule by mouth Daily.   • [DISCONTINUED] HYDROcodone-acetaminophen (NORCO) 7.5-325 MG per tablet Take 1 tablet by mouth 3 (Three) Times a Day As Needed for Moderate Pain .   • ciprofloxacin (CIPRO) 500 MG tablet Every 12 (Twelve) Hours.     No current facility-administered medications on file prior to visit.          The following portions of the patient's history were reviewed and updated as appropriate: allergies, current medications, past family history, past medical history, past social history, past surgical history and problem list.    Review of Systems   Constitution: Negative.   HENT: Negative.  Negative for congestion.    Eyes: Negative.    Cardiovascular: Negative.  Negative for chest pain, cyanosis, dyspnea on exertion, irregular heartbeat, leg swelling, near-syncope, orthopnea, palpitations, paroxysmal nocturnal dyspnea and syncope.   Respiratory: Negative.  Negative for shortness of breath.    Hematologic/Lymphatic: Negative.    Musculoskeletal: Positive for back pain.   Gastrointestinal: Negative.    Neurological: Negative.  Negative for headaches.   Psychiatric/Behavioral: The patient is nervous/anxious.           Objective:     /68 (BP Location: Left arm, Patient Position: Sitting, Cuff Size: Adult)   Pulse 57   Ht 180.3 cm (71\")   Wt 99.3 kg (219 lb)   SpO2 96%   BMI 30.54 kg/m² "   Physical Exam   Constitutional: He appears well-developed and well-nourished. No distress.   HENT:   Head: Normocephalic and atraumatic.   Mouth/Throat: Oropharynx is clear and moist. No oropharyngeal exudate.   Eyes: Conjunctivae and EOM are normal. Pupils are equal, round, and reactive to light. No scleral icterus.   Neck: Normal range of motion. Neck supple. No JVD present. No tracheal deviation present. No thyromegaly present.   Cardiovascular: Normal rate, regular rhythm, normal heart sounds and intact distal pulses. PMI is not displaced. Exam reveals no gallop, no friction rub and no decreased pulses.   No murmur heard.  Pulses:       Carotid pulses are 3+ on the right side, and 3+ on the left side.       Radial pulses are 3+ on the right side, and 3+ on the left side.   Pulmonary/Chest: Effort normal and breath sounds normal. No respiratory distress. He has no wheezes. He has no rales. He exhibits no tenderness.   Abdominal: Soft. Bowel sounds are normal. He exhibits no distension, no abdominal bruit and no mass. There is no splenomegaly or hepatomegaly. There is no tenderness. There is no rebound and no guarding.   Musculoskeletal: Normal range of motion. He exhibits no edema, tenderness or deformity.   Lymphadenopathy:     He has no cervical adenopathy.   Neurological: He is alert. He has normal reflexes. No cranial nerve deficit. He exhibits normal muscle tone. Coordination normal.   Skin: Skin is warm and dry. No rash noted. He is not diaphoretic. No erythema.   Psychiatric: He has a normal mood and affect. His behavior is normal. Judgment and thought content normal.         Lab Review  Lab Results   Component Value Date     09/19/2019    K 4.7 09/19/2019     09/19/2019    BUN 23 09/19/2019    CREATININE 1.40 (H) 09/19/2019    GLUCOSE 152 (H) 09/19/2019    CALCIUM 9.5 09/19/2019    ALT 16 09/19/2019    ALKPHOS 56 09/19/2019    LABIL2 1.8 03/07/2016     Lab Results   Component Value Date     CKTOTAL 180 09/19/2019     Lab Results   Component Value Date    WBC 10.76 09/19/2019    HGB 13.5 09/19/2019    HCT 39.9 09/19/2019     09/19/2019     No results found for: INR  No results found for: MG  Lab Results   Component Value Date    PSA 3.00 05/26/2016    TSH 2.807 09/28/2015     No results found for: BNP  Lab Results   Component Value Date    CHLPL 227 (H) 03/07/2016    CHOL 167 09/19/2019    TRIG 176 (H) 09/19/2019    HDL 44 09/19/2019    VLDL 35.2 09/19/2019    LDLHDL 2.00 09/19/2019     Lab Results   Component Value Date    LDL 88 09/19/2019    LDL 73 04/05/2019       Procedures       I personally viewed and interpreted the patient's LAB data         Assessment:     1. Essential hypertension    2. Mixed hyperlipidemia    3. Stage 2 chronic kidney disease    4. Anxiety    5. DDD (degenerative disc disease), lumbar          Plan:     Blood pressure is very well controlled he will continue current medications.  Hyperlipidemia is controlled with cholesterol of 167 and triglyceride 176.  No change in therapy he will continue taking Crestor.  Pain and nerve medication refills were given.  Follow-up scheduled        No Follow-up on file.

## 2019-11-20 RX ORDER — ALLOPURINOL 300 MG/1
300 TABLET ORAL DAILY
Qty: 90 TABLET | Refills: 1 | Status: SHIPPED | OUTPATIENT
Start: 2019-11-20 | End: 2020-03-19 | Stop reason: SDUPTHER

## 2019-12-09 ENCOUNTER — TRANSCRIBE ORDERS (OUTPATIENT)
Dept: ADMINISTRATIVE | Facility: HOSPITAL | Age: 74
End: 2019-12-09

## 2019-12-09 ENCOUNTER — LAB (OUTPATIENT)
Dept: LAB | Facility: HOSPITAL | Age: 74
End: 2019-12-09

## 2019-12-09 DIAGNOSIS — C61 MALIGNANT NEOPLASM OF PROSTATE (HCC): Primary | ICD-10-CM

## 2019-12-09 DIAGNOSIS — C61 MALIGNANT NEOPLASM OF PROSTATE (HCC): ICD-10-CM

## 2019-12-09 LAB — PSA SERPL-MCNC: <0.014 NG/ML (ref 0–4)

## 2019-12-09 PROCEDURE — 84153 ASSAY OF PSA TOTAL: CPT

## 2019-12-09 PROCEDURE — 36415 COLL VENOUS BLD VENIPUNCTURE: CPT

## 2019-12-16 ENCOUNTER — OFFICE VISIT (OUTPATIENT)
Dept: CARDIOLOGY | Facility: CLINIC | Age: 74
End: 2019-12-16

## 2019-12-16 VITALS
DIASTOLIC BLOOD PRESSURE: 60 MMHG | BODY MASS INDEX: 30.97 KG/M2 | SYSTOLIC BLOOD PRESSURE: 126 MMHG | WEIGHT: 221.2 LBS | HEIGHT: 71 IN | OXYGEN SATURATION: 97 % | HEART RATE: 61 BPM

## 2019-12-16 DIAGNOSIS — E78.2 MIXED HYPERLIPIDEMIA: ICD-10-CM

## 2019-12-16 DIAGNOSIS — F41.9 ANXIETY: ICD-10-CM

## 2019-12-16 DIAGNOSIS — M51.36 DDD (DEGENERATIVE DISC DISEASE), LUMBAR: ICD-10-CM

## 2019-12-16 DIAGNOSIS — I10 ESSENTIAL HYPERTENSION: Primary | ICD-10-CM

## 2019-12-16 PROCEDURE — 99213 OFFICE O/P EST LOW 20 MIN: CPT | Performed by: INTERNAL MEDICINE

## 2019-12-16 RX ORDER — ALPRAZOLAM 0.25 MG/1
0.25 TABLET ORAL 2 TIMES DAILY PRN
Qty: 60 TABLET | Refills: 2 | Status: SHIPPED | OUTPATIENT
Start: 2019-12-16 | End: 2020-02-05 | Stop reason: SDUPTHER

## 2019-12-16 RX ORDER — HYDROCODONE BITARTRATE AND ACETAMINOPHEN 7.5; 325 MG/1; MG/1
1 TABLET ORAL 3 TIMES DAILY PRN
Qty: 90 TABLET | Refills: 0 | Status: SHIPPED | OUTPATIENT
Start: 2019-12-16 | End: 2020-02-05 | Stop reason: SDUPTHER

## 2019-12-16 NOTE — PROGRESS NOTES
"subjective     Chief Complaint   Patient presents with   • Fatigue     feels \"weak, tired feeling this morning\",   • Hypertension   • Anxiety   • Med Refill     pending     History of Present Illness  Patient is 74 years old white male who is here for follow-up.  Patient has hypertension which has been difficult to control blood pressure now is good with current medications.  Patient has lot of anxiety and is taking Prozac and Xanax which was continued.  Refills will be given today.  He also has degenerative disc disease and is taking Norco.  Side effects were explained he needs refill today.    Patient has prostate cancer but is doing very well with current medications.  GERD is controlled with Protonix.  Other problems consist of hyperuricemia and hyperlipidemia.  He is taking Crestor with no drug side effects    Past Surgical History:   Procedure Laterality Date   • COLONOSCOPY  03/2018   • EYE SURGERY     • FOOT SURGERY     • HERNIA REPAIR     • HYDROCELECTOMY  06/15/2015   • PROSTATE BIOPSY  2018   • PROSTATE FIDUCIAL MARKER PLACEMENT  09/14/2018   • RHINOPLASTY     • UPPER GASTROINTESTINAL ENDOSCOPY  03/24/2014    WITH BIOPSIES AND DILATION     Family History   Problem Relation Age of Onset   • Kidney disease Other    • Other Other         CHRONIC DISABLING DISEASES URINARY TRACT DISEASE   • Diabetes Other    • Hypertension Other    • Other Mother    • Heart failure Father    • Stroke Sister    • Arthritis Sister    • Heart disease Brother    • No Known Problems Brother    • No Known Problems Brother      Past Medical History:   Diagnosis Date   • Anxiety    • Arthritis    • Colitis    • Depression    • GERD (gastroesophageal reflux disease)    • Gout    • Heart murmur    • History of EKG 12/22/2015    NORMAL   • Hyperlipidemia    • Hypertension    • Obesity    • Pancreatitis     history of   • Prostate cancer (CMS/HCC)    • Renal failure     MILD one functioning kidney   • Skin cancer     basal cell cancer on " back     Patient Active Problem List   Diagnosis   • Essential hypertension, labile   • GERD (gastroesophageal reflux disease)   • Renal failure   • Hyperlipidemia   • Anxiety   • Depression   • Type 2 diabetes mellitus (CMS/HCC)   • Periumbilical abdominal pain   • DDD (degenerative disc disease), lumbar   • Prostate cancer (CMS/HCC)   • Hyperuricemia   • BURKS (dyspnea on exertion)   • Bradycardia   • Lower urinary tract symptoms due to benign prostatic hyperplasia   • Raised prostate specific antigen       Social History     Tobacco Use   • Smoking status: Former Smoker     Packs/day: 1.00     Types: Cigarettes     Last attempt to quit:      Years since quittin.9   • Smokeless tobacco: Never Used   Substance Use Topics   • Alcohol use: Yes     Alcohol/week: 7.0 standard drinks     Types: 7 Shots of liquor per week     Comment:  once per day   • Drug use: No       No Known Allergies    Current Outpatient Medications on File Prior to Visit   Medication Sig   • allopurinol (ZYLOPRIM) 300 MG tablet TAKE 1 TABLET BY MOUTH  DAILY   • amLODIPine (NORVASC) 10 MG tablet Take 1 tablet by mouth Daily.   • aspirin 81 MG tablet Take 81 mg by mouth daily.   • bicalutamide (CASODEX) 50 MG chemo tablet Casodex 50 mg tablet   Take 1 tablet every day by oral route for 30 days.   • Blood Glucose Monitoring Suppl w/Device kit Check blood sugar once a day.    DX: E11.9   • cholecalciferol (VITAMIN D3) 1000 UNITS tablet Take 1,000 Units by mouth daily.   • cloNIDine (CATAPRES) 0.1 MG tablet Take 0.05 mg by mouth As Needed. TID TO QID PRN    • Cyanocobalamin (VITAMIN B12 PO) Take  by mouth daily.   • FLUoxetine (PROzac) 20 MG capsule Take 1 capsule by mouth Daily.   • folic acid (FOLVITE) 1 MG tablet Take 1 tablet by mouth Daily.   • hydrALAZINE (APRESOLINE) 50 MG tablet Take 1 tablet by mouth 3 (Three) Times a Day.   • hydrOXYzine (ATARAX) 25 MG tablet Take 1 tablet by mouth At Night As Needed (PRN).   • Leuprolide Acetate  (SIM SC) Inject  under the skin into the appropriate area as directed. 3/2019 first one month shot   • loratadine (CLARITIN) 10 MG tablet Take 1 tablet by mouth Daily.   • meclizine (ANTIVERT) 25 MG tablet Take 1 tablet by mouth 3 (three) times a day as needed for dizziness.   • metoprolol tartrate (LOPRESSOR) 25 MG tablet Take 1 tablet by mouth 2 (Two) Times a Day.   • pantoprazole (PROTONIX) 40 MG EC tablet Take 1 tablet by mouth Daily.   • Pyridoxine HCl (VITAMIN B-6 PO) Take  by mouth daily.   • rosuvastatin (CRESTOR) 10 MG tablet Take 1 tablet by mouth Every Night.   • tamsulosin (FLOMAX) 0.4 MG capsule 24 hr capsule Take 1 capsule by mouth Daily.   • [DISCONTINUED] ALPRAZolam (XANAX) 0.25 MG tablet Take 1 tablet by mouth 2 (Two) Times a Day As Needed (as needed for anxiety).   • [DISCONTINUED] HYDROcodone-acetaminophen (NORCO) 7.5-325 MG per tablet Take 1 tablet by mouth 3 (Three) Times a Day As Needed for Moderate Pain .   • [DISCONTINUED] mupirocin (BACTROBAN) 2 % ointment      No current facility-administered medications on file prior to visit.          The following portions of the patient's history were reviewed and updated as appropriate: allergies, current medications, past family history, past medical history, past social history, past surgical history and problem list.    Review of Systems   Constitution: Negative.   HENT: Negative.  Negative for congestion.    Eyes: Negative.    Cardiovascular: Negative.  Negative for chest pain, cyanosis, dyspnea on exertion, irregular heartbeat, leg swelling, near-syncope, orthopnea, palpitations, paroxysmal nocturnal dyspnea and syncope.   Respiratory: Negative.  Negative for shortness of breath.    Hematologic/Lymphatic: Negative.    Musculoskeletal: Positive for arthritis, back pain and stiffness.   Gastrointestinal: Positive for heartburn.   Neurological: Negative.  Negative for headaches.   Psychiatric/Behavioral: The patient is nervous/anxious.          "  Objective:     /60   Pulse 61   Ht 180.3 cm (71\")   Wt 100 kg (221 lb 3.2 oz)   SpO2 97%   BMI 30.85 kg/m²   Physical Exam   Constitutional: He appears well-developed and well-nourished. No distress.   HENT:   Head: Normocephalic and atraumatic.   Mouth/Throat: Oropharynx is clear and moist. No oropharyngeal exudate.   Eyes: Pupils are equal, round, and reactive to light. Conjunctivae and EOM are normal. No scleral icterus.   Neck: Normal range of motion. Neck supple. No JVD present. No tracheal deviation present. No thyromegaly present.   Cardiovascular: Normal rate, regular rhythm, normal heart sounds and intact distal pulses. PMI is not displaced. Exam reveals no gallop, no friction rub and no decreased pulses.   No murmur heard.  Pulses:       Carotid pulses are 3+ on the right side, and 3+ on the left side.       Radial pulses are 3+ on the right side, and 3+ on the left side.   Pulmonary/Chest: Effort normal and breath sounds normal. No respiratory distress. He has no wheezes. He has no rales. He exhibits no tenderness.   Abdominal: Soft. Bowel sounds are normal. He exhibits no distension, no abdominal bruit and no mass. There is no splenomegaly or hepatomegaly. There is no tenderness. There is no rebound and no guarding.   Musculoskeletal: Normal range of motion. He exhibits no edema, tenderness or deformity.   Lymphadenopathy:     He has no cervical adenopathy.   Neurological: He is alert. He has normal reflexes. No cranial nerve deficit. He exhibits normal muscle tone. Coordination normal.   Skin: Skin is warm and dry. No rash noted. He is not diaphoretic. No erythema.   Psychiatric: He has a normal mood and affect. His behavior is normal. Judgment and thought content normal.         Lab Review  Lab Results   Component Value Date     09/19/2019    K 4.7 09/19/2019     09/19/2019    BUN 23 09/19/2019    CREATININE 1.40 (H) 09/19/2019    GLUCOSE 152 (H) 09/19/2019    CALCIUM 9.5 " 09/19/2019    ALT 16 09/19/2019    ALKPHOS 56 09/19/2019    LABIL2 1.8 03/07/2016     Lab Results   Component Value Date    CKTOTAL 180 09/19/2019     Lab Results   Component Value Date    WBC 10.76 09/19/2019    HGB 13.5 09/19/2019    HCT 39.9 09/19/2019     09/19/2019     No results found for: INR  No results found for: MG  Lab Results   Component Value Date    PSA <0.014 12/09/2019    TSH 2.807 09/28/2015     No results found for: BNP  Lab Results   Component Value Date    CHLPL 227 (H) 03/07/2016    CHOL 167 09/19/2019    TRIG 176 (H) 09/19/2019    HDL 44 09/19/2019    VLDL 35.2 09/19/2019    LDLHDL 2.00 09/19/2019     Lab Results   Component Value Date    LDL 88 09/19/2019    LDL 73 04/05/2019       Procedures       I personally viewed and interpreted the patient's LAB data         Assessment:     1. Essential hypertension    2. Anxiety    3. DDD (degenerative disc disease), lumbar    4. Mixed hyperlipidemia          Plan:     Blood pressure is very well controlled.  Patient will continue current medications  Anxiety is controlled with Prozac and Xanax.  Refills were given.  Back pain is controlled with Norco.  Side effects were discussed patient will continue medications.  Patient was advised to check his blood pressure closely at home.  He will also continue Crestor        No follow-ups on file.

## 2020-02-05 ENCOUNTER — OFFICE VISIT (OUTPATIENT)
Dept: CARDIOLOGY | Facility: CLINIC | Age: 75
End: 2020-02-05

## 2020-02-05 VITALS
BODY MASS INDEX: 31.11 KG/M2 | HEIGHT: 71 IN | WEIGHT: 222.2 LBS | DIASTOLIC BLOOD PRESSURE: 72 MMHG | HEART RATE: 68 BPM | OXYGEN SATURATION: 98 % | SYSTOLIC BLOOD PRESSURE: 126 MMHG

## 2020-02-05 DIAGNOSIS — N18.2 STAGE 2 CHRONIC KIDNEY DISEASE: ICD-10-CM

## 2020-02-05 DIAGNOSIS — E11.22 TYPE 2 DIABETES MELLITUS WITH STAGE 2 CHRONIC KIDNEY DISEASE, WITHOUT LONG-TERM CURRENT USE OF INSULIN (HCC): ICD-10-CM

## 2020-02-05 DIAGNOSIS — E78.2 MIXED HYPERLIPIDEMIA: Primary | ICD-10-CM

## 2020-02-05 DIAGNOSIS — E79.0 HYPERURICEMIA: ICD-10-CM

## 2020-02-05 DIAGNOSIS — M51.36 DDD (DEGENERATIVE DISC DISEASE), LUMBAR: ICD-10-CM

## 2020-02-05 DIAGNOSIS — N18.2 TYPE 2 DIABETES MELLITUS WITH STAGE 2 CHRONIC KIDNEY DISEASE, WITHOUT LONG-TERM CURRENT USE OF INSULIN (HCC): ICD-10-CM

## 2020-02-05 DIAGNOSIS — F41.9 ANXIETY: ICD-10-CM

## 2020-02-05 DIAGNOSIS — I10 ESSENTIAL HYPERTENSION: ICD-10-CM

## 2020-02-05 PROCEDURE — 99213 OFFICE O/P EST LOW 20 MIN: CPT | Performed by: INTERNAL MEDICINE

## 2020-02-05 RX ORDER — HYDROCODONE BITARTRATE AND ACETAMINOPHEN 7.5; 325 MG/1; MG/1
1 TABLET ORAL 3 TIMES DAILY PRN
Qty: 90 TABLET | Refills: 0 | Status: SHIPPED | OUTPATIENT
Start: 2020-02-05 | End: 2020-03-24 | Stop reason: SDUPTHER

## 2020-02-05 RX ORDER — ALPRAZOLAM 0.25 MG/1
0.25 TABLET ORAL 2 TIMES DAILY PRN
Qty: 60 TABLET | Refills: 2 | Status: SHIPPED | OUTPATIENT
Start: 2020-02-05 | End: 2020-03-24 | Stop reason: SDUPTHER

## 2020-02-05 NOTE — PROGRESS NOTES
subjective     Chief Complaint   Patient presents with   • Anxiety   • Back Pain     History of Present Illness  Patient has multiple chronic medical problems is here for follow-up.  Blood pressure is well controlled with current medications.  Patient also has hyperlipidemia which is well controlled with Crestor.  No drug side effects.  Diabetic control is also good.  Patient has chronic low back pain and degenerative disc disease.  He is taking Norco and needs refills.  Patient also has lot of anxiety and needs refill on Xanax.  Patient is compliant with medications.  He has been trying to lose weight but has not been very successful.  Patient has prostatic cancer living with urology.      Past Surgical History:   Procedure Laterality Date   • COLONOSCOPY  03/2018   • EYE SURGERY     • FOOT SURGERY     • HERNIA REPAIR     • HYDROCELECTOMY  06/15/2015   • PROSTATE BIOPSY  2018   • PROSTATE FIDUCIAL MARKER PLACEMENT  09/14/2018   • RHINOPLASTY     • UPPER GASTROINTESTINAL ENDOSCOPY  03/24/2014    WITH BIOPSIES AND DILATION     Family History   Problem Relation Age of Onset   • Kidney disease Other    • Other Other         CHRONIC DISABLING DISEASES URINARY TRACT DISEASE   • Diabetes Other    • Hypertension Other    • Other Mother    • Heart failure Father    • Stroke Sister    • Arthritis Sister    • Heart disease Brother    • No Known Problems Brother    • No Known Problems Brother      Past Medical History:   Diagnosis Date   • Anxiety    • Arthritis    • Colitis    • Depression    • GERD (gastroesophageal reflux disease)    • Gout    • Heart murmur    • History of EKG 12/22/2015    NORMAL   • Hyperlipidemia    • Hypertension    • Obesity    • Pancreatitis     history of   • Prostate cancer (CMS/HCC)    • Renal failure     MILD one functioning kidney   • Skin cancer     basal cell cancer on back     Patient Active Problem List   Diagnosis   • Essential hypertension, labile   • GERD (gastroesophageal reflux disease)    • Renal failure   • Hyperlipidemia   • Anxiety   • Depression   • Type 2 diabetes mellitus (CMS/HCC)   • Periumbilical abdominal pain   • DDD (degenerative disc disease), lumbar   • Prostate cancer (CMS/HCC)   • Hyperuricemia   • BURKS (dyspnea on exertion)   • Bradycardia   • Lower urinary tract symptoms due to benign prostatic hyperplasia   • Raised prostate specific antigen       Social History     Tobacco Use   • Smoking status: Former Smoker     Packs/day: 1.00     Types: Cigarettes     Last attempt to quit:      Years since quittin.1   • Smokeless tobacco: Never Used   Substance Use Topics   • Alcohol use: Yes     Alcohol/week: 7.0 standard drinks     Types: 7 Shots of liquor per week     Comment:  once per day   • Drug use: No       No Known Allergies    Current Outpatient Medications on File Prior to Visit   Medication Sig   • allopurinol (ZYLOPRIM) 300 MG tablet TAKE 1 TABLET BY MOUTH  DAILY   • amLODIPine (NORVASC) 10 MG tablet Take 1 tablet by mouth Daily.   • aspirin 81 MG tablet Take 81 mg by mouth daily.   • bicalutamide (CASODEX) 50 MG chemo tablet Casodex 50 mg tablet   Take 1 tablet every day by oral route for 30 days.   • Blood Glucose Monitoring Suppl w/Device kit Check blood sugar once a day.    DX: E11.9   • cholecalciferol (VITAMIN D3) 1000 UNITS tablet Take 1,000 Units by mouth daily.   • cloNIDine (CATAPRES) 0.1 MG tablet Take 0.05 mg by mouth As Needed. TID TO QID PRN    • Cyanocobalamin (VITAMIN B12 PO) Take  by mouth daily.   • FLUoxetine (PROzac) 20 MG capsule Take 1 capsule by mouth Daily.   • folic acid (FOLVITE) 1 MG tablet Take 1 tablet by mouth Daily.   • hydrALAZINE (APRESOLINE) 50 MG tablet Take 1 tablet by mouth 3 (Three) Times a Day.   • hydrOXYzine (ATARAX) 25 MG tablet Take 1 tablet by mouth At Night As Needed (PRN).   • Leuprolide Acetate (ELIGARD SC) Inject  under the skin into the appropriate area as directed. 3/2019 first one month shot   • loratadine (CLARITIN) 10  "MG tablet Take 1 tablet by mouth Daily.   • meclizine (ANTIVERT) 25 MG tablet Take 1 tablet by mouth 3 (three) times a day as needed for dizziness.   • metoprolol tartrate (LOPRESSOR) 25 MG tablet Take 1 tablet by mouth 2 (Two) Times a Day.   • pantoprazole (PROTONIX) 40 MG EC tablet Take 1 tablet by mouth Daily.   • Pyridoxine HCl (VITAMIN B-6 PO) Take  by mouth daily.   • rosuvastatin (CRESTOR) 10 MG tablet Take 1 tablet by mouth Every Night.   • tamsulosin (FLOMAX) 0.4 MG capsule 24 hr capsule Take 1 capsule by mouth Daily.   • [DISCONTINUED] ALPRAZolam (XANAX) 0.25 MG tablet Take 1 tablet by mouth 2 (Two) Times a Day As Needed (as needed for anxiety).   • [DISCONTINUED] HYDROcodone-acetaminophen (NORCO) 7.5-325 MG per tablet Take 1 tablet by mouth 3 (Three) Times a Day As Needed for Moderate Pain .     No current facility-administered medications on file prior to visit.          The following portions of the patient's history were reviewed and updated as appropriate: allergies, current medications, past family history, past medical history, past social history, past surgical history and problem list.    Review of Systems   Constitution: Negative.   HENT: Negative.  Negative for congestion.    Eyes: Negative.    Cardiovascular: Negative.  Negative for chest pain, cyanosis, dyspnea on exertion, irregular heartbeat, leg swelling, near-syncope, orthopnea, palpitations, paroxysmal nocturnal dyspnea and syncope.   Respiratory: Negative.  Negative for shortness of breath.    Hematologic/Lymphatic: Negative.    Musculoskeletal: Positive for arthritis, back pain and stiffness.   Gastrointestinal: Negative.    Neurological: Negative.  Negative for headaches.   Psychiatric/Behavioral: The patient is nervous/anxious.           Objective:     /72 (BP Location: Left arm, Patient Position: Sitting, Cuff Size: Adult)   Pulse 68   Ht 180.3 cm (71\")   Wt 101 kg (222 lb 3.2 oz)   SpO2 98%   BMI 30.99 kg/m²   Physical " Exam   Constitutional: He appears well-developed and well-nourished. No distress.   HENT:   Head: Normocephalic and atraumatic.   Mouth/Throat: Oropharynx is clear and moist. No oropharyngeal exudate.   Eyes: Pupils are equal, round, and reactive to light. Conjunctivae and EOM are normal. No scleral icterus.   Neck: Normal range of motion. Neck supple. No JVD present. No tracheal deviation present. No thyromegaly present.   Cardiovascular: Normal rate, regular rhythm, normal heart sounds and intact distal pulses. PMI is not displaced. Exam reveals no gallop, no friction rub and no decreased pulses.   No murmur heard.  Pulses:       Carotid pulses are 3+ on the right side, and 3+ on the left side.       Radial pulses are 3+ on the right side, and 3+ on the left side.   Pulmonary/Chest: Effort normal and breath sounds normal. No respiratory distress. He has no wheezes. He has no rales. He exhibits no tenderness.   Abdominal: Soft. Bowel sounds are normal. He exhibits no distension, no abdominal bruit and no mass. There is no splenomegaly or hepatomegaly. There is no tenderness. There is no rebound and no guarding.   Musculoskeletal: Normal range of motion. He exhibits no edema, tenderness or deformity.   Lymphadenopathy:     He has no cervical adenopathy.   Neurological: He is alert. He has normal reflexes. No cranial nerve deficit. He exhibits normal muscle tone. Coordination normal.   Skin: Skin is warm and dry. No rash noted. He is not diaphoretic. No erythema.   Psychiatric: He has a normal mood and affect. His behavior is normal. Judgment and thought content normal.         Lab Review  Lab Results   Component Value Date     09/19/2019    K 4.7 09/19/2019     09/19/2019    BUN 23 09/19/2019    CREATININE 1.40 (H) 09/19/2019    GLUCOSE 152 (H) 09/19/2019    CALCIUM 9.5 09/19/2019    ALT 16 09/19/2019    ALKPHOS 56 09/19/2019    LABIL2 1.8 03/07/2016     Lab Results   Component Value Date    CKTOTAL 180  09/19/2019     Lab Results   Component Value Date    WBC 10.76 09/19/2019    HGB 13.5 09/19/2019    HCT 39.9 09/19/2019     09/19/2019     No results found for: INR  No results found for: MG  Lab Results   Component Value Date    PSA <0.014 12/09/2019    TSH 2.807 09/28/2015     No results found for: BNP  Lab Results   Component Value Date    CHLPL 227 (H) 03/07/2016    CHOL 167 09/19/2019    TRIG 176 (H) 09/19/2019    HDL 44 09/19/2019    VLDL 35.2 09/19/2019    LDLHDL 2.00 09/19/2019     Lab Results   Component Value Date    LDL 88 09/19/2019    LDL 73 04/05/2019       Procedures       I personally viewed and interpreted the patient's LAB data         Assessment:     1. Mixed hyperlipidemia    2. DDD (degenerative disc disease), lumbar    3. Anxiety    4. Essential hypertension    5. Type 2 diabetes mellitus with stage 2 chronic kidney disease, without long-term current use of insulin (CMS/[facility])    6. Stage 2 chronic kidney disease    7. Hyperuricemia          Plan:     Patient has hyperlipidemia he will continue current medications lab work scheduled for next visit  Blood pressure has been difficult to control however is doing very well he will continue current medications.  Xanax refills were given.  Norco refill was also given.  Side effects including addiction were discussed.  Patient has diabetes mellitus dietary restrictions and weight loss was emphasized.  Patient was advised to drink more fluids.  Uric acid will be checked next visit.  Patient will continue Zyloprim.  Follow-up scheduled        No follow-ups on file.

## 2020-03-19 RX ORDER — FOLIC ACID 1 MG/1
1 TABLET ORAL DAILY
Qty: 90 TABLET | Refills: 3 | Status: SHIPPED | OUTPATIENT
Start: 2020-03-19 | End: 2020-06-19 | Stop reason: SDUPTHER

## 2020-03-19 RX ORDER — FLUOXETINE HYDROCHLORIDE 20 MG/1
20 CAPSULE ORAL DAILY
Qty: 90 CAPSULE | Refills: 3 | Status: SHIPPED | OUTPATIENT
Start: 2020-03-19 | End: 2020-06-19 | Stop reason: SDUPTHER

## 2020-03-19 RX ORDER — TAMSULOSIN HYDROCHLORIDE 0.4 MG/1
1 CAPSULE ORAL DAILY
Qty: 90 CAPSULE | Refills: 3 | Status: SHIPPED | OUTPATIENT
Start: 2020-03-19 | End: 2020-06-19 | Stop reason: SDUPTHER

## 2020-03-19 RX ORDER — ALLOPURINOL 300 MG/1
300 TABLET ORAL DAILY
Qty: 90 TABLET | Refills: 1 | Status: SHIPPED | OUTPATIENT
Start: 2020-03-19 | End: 2020-06-19 | Stop reason: SDUPTHER

## 2020-03-19 RX ORDER — AMLODIPINE BESYLATE 10 MG/1
10 TABLET ORAL DAILY
Qty: 90 TABLET | Refills: 3 | Status: SHIPPED | OUTPATIENT
Start: 2020-03-19 | End: 2020-06-19 | Stop reason: SDUPTHER

## 2020-03-19 RX ORDER — PANTOPRAZOLE SODIUM 40 MG/1
40 TABLET, DELAYED RELEASE ORAL DAILY
Qty: 90 TABLET | Refills: 3 | Status: SHIPPED | OUTPATIENT
Start: 2020-03-19 | End: 2020-06-19 | Stop reason: SDUPTHER

## 2020-03-24 ENCOUNTER — OFFICE VISIT (OUTPATIENT)
Dept: CARDIOLOGY | Facility: CLINIC | Age: 75
End: 2020-03-24

## 2020-03-24 VITALS
HEART RATE: 57 BPM | OXYGEN SATURATION: 97 % | WEIGHT: 216.6 LBS | HEIGHT: 71 IN | SYSTOLIC BLOOD PRESSURE: 124 MMHG | BODY MASS INDEX: 30.32 KG/M2 | DIASTOLIC BLOOD PRESSURE: 80 MMHG

## 2020-03-24 DIAGNOSIS — E78.2 MIXED HYPERLIPIDEMIA: Primary | ICD-10-CM

## 2020-03-24 DIAGNOSIS — C61 PROSTATE CANCER (HCC): ICD-10-CM

## 2020-03-24 DIAGNOSIS — F41.9 ANXIETY: ICD-10-CM

## 2020-03-24 DIAGNOSIS — N18.2 TYPE 2 DIABETES MELLITUS WITH STAGE 2 CHRONIC KIDNEY DISEASE, WITHOUT LONG-TERM CURRENT USE OF INSULIN (HCC): ICD-10-CM

## 2020-03-24 DIAGNOSIS — M51.36 DDD (DEGENERATIVE DISC DISEASE), LUMBAR: ICD-10-CM

## 2020-03-24 DIAGNOSIS — E11.22 TYPE 2 DIABETES MELLITUS WITH STAGE 2 CHRONIC KIDNEY DISEASE, WITHOUT LONG-TERM CURRENT USE OF INSULIN (HCC): ICD-10-CM

## 2020-03-24 DIAGNOSIS — N18.2 STAGE 2 CHRONIC KIDNEY DISEASE: ICD-10-CM

## 2020-03-24 DIAGNOSIS — I10 ESSENTIAL HYPERTENSION: ICD-10-CM

## 2020-03-24 PROCEDURE — 99213 OFFICE O/P EST LOW 20 MIN: CPT | Performed by: INTERNAL MEDICINE

## 2020-03-24 RX ORDER — ALPRAZOLAM 0.25 MG/1
0.25 TABLET ORAL 2 TIMES DAILY PRN
Qty: 60 TABLET | Refills: 2 | Status: SHIPPED | OUTPATIENT
Start: 2020-03-24 | End: 2020-08-07 | Stop reason: SDUPTHER

## 2020-03-24 RX ORDER — HYDROCODONE BITARTRATE AND ACETAMINOPHEN 7.5; 325 MG/1; MG/1
1 TABLET ORAL 3 TIMES DAILY PRN
Qty: 90 TABLET | Refills: 0 | Status: SHIPPED | OUTPATIENT
Start: 2020-03-24 | End: 2020-05-08 | Stop reason: SDUPTHER

## 2020-03-24 NOTE — PROGRESS NOTES
subjective     Chief Complaint   Patient presents with   • Anxiety   • Hypertension   • Med Refill     pending     History of Present Illness  Patient is 74 years old white male who is here for follow-up.  Patient has multiple chronic medical problems, he is doing very well overall.  Blood pressure is well controlled with current medications.  Patient also has hyperlipidemia and is taking Crestor 10 mg daily.  GERD symptoms are better with Protonix.  Patient has anxiety and low back pain.  He is taking Xanax and Norco.  Patient is compliant with medications and needs refills.    Past Surgical History:   Procedure Laterality Date   • COLONOSCOPY  03/2018   • EYE SURGERY     • FOOT SURGERY     • HERNIA REPAIR     • HYDROCELECTOMY  06/15/2015   • PROSTATE BIOPSY  2018   • PROSTATE FIDUCIAL MARKER PLACEMENT  09/14/2018   • RHINOPLASTY     • UPPER GASTROINTESTINAL ENDOSCOPY  03/24/2014    WITH BIOPSIES AND DILATION     Family History   Problem Relation Age of Onset   • Kidney disease Other    • Other Other         CHRONIC DISABLING DISEASES URINARY TRACT DISEASE   • Diabetes Other    • Hypertension Other    • Other Mother    • Heart failure Father    • Stroke Sister    • Arthritis Sister    • Heart disease Brother    • No Known Problems Brother    • No Known Problems Brother      Past Medical History:   Diagnosis Date   • Anxiety    • Arthritis    • Colitis    • Depression    • GERD (gastroesophageal reflux disease)    • Gout    • Heart murmur    • History of EKG 12/22/2015    NORMAL   • Hyperlipidemia    • Hypertension    • Obesity    • Pancreatitis     history of   • Prostate cancer (CMS/HCC)    • Renal failure     MILD one functioning kidney   • Skin cancer     basal cell cancer on back     Patient Active Problem List   Diagnosis   • Essential hypertension, labile   • GERD (gastroesophageal reflux disease)   • Renal failure   • Hyperlipidemia   • Anxiety   • Depression   • Type 2 diabetes mellitus (CMS/HCC)   •  Periumbilical abdominal pain   • DDD (degenerative disc disease), lumbar   • Prostate cancer (CMS/HCC)   • Hyperuricemia   • BURKS (dyspnea on exertion)   • Bradycardia   • Lower urinary tract symptoms due to benign prostatic hyperplasia   • Raised prostate specific antigen       Social History     Tobacco Use   • Smoking status: Former Smoker     Packs/day: 1.00     Types: Cigarettes     Last attempt to quit:      Years since quittin.2   • Smokeless tobacco: Never Used   Substance Use Topics   • Alcohol use: Yes     Alcohol/week: 7.0 standard drinks     Types: 7 Shots of liquor per week     Comment:  once per day   • Drug use: No       No Known Allergies    Current Outpatient Medications on File Prior to Visit   Medication Sig   • allopurinol (ZYLOPRIM) 300 MG tablet Take 1 tablet by mouth Daily.   • ALPRAZolam (XANAX) 0.25 MG tablet Take 1 tablet by mouth 2 (Two) Times a Day As Needed (as needed for anxiety).   • amLODIPine (NORVASC) 10 MG tablet Take 1 tablet by mouth Daily.   • aspirin 81 MG tablet Take 81 mg by mouth daily.   • bicalutamide (CASODEX) 50 MG chemo tablet Casodex 50 mg tablet   Take 1 tablet every day by oral route for 30 days.   • Blood Glucose Monitoring Suppl w/Device kit Check blood sugar once a day.    DX: E11.9   • cholecalciferol (VITAMIN D3) 1000 UNITS tablet Take 1,000 Units by mouth daily.   • cloNIDine (CATAPRES) 0.1 MG tablet Take 0.05 mg by mouth As Needed. TID TO QID PRN    • Cyanocobalamin (VITAMIN B12 PO) Take  by mouth daily.   • FLUoxetine (PROzac) 20 MG capsule Take 1 capsule by mouth Daily.   • folic acid (FOLVITE) 1 MG tablet Take 1 tablet by mouth Daily.   • hydrALAZINE (APRESOLINE) 50 MG tablet Take 1 tablet by mouth 3 (Three) Times a Day.   • HYDROcodone-acetaminophen (NORCO) 7.5-325 MG per tablet Take 1 tablet by mouth 3 (Three) Times a Day As Needed for Moderate Pain .   • hydrOXYzine (ATARAX) 25 MG tablet Take 1 tablet by mouth At Night As Needed (PRN).   •  "Leuprolide Acetate (ELIGARD SC) Inject  under the skin into the appropriate area as directed. 3/2019 first one month shot   • loratadine (CLARITIN) 10 MG tablet Take 1 tablet by mouth Daily.   • meclizine (ANTIVERT) 25 MG tablet Take 1 tablet by mouth 3 (three) times a day as needed for dizziness.   • metoprolol tartrate (LOPRESSOR) 25 MG tablet Take 1 tablet by mouth 2 (Two) Times a Day.   • pantoprazole (PROTONIX) 40 MG EC tablet Take 1 tablet by mouth Daily.   • Pyridoxine HCl (VITAMIN B-6 PO) Take  by mouth daily.   • rosuvastatin (CRESTOR) 10 MG tablet Take 1 tablet by mouth Every Night.   • tamsulosin (FLOMAX) 0.4 MG capsule 24 hr capsule Take 1 capsule by mouth Daily.     No current facility-administered medications on file prior to visit.          The following portions of the patient's history were reviewed and updated as appropriate: allergies, current medications, past family history, past medical history, past social history, past surgical history and problem list.    Review of Systems   Constitution: Negative.   HENT: Negative.  Negative for congestion.    Eyes: Negative.    Cardiovascular: Negative.  Negative for chest pain, cyanosis, dyspnea on exertion, irregular heartbeat, leg swelling, near-syncope, orthopnea, palpitations, paroxysmal nocturnal dyspnea and syncope.   Respiratory: Negative.  Negative for shortness of breath.    Hematologic/Lymphatic: Negative.    Musculoskeletal: Positive for back pain.   Gastrointestinal: Negative.    Neurological: Negative.  Negative for headaches.   Psychiatric/Behavioral: The patient is nervous/anxious.           Objective:     /80 (BP Location: Left arm, Patient Position: Sitting, Cuff Size: Adult)   Pulse 57   Ht 180.3 cm (71\")   Wt 98.2 kg (216 lb 9.6 oz)   SpO2 97%   BMI 30.21 kg/m²   Physical Exam   Constitutional: He appears well-developed and well-nourished. No distress.   HENT:   Head: Normocephalic and atraumatic.   Mouth/Throat: Oropharynx " is clear and moist. No oropharyngeal exudate.   Eyes: Pupils are equal, round, and reactive to light. Conjunctivae and EOM are normal. No scleral icterus.   Neck: Normal range of motion. Neck supple. No JVD present. No tracheal deviation present. No thyromegaly present.   Cardiovascular: Normal rate, regular rhythm, normal heart sounds and intact distal pulses. PMI is not displaced. Exam reveals no gallop, no friction rub and no decreased pulses.   No murmur heard.  Pulses:       Carotid pulses are 3+ on the right side, and 3+ on the left side.       Radial pulses are 3+ on the right side, and 3+ on the left side.   Pulmonary/Chest: Effort normal and breath sounds normal. No respiratory distress. He has no wheezes. He has no rales. He exhibits no tenderness.   Abdominal: Soft. Bowel sounds are normal. He exhibits no distension, no abdominal bruit and no mass. There is no splenomegaly or hepatomegaly. There is no tenderness. There is no rebound and no guarding.   Musculoskeletal: Normal range of motion. He exhibits no edema, tenderness or deformity.   Lymphadenopathy:     He has no cervical adenopathy.   Neurological: He is alert. He has normal reflexes. No cranial nerve deficit. He exhibits normal muscle tone. Coordination normal.   Skin: Skin is warm and dry. No rash noted. He is not diaphoretic. No erythema.   Psychiatric: He has a normal mood and affect. His behavior is normal. Judgment and thought content normal.         Lab Review  Lab Results   Component Value Date     09/19/2019    K 4.7 09/19/2019     09/19/2019    BUN 23 09/19/2019    CREATININE 1.40 (H) 09/19/2019    GLUCOSE 152 (H) 09/19/2019    CALCIUM 9.5 09/19/2019    ALT 16 09/19/2019    ALKPHOS 56 09/19/2019    LABIL2 1.8 03/07/2016     Lab Results   Component Value Date    CKTOTAL 180 09/19/2019     Lab Results   Component Value Date    WBC 10.76 09/19/2019    HGB 13.5 09/19/2019    HCT 39.9 09/19/2019     09/19/2019     No  results found for: INR  No results found for: MG  Lab Results   Component Value Date    PSA <0.014 12/09/2019    TSH 2.807 09/28/2015     No results found for: BNP  Lab Results   Component Value Date    CHLPL 227 (H) 03/07/2016    CHOL 167 09/19/2019    TRIG 176 (H) 09/19/2019    HDL 44 09/19/2019    VLDL 35.2 09/19/2019    LDLHDL 2.00 09/19/2019     Lab Results   Component Value Date    LDL 88 09/19/2019    LDL 73 04/05/2019       Procedures       I personally viewed and interpreted the patient's LAB data         Assessment:     1. Mixed hyperlipidemia    2. Anxiety    3. DDD (degenerative disc disease), lumbar    4. Essential hypertension    5. Type 2 diabetes mellitus with stage 2 chronic kidney disease, without long-term current use of insulin (CMS/[facility])    6. Stage 2 chronic kidney disease    7. Prostate cancer (CMS/[facility])          Plan:     Patient is doing very well with current medications.  Last lipid panel was normal he was advised to continue Crestor.  Protonix was continued for GERD symptoms  Blood pressure has been well controlled on current medication.  Refills of Xanax and Norco was given.  Side effects of narcotics and sedatives was discussed.  Follow-up scheduled lab work next visit        No follow-ups on file.

## 2020-05-08 ENCOUNTER — OFFICE VISIT (OUTPATIENT)
Dept: CARDIOLOGY | Facility: CLINIC | Age: 75
End: 2020-05-08

## 2020-05-08 VITALS
SYSTOLIC BLOOD PRESSURE: 118 MMHG | HEART RATE: 56 BPM | TEMPERATURE: 98.7 F | BODY MASS INDEX: 30.52 KG/M2 | OXYGEN SATURATION: 99 % | DIASTOLIC BLOOD PRESSURE: 74 MMHG | HEIGHT: 71 IN | WEIGHT: 218 LBS

## 2020-05-08 DIAGNOSIS — N18.2 STAGE 2 CHRONIC KIDNEY DISEASE: ICD-10-CM

## 2020-05-08 DIAGNOSIS — F41.9 ANXIETY: ICD-10-CM

## 2020-05-08 DIAGNOSIS — M51.36 DDD (DEGENERATIVE DISC DISEASE), LUMBAR: ICD-10-CM

## 2020-05-08 DIAGNOSIS — E79.0 HYPERURICEMIA: ICD-10-CM

## 2020-05-08 DIAGNOSIS — N18.2 TYPE 2 DIABETES MELLITUS WITH STAGE 2 CHRONIC KIDNEY DISEASE, WITHOUT LONG-TERM CURRENT USE OF INSULIN (HCC): ICD-10-CM

## 2020-05-08 DIAGNOSIS — E78.2 MIXED HYPERLIPIDEMIA: Primary | ICD-10-CM

## 2020-05-08 DIAGNOSIS — E11.22 TYPE 2 DIABETES MELLITUS WITH STAGE 2 CHRONIC KIDNEY DISEASE, WITHOUT LONG-TERM CURRENT USE OF INSULIN (HCC): ICD-10-CM

## 2020-05-08 DIAGNOSIS — I10 ESSENTIAL HYPERTENSION: ICD-10-CM

## 2020-05-08 PROCEDURE — 99213 OFFICE O/P EST LOW 20 MIN: CPT | Performed by: INTERNAL MEDICINE

## 2020-05-08 RX ORDER — HYDROCODONE BITARTRATE AND ACETAMINOPHEN 7.5; 325 MG/1; MG/1
1 TABLET ORAL 3 TIMES DAILY PRN
Qty: 90 TABLET | Refills: 0 | Status: SHIPPED | OUTPATIENT
Start: 2020-05-08 | End: 2020-06-26 | Stop reason: SDUPTHER

## 2020-05-08 NOTE — PROGRESS NOTES
subjective     Chief Complaint   Patient presents with   • Back Pain   • Hypertension   • Med Refill     PENDING      History of Present Illness  Patient is 74 years old white male who complains of back pain, he has been taking Norco 7.5 on PRN basis.  He is compliant with medications and needs refills.  Patient also has anxiety and needs refill on Xanax 0.25.  New    Blood pressure is very well controlled, he is taking Norvasc, hydralazine, clonidine and Lopressor.  Patient also has hyperlipidemia and is taking Crestor 10 mg daily  GERD symptoms are better with Protonix.  He has history of prostate cancer and is following with urologist in [place].  Patient has multiple bladder stones and he has passed few of them recently on his own.  He plans to have surgery done in a few months.  He is doing very well from cardiac standpoint.  Denies any chest pain palpitations or shortness of breath.  He has not had lab work done in around 8 months.  He is afraid to go to the hospital because of COVID-19.  He was encouraged to have lab work done.    Past Surgical History:   Procedure Laterality Date   • COLONOSCOPY  03/2018   • EYE SURGERY     • FOOT SURGERY     • HERNIA REPAIR     • HYDROCELECTOMY  06/15/2015   • PROSTATE BIOPSY  2018   • PROSTATE FIDUCIAL MARKER PLACEMENT  09/14/2018   • RHINOPLASTY     • UPPER GASTROINTESTINAL ENDOSCOPY  03/24/2014    WITH BIOPSIES AND DILATION     Family History   Problem Relation Age of Onset   • Kidney disease Other    • Other Other         CHRONIC DISABLING DISEASES URINARY TRACT DISEASE   • Diabetes Other    • Hypertension Other    • Other Mother    • Heart failure Father    • Stroke Sister    • Arthritis Sister    • Heart disease Brother    • No Known Problems Brother    • No Known Problems Brother      Past Medical History:   Diagnosis Date   • Anxiety    • Arthritis    • Colitis    • Depression    • GERD (gastroesophageal reflux disease)    • Gout    • Heart murmur    • History of  EKG 2015    NORMAL   • Hyperlipidemia    • Hypertension    • Obesity    • Pancreatitis     history of   • Prostate cancer (CMS/HCC)    • Renal failure     MILD one functioning kidney   • Skin cancer     basal cell cancer on back     Patient Active Problem List   Diagnosis   • Essential hypertension, labile   • GERD (gastroesophageal reflux disease)   • Renal failure   • Hyperlipidemia   • Anxiety   • Depression   • Type 2 diabetes mellitus (CMS/HCC)   • Periumbilical abdominal pain   • DDD (degenerative disc disease), lumbar   • Prostate cancer (CMS/HCC)   • Hyperuricemia   • BURKS (dyspnea on exertion)   • Bradycardia   • Lower urinary tract symptoms due to benign prostatic hyperplasia   • Raised prostate specific antigen       Social History     Tobacco Use   • Smoking status: Former Smoker     Packs/day: 1.00     Types: Cigarettes     Last attempt to quit:      Years since quittin.3   • Smokeless tobacco: Never Used   Substance Use Topics   • Alcohol use: Yes     Alcohol/week: 7.0 standard drinks     Types: 7 Shots of liquor per week     Comment:  once per day   • Drug use: No       No Known Allergies    Current Outpatient Medications on File Prior to Visit   Medication Sig   • allopurinol (ZYLOPRIM) 300 MG tablet Take 1 tablet by mouth Daily.   • ALPRAZolam (XANAX) 0.25 MG tablet Take 1 tablet by mouth 2 (Two) Times a Day As Needed (as needed for anxiety).   • amLODIPine (NORVASC) 10 MG tablet Take 1 tablet by mouth Daily.   • aspirin 81 MG tablet Take 81 mg by mouth daily.   • bicalutamide (CASODEX) 50 MG chemo tablet Casodex 50 mg tablet   Take 1 tablet every day by oral route for 30 days.   • Blood Glucose Monitoring Suppl w/Device kit Check blood sugar once a day.    DX: E11.9   • cholecalciferol (VITAMIN D3) 1000 UNITS tablet Take 1,000 Units by mouth daily.   • cloNIDine (CATAPRES) 0.1 MG tablet Take 0.05 mg by mouth As Needed. TID TO QID PRN    • Cyanocobalamin (VITAMIN B12 PO) Take  by mouth  daily.   • FLUoxetine (PROzac) 20 MG capsule Take 1 capsule by mouth Daily.   • folic acid (FOLVITE) 1 MG tablet Take 1 tablet by mouth Daily.   • hydrALAZINE (APRESOLINE) 50 MG tablet Take 1 tablet by mouth 3 (Three) Times a Day.   • hydrOXYzine (ATARAX) 25 MG tablet Take 1 tablet by mouth At Night As Needed (PRN).   • Leuprolide Acetate (ELIGARD SC) Inject  under the skin into the appropriate area as directed. 3/2019 first one month shot   • loratadine (CLARITIN) 10 MG tablet Take 1 tablet by mouth Daily.   • meclizine (ANTIVERT) 25 MG tablet Take 1 tablet by mouth 3 (three) times a day as needed for dizziness.   • metoprolol tartrate (LOPRESSOR) 25 MG tablet Take 1 tablet by mouth 2 (Two) Times a Day.   • pantoprazole (PROTONIX) 40 MG EC tablet Take 1 tablet by mouth Daily.   • Pyridoxine HCl (VITAMIN B-6 PO) Take  by mouth daily.   • rosuvastatin (CRESTOR) 10 MG tablet Take 1 tablet by mouth Every Night.   • tamsulosin (FLOMAX) 0.4 MG capsule 24 hr capsule Take 1 capsule by mouth Daily.   • [DISCONTINUED] HYDROcodone-acetaminophen (NORCO) 7.5-325 MG per tablet Take 1 tablet by mouth 3 (Three) Times a Day As Needed for Moderate Pain .     No current facility-administered medications on file prior to visit.          The following portions of the patient's history were reviewed and updated as appropriate: allergies, current medications, past family history, past medical history, past social history, past surgical history and problem list.    Review of Systems   Constitution: Negative.   HENT: Negative.  Negative for congestion.    Eyes: Negative.    Cardiovascular: Negative.  Negative for chest pain, cyanosis, dyspnea on exertion, irregular heartbeat, leg swelling, near-syncope, orthopnea, palpitations, paroxysmal nocturnal dyspnea and syncope.   Respiratory: Negative.  Negative for shortness of breath.    Hematologic/Lymphatic: Negative.    Musculoskeletal: Positive for back pain, myalgias and stiffness.  "  Gastrointestinal: Negative.    Genitourinary:        Recently passed kidney stones   Neurological: Negative.  Negative for headaches.   Psychiatric/Behavioral: The patient is nervous/anxious.           Objective:     /74 (BP Location: Left arm, Patient Position: Sitting, Cuff Size: Adult)   Pulse 56   Temp 98.7 °F (37.1 °C)   Ht 180.3 cm (71\")   Wt 98.9 kg (218 lb)   SpO2 99%   BMI 30.40 kg/m²   Physical Exam   Constitutional: He appears well-developed and well-nourished. No distress.   HENT:   Head: Normocephalic and atraumatic.   Mouth/Throat: Oropharynx is clear and moist. No oropharyngeal exudate.   Eyes: Pupils are equal, round, and reactive to light. Conjunctivae and EOM are normal. No scleral icterus.   Neck: Normal range of motion. Neck supple. No JVD present. No tracheal deviation present. No thyromegaly present.   Cardiovascular: Normal rate, regular rhythm and intact distal pulses. PMI is not displaced. Exam reveals no decreased pulses.   Pulses:       Carotid pulses are 3+ on the right side, and 3+ on the left side.       Radial pulses are 3+ on the right side, and 3+ on the left side.   Pulmonary/Chest: Effort normal and breath sounds normal. No respiratory distress.   Abdominal: Soft. Bowel sounds are normal. He exhibits no distension and no abdominal bruit. There is no splenomegaly or hepatomegaly.   Musculoskeletal: Normal range of motion. He exhibits no edema, tenderness or deformity.   Lymphadenopathy:     He has no cervical adenopathy.   Neurological: He is alert. He has normal reflexes. No cranial nerve deficit. He exhibits normal muscle tone. Coordination normal.   Skin: Skin is warm and dry. No rash noted. He is not diaphoretic. No erythema.   Psychiatric: He has a normal mood and affect. His behavior is normal. Judgment and thought content normal.         Lab Review  Lab Results   Component Value Date     09/19/2019    K 4.7 09/19/2019     09/19/2019    BUN 23 " 09/19/2019    CREATININE 1.40 (H) 09/19/2019    GLUCOSE 152 (H) 09/19/2019    CALCIUM 9.5 09/19/2019    ALT 16 09/19/2019    ALKPHOS 56 09/19/2019    LABIL2 1.8 03/07/2016     Lab Results   Component Value Date    CKTOTAL 180 09/19/2019     Lab Results   Component Value Date    WBC 10.76 09/19/2019    HGB 13.5 09/19/2019    HCT 39.9 09/19/2019     09/19/2019     No results found for: INR  No results found for: MG  Lab Results   Component Value Date    PSA <0.014 12/09/2019    TSH 2.807 09/28/2015     No results found for: BNP  Lab Results   Component Value Date    CHLPL 227 (H) 03/07/2016    CHOL 167 09/19/2019    TRIG 176 (H) 09/19/2019    HDL 44 09/19/2019    VLDL 35.2 09/19/2019    LDLHDL 2.00 09/19/2019     Lab Results   Component Value Date    LDL 88 09/19/2019    LDL 73 04/05/2019       Procedures       I personally viewed and interpreted the patient's LAB data         Assessment:     1. Mixed hyperlipidemia    2. DDD (degenerative disc disease), lumbar    3. Essential hypertension    4. Type 2 diabetes mellitus with stage 2 chronic kidney disease, without long-term current use of insulin (CMS/[facility])    5. Stage 2 chronic kidney disease    6. Hyperuricemia    7. Anxiety          Plan:     Patient has hyperlipidemia he was advised to continue Crestor 10 mg daily.  Protonix was continued, GI symptoms are better.  Blood pressure is very well controlled patient will continue clonidine, metoprolol, hydralazine and Norvasc.  Prozac and Xanax were continued.  Refill of Xanax was given.  Refill of Norco was also given side effects were discussed.  COVID-19 precautions discussed.  Follow-up scheduled.  Patient was encouraged to have lab work drawn.          No follow-ups on file.

## 2020-05-14 DIAGNOSIS — F41.9 ANXIETY: ICD-10-CM

## 2020-05-14 RX ORDER — ALPRAZOLAM 0.25 MG/1
TABLET ORAL
Qty: 60 TABLET | Refills: 2 | OUTPATIENT
Start: 2020-05-14

## 2020-06-19 RX ORDER — PANTOPRAZOLE SODIUM 40 MG/1
40 TABLET, DELAYED RELEASE ORAL DAILY
Qty: 90 TABLET | Refills: 3 | Status: SHIPPED | OUTPATIENT
Start: 2020-06-19 | End: 2020-07-01 | Stop reason: SDUPTHER

## 2020-06-19 RX ORDER — AMLODIPINE BESYLATE 10 MG/1
10 TABLET ORAL DAILY
Qty: 90 TABLET | Refills: 3 | Status: SHIPPED | OUTPATIENT
Start: 2020-06-19 | End: 2020-07-01 | Stop reason: SDUPTHER

## 2020-06-19 RX ORDER — TAMSULOSIN HYDROCHLORIDE 0.4 MG/1
1 CAPSULE ORAL DAILY
Qty: 90 CAPSULE | Refills: 3 | Status: SHIPPED | OUTPATIENT
Start: 2020-06-19 | End: 2020-07-01 | Stop reason: SDUPTHER

## 2020-06-19 RX ORDER — FLUOXETINE HYDROCHLORIDE 20 MG/1
20 CAPSULE ORAL DAILY
Qty: 90 CAPSULE | Refills: 3 | Status: SHIPPED | OUTPATIENT
Start: 2020-06-19 | End: 2020-07-01 | Stop reason: SDUPTHER

## 2020-06-19 RX ORDER — ALLOPURINOL 300 MG/1
300 TABLET ORAL DAILY
Qty: 90 TABLET | Refills: 1 | Status: SHIPPED | OUTPATIENT
Start: 2020-06-19 | End: 2020-07-01 | Stop reason: SDUPTHER

## 2020-06-19 RX ORDER — FOLIC ACID 1 MG/1
1 TABLET ORAL DAILY
Qty: 90 TABLET | Refills: 3 | Status: SHIPPED | OUTPATIENT
Start: 2020-06-19 | End: 2020-07-01 | Stop reason: SDUPTHER

## 2020-06-26 ENCOUNTER — OFFICE VISIT (OUTPATIENT)
Dept: CARDIOLOGY | Facility: CLINIC | Age: 75
End: 2020-06-26

## 2020-06-26 VITALS
HEART RATE: 60 BPM | DIASTOLIC BLOOD PRESSURE: 68 MMHG | BODY MASS INDEX: 30.24 KG/M2 | OXYGEN SATURATION: 98 % | TEMPERATURE: 98.7 F | SYSTOLIC BLOOD PRESSURE: 110 MMHG | HEIGHT: 71 IN | WEIGHT: 216 LBS

## 2020-06-26 DIAGNOSIS — I10 ESSENTIAL HYPERTENSION: ICD-10-CM

## 2020-06-26 DIAGNOSIS — E79.0 HYPERURICEMIA: ICD-10-CM

## 2020-06-26 DIAGNOSIS — M51.36 DDD (DEGENERATIVE DISC DISEASE), LUMBAR: ICD-10-CM

## 2020-06-26 DIAGNOSIS — N18.2 STAGE 2 CHRONIC KIDNEY DISEASE: ICD-10-CM

## 2020-06-26 DIAGNOSIS — E11.22 TYPE 2 DIABETES MELLITUS WITH STAGE 2 CHRONIC KIDNEY DISEASE, WITHOUT LONG-TERM CURRENT USE OF INSULIN (HCC): ICD-10-CM

## 2020-06-26 DIAGNOSIS — F41.9 ANXIETY: ICD-10-CM

## 2020-06-26 DIAGNOSIS — E78.2 MIXED HYPERLIPIDEMIA: Primary | ICD-10-CM

## 2020-06-26 DIAGNOSIS — N18.2 TYPE 2 DIABETES MELLITUS WITH STAGE 2 CHRONIC KIDNEY DISEASE, WITHOUT LONG-TERM CURRENT USE OF INSULIN (HCC): ICD-10-CM

## 2020-06-26 PROCEDURE — 99213 OFFICE O/P EST LOW 20 MIN: CPT | Performed by: INTERNAL MEDICINE

## 2020-06-26 RX ORDER — HYDROXYZINE HYDROCHLORIDE 25 MG/1
25 TABLET, FILM COATED ORAL NIGHTLY PRN
Qty: 90 TABLET | Refills: 3 | Status: SHIPPED | OUTPATIENT
Start: 2020-06-26 | End: 2020-07-01 | Stop reason: SDUPTHER

## 2020-06-26 RX ORDER — HYDRALAZINE HYDROCHLORIDE 50 MG/1
50 TABLET, FILM COATED ORAL 3 TIMES DAILY
Qty: 270 TABLET | Refills: 3 | Status: SHIPPED | OUTPATIENT
Start: 2020-06-26 | End: 2020-11-16

## 2020-06-26 RX ORDER — ALPRAZOLAM 0.25 MG/1
0.25 TABLET ORAL 2 TIMES DAILY PRN
Qty: 60 TABLET | Refills: 2 | Status: CANCELLED | OUTPATIENT
Start: 2020-06-26

## 2020-06-26 RX ORDER — HYDROCODONE BITARTRATE AND ACETAMINOPHEN 7.5; 325 MG/1; MG/1
1 TABLET ORAL 3 TIMES DAILY PRN
Qty: 90 TABLET | Refills: 0 | Status: SHIPPED | OUTPATIENT
Start: 2020-06-26 | End: 2020-08-07 | Stop reason: SDUPTHER

## 2020-06-26 NOTE — PROGRESS NOTES
"subjective     Chief Complaint   Patient presents with   • Abdominal Pain     \"hurts all the time\"   • Hypertension   • Back Pain   • Med Refill     pending     History of Present Illness  Patient is 74 years old white male who is here for follow-up of multiple chronic medical conditions.  Blood pressure is very well controlled with current medications.  He is taking Norvasc 10 mg daily, hydralazine 50 mg 3 times a day, clonidine on PRN basis and Lopressor 25 twice daily    He also complains of vague epigastric discomfort.  He is taking Protonix 40 mg daily and plan to see Dr. Marie soon.  Patient also has hyperlipidemia and is taking Crestor 10 mg daily no drug side effects.    Anxiety is controlled with Prozac and Xanax.  Patient will need lab work done which was scheduled.    Patient has degenerative disc disease of lumbar spine.  He is taking pain medications Norco.  Because of renal failure we cannot take NSAID therapy which does not help also  He is compliant with medications refill was given.    Past Surgical History:   Procedure Laterality Date   • COLONOSCOPY  03/2018   • EYE SURGERY     • FOOT SURGERY     • HERNIA REPAIR     • HYDROCELECTOMY  06/15/2015   • PROSTATE BIOPSY  2018   • PROSTATE FIDUCIAL MARKER PLACEMENT  09/14/2018   • RHINOPLASTY     • UPPER GASTROINTESTINAL ENDOSCOPY  03/24/2014    WITH BIOPSIES AND DILATION     Family History   Problem Relation Age of Onset   • Kidney disease Other    • Other Other         CHRONIC DISABLING DISEASES URINARY TRACT DISEASE   • Diabetes Other    • Hypertension Other    • Other Mother    • Heart failure Father    • Stroke Sister    • Arthritis Sister    • Heart disease Brother    • No Known Problems Brother    • No Known Problems Brother      Past Medical History:   Diagnosis Date   • Anxiety    • Arthritis    • Colitis    • Depression    • GERD (gastroesophageal reflux disease)    • Gout    • Heart murmur    • History of EKG 12/22/2015    NORMAL   • " Hyperlipidemia    • Hypertension    • Obesity    • Pancreatitis     history of   • Prostate cancer (CMS/HCC)    • Renal failure     MILD one functioning kidney   • Skin cancer     basal cell cancer on back     Patient Active Problem List   Diagnosis   • Essential hypertension, labile   • GERD (gastroesophageal reflux disease)   • Renal failure   • Hyperlipidemia   • Anxiety   • Depression   • Type 2 diabetes mellitus (CMS/HCC)   • Periumbilical abdominal pain   • DDD (degenerative disc disease), lumbar   • Prostate cancer (CMS/HCC)   • Hyperuricemia   • BURKS (dyspnea on exertion)   • Bradycardia   • Lower urinary tract symptoms due to benign prostatic hyperplasia   • Raised prostate specific antigen       Social History     Tobacco Use   • Smoking status: Former Smoker     Packs/day: 1.00     Types: Cigarettes     Last attempt to quit:      Years since quittin.5   • Smokeless tobacco: Never Used   Substance Use Topics   • Alcohol use: Yes     Alcohol/week: 7.0 standard drinks     Types: 7 Shots of liquor per week     Comment:  once per day   • Drug use: No       No Known Allergies    Current Outpatient Medications on File Prior to Visit   Medication Sig   • allopurinol (ZYLOPRIM) 300 MG tablet Take 1 tablet by mouth Daily.   • ALPRAZolam (XANAX) 0.25 MG tablet Take 1 tablet by mouth 2 (Two) Times a Day As Needed (as needed for anxiety).   • amLODIPine (NORVASC) 10 MG tablet Take 1 tablet by mouth Daily.   • aspirin 81 MG tablet Take 81 mg by mouth daily.   • bicalutamide (CASODEX) 50 MG chemo tablet Casodex 50 mg tablet   Take 1 tablet every day by oral route for 30 days.   • Blood Glucose Monitoring Suppl w/Device kit Check blood sugar once a day.    DX: E11.9   • cholecalciferol (VITAMIN D3) 1000 UNITS tablet Take 1,000 Units by mouth daily.   • cloNIDine (CATAPRES) 0.1 MG tablet Take 0.05 mg by mouth As Needed. TID TO QID PRN    • Cyanocobalamin (VITAMIN B12 PO) Take  by mouth daily.   • FLUoxetine  (PROzac) 20 MG capsule Take 1 capsule by mouth Daily.   • folic acid (FOLVITE) 1 MG tablet Take 1 tablet by mouth Daily.   • Leuprolide Acetate (ELIGARD SC) Inject  under the skin into the appropriate area as directed. 3/2019 first one month shot   • loratadine (CLARITIN) 10 MG tablet Take 1 tablet by mouth Daily.   • meclizine (ANTIVERT) 25 MG tablet Take 1 tablet by mouth 3 (three) times a day as needed for dizziness.   • metoprolol tartrate (LOPRESSOR) 25 MG tablet Take 1 tablet by mouth 2 (Two) Times a Day.   • pantoprazole (PROTONIX) 40 MG EC tablet Take 1 tablet by mouth Daily.   • Pyridoxine HCl (VITAMIN B-6 PO) Take  by mouth daily.   • rosuvastatin (CRESTOR) 10 MG tablet Take 1 tablet by mouth Every Night.   • tamsulosin (FLOMAX) 0.4 MG capsule 24 hr capsule Take 1 capsule by mouth Daily.   • [DISCONTINUED] hydrALAZINE (APRESOLINE) 50 MG tablet Take 1 tablet by mouth 3 (Three) Times a Day.   • [DISCONTINUED] HYDROcodone-acetaminophen (NORCO) 7.5-325 MG per tablet Take 1 tablet by mouth 3 (Three) Times a Day As Needed for Moderate Pain .   • [DISCONTINUED] hydrOXYzine (ATARAX) 25 MG tablet Take 1 tablet by mouth At Night As Needed (PRN).     No current facility-administered medications on file prior to visit.          The following portions of the patient's history were reviewed and updated as appropriate: allergies, current medications, past family history, past medical history, past social history, past surgical history and problem list.    Review of Systems   Constitution: Negative.   HENT: Negative.  Negative for congestion.    Eyes: Negative.    Cardiovascular: Negative.  Negative for chest pain, cyanosis, dyspnea on exertion, irregular heartbeat, leg swelling, near-syncope, orthopnea, palpitations, paroxysmal nocturnal dyspnea and syncope.   Respiratory: Negative.  Negative for shortness of breath.    Hematologic/Lymphatic: Negative.    Musculoskeletal: Positive for arthritis and back pain.  "  Gastrointestinal: Positive for bloating and heartburn.   Neurological: Negative.  Negative for headaches.   Psychiatric/Behavioral: The patient is nervous/anxious.           Objective:     /68 (BP Location: Left arm, Patient Position: Sitting, Cuff Size: Adult)   Pulse 60   Temp 98.7 °F (37.1 °C)   Ht 180.3 cm (71\")   Wt 98 kg (216 lb)   SpO2 98%   BMI 30.13 kg/m²   Physical Exam   Constitutional: He appears well-developed and well-nourished. No distress.   HENT:   Head: Normocephalic and atraumatic.   Mouth/Throat: Oropharynx is clear and moist. No oropharyngeal exudate.   Eyes: Pupils are equal, round, and reactive to light. Conjunctivae and EOM are normal. No scleral icterus.   Neck: Normal range of motion. Neck supple. No JVD present. No tracheal deviation present. No thyromegaly present.   Cardiovascular: Normal rate, regular rhythm, normal heart sounds and intact distal pulses. PMI is not displaced. Exam reveals no gallop, no friction rub and no decreased pulses.   No murmur heard.  Pulses:       Carotid pulses are 3+ on the right side, and 3+ on the left side.       Radial pulses are 3+ on the right side, and 3+ on the left side.   Pulmonary/Chest: Effort normal and breath sounds normal. No respiratory distress. He has no wheezes. He has no rales. He exhibits no tenderness.   Abdominal: Soft. Bowel sounds are normal. He exhibits no distension, no abdominal bruit and no mass. There is no splenomegaly or hepatomegaly. There is no tenderness. There is no rebound and no guarding.   Musculoskeletal: Normal range of motion. He exhibits no edema, tenderness or deformity.   Lymphadenopathy:     He has no cervical adenopathy.   Neurological: He is alert. He has normal reflexes. No cranial nerve deficit. He exhibits normal muscle tone. Coordination normal.   Skin: Skin is warm and dry. No rash noted. He is not diaphoretic. No erythema.   Psychiatric: He has a normal mood and affect. His behavior is " normal. Judgment and thought content normal.         Lab Review  Lab Results   Component Value Date     09/19/2019    K 4.7 09/19/2019     09/19/2019    BUN 23 09/19/2019    CREATININE 1.40 (H) 09/19/2019    GLUCOSE 152 (H) 09/19/2019    CALCIUM 9.5 09/19/2019    ALT 16 09/19/2019    ALKPHOS 56 09/19/2019    LABIL2 1.8 03/07/2016     Lab Results   Component Value Date    CKTOTAL 180 09/19/2019     Lab Results   Component Value Date    WBC 10.76 09/19/2019    HGB 13.5 09/19/2019    HCT 39.9 09/19/2019     09/19/2019     No results found for: INR  No results found for: MG  Lab Results   Component Value Date    PSA <0.014 12/09/2019    TSH 2.807 09/28/2015     No results found for: BNP  Lab Results   Component Value Date    CHLPL 227 (H) 03/07/2016    CHOL 167 09/19/2019    TRIG 176 (H) 09/19/2019    HDL 44 09/19/2019    VLDL 35.2 09/19/2019    LDLHDL 2.00 09/19/2019     Lab Results   Component Value Date    LDL 88 09/19/2019    LDL 73 04/05/2019       Procedures       I personally viewed and interpreted the patient's LAB data         Assessment:     1. Mixed hyperlipidemia    2. DDD (degenerative disc disease), lumbar    3. Essential hypertension    4. Type 2 diabetes mellitus with stage 2 chronic kidney disease, without long-term current use of insulin (CMS/[facility])    5. Stage 2 chronic kidney disease    6. Anxiety    7. Hyperuricemia          Plan:     Patient has hyperlipidemia which appears to be very well controlled he will continue current statin therapy lab work scheduled.  Degenerative disc disease lower back pain well controlled with pain medication which will be renewed.  Patient was advised to avoid NSAID therapy because of renal failure.  Blood pressure is very well controlled we will continue current medication.  He has anxiety which is controlled with Prozac and Xanax which was continued.  Lab work scheduled follow-up also scheduled  He has a vague abdominal discomfort he will see   Frank soon.        No follow-ups on file.

## 2020-07-01 RX ORDER — FLUOXETINE HYDROCHLORIDE 20 MG/1
20 CAPSULE ORAL DAILY
Qty: 90 CAPSULE | Refills: 3 | Status: SHIPPED | OUTPATIENT
Start: 2020-07-01 | End: 2021-06-01

## 2020-07-01 RX ORDER — FOLIC ACID 1 MG/1
1 TABLET ORAL DAILY
Qty: 90 TABLET | Refills: 3 | Status: SHIPPED | OUTPATIENT
Start: 2020-07-01 | End: 2021-06-11 | Stop reason: SDUPTHER

## 2020-07-01 RX ORDER — AMLODIPINE BESYLATE 10 MG/1
10 TABLET ORAL DAILY
Qty: 90 TABLET | Refills: 3 | Status: SHIPPED | OUTPATIENT
Start: 2020-07-01 | End: 2021-06-01

## 2020-07-01 RX ORDER — TAMSULOSIN HYDROCHLORIDE 0.4 MG/1
1 CAPSULE ORAL DAILY
Qty: 90 CAPSULE | Refills: 3 | Status: SHIPPED | OUTPATIENT
Start: 2020-07-01 | End: 2020-12-11 | Stop reason: ALTCHOICE

## 2020-07-01 RX ORDER — HYDROXYZINE HYDROCHLORIDE 25 MG/1
25 TABLET, FILM COATED ORAL NIGHTLY PRN
Qty: 90 TABLET | Refills: 3 | Status: SHIPPED | OUTPATIENT
Start: 2020-07-01

## 2020-07-01 RX ORDER — ALLOPURINOL 300 MG/1
300 TABLET ORAL DAILY
Qty: 90 TABLET | Refills: 1 | Status: SHIPPED | OUTPATIENT
Start: 2020-07-01 | End: 2020-12-11

## 2020-07-01 RX ORDER — PANTOPRAZOLE SODIUM 40 MG/1
40 TABLET, DELAYED RELEASE ORAL DAILY
Qty: 90 TABLET | Refills: 3 | Status: SHIPPED | OUTPATIENT
Start: 2020-07-01 | End: 2021-04-02

## 2020-07-22 DIAGNOSIS — F41.9 ANXIETY: ICD-10-CM

## 2020-07-22 RX ORDER — LORATADINE 10 MG/1
10 TABLET ORAL DAILY
Qty: 90 TABLET | Refills: 0 | Status: SHIPPED | OUTPATIENT
Start: 2020-07-22 | End: 2021-06-11 | Stop reason: SDUPTHER

## 2020-07-28 ENCOUNTER — LAB (OUTPATIENT)
Dept: LAB | Facility: HOSPITAL | Age: 75
End: 2020-07-28

## 2020-07-28 DIAGNOSIS — E78.2 MIXED HYPERLIPIDEMIA: ICD-10-CM

## 2020-07-28 DIAGNOSIS — N18.2 TYPE 2 DIABETES MELLITUS WITH STAGE 2 CHRONIC KIDNEY DISEASE, WITHOUT LONG-TERM CURRENT USE OF INSULIN (HCC): ICD-10-CM

## 2020-07-28 DIAGNOSIS — E79.0 HYPERURICEMIA: ICD-10-CM

## 2020-07-28 DIAGNOSIS — E11.22 TYPE 2 DIABETES MELLITUS WITH STAGE 2 CHRONIC KIDNEY DISEASE, WITHOUT LONG-TERM CURRENT USE OF INSULIN (HCC): ICD-10-CM

## 2020-07-28 PROCEDURE — 85025 COMPLETE CBC W/AUTO DIFF WBC: CPT

## 2020-07-28 PROCEDURE — 82043 UR ALBUMIN QUANTITATIVE: CPT

## 2020-07-28 PROCEDURE — 82550 ASSAY OF CK (CPK): CPT

## 2020-07-28 PROCEDURE — 84550 ASSAY OF BLOOD/URIC ACID: CPT

## 2020-07-28 PROCEDURE — 83036 HEMOGLOBIN GLYCOSYLATED A1C: CPT

## 2020-07-28 PROCEDURE — 80061 LIPID PANEL: CPT

## 2020-07-28 PROCEDURE — 85007 BL SMEAR W/DIFF WBC COUNT: CPT

## 2020-07-28 PROCEDURE — 36415 COLL VENOUS BLD VENIPUNCTURE: CPT

## 2020-07-28 PROCEDURE — 80053 COMPREHEN METABOLIC PANEL: CPT

## 2020-07-29 LAB
ALBUMIN SERPL-MCNC: 4.7 G/DL (ref 3.5–5.2)
ALBUMIN UR-MCNC: 5.9 MG/DL
ALBUMIN/GLOB SERPL: 2.2 G/DL
ALP SERPL-CCNC: 56 U/L (ref 39–117)
ALT SERPL W P-5'-P-CCNC: 19 U/L (ref 1–41)
ANION GAP SERPL CALCULATED.3IONS-SCNC: 10.8 MMOL/L (ref 5–15)
ANISOCYTOSIS BLD QL: ABNORMAL
AST SERPL-CCNC: 18 U/L (ref 1–40)
BASOPHILS # BLD MANUAL: 0.18 10*3/MM3 (ref 0–0.2)
BASOPHILS NFR BLD AUTO: 2 % (ref 0–1.5)
BILIRUB SERPL-MCNC: 0.6 MG/DL (ref 0–1.2)
BUN SERPL-MCNC: 15 MG/DL (ref 8–23)
BUN/CREAT SERPL: 11.7 (ref 7–25)
CALCIUM SPEC-SCNC: 9.9 MG/DL (ref 8.6–10.5)
CHLORIDE SERPL-SCNC: 103 MMOL/L (ref 98–107)
CHOLEST SERPL-MCNC: 184 MG/DL (ref 0–200)
CK SERPL-CCNC: 103 U/L (ref 20–200)
CO2 SERPL-SCNC: 26.2 MMOL/L (ref 22–29)
CREAT SERPL-MCNC: 1.28 MG/DL (ref 0.76–1.27)
DEPRECATED RDW RBC AUTO: 44.6 FL (ref 37–54)
EOSINOPHIL # BLD MANUAL: 2.65 10*3/MM3 (ref 0–0.4)
EOSINOPHIL NFR BLD MANUAL: 29.3 % (ref 0.3–6.2)
ERYTHROCYTE [DISTWIDTH] IN BLOOD BY AUTOMATED COUNT: 12.9 % (ref 12.3–15.4)
GFR SERPL CREATININE-BSD FRML MDRD: 55 ML/MIN/1.73
GLOBULIN UR ELPH-MCNC: 2.1 GM/DL
GLUCOSE SERPL-MCNC: 211 MG/DL (ref 65–99)
HBA1C MFR BLD: 8.97 % (ref 4.8–5.6)
HCT VFR BLD AUTO: 41 % (ref 37.5–51)
HDLC SERPL-MCNC: 42 MG/DL (ref 40–60)
HGB BLD-MCNC: 14.3 G/DL (ref 13–17.7)
LDLC SERPL CALC-MCNC: 82 MG/DL (ref 0–100)
LDLC/HDLC SERPL: 1.96 {RATIO}
LYMPHOCYTES # BLD MANUAL: 1.83 10*3/MM3 (ref 0.7–3.1)
LYMPHOCYTES NFR BLD MANUAL: 20.2 % (ref 19.6–45.3)
LYMPHOCYTES NFR BLD MANUAL: 6.1 % (ref 5–12)
MCH RBC QN AUTO: 32.9 PG (ref 26.6–33)
MCHC RBC AUTO-ENTMCNC: 34.9 G/DL (ref 31.5–35.7)
MCV RBC AUTO: 94.3 FL (ref 79–97)
MICROCYTES BLD QL: ABNORMAL
MONOCYTES # BLD AUTO: 0.55 10*3/MM3 (ref 0.1–0.9)
NEUTROPHILS # BLD AUTO: 3.83 10*3/MM3 (ref 1.7–7)
NEUTROPHILS NFR BLD MANUAL: 42.4 % (ref 42.7–76)
NRBC SPEC MANUAL: 1 /100 WBC (ref 0–0.2)
PLAT MORPH BLD: NORMAL
PLATELET # BLD AUTO: 164 10*3/MM3 (ref 140–450)
PMV BLD AUTO: 12.2 FL (ref 6–12)
POIKILOCYTOSIS BLD QL SMEAR: ABNORMAL
POTASSIUM SERPL-SCNC: 4.4 MMOL/L (ref 3.5–5.2)
PROT SERPL-MCNC: 6.8 G/DL (ref 6–8.5)
RBC # BLD AUTO: 4.35 10*6/MM3 (ref 4.14–5.8)
SODIUM SERPL-SCNC: 140 MMOL/L (ref 136–145)
TRIGL SERPL-MCNC: 298 MG/DL (ref 0–150)
URATE SERPL-MCNC: 5.6 MG/DL (ref 3.4–7)
VLDLC SERPL-MCNC: 59.6 MG/DL (ref 5–40)
WBC # BLD AUTO: 9.04 10*3/MM3 (ref 3.4–10.8)
WBC MORPH BLD: NORMAL

## 2020-07-30 ENCOUNTER — TELEPHONE (OUTPATIENT)
Dept: CARDIOLOGY | Facility: CLINIC | Age: 75
End: 2020-07-30

## 2020-07-30 NOTE — TELEPHONE ENCOUNTER
----- Message from Antione De Santiago MD sent at 7/29/2020  5:30 PM EDT -----  Blood sugar is elevated, hemoglobin A1c is elevated and triglyceride is elevated because of blood sugar problems  This coming visit we will have to start you on some medications

## 2020-07-30 NOTE — TELEPHONE ENCOUNTER
Called Lei and given message per Dr. De Santiago.  He thanked me for calling and is going to work on his  sugar as he does not want another RX!.

## 2020-08-07 ENCOUNTER — OFFICE VISIT (OUTPATIENT)
Dept: CARDIOLOGY | Facility: CLINIC | Age: 75
End: 2020-08-07

## 2020-08-07 VITALS
HEART RATE: 63 BPM | WEIGHT: 216.2 LBS | DIASTOLIC BLOOD PRESSURE: 68 MMHG | TEMPERATURE: 97.7 F | OXYGEN SATURATION: 96 % | BODY MASS INDEX: 30.27 KG/M2 | HEIGHT: 71 IN | SYSTOLIC BLOOD PRESSURE: 108 MMHG

## 2020-08-07 DIAGNOSIS — N18.2 TYPE 2 DIABETES MELLITUS WITH STAGE 2 CHRONIC KIDNEY DISEASE, WITHOUT LONG-TERM CURRENT USE OF INSULIN (HCC): Primary | ICD-10-CM

## 2020-08-07 DIAGNOSIS — N18.2 STAGE 2 CHRONIC KIDNEY DISEASE: ICD-10-CM

## 2020-08-07 DIAGNOSIS — E11.22 TYPE 2 DIABETES MELLITUS WITH STAGE 2 CHRONIC KIDNEY DISEASE, WITHOUT LONG-TERM CURRENT USE OF INSULIN (HCC): Primary | ICD-10-CM

## 2020-08-07 DIAGNOSIS — M51.36 DDD (DEGENERATIVE DISC DISEASE), LUMBAR: ICD-10-CM

## 2020-08-07 DIAGNOSIS — F41.9 ANXIETY: ICD-10-CM

## 2020-08-07 DIAGNOSIS — I10 ESSENTIAL HYPERTENSION: ICD-10-CM

## 2020-08-07 DIAGNOSIS — E78.2 MIXED HYPERLIPIDEMIA: ICD-10-CM

## 2020-08-07 PROCEDURE — 99214 OFFICE O/P EST MOD 30 MIN: CPT | Performed by: INTERNAL MEDICINE

## 2020-08-07 RX ORDER — ALPRAZOLAM 0.25 MG/1
0.25 TABLET ORAL 2 TIMES DAILY PRN
Qty: 60 TABLET | Refills: 2 | Status: SHIPPED | OUTPATIENT
Start: 2020-08-07 | End: 2020-09-18 | Stop reason: SDUPTHER

## 2020-08-07 RX ORDER — HYDROCODONE BITARTRATE AND ACETAMINOPHEN 7.5; 325 MG/1; MG/1
1 TABLET ORAL 3 TIMES DAILY PRN
Qty: 90 TABLET | Refills: 0 | Status: SHIPPED | OUTPATIENT
Start: 2020-08-07 | End: 2020-09-18 | Stop reason: SDUPTHER

## 2020-08-07 NOTE — PROGRESS NOTES
subjective     Chief Complaint   Patient presents with   • Anxiety     Follow up   • Back Pain     Follow up   • Med Refill     pending   • Results     labs     History of Present Illness  Patient is 74 years old white male who had lab work done, sugar and A1c is quite high.  He is having lot of urinary frequency but he blames it on his prostate problem.  Hemoglobin A1c is now 8.97 a year ago it was 6.3    Patient states that he loves sweets and has been eating a lot of sweets.  Patient also complains of back pain and needs refill on his pain medication.  He is taking hydrocodone 7.53 times daily and PRN basis    Anxiety is better with nerve medications.  He is taking Prozac 20 mg daily and Xanax 0.25 twice daily.    Blood pressure is well controlled, he is taking Norvasc 10 mg daily, hydralazine 50 mg 3 times a day and Lopressor.  He also has clonidine which he takes only on PRN basis depending on blood pressure readings  He also has hyperlipidemia and is taking Crestor 10 mg nightly.  No drug side effects.  He has prostate cancer and is following with urologist in [place].      Past Surgical History:   Procedure Laterality Date   • COLONOSCOPY  03/2018   • EYE SURGERY     • FOOT SURGERY     • HERNIA REPAIR     • HYDROCELECTOMY  06/15/2015   • PROSTATE BIOPSY  2018   • PROSTATE FIDUCIAL MARKER PLACEMENT  09/14/2018   • RHINOPLASTY     • UPPER GASTROINTESTINAL ENDOSCOPY  03/24/2014    WITH BIOPSIES AND DILATION     Family History   Problem Relation Age of Onset   • Kidney disease Other    • Other Other         CHRONIC DISABLING DISEASES URINARY TRACT DISEASE   • Diabetes Other    • Hypertension Other    • Other Mother    • Heart failure Father    • Stroke Sister    • Arthritis Sister    • Heart disease Brother    • No Known Problems Brother    • No Known Problems Brother      Past Medical History:   Diagnosis Date   • Anxiety    • Arthritis    • Colitis    • Depression    • GERD (gastroesophageal reflux disease)     • Gout    • Heart murmur    • History of EKG 2015    NORMAL   • Hyperlipidemia    • Hypertension    • Obesity    • Pancreatitis     history of   • Prostate cancer (CMS/HCC)    • Renal failure     MILD one functioning kidney   • Skin cancer     basal cell cancer on back     Patient Active Problem List   Diagnosis   • Essential hypertension, labile   • GERD (gastroesophageal reflux disease)   • Renal failure   • Hyperlipidemia   • Anxiety   • Depression   • Type 2 diabetes mellitus (CMS/HCC)   • Periumbilical abdominal pain   • DDD (degenerative disc disease), lumbar   • Prostate cancer (CMS/HCC)   • Hyperuricemia   • BURKS (dyspnea on exertion)   • Bradycardia   • Lower urinary tract symptoms due to benign prostatic hyperplasia   • Raised prostate specific antigen       Social History     Tobacco Use   • Smoking status: Former Smoker     Packs/day: 1.00     Types: Cigarettes     Last attempt to quit:      Years since quittin.6   • Smokeless tobacco: Never Used   Substance Use Topics   • Alcohol use: Yes     Alcohol/week: 7.0 standard drinks     Types: 7 Shots of liquor per week     Comment:  once per day   • Drug use: No       No Known Allergies    Current Outpatient Medications on File Prior to Visit   Medication Sig   • allopurinol (ZYLOPRIM) 300 MG tablet Take 1 tablet by mouth Daily.   • amLODIPine (NORVASC) 10 MG tablet Take 1 tablet by mouth Daily.   • aspirin 81 MG tablet Take 81 mg by mouth daily.   • bicalutamide (CASODEX) 50 MG chemo tablet Casodex 50 mg tablet   Take 1 tablet every day by oral route for 30 days.   • Blood Glucose Monitoring Suppl w/Device kit Check blood sugar once a day.    DX: E11.9   • cholecalciferol (VITAMIN D3) 1000 UNITS tablet Take 1,000 Units by mouth daily.   • cloNIDine (CATAPRES) 0.1 MG tablet Take 0.05 mg by mouth As Needed. TID TO QID PRN    • Cyanocobalamin (VITAMIN B12 PO) Take  by mouth daily.   • FLUoxetine (PROzac) 20 MG capsule Take 1 capsule by mouth  Daily.   • folic acid (FOLVITE) 1 MG tablet Take 1 tablet by mouth Daily.   • hydrALAZINE (APRESOLINE) 50 MG tablet Take 1 tablet by mouth 3 (Three) Times a Day.   • hydrOXYzine (ATARAX) 25 MG tablet Take 1 tablet by mouth At Night As Needed (PRN).   • Leuprolide Acetate (ELIGARD SC) Inject  under the skin into the appropriate area as directed. 3/2019 first one month shot   • loratadine (Claritin) 10 MG tablet Take 1 tablet by mouth Daily.   • meclizine (ANTIVERT) 25 MG tablet Take 1 tablet by mouth 3 (three) times a day as needed for dizziness.   • metoprolol tartrate (LOPRESSOR) 25 MG tablet Take 1 tablet by mouth 2 (Two) Times a Day.   • pantoprazole (PROTONIX) 40 MG EC tablet Take 1 tablet by mouth Daily.   • Pyridoxine HCl (VITAMIN B-6 PO) Take  by mouth daily.   • rosuvastatin (CRESTOR) 10 MG tablet Take 1 tablet by mouth Every Night.   • tamsulosin (FLOMAX) 0.4 MG capsule 24 hr capsule Take 1 capsule by mouth Daily.   • [DISCONTINUED] ALPRAZolam (XANAX) 0.25 MG tablet Take 1 tablet by mouth 2 (Two) Times a Day As Needed (as needed for anxiety).   • [DISCONTINUED] HYDROcodone-acetaminophen (NORCO) 7.5-325 MG per tablet Take 1 tablet by mouth 3 (Three) Times a Day As Needed for Moderate Pain .     No current facility-administered medications on file prior to visit.          The following portions of the patient's history were reviewed and updated as appropriate: allergies, current medications, past family history, past medical history, past social history, past surgical history and problem list.    Review of Systems   Constitution: Negative.   HENT: Negative.  Negative for congestion.    Eyes: Negative.    Cardiovascular: Negative.  Negative for chest pain, cyanosis, dyspnea on exertion, irregular heartbeat, leg swelling, near-syncope, orthopnea, palpitations, paroxysmal nocturnal dyspnea and syncope.   Respiratory: Negative.  Negative for shortness of breath.    Endocrine: Positive for polyuria.  "  Hematologic/Lymphatic: Negative.    Musculoskeletal: Positive for arthritis, back pain and stiffness.   Gastrointestinal: Negative.    Genitourinary: Positive for frequency and nocturia.   Neurological: Negative.  Negative for headaches.   Psychiatric/Behavioral: The patient is nervous/anxious.           Objective:     /68 (BP Location: Left arm, Patient Position: Sitting, Cuff Size: Adult)   Pulse 63   Temp 97.7 °F (36.5 °C)   Ht 180.3 cm (71\")   Wt 98.1 kg (216 lb 3.2 oz)   SpO2 96%   BMI 30.15 kg/m²   Physical Exam   Constitutional: He appears well-developed and well-nourished. No distress.   HENT:   Head: Normocephalic and atraumatic.   Mouth/Throat: Oropharynx is clear and moist. No oropharyngeal exudate.   Eyes: Pupils are equal, round, and reactive to light. Conjunctivae and EOM are normal. No scleral icterus.   Neck: Normal range of motion. Neck supple. No JVD present. No tracheal deviation present. No thyromegaly present.   Cardiovascular: Normal rate, regular rhythm, normal heart sounds and intact distal pulses. PMI is not displaced. Exam reveals no gallop, no friction rub and no decreased pulses.   No murmur heard.  Pulses:       Carotid pulses are 3+ on the right side, and 3+ on the left side.       Radial pulses are 3+ on the right side, and 3+ on the left side.   Pulmonary/Chest: Effort normal and breath sounds normal. No respiratory distress. He has no wheezes. He has no rales. He exhibits no tenderness.   Abdominal: Soft. Bowel sounds are normal. He exhibits no distension, no abdominal bruit and no mass. There is no splenomegaly or hepatomegaly. There is no tenderness. There is no rebound and no guarding.   Musculoskeletal: Normal range of motion. He exhibits no edema, tenderness or deformity.   Lymphadenopathy:     He has no cervical adenopathy.   Neurological: He is alert. He has normal reflexes. No cranial nerve deficit. He exhibits normal muscle tone. Coordination normal.   Skin: " Skin is warm and dry. No rash noted. He is not diaphoretic. No erythema.   Psychiatric: He has a normal mood and affect. His behavior is normal. Judgment and thought content normal.         Lab Review  Lab Results   Component Value Date     07/28/2020    K 4.4 07/28/2020     07/28/2020    BUN 15 07/28/2020    CREATININE 1.28 (H) 07/28/2020    GLUCOSE 211 (H) 07/28/2020    CALCIUM 9.9 07/28/2020    ALT 19 07/28/2020    ALKPHOS 56 07/28/2020    LABIL2 1.8 03/07/2016     Lab Results   Component Value Date    CKTOTAL 103 07/28/2020     Lab Results   Component Value Date    WBC 9.04 07/28/2020    HGB 14.3 07/28/2020    HCT 41.0 07/28/2020     07/28/2020     No results found for: INR  No results found for: MG  Lab Results   Component Value Date    PSA <0.014 12/09/2019    TSH 2.807 09/28/2015     No results found for: BNP  Lab Results   Component Value Date    CHLPL 227 (H) 03/07/2016    CHOL 184 07/28/2020    TRIG 298 (H) 07/28/2020    HDL 42 07/28/2020    VLDL 59.6 (H) 07/28/2020    LDLHDL 1.96 07/28/2020     Lab Results   Component Value Date    LDL 82 07/28/2020    LDL 88 09/19/2019       Procedures       I personally viewed and interpreted the patient's LAB data         Assessment:     1. Type 2 diabetes mellitus with stage 2 chronic kidney disease, without long-term current use of insulin (CMS/[facility])    2. Anxiety    3. DDD (degenerative disc disease), lumbar    4. Stage 2 chronic kidney disease    5. Essential hypertension    6. Mixed hyperlipidemia          Plan:     Labs reviewed blood sugar is 211 hemoglobin A1c 8.97.  Patient is not following diet.  He was advised to follow diabetic diet and try to lose weight.  He was started on Tradjenta 5 mg daily.  Renal functions are abnormal estimated GFR is 55 which is significantly better than before.  Blood pressure is very well controlled.  Refills of nerve and pain medications were given.  Follow-up scheduled          No follow-ups on file.

## 2020-08-18 ENCOUNTER — TELEPHONE (OUTPATIENT)
Dept: CARDIOLOGY | Facility: CLINIC | Age: 75
End: 2020-08-18

## 2020-08-18 NOTE — TELEPHONE ENCOUNTER
Lei called stating his tradjenta is 200$/month.  Cannot afford this.  (includes Januvia, Onglyza and tradjenta ).  Can we prescribe something cheaper.?  Please advise.

## 2020-09-18 ENCOUNTER — OFFICE VISIT (OUTPATIENT)
Dept: CARDIOLOGY | Facility: CLINIC | Age: 75
End: 2020-09-18

## 2020-09-18 VITALS
HEIGHT: 71 IN | TEMPERATURE: 96.8 F | WEIGHT: 210.2 LBS | RESPIRATION RATE: 16 BRPM | HEART RATE: 60 BPM | SYSTOLIC BLOOD PRESSURE: 122 MMHG | DIASTOLIC BLOOD PRESSURE: 68 MMHG | BODY MASS INDEX: 29.43 KG/M2 | OXYGEN SATURATION: 97 %

## 2020-09-18 DIAGNOSIS — I10 ESSENTIAL HYPERTENSION: Primary | ICD-10-CM

## 2020-09-18 DIAGNOSIS — F41.9 ANXIETY: ICD-10-CM

## 2020-09-18 DIAGNOSIS — N18.2 STAGE 2 CHRONIC KIDNEY DISEASE: ICD-10-CM

## 2020-09-18 DIAGNOSIS — Z23 NEED FOR INFLUENZA VACCINATION: ICD-10-CM

## 2020-09-18 DIAGNOSIS — E78.2 MIXED HYPERLIPIDEMIA: ICD-10-CM

## 2020-09-18 DIAGNOSIS — M51.36 DDD (DEGENERATIVE DISC DISEASE), LUMBAR: ICD-10-CM

## 2020-09-18 PROCEDURE — G0008 ADMIN INFLUENZA VIRUS VAC: HCPCS | Performed by: INTERNAL MEDICINE

## 2020-09-18 PROCEDURE — 99213 OFFICE O/P EST LOW 20 MIN: CPT | Performed by: INTERNAL MEDICINE

## 2020-09-18 PROCEDURE — 90694 VACC AIIV4 NO PRSRV 0.5ML IM: CPT | Performed by: INTERNAL MEDICINE

## 2020-09-18 RX ORDER — HYDROCODONE BITARTRATE AND ACETAMINOPHEN 7.5; 325 MG/1; MG/1
1 TABLET ORAL 3 TIMES DAILY PRN
Qty: 90 TABLET | Refills: 0 | Status: SHIPPED | OUTPATIENT
Start: 2020-09-18 | End: 2020-10-30 | Stop reason: SDUPTHER

## 2020-09-18 RX ORDER — ALPRAZOLAM 0.25 MG/1
0.25 TABLET ORAL 2 TIMES DAILY PRN
Qty: 60 TABLET | Refills: 2 | Status: SHIPPED | OUTPATIENT
Start: 2020-09-18 | End: 2020-10-30 | Stop reason: SDUPTHER

## 2020-09-18 NOTE — PROGRESS NOTES
subjective     Chief Complaint   Patient presents with   • Med Refill     refill   • Hypertension     follow up   • Hyperlipidemia     follow up     History of Present Illness  Patient is 75 years old white male who is here for follow-up.  Patient states that he has been under a lot of stress. he is depressed and anxious because of his prostate cancer and also because of COVID-19 and having to stay at home all the times.  He is taking Prozac that seems to be helping and also takes Xanax twice a day as needed.    Blood pressure is running good with current medications, he is taking Norvasc, hydralazine and metoprolol.  He takes clonidine only on PRN basis.    Hyperlipidemia is being controlled with Crestor.  Patient also is diabetic on metformin.  Denies any chest pain palpitations or shortness of breath.    Past Surgical History:   Procedure Laterality Date   • COLONOSCOPY  03/2018   • EYE SURGERY     • FOOT SURGERY     • HERNIA REPAIR     • HYDROCELECTOMY  06/15/2015   • PROSTATE BIOPSY  2018   • PROSTATE FIDUCIAL MARKER PLACEMENT  09/14/2018   • RHINOPLASTY     • UPPER GASTROINTESTINAL ENDOSCOPY  03/24/2014    WITH BIOPSIES AND DILATION     Family History   Problem Relation Age of Onset   • Kidney disease Other    • Other Other         CHRONIC DISABLING DISEASES URINARY TRACT DISEASE   • Diabetes Other    • Hypertension Other    • Other Mother    • Heart failure Father    • Stroke Sister    • Arthritis Sister    • Heart disease Brother    • No Known Problems Brother    • No Known Problems Brother      Past Medical History:   Diagnosis Date   • Anxiety    • Arthritis    • Colitis    • Depression    • GERD (gastroesophageal reflux disease)    • Gout    • Heart murmur    • History of EKG 12/22/2015    NORMAL   • Hyperlipidemia    • Hypertension    • Obesity    • Pancreatitis     history of   • Prostate cancer (CMS/HCC)    • Renal failure     MILD one functioning kidney   • Skin cancer     basal cell cancer on back      Patient Active Problem List   Diagnosis   • Essential hypertension, labile   • GERD (gastroesophageal reflux disease)   • Renal failure   • Hyperlipidemia   • Anxiety   • Depression   • Type 2 diabetes mellitus (CMS/HCC)   • Periumbilical abdominal pain   • DDD (degenerative disc disease), lumbar   • Prostate cancer (CMS/HCC)   • Hyperuricemia   • BURKS (dyspnea on exertion)   • Bradycardia   • Lower urinary tract symptoms due to benign prostatic hyperplasia   • Raised prostate specific antigen       Social History     Tobacco Use   • Smoking status: Former Smoker     Packs/day: 1.00     Types: Cigarettes     Quit date:      Years since quittin.7   • Smokeless tobacco: Never Used   Substance Use Topics   • Alcohol use: Yes     Alcohol/week: 7.0 standard drinks     Types: 7 Shots of liquor per week     Comment:  once per day   • Drug use: No       No Known Allergies    Current Outpatient Medications on File Prior to Visit   Medication Sig   • allopurinol (ZYLOPRIM) 300 MG tablet Take 1 tablet by mouth Daily.   • amLODIPine (NORVASC) 10 MG tablet Take 1 tablet by mouth Daily.   • aspirin 81 MG tablet Take 81 mg by mouth daily.   • bicalutamide (CASODEX) 50 MG chemo tablet Casodex 50 mg tablet   Take 1 tablet every day by oral route for 30 days.   • Blood Glucose Monitoring Suppl w/Device kit Check blood sugar once a day.    DX: E11.9   • cholecalciferol (VITAMIN D3) 1000 UNITS tablet Take 1,000 Units by mouth daily.   • cloNIDine (CATAPRES) 0.1 MG tablet Take 0.05 mg by mouth As Needed. TID TO QID PRN    • Cyanocobalamin (VITAMIN B12 PO) Take  by mouth daily.   • FLUoxetine (PROzac) 20 MG capsule Take 1 capsule by mouth Daily.   • folic acid (FOLVITE) 1 MG tablet Take 1 tablet by mouth Daily.   • hydrALAZINE (APRESOLINE) 50 MG tablet Take 1 tablet by mouth 3 (Three) Times a Day.   • hydrOXYzine (ATARAX) 25 MG tablet Take 1 tablet by mouth At Night As Needed (PRN).   • Leuprolide Acetate (ELIGARD SC) Inject   under the skin into the appropriate area as directed. 3/2019 first one month shot   • loratadine (Claritin) 10 MG tablet Take 1 tablet by mouth Daily.   • meclizine (ANTIVERT) 25 MG tablet Take 1 tablet by mouth 3 (three) times a day as needed for dizziness.   • metFORMIN (GLUCOPHAGE) 500 MG tablet Take 1 tablet by mouth Daily With Breakfast.   • metoprolol tartrate (LOPRESSOR) 25 MG tablet Take 1 tablet by mouth 2 (Two) Times a Day.   • pantoprazole (PROTONIX) 40 MG EC tablet Take 1 tablet by mouth Daily.   • Pyridoxine HCl (VITAMIN B-6 PO) Take  by mouth daily.   • rosuvastatin (CRESTOR) 10 MG tablet Take 1 tablet by mouth Every Night.   • tamsulosin (FLOMAX) 0.4 MG capsule 24 hr capsule Take 1 capsule by mouth Daily.   • [DISCONTINUED] ALPRAZolam (XANAX) 0.25 MG tablet Take 1 tablet by mouth 2 (Two) Times a Day As Needed (as needed for anxiety).   • [DISCONTINUED] HYDROcodone-acetaminophen (NORCO) 7.5-325 MG per tablet Take 1 tablet by mouth 3 (Three) Times a Day As Needed for Moderate Pain .     No current facility-administered medications on file prior to visit.          The following portions of the patient's history were reviewed and updated as appropriate: allergies, current medications, past family history, past medical history, past social history, past surgical history and problem list.    Review of Systems   Constitution: Negative.   HENT: Negative.  Negative for congestion.    Eyes: Negative.    Cardiovascular: Negative.  Negative for chest pain, cyanosis, dyspnea on exertion, irregular heartbeat, leg swelling, near-syncope, orthopnea, palpitations, paroxysmal nocturnal dyspnea and syncope.   Respiratory: Negative.  Negative for shortness of breath.    Hematologic/Lymphatic: Negative.    Musculoskeletal: Positive for back pain.   Gastrointestinal: Negative.    Neurological: Negative.  Negative for headaches.   Psychiatric/Behavioral: Positive for depression. The patient is nervous/anxious.          "  Objective:     /68 (BP Location: Left arm, Patient Position: Sitting, Cuff Size: Adult)   Pulse 60   Temp 96.8 °F (36 °C) (Temporal)   Resp 16   Ht 180.3 cm (70.98\")   Wt 95.3 kg (210 lb 3.2 oz)   SpO2 97%   BMI 29.33 kg/m²   Constitutional:       Appearance: Healthy appearance. Not in distress.   Neck:      Vascular: No JVR. JVD normal.   Pulmonary:      Effort: Pulmonary effort is normal.      Breath sounds: Normal breath sounds. No wheezing. No rhonchi. No rales.   Chest:      Chest wall: Not tender to palpatation.   Cardiovascular:      PMI at left midclavicular line. Normal rate. Regular rhythm.      Murmurs: There is no murmur.   Pulses:     Intact distal pulses.   Edema:     Peripheral edema absent.   Abdominal:      General: Bowel sounds are normal.      Palpations: Abdomen is soft.      Tenderness: There is no abdominal tenderness.   Skin:     General: Skin is warm and dry.           Lab Review  Lab Results   Component Value Date     07/28/2020    K 4.4 07/28/2020     07/28/2020    BUN 15 07/28/2020    CREATININE 1.28 (H) 07/28/2020    GLUCOSE 211 (H) 07/28/2020    CALCIUM 9.9 07/28/2020    ALT 19 07/28/2020    ALKPHOS 56 07/28/2020    LABIL2 1.8 03/07/2016     Lab Results   Component Value Date    CKTOTAL 103 07/28/2020     Lab Results   Component Value Date    WBC 9.04 07/28/2020    HGB 14.3 07/28/2020    HCT 41.0 07/28/2020     07/28/2020     No results found for: INR  No results found for: MG  Lab Results   Component Value Date    PSA <0.014 12/09/2019    TSH 2.807 09/28/2015     No results found for: BNP  Lab Results   Component Value Date    CHLPL 227 (H) 03/07/2016    CHOL 184 07/28/2020    TRIG 298 (H) 07/28/2020    HDL 42 07/28/2020    VLDL 59.6 (H) 07/28/2020    LDLHDL 1.96 07/28/2020     Lab Results   Component Value Date    LDL 82 07/28/2020    LDL 88 09/19/2019       Procedures       I personally viewed and interpreted the patient's LAB data         Assessment: "     1. Essential hypertension    2. DDD (degenerative disc disease), lumbar    3. Anxiety    4. Need for influenza vaccination    5. Mixed hyperlipidemia    6. Stage 2 chronic kidney disease          Plan:     Patient is doing very well except is anxious nervous and depressed.  He is taking Zoloft and Xanax which was continued.  Patient does not wish to increase the dose or psychiatric consult.  He feels there is due to being confined to home because of COVID-19.  Refills of Xanax and Norco was given.  He will continue rest of medication.  Flu shot was also given.  Follow-up scheduled        No follow-ups on file.

## 2020-10-30 ENCOUNTER — HOSPITAL ENCOUNTER (OUTPATIENT)
Dept: GENERAL RADIOLOGY | Facility: HOSPITAL | Age: 75
Discharge: HOME | End: 2020-10-30

## 2020-10-30 ENCOUNTER — OFFICE VISIT (OUTPATIENT)
Dept: CARDIOLOGY | Facility: CLINIC | Age: 75
End: 2020-10-30

## 2020-10-30 ENCOUNTER — LAB (OUTPATIENT)
Dept: LAB | Facility: HOSPITAL | Age: 75
End: 2020-10-30

## 2020-10-30 VITALS
HEART RATE: 52 BPM | BODY MASS INDEX: 29.9 KG/M2 | SYSTOLIC BLOOD PRESSURE: 110 MMHG | OXYGEN SATURATION: 95 % | DIASTOLIC BLOOD PRESSURE: 62 MMHG | TEMPERATURE: 97.1 F | WEIGHT: 213.6 LBS | HEIGHT: 71 IN

## 2020-10-30 DIAGNOSIS — F41.9 ANXIETY: ICD-10-CM

## 2020-10-30 DIAGNOSIS — E78.2 MIXED HYPERLIPIDEMIA: ICD-10-CM

## 2020-10-30 DIAGNOSIS — E79.0 HYPERURICEMIA: ICD-10-CM

## 2020-10-30 DIAGNOSIS — M1A.0720 CHRONIC GOUT OF LEFT ANKLE, UNSPECIFIED CAUSE: ICD-10-CM

## 2020-10-30 DIAGNOSIS — E11.22 TYPE 2 DIABETES MELLITUS WITH STAGE 2 CHRONIC KIDNEY DISEASE, WITHOUT LONG-TERM CURRENT USE OF INSULIN (HCC): ICD-10-CM

## 2020-10-30 DIAGNOSIS — M51.36 DDD (DEGENERATIVE DISC DISEASE), LUMBAR: ICD-10-CM

## 2020-10-30 DIAGNOSIS — N18.2 TYPE 2 DIABETES MELLITUS WITH STAGE 2 CHRONIC KIDNEY DISEASE, WITHOUT LONG-TERM CURRENT USE OF INSULIN (HCC): ICD-10-CM

## 2020-10-30 DIAGNOSIS — I10 ESSENTIAL HYPERTENSION: Primary | ICD-10-CM

## 2020-10-30 PROCEDURE — 80053 COMPREHEN METABOLIC PANEL: CPT

## 2020-10-30 PROCEDURE — 73610 X-RAY EXAM OF ANKLE: CPT

## 2020-10-30 PROCEDURE — 73610 X-RAY EXAM OF ANKLE: CPT | Performed by: RADIOLOGY

## 2020-10-30 PROCEDURE — 36415 COLL VENOUS BLD VENIPUNCTURE: CPT

## 2020-10-30 PROCEDURE — 99214 OFFICE O/P EST MOD 30 MIN: CPT | Performed by: INTERNAL MEDICINE

## 2020-10-30 PROCEDURE — 82550 ASSAY OF CK (CPK): CPT

## 2020-10-30 PROCEDURE — 80061 LIPID PANEL: CPT

## 2020-10-30 PROCEDURE — 84550 ASSAY OF BLOOD/URIC ACID: CPT

## 2020-10-30 PROCEDURE — 83036 HEMOGLOBIN GLYCOSYLATED A1C: CPT

## 2020-10-30 RX ORDER — HYDROCODONE BITARTRATE AND ACETAMINOPHEN 7.5; 325 MG/1; MG/1
1 TABLET ORAL 3 TIMES DAILY PRN
Qty: 90 TABLET | Refills: 0 | Status: SHIPPED | OUTPATIENT
Start: 2020-10-30 | End: 2020-12-11 | Stop reason: SDUPTHER

## 2020-10-30 RX ORDER — ALPRAZOLAM 0.25 MG/1
0.25 TABLET ORAL 2 TIMES DAILY PRN
Qty: 60 TABLET | Refills: 2 | Status: SHIPPED | OUTPATIENT
Start: 2020-10-30 | End: 2021-01-22 | Stop reason: SDUPTHER

## 2020-10-30 NOTE — PROGRESS NOTES
subjective     Chief Complaint   Patient presents with   • Extremity Weakness     pt fell as he started to stand, legs were very weak, thinks was from oxybutin, wants to go backon flomax   • Slow Heart Rate     today   • Anxiety     Follow up   • Arthritis     Follow up   • Med Refill     pending     History of Present Illness  Patient is 75 years old white male who is here for regular follow-up of multiple chronic medical problems.  Patient states that he was trying to stand up and legs got weak and he fell.  Patient thinks it is due to his prostate medications.  He complains of swelling of the left ankle which happened after the fall but he believes that it is due to gout.  There is no neurological deficit.  Patient did not pass out.  There is no loss of sensory or motor functions.    Patient has anxiety which is well controlled with Xanax  Patient has degenerative disc disease and has been taking hydrocodone and needs refills.    Blood pressure has been difficult to control.  He is taking clonidine 0.05 on as needed basis and is taking Norvasc, Lopressor and hydralazine    GERD symptoms are better with Protonix.  Hyperlipidemia is being managed with Crestor.  Patient is also diabetic on Metformin.  Past Surgical History:   Procedure Laterality Date   • COLONOSCOPY  03/2018   • EYE SURGERY     • FOOT SURGERY     • HERNIA REPAIR     • HYDROCELECTOMY  06/15/2015   • PROSTATE BIOPSY  2018   • PROSTATE FIDUCIAL MARKER PLACEMENT  09/14/2018   • RHINOPLASTY     • UPPER GASTROINTESTINAL ENDOSCOPY  03/24/2014    WITH BIOPSIES AND DILATION     Family History   Problem Relation Age of Onset   • Kidney disease Other    • Other Other         CHRONIC DISABLING DISEASES URINARY TRACT DISEASE   • Diabetes Other    • Hypertension Other    • Other Mother    • Heart failure Father    • Stroke Sister    • Arthritis Sister    • Heart disease Brother    • No Known Problems Brother    • No Known Problems Brother      Past Medical  History:   Diagnosis Date   • Anxiety    • Arthritis    • Colitis    • Depression    • GERD (gastroesophageal reflux disease)    • Gout    • Heart murmur    • History of EKG 2015    NORMAL   • Hyperlipidemia    • Hypertension    • Obesity    • Pancreatitis     history of   • Prostate cancer (CMS/HCC)    • Renal failure     MILD one functioning kidney   • Skin cancer     basal cell cancer on back     Patient Active Problem List   Diagnosis   • Essential hypertension, labile   • GERD (gastroesophageal reflux disease)   • Renal failure   • Hyperlipidemia   • Anxiety   • Depression   • Type 2 diabetes mellitus (CMS/HCC)   • Periumbilical abdominal pain   • DDD (degenerative disc disease), lumbar   • Prostate cancer (CMS/HCC)   • Hyperuricemia   • BURKS (dyspnea on exertion)   • Bradycardia   • Lower urinary tract symptoms due to benign prostatic hyperplasia   • Raised prostate specific antigen       Social History     Tobacco Use   • Smoking status: Former Smoker     Packs/day: 1.00     Types: Cigarettes     Quit date:      Years since quittin.8   • Smokeless tobacco: Never Used   Substance Use Topics   • Alcohol use: Yes     Alcohol/week: 7.0 standard drinks     Types: 7 Shots of liquor per week     Comment:  once per day   • Drug use: No       No Known Allergies    Current Outpatient Medications on File Prior to Visit   Medication Sig   • allopurinol (ZYLOPRIM) 300 MG tablet Take 1 tablet by mouth Daily.   • amLODIPine (NORVASC) 10 MG tablet Take 1 tablet by mouth Daily.   • aspirin 81 MG tablet Take 81 mg by mouth daily.   • bicalutamide (CASODEX) 50 MG chemo tablet Casodex 50 mg tablet   Take 1 tablet every day by oral route for 30 days.   • Blood Glucose Monitoring Suppl w/Device kit Check blood sugar once a day.    DX: E11.9   • cholecalciferol (VITAMIN D3) 1000 UNITS tablet Take 1,000 Units by mouth daily.   • cloNIDine (CATAPRES) 0.1 MG tablet Take 0.05 mg by mouth As Needed. TID TO QID PRN    •  Cyanocobalamin (VITAMIN B12 PO) Take  by mouth daily.   • FLUoxetine (PROzac) 20 MG capsule Take 1 capsule by mouth Daily.   • folic acid (FOLVITE) 1 MG tablet Take 1 tablet by mouth Daily.   • hydrALAZINE (APRESOLINE) 50 MG tablet Take 1 tablet by mouth 3 (Three) Times a Day.   • hydrOXYzine (ATARAX) 25 MG tablet Take 1 tablet by mouth At Night As Needed (PRN).   • Leuprolide Acetate (ELIGARD SC) Inject  under the skin into the appropriate area as directed. 3/2019 first one month shot   • loratadine (Claritin) 10 MG tablet Take 1 tablet by mouth Daily.   • meclizine (ANTIVERT) 25 MG tablet Take 1 tablet by mouth 3 (three) times a day as needed for dizziness.   • metFORMIN (GLUCOPHAGE) 500 MG tablet Take 1 tablet by mouth Daily With Breakfast.   • metoprolol tartrate (LOPRESSOR) 25 MG tablet Take 1 tablet by mouth 2 (Two) Times a Day.   • pantoprazole (PROTONIX) 40 MG EC tablet Take 1 tablet by mouth Daily.   • Pyridoxine HCl (VITAMIN B-6 PO) Take  by mouth daily.   • rosuvastatin (CRESTOR) 10 MG tablet Take 1 tablet by mouth Every Night.   • [DISCONTINUED] ALPRAZolam (XANAX) 0.25 MG tablet Take 1 tablet by mouth 2 (Two) Times a Day As Needed (as needed for anxiety).   • [DISCONTINUED] HYDROcodone-acetaminophen (NORCO) 7.5-325 MG per tablet Take 1 tablet by mouth 3 (Three) Times a Day As Needed for Moderate Pain .   • tamsulosin (FLOMAX) 0.4 MG capsule 24 hr capsule Take 1 capsule by mouth Daily.     No current facility-administered medications on file prior to visit.          The following portions of the patient's history were reviewed and updated as appropriate: allergies, current medications, past family history, past medical history, past social history, past surgical history and problem list.    Review of Systems   Constitution: Positive for malaise/fatigue.   HENT: Negative.  Negative for congestion.    Eyes: Negative.    Cardiovascular: Negative.  Negative for chest pain, cyanosis, dyspnea on exertion,  "irregular heartbeat, leg swelling, near-syncope, orthopnea, palpitations, paroxysmal nocturnal dyspnea and syncope.   Respiratory: Negative.  Negative for shortness of breath.    Hematologic/Lymphatic: Negative.    Musculoskeletal: Positive for joint pain and joint swelling.   Gastrointestinal: Negative.    Neurological: Positive for weakness. Negative for headaches.          Objective:     /62 (BP Location: Left arm, Patient Position: Sitting, Cuff Size: Adult)   Pulse 52   Temp 97.1 °F (36.2 °C)   Ht 180.3 cm (71\")   Wt 96.9 kg (213 lb 9.6 oz)   SpO2 95%   BMI 29.79 kg/m²   Vitals signs and nursing note reviewed.   Constitutional:       General: Not in acute distress.     Appearance: Healthy appearance. Well-developed and not in distress. Not diaphoretic.   Eyes:      General: No scleral icterus.     Conjunctiva/sclera: Conjunctivae normal.      Pupils: Pupils are equal, round, and reactive to light.   HENT:      Head: Normocephalic and atraumatic.   Neck:      Musculoskeletal: Normal range of motion and neck supple.      Thyroid: Thyroid normal. No thyromegaly.      Vascular: No JVD. JVD normal.      Trachea: No tracheal deviation.      Lymphadenopathy: No cervical adenopathy.   Pulmonary:      Effort: Pulmonary effort is normal. No respiratory distress.      Breath sounds: Normal breath sounds and air entry. No wheezing. No rhonchi. No rales.   Chest:      Chest wall: Not tender to palpatation.   Cardiovascular:      PMI at left midclavicular line. Bradycardia present. Regular rhythm.      No gallop.   Pulses:     Intact distal pulses. No decreased pulses.   Edema:     Peripheral edema absent.   Abdominal:      General: Bowel sounds are normal. There is no distension or abdominal bruit.      Palpations: Abdomen is soft. There is no hepatomegaly, splenomegaly or abdominal mass.      Tenderness: There is no abdominal tenderness. There is no guarding or rebound.   Musculoskeletal:      Comments: " Swelling of left lateral malleolus which is tender   Skin:     General: Skin is warm and dry. There is no cyanosis.      Coloration: Skin is not jaundiced or pale.      Findings: No erythema or rash.   Neurological:      Mental Status: Alert, oriented to person, place, and time and oriented to person, place and time.   Psychiatric:         Attention and Perception: Attention normal.         Mood and Affect: Mood and affect normal.         Speech: Speech normal.         Behavior: Behavior is cooperative.           Lab Review  Lab Results   Component Value Date     07/28/2020    K 4.4 07/28/2020     07/28/2020    BUN 15 07/28/2020    CREATININE 1.28 (H) 07/28/2020    GLUCOSE 211 (H) 07/28/2020    CALCIUM 9.9 07/28/2020    ALT 19 07/28/2020    ALKPHOS 56 07/28/2020    LABIL2 1.8 03/07/2016     Lab Results   Component Value Date    CKTOTAL 103 07/28/2020     Lab Results   Component Value Date    WBC 9.04 07/28/2020    HGB 14.3 07/28/2020    HCT 41.0 07/28/2020     07/28/2020     No results found for: INR  No results found for: MG  Lab Results   Component Value Date    PSA <0.014 12/09/2019    TSH 2.807 09/28/2015     No results found for: BNP  Lab Results   Component Value Date    CHLPL 227 (H) 03/07/2016    CHOL 184 07/28/2020    TRIG 298 (H) 07/28/2020    HDL 42 07/28/2020    VLDL 59.6 (H) 07/28/2020    LDLHDL 1.96 07/28/2020     Lab Results   Component Value Date    LDL 82 07/28/2020    LDL 88 09/19/2019       Procedures       I personally viewed and interpreted the patient's LAB data         Assessment:     1. Essential hypertension    2. Anxiety    3. DDD (degenerative disc disease), lumbar    4. Mixed hyperlipidemia    5. Hyperuricemia    6. Type 2 diabetes mellitus with stage 2 chronic kidney disease, without long-term current use of insulin (CMS/[facility])    7. Chronic gout of left ankle, unspecified cause          Plan:     Blood pressure is very well controlled no change in therapy was  made.  Refills of Xanax was given.  Refill of pain medication was also given.  Patient has painful right ankle will get x-ray.  Appears to be probably gouty arthritis will arrange uric acid.  He will take colchicine as needed.  Lipid control is good.  Refills of medications were given.    Patient has no neurological deficit and does not think that was neurological event.  Although neurological evaluation  discussed.  Follow-up scheduled      No follow-ups on file.

## 2020-10-31 LAB
ALBUMIN SERPL-MCNC: 4.5 G/DL (ref 3.5–5.2)
ALBUMIN/GLOB SERPL: 1.7 G/DL
ALP SERPL-CCNC: 70 U/L (ref 39–117)
ALT SERPL W P-5'-P-CCNC: 17 U/L (ref 1–41)
ANION GAP SERPL CALCULATED.3IONS-SCNC: 11.7 MMOL/L (ref 5–15)
AST SERPL-CCNC: 15 U/L (ref 1–40)
BILIRUB SERPL-MCNC: 0.6 MG/DL (ref 0–1.2)
BUN SERPL-MCNC: 26 MG/DL (ref 8–23)
BUN/CREAT SERPL: 17.2 (ref 7–25)
CALCIUM SPEC-SCNC: 9.6 MG/DL (ref 8.6–10.5)
CHLORIDE SERPL-SCNC: 100 MMOL/L (ref 98–107)
CHOLEST SERPL-MCNC: 202 MG/DL (ref 0–200)
CK SERPL-CCNC: 108 U/L (ref 20–200)
CO2 SERPL-SCNC: 24.3 MMOL/L (ref 22–29)
CREAT SERPL-MCNC: 1.51 MG/DL (ref 0.76–1.27)
GFR SERPL CREATININE-BSD FRML MDRD: 45 ML/MIN/1.73
GLOBULIN UR ELPH-MCNC: 2.6 GM/DL
GLUCOSE SERPL-MCNC: 291 MG/DL (ref 65–99)
HBA1C MFR BLD: 8.19 % (ref 4.8–5.6)
HDLC SERPL-MCNC: 37 MG/DL (ref 40–60)
LDLC SERPL CALC-MCNC: 122 MG/DL (ref 0–100)
LDLC/HDLC SERPL: 3.14 {RATIO}
POTASSIUM SERPL-SCNC: 4.5 MMOL/L (ref 3.5–5.2)
PROT SERPL-MCNC: 7.1 G/DL (ref 6–8.5)
SODIUM SERPL-SCNC: 136 MMOL/L (ref 136–145)
TRIGL SERPL-MCNC: 244 MG/DL (ref 0–150)
URATE SERPL-MCNC: 7 MG/DL (ref 3.4–7)
VLDLC SERPL-MCNC: 43 MG/DL (ref 5–40)

## 2020-11-02 NOTE — TELEPHONE ENCOUNTER
----- Message from Antione De Santiago MD sent at 11/2/2020  9:15 AM EST -----  And Jardiance 10 mg daily, continue Metformin 500 mg daily  Continue Crestor 10 mg daily  Sugar and cholesterol are both elevated, renal functions are slightly worse .

## 2020-11-16 RX ORDER — HYDRALAZINE HYDROCHLORIDE 50 MG/1
TABLET, FILM COATED ORAL
Qty: 270 TABLET | Refills: 2 | Status: SHIPPED | OUTPATIENT
Start: 2020-11-16 | End: 2021-06-30 | Stop reason: SDUPTHER

## 2020-12-08 DIAGNOSIS — E11.10 TYPE 2 DIABETES MELLITUS WITH KETOACIDOSIS WITHOUT COMA, WITHOUT LONG-TERM CURRENT USE OF INSULIN (HCC): Primary | ICD-10-CM

## 2020-12-08 RX ORDER — PERPHENAZINE 16 MG/1
TABLET, FILM COATED ORAL
Qty: 100 EACH | Refills: 11 | Status: SHIPPED | OUTPATIENT
Start: 2020-12-08

## 2020-12-08 RX ORDER — LANCETS
1 EACH MISCELLANEOUS DAILY
Qty: 100 EACH | Refills: 11 | Status: SHIPPED | OUTPATIENT
Start: 2020-12-08

## 2020-12-11 ENCOUNTER — OFFICE VISIT (OUTPATIENT)
Dept: CARDIOLOGY | Facility: CLINIC | Age: 75
End: 2020-12-11

## 2020-12-11 VITALS
DIASTOLIC BLOOD PRESSURE: 68 MMHG | SYSTOLIC BLOOD PRESSURE: 112 MMHG | WEIGHT: 212 LBS | BODY MASS INDEX: 29.68 KG/M2 | TEMPERATURE: 96.9 F | HEART RATE: 60 BPM | OXYGEN SATURATION: 97 % | HEIGHT: 71 IN

## 2020-12-11 DIAGNOSIS — R07.9 CHEST PAIN IN ADULT: ICD-10-CM

## 2020-12-11 DIAGNOSIS — E79.0 HYPERURICEMIA: ICD-10-CM

## 2020-12-11 DIAGNOSIS — C61 PROSTATE CANCER (HCC): ICD-10-CM

## 2020-12-11 DIAGNOSIS — N18.31 STAGE 3A CHRONIC KIDNEY DISEASE (HCC): ICD-10-CM

## 2020-12-11 DIAGNOSIS — E78.2 MIXED HYPERLIPIDEMIA: ICD-10-CM

## 2020-12-11 DIAGNOSIS — K21.9 GASTROESOPHAGEAL REFLUX DISEASE WITHOUT ESOPHAGITIS: ICD-10-CM

## 2020-12-11 DIAGNOSIS — I10 ESSENTIAL HYPERTENSION: Primary | ICD-10-CM

## 2020-12-11 DIAGNOSIS — F41.9 ANXIETY: ICD-10-CM

## 2020-12-11 DIAGNOSIS — E11.21 TYPE 2 DIABETES MELLITUS WITH DIABETIC NEPHROPATHY, WITHOUT LONG-TERM CURRENT USE OF INSULIN (HCC): ICD-10-CM

## 2020-12-11 DIAGNOSIS — M51.36 DDD (DEGENERATIVE DISC DISEASE), LUMBAR: ICD-10-CM

## 2020-12-11 DIAGNOSIS — R07.9 CHEST PAIN, UNSPECIFIED TYPE: ICD-10-CM

## 2020-12-11 PROCEDURE — 99214 OFFICE O/P EST MOD 30 MIN: CPT | Performed by: INTERNAL MEDICINE

## 2020-12-11 PROCEDURE — 93000 ELECTROCARDIOGRAM COMPLETE: CPT | Performed by: INTERNAL MEDICINE

## 2020-12-11 RX ORDER — ROSUVASTATIN CALCIUM 20 MG/1
20 TABLET, COATED ORAL NIGHTLY
Qty: 90 TABLET | Refills: 2 | Status: SHIPPED | OUTPATIENT
Start: 2020-12-11

## 2020-12-11 RX ORDER — HYDROCODONE BITARTRATE AND ACETAMINOPHEN 7.5; 325 MG/1; MG/1
1 TABLET ORAL 3 TIMES DAILY PRN
Qty: 90 TABLET | Refills: 0 | Status: SHIPPED | OUTPATIENT
Start: 2020-12-11 | End: 2021-01-22 | Stop reason: SDUPTHER

## 2020-12-11 RX ORDER — OXYBUTYNIN CHLORIDE 5 MG/1
5 TABLET ORAL
COMMUNITY
End: 2021-05-07 | Stop reason: ALTCHOICE

## 2020-12-11 NOTE — PROGRESS NOTES
subjective     Chief Complaint   Patient presents with   • Back Pain     Follow up   • Chest Pain     Follow up   • Diabetes     Follow up   • Med Refill     pending     History of Present Illness  Patient is 75 years old white male who is here for follow-up of multiple chronic medical problems.  Patient has degenerative disc disease of lumbar spine and has been taking Norco.  He is compliant with medications and needs refills.    Blood sugar is very poorly controlled  Patient states that he has not started empagliflozin as ordered however he is taking Metformin 500 mg daily.  Sugar is quite high.    Lipid control is extremely poor, he is taking Crestor 10 mg daily however his lipid has jumped for some reason.  Medication will need to be adjusted.    Renal failure is also getting worse patient is totally asymptomatic however he is taking Zyloprim for gouty arthritis which will need to be discontinued.    Patient also has anxiety and has been taking Xanax.  Patient's girlfriend states that he has been having chest discomfort which is atypical and not related to exertion, will check EKG      Past Surgical History:   Procedure Laterality Date   • COLONOSCOPY  03/2018   • EYE SURGERY     • FOOT SURGERY     • HERNIA REPAIR     • HYDROCELECTOMY  06/15/2015   • PROSTATE BIOPSY  2018   • PROSTATE FIDUCIAL MARKER PLACEMENT  09/14/2018   • RHINOPLASTY     • UPPER GASTROINTESTINAL ENDOSCOPY  03/24/2014    WITH BIOPSIES AND DILATION     Family History   Problem Relation Age of Onset   • Kidney disease Other    • Other Other         CHRONIC DISABLING DISEASES URINARY TRACT DISEASE   • Diabetes Other    • Hypertension Other    • Other Mother    • Heart failure Father    • Stroke Sister    • Arthritis Sister    • Heart disease Brother    • No Known Problems Brother    • No Known Problems Brother      Past Medical History:   Diagnosis Date   • Anxiety    • Arthritis    • Colitis    • Depression    • GERD (gastroesophageal reflux  disease)    • Gout    • Heart murmur    • History of EKG 2015    NORMAL   • Hyperlipidemia    • Hypertension    • Obesity    • Pancreatitis     history of   • Prostate cancer (CMS/HCC)    • Renal failure     MILD one functioning kidney   • Skin cancer     basal cell cancer on back     Patient Active Problem List   Diagnosis   • Essential hypertension, labile   • GERD (gastroesophageal reflux disease)   • Renal failure   • Hyperlipidemia   • Anxiety   • Depression   • Type 2 diabetes mellitus (CMS/HCC)   • Periumbilical abdominal pain   • DDD (degenerative disc disease), lumbar   • Prostate cancer (CMS/HCC)   • Hyperuricemia   • BURKS (dyspnea on exertion)   • Bradycardia   • Lower urinary tract symptoms due to benign prostatic hyperplasia   • Raised prostate specific antigen   • Chest pain in adult       Social History     Tobacco Use   • Smoking status: Former Smoker     Packs/day: 1.00     Types: Cigarettes     Quit date:      Years since quittin.9   • Smokeless tobacco: Never Used   Substance Use Topics   • Alcohol use: Yes     Alcohol/week: 7.0 standard drinks     Types: 7 Shots of liquor per week     Comment:  once per day   • Drug use: No       No Known Allergies    Current Outpatient Medications on File Prior to Visit   Medication Sig   • ALPRAZolam (XANAX) 0.25 MG tablet Take 1 tablet by mouth 2 (Two) Times a Day As Needed (as needed for anxiety).   • amLODIPine (NORVASC) 10 MG tablet Take 1 tablet by mouth Daily.   • aspirin 81 MG tablet Take 81 mg by mouth daily.   • bicalutamide (CASODEX) 50 MG chemo tablet Casodex 50 mg tablet   Take 1 tablet every day by oral route for 30 days.   • Blood Glucose Monitoring Suppl w/Device kit Check blood sugar once a day.    DX: E11.9   • cholecalciferol (VITAMIN D3) 1000 UNITS tablet Take 1,000 Units by mouth daily.   • cloNIDine (CATAPRES) 0.1 MG tablet Take 0.05 mg by mouth As Needed. TID TO QID PRN    • Cyanocobalamin (VITAMIN B12 PO) Take  by mouth  daily.   • Empagliflozin 10 MG tablet Take 10 mg by mouth Daily.   • FLUoxetine (PROzac) 20 MG capsule Take 1 capsule by mouth Daily.   • folic acid (FOLVITE) 1 MG tablet Take 1 tablet by mouth Daily.   • glucose blood (Contour Next Test) test strip Check blood glucose daily   • hydrALAZINE (APRESOLINE) 50 MG tablet TAKE 1 TABLET BY MOUTH 3  TIMES A DAY   • hydrOXYzine (ATARAX) 25 MG tablet Take 1 tablet by mouth At Night As Needed (PRN).   • Leuprolide Acetate (ELIGARD SC) Inject  under the skin into the appropriate area as directed. 3/2019 first one month shot   • loratadine (Claritin) 10 MG tablet Take 1 tablet by mouth Daily.   • meclizine (ANTIVERT) 25 MG tablet Take 1 tablet by mouth 3 (three) times a day as needed for dizziness.   • metoprolol tartrate (LOPRESSOR) 25 MG tablet Take 1 tablet by mouth 2 (Two) Times a Day.   • Microlet Lancets misc 1 stick Daily.   • pantoprazole (PROTONIX) 40 MG EC tablet Take 1 tablet by mouth Daily.   • Pyridoxine HCl (VITAMIN B-6 PO) Take  by mouth daily.   • [DISCONTINUED] allopurinol (ZYLOPRIM) 300 MG tablet Take 1 tablet by mouth Daily.   • [DISCONTINUED] HYDROcodone-acetaminophen (NORCO) 7.5-325 MG per tablet Take 1 tablet by mouth 3 (Three) Times a Day As Needed for Moderate Pain .   • [DISCONTINUED] metFORMIN (GLUCOPHAGE) 500 MG tablet Take 1 tablet by mouth Daily With Breakfast.   • [DISCONTINUED] rosuvastatin (CRESTOR) 10 MG tablet Take 1 tablet by mouth Every Night.   • oxybutynin (DITROPAN) 5 MG tablet Take 5 mg by mouth.   • [DISCONTINUED] tamsulosin (FLOMAX) 0.4 MG capsule 24 hr capsule Take 1 capsule by mouth Daily.     No current facility-administered medications on file prior to visit.          The following portions of the patient's history were reviewed and updated as appropriate: allergies, current medications, past family history, past medical history, past social history, past surgical history and problem list.    Review of Systems   Constitution:  "Negative.   HENT: Negative.  Negative for congestion.    Eyes: Negative.    Cardiovascular: Negative.  Negative for chest pain, cyanosis, dyspnea on exertion, irregular heartbeat, leg swelling, near-syncope, orthopnea, palpitations, paroxysmal nocturnal dyspnea and syncope.   Respiratory: Negative.  Negative for shortness of breath.    Hematologic/Lymphatic: Negative.    Musculoskeletal: Positive for arthritis and back pain.   Gastrointestinal: Negative.    Neurological: Negative.  Negative for headaches.   Psychiatric/Behavioral: The patient is nervous/anxious.           Objective:     /68 (BP Location: Left arm, Patient Position: Sitting, Cuff Size: Adult)   Pulse 60   Temp 96.9 °F (36.1 °C)   Ht 180.3 cm (71\")   Wt 96.2 kg (212 lb)   SpO2 97%   BMI 29.57 kg/m²   Vitals signs and nursing note reviewed.   Constitutional:       General: Not in acute distress.     Appearance: Healthy appearance. Well-developed and not in distress. Not diaphoretic.   Eyes:      General: No scleral icterus.     Conjunctiva/sclera: Conjunctivae normal.      Pupils: Pupils are equal, round, and reactive to light.   HENT:      Head: Normocephalic and atraumatic.   Neck:      Musculoskeletal: Normal range of motion and neck supple.      Thyroid: Thyroid normal. No thyromegaly.      Vascular: No JVD. JVD normal.      Trachea: No tracheal deviation.      Lymphadenopathy: No cervical adenopathy.   Pulmonary:      Effort: Pulmonary effort is normal. No respiratory distress.      Breath sounds: Normal breath sounds and air entry.   Chest:      Chest wall: Not tender to palpatation.   Cardiovascular:      PMI at left midclavicular line. Normal rate. Regular rhythm.      No gallop.   Pulses:     Intact distal pulses. No decreased pulses.   Abdominal:      General: Bowel sounds are normal. There is no distension or abdominal bruit.      Palpations: Abdomen is soft. There is no hepatomegaly, splenomegaly or abdominal mass.      " Tenderness: There is no abdominal tenderness. There is no guarding or rebound.   Skin:     General: Skin is warm and dry. There is no cyanosis.      Coloration: Skin is not jaundiced or pale.      Findings: No erythema or rash.   Neurological:      Mental Status: Alert, oriented to person, place, and time and oriented to person, place and time.   Psychiatric:         Attention and Perception: Attention normal.         Mood and Affect: Mood and affect normal.         Speech: Speech normal.         Behavior: Behavior is cooperative.           Lab Review  Lab Results   Component Value Date     10/30/2020    K 4.5 10/30/2020     10/30/2020    BUN 26 (H) 10/30/2020    CREATININE 1.51 (H) 10/30/2020    GLUCOSE 291 (H) 10/30/2020    CALCIUM 9.6 10/30/2020    ALT 17 10/30/2020    ALKPHOS 70 10/30/2020    LABIL2 1.8 03/07/2016     Lab Results   Component Value Date    CKTOTAL 108 10/30/2020     Lab Results   Component Value Date    WBC 9.04 07/28/2020    HGB 14.3 07/28/2020    HCT 41.0 07/28/2020     07/28/2020     No results found for: INR  No results found for: MG  Lab Results   Component Value Date    PSA <0.014 12/09/2019    TSH 2.807 09/28/2015     No results found for: BNP  Lab Results   Component Value Date    CHLPL 227 (H) 03/07/2016    CHOL 202 (H) 10/30/2020    TRIG 244 (H) 10/30/2020    HDL 37 (L) 10/30/2020    VLDL 43 (H) 10/30/2020    LDLHDL 3.14 10/30/2020     Lab Results   Component Value Date     (H) 10/30/2020    LDL 82 07/28/2020         ECG 12 Lead    Date/Time: 12/11/2020 12:26 PM  Performed by: Antione De Santiago MD  Authorized by: Antione De Santiago MD   Comparison: compared with previous ECG from 8/14/2019  Similar to previous ECG  Rhythm: sinus rhythm and sinus bradycardia  Rate: bradycardic  BPM: 59  Conduction: incomplete right bundle branch block  QRS axis: normal  Other findings: non-specific ST-T wave changes and left ventricular hypertrophy               I  personally viewed and interpreted the patient's LAB data         Assessment:     1. Essential hypertension    2. DDD (degenerative disc disease), lumbar    3. Mixed hyperlipidemia    4. Gastroesophageal reflux disease without esophagitis    5. Stage 3a chronic kidney disease    6. Anxiety    7. Type 2 diabetes mellitus with diabetic nephropathy, without long-term current use of insulin (CMS/[facility])    8. Prostate cancer (CMS/[facility])    9. Hyperuricemia    10. Chest pain, unspecified type    11. Chest pain in adult          Plan:     Blood sugar is 291 and hemoglobin A1c is also elevated.  Patient was advised to start empagliflozin as ordered  Tradjenta 5 mg daily was also added.  Metformin was discontinued because of renal failure.  Healthy lifestyle again discussed.    Lipid control is very poor  Lipids have jumped significantly without any change in medications.  Patient states that he is taking his Crestor regularly.  Crestor dose will be increased to 20 mg daily    Renal functions are getting worse with the estimated GFR of around 45.  Patient was advised to DC Metformin and DC Zyloprim.  He will drink more fluids.  Better control of sugar is needed which was discussed.    Blood pressure control is good he will continue current medication.    EKG does not show any acute changes.    Xanax was continued for anxiety but he will take it on as needed basis  Patient has lot of back pain and cannot take NSAID therapy because of renal failure.  Norco was refilled.  Side effects including addiction discussed.  Follow-up scheduled with lab work        No follow-ups on file.

## 2021-01-07 ENCOUNTER — LAB (OUTPATIENT)
Dept: LAB | Facility: HOSPITAL | Age: 76
End: 2021-01-07

## 2021-01-07 DIAGNOSIS — E11.21 TYPE 2 DIABETES MELLITUS WITH DIABETIC NEPHROPATHY, WITHOUT LONG-TERM CURRENT USE OF INSULIN (HCC): ICD-10-CM

## 2021-01-07 DIAGNOSIS — E78.2 MIXED HYPERLIPIDEMIA: ICD-10-CM

## 2021-01-07 PROCEDURE — 83036 HEMOGLOBIN GLYCOSYLATED A1C: CPT

## 2021-01-07 PROCEDURE — 85025 COMPLETE CBC W/AUTO DIFF WBC: CPT

## 2021-01-07 PROCEDURE — 36415 COLL VENOUS BLD VENIPUNCTURE: CPT

## 2021-01-07 PROCEDURE — 80061 LIPID PANEL: CPT

## 2021-01-07 PROCEDURE — 80053 COMPREHEN METABOLIC PANEL: CPT

## 2021-01-07 PROCEDURE — 82550 ASSAY OF CK (CPK): CPT

## 2021-01-08 LAB
ALBUMIN SERPL-MCNC: 4.7 G/DL (ref 3.5–5.2)
ALBUMIN/GLOB SERPL: 1.8 G/DL
ALP SERPL-CCNC: 56 U/L (ref 39–117)
ALT SERPL W P-5'-P-CCNC: 22 U/L (ref 1–41)
ANION GAP SERPL CALCULATED.3IONS-SCNC: 13.9 MMOL/L (ref 5–15)
AST SERPL-CCNC: 24 U/L (ref 1–40)
BASOPHILS # BLD AUTO: 0.14 10*3/MM3 (ref 0–0.2)
BASOPHILS NFR BLD AUTO: 1.4 % (ref 0–1.5)
BILIRUB SERPL-MCNC: 0.5 MG/DL (ref 0–1.2)
BUN SERPL-MCNC: 19 MG/DL (ref 8–23)
BUN/CREAT SERPL: 15.1 (ref 7–25)
CALCIUM SPEC-SCNC: 9.9 MG/DL (ref 8.6–10.5)
CHLORIDE SERPL-SCNC: 103 MMOL/L (ref 98–107)
CHOLEST SERPL-MCNC: 175 MG/DL (ref 0–200)
CK SERPL-CCNC: 130 U/L (ref 20–200)
CO2 SERPL-SCNC: 25.1 MMOL/L (ref 22–29)
CREAT SERPL-MCNC: 1.26 MG/DL (ref 0.76–1.27)
DEPRECATED RDW RBC AUTO: 44.9 FL (ref 37–54)
EOSINOPHIL # BLD AUTO: 1.45 10*3/MM3 (ref 0–0.4)
EOSINOPHIL NFR BLD AUTO: 14 % (ref 0.3–6.2)
ERYTHROCYTE [DISTWIDTH] IN BLOOD BY AUTOMATED COUNT: 13.1 % (ref 12.3–15.4)
GFR SERPL CREATININE-BSD FRML MDRD: 56 ML/MIN/1.73
GLOBULIN UR ELPH-MCNC: 2.6 GM/DL
GLUCOSE SERPL-MCNC: 190 MG/DL (ref 65–99)
HBA1C MFR BLD: 7.3 % (ref 4.8–5.6)
HCT VFR BLD AUTO: 44.4 % (ref 37.5–51)
HDLC SERPL-MCNC: 40 MG/DL (ref 40–60)
HGB BLD-MCNC: 15.1 G/DL (ref 13–17.7)
IMM GRANULOCYTES # BLD AUTO: 0.03 10*3/MM3 (ref 0–0.05)
IMM GRANULOCYTES NFR BLD AUTO: 0.3 % (ref 0–0.5)
LDLC SERPL CALC-MCNC: 89 MG/DL (ref 0–100)
LDLC/HDLC SERPL: 2 {RATIO}
LYMPHOCYTES # BLD AUTO: 2.48 10*3/MM3 (ref 0.7–3.1)
LYMPHOCYTES NFR BLD AUTO: 24 % (ref 19.6–45.3)
MCH RBC QN AUTO: 32.1 PG (ref 26.6–33)
MCHC RBC AUTO-ENTMCNC: 34 G/DL (ref 31.5–35.7)
MCV RBC AUTO: 94.3 FL (ref 79–97)
MONOCYTES # BLD AUTO: 0.61 10*3/MM3 (ref 0.1–0.9)
MONOCYTES NFR BLD AUTO: 5.9 % (ref 5–12)
NEUTROPHILS NFR BLD AUTO: 5.64 10*3/MM3 (ref 1.7–7)
NEUTROPHILS NFR BLD AUTO: 54.4 % (ref 42.7–76)
NRBC BLD AUTO-RTO: 0 /100 WBC (ref 0–0.2)
PLATELET # BLD AUTO: 202 10*3/MM3 (ref 140–450)
PMV BLD AUTO: 12.5 FL (ref 6–12)
POTASSIUM SERPL-SCNC: 3.9 MMOL/L (ref 3.5–5.2)
PROT SERPL-MCNC: 7.3 G/DL (ref 6–8.5)
RBC # BLD AUTO: 4.71 10*6/MM3 (ref 4.14–5.8)
SODIUM SERPL-SCNC: 142 MMOL/L (ref 136–145)
TRIGL SERPL-MCNC: 275 MG/DL (ref 0–150)
VLDLC SERPL-MCNC: 46 MG/DL (ref 5–40)
WBC # BLD AUTO: 10.35 10*3/MM3 (ref 3.4–10.8)

## 2021-01-18 ENCOUNTER — IMMUNIZATION (OUTPATIENT)
Dept: VACCINE CLINIC | Facility: HOSPITAL | Age: 76
End: 2021-01-18

## 2021-01-18 PROCEDURE — 91300 HC SARSCOV02 VAC 30MCG/0.3ML IM: CPT | Performed by: FAMILY MEDICINE

## 2021-01-18 PROCEDURE — 0001A: CPT | Performed by: FAMILY MEDICINE

## 2021-01-22 ENCOUNTER — OFFICE VISIT (OUTPATIENT)
Dept: CARDIOLOGY | Facility: CLINIC | Age: 76
End: 2021-01-22

## 2021-01-22 VITALS
RESPIRATION RATE: 14 BRPM | WEIGHT: 212 LBS | BODY MASS INDEX: 29.68 KG/M2 | OXYGEN SATURATION: 97 % | SYSTOLIC BLOOD PRESSURE: 132 MMHG | HEART RATE: 56 BPM | DIASTOLIC BLOOD PRESSURE: 62 MMHG | TEMPERATURE: 96.8 F | HEIGHT: 71 IN

## 2021-01-22 DIAGNOSIS — M51.36 DDD (DEGENERATIVE DISC DISEASE), LUMBAR: ICD-10-CM

## 2021-01-22 DIAGNOSIS — I10 ESSENTIAL HYPERTENSION: Primary | ICD-10-CM

## 2021-01-22 DIAGNOSIS — R00.1 BRADYCARDIA: ICD-10-CM

## 2021-01-22 DIAGNOSIS — E78.2 MIXED HYPERLIPIDEMIA: ICD-10-CM

## 2021-01-22 DIAGNOSIS — F41.9 ANXIETY: ICD-10-CM

## 2021-01-22 PROCEDURE — 99214 OFFICE O/P EST MOD 30 MIN: CPT | Performed by: INTERNAL MEDICINE

## 2021-01-22 RX ORDER — ALPRAZOLAM 0.25 MG/1
0.25 TABLET ORAL 2 TIMES DAILY PRN
Qty: 60 TABLET | Refills: 2 | Status: SHIPPED | OUTPATIENT
Start: 2021-01-22 | End: 2021-02-26 | Stop reason: SDUPTHER

## 2021-01-22 RX ORDER — METFORMIN HYDROCHLORIDE EXTENDED-RELEASE TABLETS 500 MG/1
500 TABLET, FILM COATED, EXTENDED RELEASE ORAL
Qty: 90 TABLET | Refills: 0 | OUTPATIENT
Start: 2021-01-22 | End: 2021-05-07 | Stop reason: SINTOL

## 2021-01-22 RX ORDER — HYDROCODONE BITARTRATE AND ACETAMINOPHEN 7.5; 325 MG/1; MG/1
1 TABLET ORAL 3 TIMES DAILY PRN
Qty: 90 TABLET | Refills: 0 | Status: SHIPPED | OUTPATIENT
Start: 2021-01-22 | End: 2021-02-26 | Stop reason: SDUPTHER

## 2021-01-22 NOTE — PROGRESS NOTES
subjective     Chief Complaint   Patient presents with   • Med Refill     refill   • Results     review labs     History of Present Illness  Patient is 75 years old white male who is here for follow-up of multiple chronic medical problems.  Patient has history of diabetes mellitus he is taking Januvia 100 mg daily  Blood sugars running high around 190 with elevated triglyceride also.  Patient used to take Metformin in the past but currently is just taking Januvia.  Patient also has hyperlipidemia which appears to be very well controlled with total cholesterol of 175 LDL of 84.  Patient is taking Crestor 20 mg daily.    Blood pressure is very well controlled with Norvasc 10 mg daily, hydralazine, Lopressor and clonidine only on as needed basis.    Patient has lot of back pain and has been taking Norco.  Patient is compliant with medications and needs refill.  Anxiety is controlled with Prozac.    Past Surgical History:   Procedure Laterality Date   • COLONOSCOPY  03/2018   • EYE SURGERY     • FOOT SURGERY     • HERNIA REPAIR     • HYDROCELECTOMY  06/15/2015   • PROSTATE BIOPSY  2018   • PROSTATE FIDUCIAL MARKER PLACEMENT  09/14/2018   • RHINOPLASTY     • UPPER GASTROINTESTINAL ENDOSCOPY  03/24/2014    WITH BIOPSIES AND DILATION     Family History   Problem Relation Age of Onset   • Kidney disease Other    • Other Other         CHRONIC DISABLING DISEASES URINARY TRACT DISEASE   • Diabetes Other    • Hypertension Other    • Other Mother    • Heart failure Father    • Stroke Sister    • Arthritis Sister    • Heart disease Brother    • No Known Problems Brother    • No Known Problems Brother      Past Medical History:   Diagnosis Date   • Anxiety    • Arthritis    • Colitis    • Depression    • GERD (gastroesophageal reflux disease)    • Gout    • Heart murmur    • History of EKG 12/22/2015    NORMAL   • Hyperlipidemia    • Hypertension    • Obesity    • Pancreatitis     history of   • Prostate cancer (CMS/HCC)    •  Renal failure     MILD one functioning kidney   • Skin cancer     basal cell cancer on back     Patient Active Problem List   Diagnosis   • Essential hypertension, labile   • GERD (gastroesophageal reflux disease)   • Renal failure   • Hyperlipidemia   • Anxiety   • Depression   • Type 2 diabetes mellitus (CMS/HCC)   • Periumbilical abdominal pain   • DDD (degenerative disc disease), lumbar   • Prostate cancer (CMS/HCC)   • Hyperuricemia   • BURKS (dyspnea on exertion)   • Bradycardia   • Lower urinary tract symptoms due to benign prostatic hyperplasia   • Raised prostate specific antigen   • Chest pain in adult       Social History     Tobacco Use   • Smoking status: Former Smoker     Packs/day: 1.00     Types: Cigarettes     Quit date:      Years since quittin.0   • Smokeless tobacco: Never Used   Substance Use Topics   • Alcohol use: Yes     Alcohol/week: 7.0 standard drinks     Types: 7 Shots of liquor per week     Comment:  once per day   • Drug use: No       No Known Allergies    Current Outpatient Medications on File Prior to Visit   Medication Sig   • ALPRAZolam (XANAX) 0.25 MG tablet Take 1 tablet by mouth 2 (Two) Times a Day As Needed (as needed for anxiety).   • amLODIPine (NORVASC) 10 MG tablet Take 1 tablet by mouth Daily.   • aspirin 81 MG tablet Take 81 mg by mouth daily.   • bicalutamide (CASODEX) 50 MG chemo tablet Casodex 50 mg tablet   Take 1 tablet every day by oral route for 30 days.   • Blood Glucose Monitoring Suppl w/Device kit Check blood sugar once a day.    DX: E11.9   • cholecalciferol (VITAMIN D3) 1000 UNITS tablet Take 1,000 Units by mouth daily.   • cloNIDine (CATAPRES) 0.1 MG tablet Take 0.05 mg by mouth As Needed. TID TO QID PRN    • Cyanocobalamin (VITAMIN B12 PO) Take  by mouth daily.   • FLUoxetine (PROzac) 20 MG capsule Take 1 capsule by mouth Daily.   • folic acid (FOLVITE) 1 MG tablet Take 1 tablet by mouth Daily.   • glucose blood (Contour Next Test) test strip Check  blood glucose daily   • hydrALAZINE (APRESOLINE) 50 MG tablet TAKE 1 TABLET BY MOUTH 3  TIMES A DAY   • HYDROcodone-acetaminophen (NORCO) 7.5-325 MG per tablet Take 1 tablet by mouth 3 (Three) Times a Day As Needed for Moderate Pain .   • hydrOXYzine (ATARAX) 25 MG tablet Take 1 tablet by mouth At Night As Needed (PRN).   • Leuprolide Acetate (ELIGARD SC) Inject  under the skin into the appropriate area as directed. 3/2019 first one month shot   • loratadine (Claritin) 10 MG tablet Take 1 tablet by mouth Daily.   • meclizine (ANTIVERT) 25 MG tablet Take 1 tablet by mouth 3 (three) times a day as needed for dizziness.   • metoprolol tartrate (LOPRESSOR) 25 MG tablet Take 1 tablet by mouth 2 (Two) Times a Day.   • Microlet Lancets misc 1 stick Daily.   • oxybutynin (DITROPAN) 5 MG tablet Take 5 mg by mouth.   • pantoprazole (PROTONIX) 40 MG EC tablet Take 1 tablet by mouth Daily.   • Pyridoxine HCl (VITAMIN B-6 PO) Take  by mouth daily.   • rosuvastatin (CRESTOR) 20 MG tablet Take 1 tablet by mouth Every Night.   • SITagliptin (Januvia) 100 MG tablet Take 1 tablet by mouth Daily.     No current facility-administered medications on file prior to visit.          The following portions of the patient's history were reviewed and updated as appropriate: allergies, current medications, past family history, past medical history, past social history, past surgical history and problem list.    Review of Systems   Constitution: Negative.   HENT: Negative.  Negative for congestion.    Eyes: Negative.    Cardiovascular: Negative.  Negative for chest pain, cyanosis, dyspnea on exertion, irregular heartbeat, leg swelling, near-syncope, orthopnea, palpitations, paroxysmal nocturnal dyspnea and syncope.   Respiratory: Negative.  Negative for shortness of breath.    Hematologic/Lymphatic: Negative.    Musculoskeletal: Positive for back pain.   Gastrointestinal: Negative.    Neurological: Negative.  Negative for headaches.  "  Psychiatric/Behavioral: The patient is nervous/anxious.           Objective:     /62 (BP Location: Left arm, Patient Position: Sitting, Cuff Size: Adult)   Pulse 56   Temp 96.8 °F (36 °C) (Temporal)   Resp 14   Ht 180.3 cm (70.98\")   Wt 96.2 kg (212 lb)   SpO2 97%   BMI 29.58 kg/m²   Constitutional:       General: Not in acute distress.     Appearance: Well-developed. Not diaphoretic.   Eyes:      General: No scleral icterus.     Conjunctiva/sclera: Conjunctivae normal.      Pupils: Pupils are equal, round, and reactive to light.   HENT:      Head: Normocephalic and atraumatic.    Mouth/Throat:      Pharynx: No oropharyngeal exudate.   Neck:      Musculoskeletal: Normal range of motion and neck supple.      Thyroid: No thyromegaly.      Vascular: No JVD.      Trachea: No tracheal deviation.      Lymphadenopathy: No cervical adenopathy.   Pulmonary:      Effort: Pulmonary effort is normal. No respiratory distress.      Breath sounds: Normal breath sounds.   Chest:      Chest wall: Not tender to palpatation.   Cardiovascular:      Normal rate. Regular rhythm.      No gallop.   Pulses:     Intact distal pulses. No decreased pulses.   Abdominal:      General: Bowel sounds are normal. There is no distension or abdominal bruit.      Palpations: Abdomen is soft. There is no hepatomegaly, splenomegaly or abdominal mass.      Tenderness: There is no abdominal tenderness. There is no guarding or rebound.   Musculoskeletal: Normal range of motion.         General: No tenderness or deformity.   Skin:     General: Skin is warm and dry.      Findings: No erythema or rash.   Neurological:      Mental Status: Alert.      Cranial Nerves: No cranial nerve deficit.      Motor: Normal muscle tone.      Coordination: Coordination normal.      Deep Tendon Reflexes: Reflexes are normal and symmetric. Reflexes normal.   Psychiatric:         Behavior: Behavior normal.         Thought Content: Thought content normal.         " Judgment: Judgment normal.           Lab Review  Lab Results   Component Value Date     01/07/2021    K 3.9 01/07/2021     01/07/2021    BUN 19 01/07/2021    CREATININE 1.26 01/07/2021    GLUCOSE 190 (H) 01/07/2021    CALCIUM 9.9 01/07/2021    ALT 22 01/07/2021    ALKPHOS 56 01/07/2021    LABIL2 1.8 03/07/2016     Lab Results   Component Value Date    CKTOTAL 130 01/07/2021     Lab Results   Component Value Date    WBC 10.35 01/07/2021    HGB 15.1 01/07/2021    HCT 44.4 01/07/2021     01/07/2021     No results found for: INR  No results found for: MG  Lab Results   Component Value Date    PSA <0.014 12/09/2019    TSH 2.807 09/28/2015     No results found for: BNP  Lab Results   Component Value Date    CHLPL 227 (H) 03/07/2016    CHOL 175 01/07/2021    TRIG 275 (H) 01/07/2021    HDL 40 01/07/2021    VLDL 46 (H) 01/07/2021    LDLHDL 2.00 01/07/2021     Lab Results   Component Value Date    LDL 89 01/07/2021     (H) 10/30/2020       Procedures       I personally viewed and interpreted the patient's LAB data         Assessment:     1. Essential hypertension    2. DDD (degenerative disc disease), lumbar    3. Anxiety    4. Mixed hyperlipidemia    5. Bradycardia          Plan:     Lab work reviewed and discussed with the patient  Blood sugar is elevated at 190  Patient is taking Januvia.  He was advised to continue Januvia 100 mg daily, Metformin 500 mg daily was added.  Healthy lifestyle and weight loss was emphasized.    Patient also has hyperlipidemia cholesterol is 175 LDL 84 he was advised to continue Crestor however triglycerides are elevated 275.  It is probably related to his weight and poor control of blood sugar.  Healthy lifestyle and better sugar control was recommended.  Lab work will be checked later on.    Patient's anxiety is controlled with the Xanax and Zoloft which was continued.  He also complains of a lot of back pain and takes Norco is compliant with medications refill was  given.  GERD symptoms are also better.  Patient will continue Protonix.  Follow-up scheduled          No follow-ups on file.

## 2021-02-08 ENCOUNTER — IMMUNIZATION (OUTPATIENT)
Dept: VACCINE CLINIC | Facility: HOSPITAL | Age: 76
End: 2021-02-08

## 2021-02-08 PROCEDURE — 91300 HC SARSCOV02 VAC 30MCG/0.3ML IM: CPT | Performed by: INTERNAL MEDICINE

## 2021-02-08 PROCEDURE — 0002A: CPT | Performed by: INTERNAL MEDICINE

## 2021-02-23 DIAGNOSIS — R05.9 COUGH: ICD-10-CM

## 2021-02-23 DIAGNOSIS — R09.89 CHEST CONGESTION: Primary | ICD-10-CM

## 2021-02-23 RX ORDER — CEFDINIR 300 MG/1
300 CAPSULE ORAL 2 TIMES DAILY
Qty: 14 CAPSULE | Refills: 0 | Status: SHIPPED | OUTPATIENT
Start: 2021-02-23 | End: 2021-03-02

## 2021-02-23 NOTE — TELEPHONE ENCOUNTER
Patient c/o deep cough sometimes productive (clear) chest congestion, sore throat, no temp wants to know if he needs antibiotics or a chest xray?  Cough is deep and you can here the hoarseness, the sore throat bothers him the most.

## 2021-02-24 ENCOUNTER — HOSPITAL ENCOUNTER (OUTPATIENT)
Dept: GENERAL RADIOLOGY | Facility: HOSPITAL | Age: 76
Discharge: HOME | End: 2021-02-24
Admitting: INTERNAL MEDICINE

## 2021-02-24 DIAGNOSIS — R05.9 COUGH: ICD-10-CM

## 2021-02-24 DIAGNOSIS — R09.89 CHEST CONGESTION: ICD-10-CM

## 2021-02-24 PROCEDURE — 71046 X-RAY EXAM CHEST 2 VIEWS: CPT

## 2021-02-24 PROCEDURE — 71046 X-RAY EXAM CHEST 2 VIEWS: CPT | Performed by: RADIOLOGY

## 2021-02-26 ENCOUNTER — OFFICE VISIT (OUTPATIENT)
Dept: CARDIOLOGY | Facility: CLINIC | Age: 76
End: 2021-02-26

## 2021-02-26 VITALS — DIASTOLIC BLOOD PRESSURE: 83 MMHG | TEMPERATURE: 97.1 F | HEART RATE: 54 BPM | SYSTOLIC BLOOD PRESSURE: 158 MMHG

## 2021-02-26 DIAGNOSIS — E11.21 TYPE 2 DIABETES MELLITUS WITH DIABETIC NEPHROPATHY, WITHOUT LONG-TERM CURRENT USE OF INSULIN (HCC): ICD-10-CM

## 2021-02-26 DIAGNOSIS — E78.2 MIXED HYPERLIPIDEMIA: ICD-10-CM

## 2021-02-26 DIAGNOSIS — M51.36 DDD (DEGENERATIVE DISC DISEASE), LUMBAR: ICD-10-CM

## 2021-02-26 DIAGNOSIS — F41.9 ANXIETY: ICD-10-CM

## 2021-02-26 DIAGNOSIS — I10 ESSENTIAL HYPERTENSION: Primary | ICD-10-CM

## 2021-02-26 PROCEDURE — 99213 OFFICE O/P EST LOW 20 MIN: CPT | Performed by: INTERNAL MEDICINE

## 2021-02-26 RX ORDER — HYDROCODONE BITARTRATE AND ACETAMINOPHEN 7.5; 325 MG/1; MG/1
1 TABLET ORAL 3 TIMES DAILY PRN
Qty: 90 TABLET | Refills: 0 | Status: SHIPPED | OUTPATIENT
Start: 2021-02-26 | End: 2021-04-02 | Stop reason: SDUPTHER

## 2021-02-26 RX ORDER — ALPRAZOLAM 0.25 MG/1
0.25 TABLET ORAL 2 TIMES DAILY PRN
Qty: 60 TABLET | Refills: 2 | Status: SHIPPED | OUTPATIENT
Start: 2021-02-26 | End: 2021-05-07 | Stop reason: SDUPTHER

## 2021-02-26 NOTE — PROGRESS NOTES
subjective     Chief Complaint   Patient presents with   • Cough     Follow up, still coughing, non productive, taking antibiotic   • Sore Throat     continues tohave since last monday   • Anxiety     Follow up   • Med Refill     pending   • Back Pain     History of Present Illness       You have chosen to receive care through a telephone visit. Do you consent to use a telephone visit for your medical care today? Yes    Patient is 75 years old white male who recently had upper respiratory tract infection he has been taking antibiotics.  He is feeling significantly better but is still coughing quite a bit is a nonproductive cough.  Throat is raw probably from the antibiotics.  It is currently afebrile.    Patient has multiple chronic medical problems  Anxiety is controlled with Prozac and low-dose Xanax.  Patient is compliant with medications and needs refills.    He also has lot of back pain.  He has history of renal failure currently renal functions are better.  He is taking hydrocodone instead of NSAID therapy.  And needs refills.    Diabetes mellitus is better controlled  He is trying to diet and is taking Januvia along with Metformin.    Blood pressure is also well controlled although it appears to be elevated today.  He is taking hydralazine 50 mg 3 times a day, Lopressor 25 twice daily, Norvasc 10 mg daily and clonidine on as needed basis  He also has history of prostate cancer on Casodex, Ditropan and leuprolide    Hyperlipidemia on Crestor 20 mg daily, no drug side effects.    Past Surgical History:   Procedure Laterality Date   • COLONOSCOPY  03/2018   • EYE SURGERY     • FOOT SURGERY     • HERNIA REPAIR     • HYDROCELECTOMY  06/15/2015   • PROSTATE BIOPSY  2018   • PROSTATE FIDUCIAL MARKER PLACEMENT  09/14/2018   • RHINOPLASTY     • UPPER GASTROINTESTINAL ENDOSCOPY  03/24/2014    WITH BIOPSIES AND DILATION     Family History   Problem Relation Age of Onset   • Kidney disease Other    • Other Other          CHRONIC DISABLING DISEASES URINARY TRACT DISEASE   • Diabetes Other    • Hypertension Other    • Other Mother    • Heart failure Father    • Stroke Sister    • Arthritis Sister    • Heart disease Brother    • No Known Problems Brother    • No Known Problems Brother      Past Medical History:   Diagnosis Date   • Anxiety    • Arthritis    • Colitis    • Depression    • GERD (gastroesophageal reflux disease)    • Gout    • Heart murmur    • History of EKG 2015    NORMAL   • Hyperlipidemia    • Hypertension    • Obesity    • Pancreatitis     history of   • Prostate cancer (CMS/HCC)    • Renal failure     MILD one functioning kidney   • Skin cancer     basal cell cancer on back     Patient Active Problem List   Diagnosis   • Essential hypertension, labile   • GERD (gastroesophageal reflux disease)   • Renal failure   • Hyperlipidemia   • Anxiety   • Depression   • Type 2 diabetes mellitus (CMS/HCC)   • Periumbilical abdominal pain   • DDD (degenerative disc disease), lumbar   • Prostate cancer (CMS/HCC)   • Hyperuricemia   • BURKS (dyspnea on exertion)   • Bradycardia   • Lower urinary tract symptoms due to benign prostatic hyperplasia   • Raised prostate specific antigen   • Chest pain in adult       Social History     Tobacco Use   • Smoking status: Former Smoker     Packs/day: 1.00     Types: Cigarettes     Quit date:      Years since quittin.1   • Smokeless tobacco: Never Used   Substance Use Topics   • Alcohol use: Yes     Alcohol/week: 7.0 standard drinks     Types: 7 Shots of liquor per week     Comment:  once per day   • Drug use: No       No Known Allergies    Current Outpatient Medications on File Prior to Visit   Medication Sig   • ALPRAZolam (XANAX) 0.25 MG tablet Take 1 tablet by mouth 2 (Two) Times a Day As Needed (as needed for anxiety).   • amLODIPine (NORVASC) 10 MG tablet Take 1 tablet by mouth Daily.   • aspirin 81 MG tablet Take 81 mg by mouth daily.   • bicalutamide (CASODEX) 50 MG  chemo tablet Casodex 50 mg tablet   Take 1 tablet every day by oral route for 30 days.   • Blood Glucose Monitoring Suppl w/Device kit Check blood sugar once a day.    DX: E11.9   • cefdinir (OMNICEF) 300 MG capsule Take 1 capsule by mouth 2 (Two) Times a Day for 7 days.   • cholecalciferol (VITAMIN D3) 1000 UNITS tablet Take 1,000 Units by mouth daily.   • cloNIDine (CATAPRES) 0.1 MG tablet Take 0.05 mg by mouth As Needed. TID TO QID PRN    • Cyanocobalamin (VITAMIN B12 PO) Take  by mouth daily.   • FLUoxetine (PROzac) 20 MG capsule Take 1 capsule by mouth Daily.   • folic acid (FOLVITE) 1 MG tablet Take 1 tablet by mouth Daily.   • glucose blood (Contour Next Test) test strip Check blood glucose daily   • hydrALAZINE (APRESOLINE) 50 MG tablet TAKE 1 TABLET BY MOUTH 3  TIMES A DAY   • HYDROcodone-acetaminophen (NORCO) 7.5-325 MG per tablet Take 1 tablet by mouth 3 (Three) Times a Day As Needed for Moderate Pain .   • hydrOXYzine (ATARAX) 25 MG tablet Take 1 tablet by mouth At Night As Needed (PRN).   • Leuprolide Acetate (ELIGARD SC) Inject  under the skin into the appropriate area as directed. 3/2019 first one month shot   • loratadine (Claritin) 10 MG tablet Take 1 tablet by mouth Daily.   • meclizine (ANTIVERT) 25 MG tablet Take 1 tablet by mouth 3 (three) times a day as needed for dizziness.   • metFORMIN (FORTAMET) 500 MG (OSM) 24 hr tablet Take 1 tablet by mouth Daily With Breakfast.   • metoprolol tartrate (LOPRESSOR) 25 MG tablet Take 1 tablet by mouth 2 (Two) Times a Day.   • Microlet Lancets misc 1 stick Daily.   • oxybutynin (DITROPAN) 5 MG tablet Take 5 mg by mouth.   • pantoprazole (PROTONIX) 40 MG EC tablet Take 1 tablet by mouth Daily.   • Pyridoxine HCl (VITAMIN B-6 PO) Take  by mouth daily.   • rosuvastatin (CRESTOR) 20 MG tablet Take 1 tablet by mouth Every Night.   • SITagliptin (Januvia) 100 MG tablet Take 1 tablet by mouth Daily.     No current facility-administered medications on file prior to  visit.          The following portions of the patient's history were reviewed and updated as appropriate: allergies, current medications, past family history, past medical history, past social history, past surgical history and problem list.    Review of Systems   Constitution: Negative.   HENT: Positive for hoarse voice, odynophagia and sore throat. Negative for congestion.    Eyes: Negative.    Cardiovascular: Negative.  Negative for chest pain, cyanosis, dyspnea on exertion, irregular heartbeat, leg swelling, near-syncope, orthopnea, palpitations, paroxysmal nocturnal dyspnea and syncope.   Respiratory: Positive for cough. Negative for shortness of breath.    Hematologic/Lymphatic: Negative.    Musculoskeletal: Positive for arthritis and back pain.   Gastrointestinal: Negative.    Genitourinary: Positive for hesitancy and urgency.   Neurological: Negative.  Negative for headaches.   Psychiatric/Behavioral: The patient is nervous/anxious.           Objective:     /83 Comment: verbal per patient  Pulse 54 Comment: verbal per patient  Temp 97.1 °F (36.2 °C) Comment: verbal per patient  Physical Exam  Telephone visit  Lab Review  Lab Results   Component Value Date     01/07/2021    K 3.9 01/07/2021     01/07/2021    BUN 19 01/07/2021    CREATININE 1.26 01/07/2021    GLUCOSE 190 (H) 01/07/2021    CALCIUM 9.9 01/07/2021    ALT 22 01/07/2021    ALKPHOS 56 01/07/2021    LABIL2 1.8 03/07/2016     Lab Results   Component Value Date    CKTOTAL 130 01/07/2021     Lab Results   Component Value Date    WBC 10.35 01/07/2021    HGB 15.1 01/07/2021    HCT 44.4 01/07/2021     01/07/2021     No results found for: INR  No results found for: MG  Lab Results   Component Value Date    PSA <0.014 12/09/2019    TSH 2.807 09/28/2015     No results found for: BNP  Lab Results   Component Value Date    CHLPL 227 (H) 03/07/2016    CHOL 175 01/07/2021    TRIG 275 (H) 01/07/2021    HDL 40 01/07/2021    VLDL 46 (H)  01/07/2021    LDLHDL 2.00 01/07/2021     Lab Results   Component Value Date    LDL 89 01/07/2021     (H) 10/30/2020       Procedures       I personally viewed and interpreted the patient's LAB data         Assessment:     1. Essential hypertension    2. Anxiety    3. DDD (degenerative disc disease), lumbar    4. Mixed hyperlipidemia    5. Type 2 diabetes mellitus with diabetic nephropathy, without long-term current use of insulin (CMS/[facility])          Plan:     Blood pressure is elevated today however patient states that his blood pressure is well controlled he takes low-dose clonidine as needed.  He was advised to continue his Norvasc, hydralazine and metoprolol.    Anxiety is well controlled with Prozac and Xanax.  Refill of Xanax was given.    Patient had degenerative disc disease chronic back pain Norco refill was given side effects were discussed.  Including addiction    Patient has hyperlipidemia which is very well controlled with Crestor which was continued.  Blood sugar was elevated on last lab work however patient states the blood sugar is much better now he will continue Januvia and Metformin.  Healthy lifestyle weight loss stressed.  Follow-up scheduled  This visit has been rescheduled as a phone visit to comply with patient safety concerns in accordance with CDC recommendations. Total time of discussion was 24 minutes.          No follow-ups on file.

## 2021-04-02 ENCOUNTER — OFFICE VISIT (OUTPATIENT)
Dept: CARDIOLOGY | Facility: CLINIC | Age: 76
End: 2021-04-02

## 2021-04-02 VITALS
RESPIRATION RATE: 14 BRPM | HEART RATE: 61 BPM | DIASTOLIC BLOOD PRESSURE: 62 MMHG | OXYGEN SATURATION: 98 % | TEMPERATURE: 98.3 F | SYSTOLIC BLOOD PRESSURE: 120 MMHG | WEIGHT: 217.2 LBS | BODY MASS INDEX: 30.41 KG/M2 | HEIGHT: 71 IN

## 2021-04-02 DIAGNOSIS — M51.36 DDD (DEGENERATIVE DISC DISEASE), LUMBAR: ICD-10-CM

## 2021-04-02 DIAGNOSIS — I10 ESSENTIAL HYPERTENSION: ICD-10-CM

## 2021-04-02 DIAGNOSIS — E11.21 TYPE 2 DIABETES MELLITUS WITH DIABETIC NEPHROPATHY, WITHOUT LONG-TERM CURRENT USE OF INSULIN (HCC): ICD-10-CM

## 2021-04-02 DIAGNOSIS — E78.2 MIXED HYPERLIPIDEMIA: Primary | ICD-10-CM

## 2021-04-02 PROCEDURE — 99213 OFFICE O/P EST LOW 20 MIN: CPT | Performed by: INTERNAL MEDICINE

## 2021-04-02 RX ORDER — HYDROCODONE BITARTRATE AND ACETAMINOPHEN 7.5; 325 MG/1; MG/1
1 TABLET ORAL 3 TIMES DAILY PRN
Qty: 90 TABLET | Refills: 0 | Status: SHIPPED | OUTPATIENT
Start: 2021-04-02 | End: 2021-05-07 | Stop reason: SDUPTHER

## 2021-04-02 RX ORDER — ESOMEPRAZOLE MAGNESIUM 40 MG/1
40 CAPSULE, DELAYED RELEASE ORAL
Qty: 90 CAPSULE | Refills: 2 | Status: SHIPPED | OUTPATIENT
Start: 2021-04-02 | End: 2021-07-29 | Stop reason: SDUPTHER

## 2021-04-02 NOTE — PROGRESS NOTES
subjective     Chief Complaint   Patient presents with   • Med Refill     refill   • Hypertension     follow up   • Hyperlipidemia     follow up     History of Present Illness    Patient is 75 years old white male who is here for follow-up of multiple chronic medical problems.  Patient has essential hypertension.  Blood pressure still very well controlled.  He is taking medications regularly.  He also has hyperlipidemia on Crestor therapy without drug side effects.  Diabetes mellitus on Januvia and Metformin.  Blood sugar according to him is good.  Anxiety is very well controlled with Prozac.  He also has GERD symptoms which is better with Nexium.  He has history of prostate cancer also and has been following with urologist.  He has been taking his medications regularly.    Past Surgical History:   Procedure Laterality Date   • COLONOSCOPY  03/2018   • EYE SURGERY     • FOOT SURGERY     • HERNIA REPAIR     • HYDROCELECTOMY  06/15/2015   • PROSTATE BIOPSY  2018   • PROSTATE FIDUCIAL MARKER PLACEMENT  09/14/2018   • RHINOPLASTY     • UPPER GASTROINTESTINAL ENDOSCOPY  03/24/2014    WITH BIOPSIES AND DILATION     Family History   Problem Relation Age of Onset   • Kidney disease Other    • Other Other         CHRONIC DISABLING DISEASES URINARY TRACT DISEASE   • Diabetes Other    • Hypertension Other    • Other Mother    • Heart failure Father    • Stroke Sister    • Arthritis Sister    • Heart disease Brother    • No Known Problems Brother    • No Known Problems Brother      Past Medical History:   Diagnosis Date   • Anxiety    • Arthritis    • Colitis    • Depression    • GERD (gastroesophageal reflux disease)    • Gout    • Heart murmur    • History of EKG 12/22/2015    NORMAL   • Hyperlipidemia    • Hypertension    • Obesity    • Pancreatitis     history of   • Prostate cancer (CMS/HCC)    • Renal failure     MILD one functioning kidney   • Skin cancer     basal cell cancer on back     Patient Active Problem List    Diagnosis   • Essential hypertension, labile   • GERD (gastroesophageal reflux disease)   • Renal failure   • Hyperlipidemia   • Anxiety   • Depression   • Type 2 diabetes mellitus (CMS/HCC)   • Periumbilical abdominal pain   • DDD (degenerative disc disease), lumbar   • Prostate cancer (CMS/HCC)   • Hyperuricemia   • BURKS (dyspnea on exertion)   • Bradycardia   • Lower urinary tract symptoms due to benign prostatic hyperplasia   • Raised prostate specific antigen   • Chest pain in adult       Social History     Tobacco Use   • Smoking status: Former Smoker     Packs/day: 1.00     Types: Cigarettes     Quit date:      Years since quittin.2   • Smokeless tobacco: Never Used   Substance Use Topics   • Alcohol use: Yes     Alcohol/week: 7.0 standard drinks     Types: 7 Shots of liquor per week     Comment:  once per day   • Drug use: No       No Known Allergies    Current Outpatient Medications on File Prior to Visit   Medication Sig   • ALPRAZolam (XANAX) 0.25 MG tablet Take 1 tablet by mouth 2 (Two) Times a Day As Needed (as needed for anxiety).   • amLODIPine (NORVASC) 10 MG tablet Take 1 tablet by mouth Daily.   • aspirin 81 MG tablet Take 81 mg by mouth daily.   • bicalutamide (CASODEX) 50 MG chemo tablet Casodex 50 mg tablet   Take 1 tablet every day by oral route for 30 days.   • Blood Glucose Monitoring Suppl w/Device kit Check blood sugar once a day.    DX: E11.9   • cholecalciferol (VITAMIN D3) 1000 UNITS tablet Take 1,000 Units by mouth daily.   • cloNIDine (CATAPRES) 0.1 MG tablet Take 0.05 mg by mouth As Needed. TID TO QID PRN    • Cyanocobalamin (VITAMIN B12 PO) Take  by mouth daily.   • FLUoxetine (PROzac) 20 MG capsule Take 1 capsule by mouth Daily.   • folic acid (FOLVITE) 1 MG tablet Take 1 tablet by mouth Daily.   • glucose blood (Contour Next Test) test strip Check blood glucose daily   • hydrALAZINE (APRESOLINE) 50 MG tablet TAKE 1 TABLET BY MOUTH 3  TIMES A DAY   •  HYDROcodone-acetaminophen (NORCO) 7.5-325 MG per tablet Take 1 tablet by mouth 3 (Three) Times a Day As Needed for Moderate Pain .   • hydrOXYzine (ATARAX) 25 MG tablet Take 1 tablet by mouth At Night As Needed (PRN).   • Leuprolide Acetate (ELIGARD SC) Inject 1 application under the skin into the appropriate area as directed Every 6 (Six) Months. 3/2019 first one month shot   • loratadine (Claritin) 10 MG tablet Take 1 tablet by mouth Daily.   • meclizine (ANTIVERT) 25 MG tablet Take 1 tablet by mouth 3 (three) times a day as needed for dizziness.   • metFORMIN (FORTAMET) 500 MG (OSM) 24 hr tablet Take 1 tablet by mouth Daily With Breakfast.   • metoprolol tartrate (LOPRESSOR) 25 MG tablet Take 1 tablet by mouth 2 (Two) Times a Day.   • Microlet Lancets misc 1 stick Daily.   • oxybutynin (DITROPAN) 5 MG tablet Take 5 mg by mouth.   • pantoprazole (PROTONIX) 40 MG EC tablet Take 1 tablet by mouth Daily.   • Pyridoxine HCl (VITAMIN B-6 PO) Take  by mouth daily.   • rosuvastatin (CRESTOR) 20 MG tablet Take 1 tablet by mouth Every Night.   • SITagliptin (Januvia) 100 MG tablet Take 1 tablet by mouth Daily.     No current facility-administered medications on file prior to visit.         The following portions of the patient's history were reviewed and updated as appropriate: allergies, current medications, past family history, past medical history, past social history, past surgical history and problem list.    Review of Systems   Constitutional: Negative.   HENT: Negative.  Negative for congestion.    Eyes: Negative.    Cardiovascular: Negative.  Negative for chest pain, cyanosis, dyspnea on exertion, irregular heartbeat, leg swelling, near-syncope, orthopnea, palpitations, paroxysmal nocturnal dyspnea and syncope.   Respiratory: Negative.  Negative for shortness of breath.    Hematologic/Lymphatic: Negative.    Musculoskeletal: Negative.    Gastrointestinal: Negative.    Neurological: Negative.  Negative for  "headaches.          Objective:     /62 (BP Location: Left arm, Patient Position: Sitting, Cuff Size: Adult)   Pulse 61   Temp 98.3 °F (36.8 °C) (Temporal)   Resp 14   Ht 180.3 cm (70.98\")   Wt 98.5 kg (217 lb 3.2 oz)   SpO2 98%   BMI 30.31 kg/m²   Vitals and nursing note reviewed.   Constitutional:       General: Not in acute distress.     Appearance: Healthy appearance. Well-developed and not in distress. Not diaphoretic.   Eyes:      General: No scleral icterus.     Conjunctiva/sclera: Conjunctivae normal.      Pupils: Pupils are equal, round, and reactive to light.   HENT:      Head: Normocephalic and atraumatic.   Neck:      Thyroid: Thyroid normal. No thyromegaly.      Vascular: No JVD. JVD normal.      Trachea: No tracheal deviation.      Lymphadenopathy: No cervical adenopathy.   Pulmonary:      Effort: Pulmonary effort is normal. No respiratory distress.      Breath sounds: Normal breath sounds and air entry.   Chest:      Chest wall: Not tender to palpatation.   Cardiovascular:      PMI at left midclavicular line. Normal rate. Regular rhythm.      No gallop.   Pulses:     Intact distal pulses. No decreased pulses.   Abdominal:      General: Bowel sounds are normal. There is no distension or abdominal bruit.      Palpations: Abdomen is soft. There is no hepatomegaly, splenomegaly or abdominal mass.      Tenderness: There is no abdominal tenderness. There is no guarding or rebound.   Musculoskeletal:      Cervical back: Normal range of motion and neck supple. Skin:     General: Skin is warm and dry. There is no cyanosis.      Coloration: Skin is not jaundiced or pale.      Findings: No erythema or rash.   Neurological:      Mental Status: Alert, oriented to person, place, and time and oriented to person, place and time.   Psychiatric:         Attention and Perception: Attention normal.         Mood and Affect: Mood and affect normal.         Speech: Speech normal.         Behavior: Behavior is " cooperative.           Lab Review  Lab Results   Component Value Date     01/07/2021    K 3.9 01/07/2021     01/07/2021    BUN 19 01/07/2021    CREATININE 1.26 01/07/2021    GLUCOSE 190 (H) 01/07/2021    CALCIUM 9.9 01/07/2021    ALT 22 01/07/2021    ALKPHOS 56 01/07/2021    LABIL2 1.8 03/07/2016     Lab Results   Component Value Date    CKTOTAL 130 01/07/2021     Lab Results   Component Value Date    WBC 10.35 01/07/2021    HGB 15.1 01/07/2021    HCT 44.4 01/07/2021     01/07/2021     No results found for: INR  No results found for: MG  Lab Results   Component Value Date    PSA <0.014 12/09/2019    TSH 2.807 09/28/2015     No results found for: BNP  Lab Results   Component Value Date    CHLPL 227 (H) 03/07/2016    CHOL 175 01/07/2021    TRIG 275 (H) 01/07/2021    HDL 40 01/07/2021    VLDL 46 (H) 01/07/2021    LDLHDL 2.00 01/07/2021     Lab Results   Component Value Date    LDL 89 01/07/2021     (H) 10/30/2020       Procedures       I personally viewed and interpreted the patient's LAB data         Assessment:     1. Mixed hyperlipidemia    2. DDD (degenerative disc disease), lumbar    3. Essential hypertension    4. Type 2 diabetes mellitus with diabetic nephropathy, without long-term current use of insulin (CMS/[facility])          Plan:     Hyperlipidemia  Lipid control is good with total cholesterol of 175 however triglyceride is elevated at 275.  LDL is 89.  He will continue current medications.  Healthy lifestyle weight loss emphasized.    Diabetes mellitus  Not very well controlled patient states that he is eating a lot of sweets and has very poor control however his recent blood sugar has been around 100 2230.  He promises to follow diet more strictly otherwise medication will need to be changed.    Blood pressure  Seems very well controlled he was advised to continue clonidine as needed along with Norvasc 10 mg daily, hydralazine 50 mg 3 times daily and Lopressor 25 twice  daily.    Anxiety is controlled he will continue Xanax and Prozac.  Degenerative disc disease and back pain is better controlled, Norco was continued.  Side effects were discussed follow-up scheduled        No follow-ups on file.

## 2021-05-07 ENCOUNTER — OFFICE VISIT (OUTPATIENT)
Dept: CARDIOLOGY | Facility: CLINIC | Age: 76
End: 2021-05-07

## 2021-05-07 VITALS
SYSTOLIC BLOOD PRESSURE: 136 MMHG | BODY MASS INDEX: 30.52 KG/M2 | DIASTOLIC BLOOD PRESSURE: 76 MMHG | WEIGHT: 218 LBS | TEMPERATURE: 97.8 F | HEIGHT: 71 IN | OXYGEN SATURATION: 97 % | HEART RATE: 63 BPM

## 2021-05-07 DIAGNOSIS — N18.31 STAGE 3A CHRONIC KIDNEY DISEASE (HCC): ICD-10-CM

## 2021-05-07 DIAGNOSIS — I10 ESSENTIAL HYPERTENSION: ICD-10-CM

## 2021-05-07 DIAGNOSIS — E78.2 MIXED HYPERLIPIDEMIA: Primary | ICD-10-CM

## 2021-05-07 DIAGNOSIS — F41.9 ANXIETY: ICD-10-CM

## 2021-05-07 DIAGNOSIS — M51.36 DDD (DEGENERATIVE DISC DISEASE), LUMBAR: ICD-10-CM

## 2021-05-07 DIAGNOSIS — E11.21 TYPE 2 DIABETES MELLITUS WITH DIABETIC NEPHROPATHY, WITHOUT LONG-TERM CURRENT USE OF INSULIN (HCC): ICD-10-CM

## 2021-05-07 PROCEDURE — 99213 OFFICE O/P EST LOW 20 MIN: CPT | Performed by: INTERNAL MEDICINE

## 2021-05-07 RX ORDER — ALPRAZOLAM 0.25 MG/1
0.25 TABLET ORAL 2 TIMES DAILY PRN
Qty: 60 TABLET | Refills: 2 | Status: SHIPPED | OUTPATIENT
Start: 2021-05-07 | End: 2021-05-27

## 2021-05-07 RX ORDER — HYDROCODONE BITARTRATE AND ACETAMINOPHEN 7.5; 325 MG/1; MG/1
1 TABLET ORAL 3 TIMES DAILY PRN
Qty: 90 TABLET | Refills: 0 | Status: SHIPPED | OUTPATIENT
Start: 2021-05-07 | End: 2021-06-11 | Stop reason: SDUPTHER

## 2021-05-07 NOTE — PROGRESS NOTES
subjective     Chief Complaint   Patient presents with   • Hypertension     Follow up   • Anxiety     Follow up   • Back Pain     Follow up   • Med Refill     pending     History of Present Illness  Patient is 75 years old white male who is here for follow-up.  He has essential hypertension, blood pressure is very well controlled with current medications.  He is taking Norvasc, hydralazine, Lopressor and clonidine on as needed basis.    Patient also has hyperlipidemia he is taking Crestor 20 mg daily without any drug side effects.    Diabetes mellitus on Januvia  Patient checks his sugar regularly.    Patient has lot of anxiety and has been taking Xanax.  Also has history of prostate cancer and a lot of backache.  He is taking Norco and is compliant with medication.  He needs refill of medications    Past Surgical History:   Procedure Laterality Date   • COLONOSCOPY  03/2018   • EYE SURGERY     • FOOT SURGERY     • HERNIA REPAIR     • HYDROCELECTOMY  06/15/2015   • PROSTATE BIOPSY  2018   • PROSTATE FIDUCIAL MARKER PLACEMENT  09/14/2018   • RHINOPLASTY     • UPPER GASTROINTESTINAL ENDOSCOPY  03/24/2014    WITH BIOPSIES AND DILATION     Family History   Problem Relation Age of Onset   • Kidney disease Other    • Other Other         CHRONIC DISABLING DISEASES URINARY TRACT DISEASE   • Diabetes Other    • Hypertension Other    • Other Mother    • Heart failure Father    • Stroke Sister    • Arthritis Sister    • Heart disease Brother    • No Known Problems Brother    • No Known Problems Brother      Past Medical History:   Diagnosis Date   • Anxiety    • Arthritis    • Colitis    • Depression    • GERD (gastroesophageal reflux disease)    • Gout    • Heart murmur    • History of EKG 12/22/2015    NORMAL   • Hyperlipidemia    • Hypertension    • Obesity    • Pancreatitis     history of   • Prostate cancer (CMS/HCC)    • Renal failure     MILD one functioning kidney   • Skin cancer     basal cell cancer on back      Patient Active Problem List   Diagnosis   • Essential hypertension, labile   • GERD (gastroesophageal reflux disease)   • Renal failure   • Hyperlipidemia   • Anxiety   • Depression   • Type 2 diabetes mellitus (CMS/HCC)   • Periumbilical abdominal pain   • DDD (degenerative disc disease), lumbar   • Prostate cancer (CMS/HCC)   • Hyperuricemia   • BURKS (dyspnea on exertion)   • Bradycardia   • Lower urinary tract symptoms due to benign prostatic hyperplasia   • Raised prostate specific antigen   • Chest pain in adult       Social History     Tobacco Use   • Smoking status: Former Smoker     Packs/day: 1.00     Types: Cigarettes     Quit date:      Years since quittin.3   • Smokeless tobacco: Never Used   Substance Use Topics   • Alcohol use: Yes     Alcohol/week: 7.0 standard drinks     Types: 7 Shots of liquor per week     Comment:  once per day   • Drug use: No       No Known Allergies    Current Outpatient Medications on File Prior to Visit   Medication Sig   • amLODIPine (NORVASC) 10 MG tablet Take 1 tablet by mouth Daily.   • aspirin 81 MG tablet Take 81 mg by mouth daily.   • bicalutamide (CASODEX) 50 MG chemo tablet Casodex 50 mg tablet   Take 1 tablet every day by oral route for 30 days.   • Blood Glucose Monitoring Suppl w/Device kit Check blood sugar once a day.    DX: E11.9   • cholecalciferol (VITAMIN D3) 1000 UNITS tablet Take 1,000 Units by mouth daily.   • cloNIDine (CATAPRES) 0.1 MG tablet Take 0.05 mg by mouth As Needed. TID TO QID PRN    • Cyanocobalamin (VITAMIN B12 PO) Take  by mouth daily.   • esomeprazole (nexIUM) 40 MG capsule Take 1 capsule by mouth Every Morning Before Breakfast.   • FLUoxetine (PROzac) 20 MG capsule Take 1 capsule by mouth Daily.   • folic acid (FOLVITE) 1 MG tablet Take 1 tablet by mouth Daily.   • glucose blood (Contour Next Test) test strip Check blood glucose daily   • hydrALAZINE (APRESOLINE) 50 MG tablet TAKE 1 TABLET BY MOUTH 3  TIMES A DAY   •  hydrOXYzine (ATARAX) 25 MG tablet Take 1 tablet by mouth At Night As Needed (PRN).   • Leuprolide Acetate (ELIGARD SC) Inject 1 application under the skin into the appropriate area as directed Every 6 (Six) Months. 3/2019 first one month shot   • loratadine (Claritin) 10 MG tablet Take 1 tablet by mouth Daily.   • meclizine (ANTIVERT) 25 MG tablet Take 1 tablet by mouth 3 (three) times a day as needed for dizziness.   • metoprolol tartrate (LOPRESSOR) 25 MG tablet Take 1 tablet by mouth 2 (Two) Times a Day.   • Microlet Lancets misc 1 stick Daily.   • Pyridoxine HCl (VITAMIN B-6 PO) Take  by mouth daily.   • rosuvastatin (CRESTOR) 20 MG tablet Take 1 tablet by mouth Every Night.   • SITagliptin (Januvia) 100 MG tablet Take 1 tablet by mouth Daily.   • [DISCONTINUED] ALPRAZolam (XANAX) 0.25 MG tablet Take 1 tablet by mouth 2 (Two) Times a Day As Needed (as needed for anxiety).   • [DISCONTINUED] HYDROcodone-acetaminophen (NORCO) 7.5-325 MG per tablet Take 1 tablet by mouth 3 (Three) Times a Day As Needed for Moderate Pain . Due 3/30   • [DISCONTINUED] metFORMIN (FORTAMET) 500 MG (OSM) 24 hr tablet Take 1 tablet by mouth Daily With Breakfast.   • [DISCONTINUED] oxybutynin (DITROPAN) 5 MG tablet Take 5 mg by mouth.     No current facility-administered medications on file prior to visit.         The following portions of the patient's history were reviewed and updated as appropriate: allergies, current medications, past family history, past medical history, past social history, past surgical history and problem list.    Review of Systems   Constitutional: Negative.   HENT: Negative.  Negative for congestion.    Eyes: Negative.    Cardiovascular: Negative.  Negative for chest pain, cyanosis, dyspnea on exertion, irregular heartbeat, leg swelling, near-syncope, orthopnea, palpitations, paroxysmal nocturnal dyspnea and syncope.   Respiratory: Negative.  Negative for shortness of breath.    Hematologic/Lymphatic: Negative.   "  Musculoskeletal: Positive for back pain.   Gastrointestinal: Negative.    Neurological: Negative.  Negative for headaches.   Psychiatric/Behavioral: The patient is nervous/anxious.           Objective:     /76 (BP Location: Left arm, Patient Position: Sitting, Cuff Size: Adult)   Pulse 63   Temp 97.8 °F (36.6 °C)   Ht 180.3 cm (71\")   Wt 98.9 kg (218 lb)   SpO2 97%   BMI 30.40 kg/m²   Vitals and nursing note reviewed.   Pulmonary:      Effort: Pulmonary effort is normal.      Breath sounds: Normal breath sounds. No stridor. No wheezing. No rhonchi. No rales.   Cardiovascular:      PMI at left midclavicular line. Normal rate. Regular rhythm. Normal S1. Normal S2.      Murmurs: There is no murmur.      No gallop. No click. No rub.   Pulses:     Intact distal pulses.   Edema:     Peripheral edema absent.           Lab Review  Lab Results   Component Value Date     01/07/2021    K 3.9 01/07/2021     01/07/2021    BUN 19 01/07/2021    CREATININE 1.26 01/07/2021    GLUCOSE 190 (H) 01/07/2021    CALCIUM 9.9 01/07/2021    ALT 22 01/07/2021    ALKPHOS 56 01/07/2021    LABIL2 1.8 03/07/2016     Lab Results   Component Value Date    CKTOTAL 130 01/07/2021     Lab Results   Component Value Date    WBC 10.35 01/07/2021    HGB 15.1 01/07/2021    HCT 44.4 01/07/2021     01/07/2021     No results found for: INR  No results found for: MG  Lab Results   Component Value Date    PSA <0.014 12/09/2019    TSH 2.807 09/28/2015     No results found for: BNP  Lab Results   Component Value Date    CHLPL 227 (H) 03/07/2016    CHOL 175 01/07/2021    TRIG 275 (H) 01/07/2021    HDL 40 01/07/2021    VLDL 46 (H) 01/07/2021    LDLHDL 2.00 01/07/2021     Lab Results   Component Value Date    LDL 89 01/07/2021     (H) 10/30/2020       Procedures       I personally viewed and interpreted the patient's LAB data         Assessment:     1. Mixed hyperlipidemia    2. Anxiety    3. DDD (degenerative disc disease), " lumbar    4. Essential hypertension    5. Type 2 diabetes mellitus with diabetic nephropathy, without long-term current use of insulin (CMS/[facility])    6. Stage 3a chronic kidney disease (CMS/[facility])          Plan:     Hyperlipidemia  Patient was advised to continue Lipitor, healthy lifestyle emphasized.  CBC CMP lipid panel and CK scheduled.    Essential hypertension  Blood pressure is very well controlled he will continue Cardizem and Diovan.    Diabetes mellitus  Januvia was continued hemoglobin A1c and urine for microalbumin next visit.    Chronic kidney disease  Patient was advised to drink more fluids.    Xanax was renewed for anxiety.  Side effects explained.  Because of chronic back pain and inability to take NSAIDs due to renal failure Anna refill was given.  Follow-up scheduled          No follow-ups on file.

## 2021-05-27 DIAGNOSIS — F41.9 ANXIETY: ICD-10-CM

## 2021-05-27 RX ORDER — ALPRAZOLAM 0.25 MG/1
TABLET ORAL
Qty: 60 TABLET | Refills: 2 | Status: SHIPPED | OUTPATIENT
Start: 2021-05-27 | End: 2021-09-24 | Stop reason: SDUPTHER

## 2021-06-01 RX ORDER — FLUOXETINE HYDROCHLORIDE 20 MG/1
20 CAPSULE ORAL DAILY
Qty: 90 CAPSULE | Refills: 3 | Status: SHIPPED | OUTPATIENT
Start: 2021-06-01 | End: 2021-06-30 | Stop reason: SDUPTHER

## 2021-06-01 RX ORDER — AMLODIPINE BESYLATE 10 MG/1
10 TABLET ORAL DAILY
Qty: 90 TABLET | Refills: 3 | Status: SHIPPED | OUTPATIENT
Start: 2021-06-01 | End: 2021-06-30 | Stop reason: SDUPTHER

## 2021-06-09 RX ORDER — DOXYCYCLINE HYCLATE 100 MG
100 TABLET ORAL 2 TIMES DAILY
Qty: 20 TABLET | Refills: 0 | Status: SHIPPED | OUTPATIENT
Start: 2021-06-09 | End: 2021-07-16

## 2021-06-11 ENCOUNTER — OFFICE VISIT (OUTPATIENT)
Dept: CARDIOLOGY | Facility: CLINIC | Age: 76
End: 2021-06-11

## 2021-06-11 VITALS
TEMPERATURE: 97.1 F | DIASTOLIC BLOOD PRESSURE: 68 MMHG | HEART RATE: 61 BPM | WEIGHT: 213.4 LBS | BODY MASS INDEX: 29.88 KG/M2 | OXYGEN SATURATION: 98 % | HEIGHT: 71 IN | SYSTOLIC BLOOD PRESSURE: 118 MMHG

## 2021-06-11 DIAGNOSIS — R07.9 CHEST PAIN IN ADULT: ICD-10-CM

## 2021-06-11 DIAGNOSIS — C61 PROSTATE CANCER (HCC): ICD-10-CM

## 2021-06-11 DIAGNOSIS — I10 ESSENTIAL HYPERTENSION: Primary | ICD-10-CM

## 2021-06-11 DIAGNOSIS — R06.09 DOE (DYSPNEA ON EXERTION): ICD-10-CM

## 2021-06-11 DIAGNOSIS — F41.9 ANXIETY: ICD-10-CM

## 2021-06-11 DIAGNOSIS — M51.36 DDD (DEGENERATIVE DISC DISEASE), LUMBAR: ICD-10-CM

## 2021-06-11 DIAGNOSIS — E78.2 MIXED HYPERLIPIDEMIA: ICD-10-CM

## 2021-06-11 PROCEDURE — 99213 OFFICE O/P EST LOW 20 MIN: CPT | Performed by: INTERNAL MEDICINE

## 2021-06-11 RX ORDER — HYDROCODONE BITARTRATE AND ACETAMINOPHEN 7.5; 325 MG/1; MG/1
1 TABLET ORAL 3 TIMES DAILY PRN
Qty: 90 TABLET | Refills: 0 | Status: SHIPPED | OUTPATIENT
Start: 2021-06-11 | End: 2021-07-16 | Stop reason: SDUPTHER

## 2021-06-11 RX ORDER — FOLIC ACID 1 MG/1
1 TABLET ORAL DAILY
Qty: 90 TABLET | Refills: 3 | Status: SHIPPED | OUTPATIENT
Start: 2021-06-11 | End: 2022-08-19 | Stop reason: SDUPTHER

## 2021-06-11 RX ORDER — LORATADINE 10 MG/1
10 TABLET ORAL DAILY
Qty: 90 TABLET | Refills: 0 | Status: SHIPPED | OUTPATIENT
Start: 2021-06-11 | End: 2021-06-30 | Stop reason: SDUPTHER

## 2021-06-11 NOTE — PROGRESS NOTES
subjective     Chief Complaint   Patient presents with   • Mixed hyperlipidemia   • Hypertension     History of Present Illness  Patient is 75 years old white male who is here for follow-up.  Patient has essential hypertension however is very well controlled currently with multiple medications.  He is taking Lopressor, hydralazine, Norvasc and clonidine    He also has hyperlipidemia and is taking Crestor 20 mg daily.    Diabetes mellitus on Januvia  Patient also has history of prostate cancer and has degenerative disc disease.  He is taking hydrocodone for pain control patient is compliant with medications.  He needs refill  He complains of being short of breath.  He has multiple risk factors for coronary artery disease will arrange a stress test.    Past Surgical History:   Procedure Laterality Date   • COLONOSCOPY  03/2018   • EYE SURGERY     • FOOT SURGERY     • HERNIA REPAIR     • HYDROCELECTOMY  06/15/2015   • PROSTATE BIOPSY  2018   • PROSTATE FIDUCIAL MARKER PLACEMENT  09/14/2018   • RHINOPLASTY     • UPPER GASTROINTESTINAL ENDOSCOPY  03/24/2014    WITH BIOPSIES AND DILATION     Family History   Problem Relation Age of Onset   • Kidney disease Other    • Other Other         CHRONIC DISABLING DISEASES URINARY TRACT DISEASE   • Diabetes Other    • Hypertension Other    • Other Mother    • Heart failure Father    • Stroke Sister    • Arthritis Sister    • Heart disease Brother    • No Known Problems Brother    • No Known Problems Brother      Past Medical History:   Diagnosis Date   • Anxiety    • Arthritis    • Colitis    • Depression    • GERD (gastroesophageal reflux disease)    • Gout    • Heart murmur    • History of EKG 12/22/2015    NORMAL   • Hyperlipidemia    • Hypertension    • Obesity    • Pancreatitis     history of   • Prostate cancer (CMS/HCC)    • Renal failure     MILD one functioning kidney   • Skin cancer     basal cell cancer on back     Patient Active Problem List   Diagnosis   • Essential  hypertension, labile   • GERD (gastroesophageal reflux disease)   • Renal failure   • Hyperlipidemia   • Anxiety   • Depression   • Type 2 diabetes mellitus (CMS/HCC)   • Periumbilical abdominal pain   • DDD (degenerative disc disease), lumbar   • Prostate cancer (CMS/HCC)   • Hyperuricemia   • BURKS (dyspnea on exertion)   • Bradycardia   • Lower urinary tract symptoms due to benign prostatic hyperplasia   • Raised prostate specific antigen   • Chest pain in adult       Social History     Tobacco Use   • Smoking status: Former Smoker     Packs/day: 1.00     Types: Cigarettes     Quit date:      Years since quittin.4   • Smokeless tobacco: Never Used   Substance Use Topics   • Alcohol use: Yes     Alcohol/week: 7.0 standard drinks     Types: 7 Shots of liquor per week     Comment:  once per day   • Drug use: No       No Known Allergies    Current Outpatient Medications on File Prior to Visit   Medication Sig   • ALPRAZolam (XANAX) 0.25 MG tablet TAKE 1 TABLET BY MOUTH TWICE DAILY AS NEEDED FOR ANXIETY   • amLODIPine (NORVASC) 10 MG tablet TAKE 1 TABLET BY MOUTH  DAILY   • aspirin 81 MG tablet Take 81 mg by mouth daily.   • bicalutamide (CASODEX) 50 MG chemo tablet Casodex 50 mg tablet   Take 1 tablet every day by oral route for 30 days.   • Blood Glucose Monitoring Suppl w/Device kit Check blood sugar once a day.    DX: E11.9   • cholecalciferol (VITAMIN D3) 1000 UNITS tablet Take 1,000 Units by mouth daily.   • cloNIDine (CATAPRES) 0.1 MG tablet Take 0.05 mg by mouth As Needed. TID TO QID PRN    • Cyanocobalamin (VITAMIN B12 PO) Take  by mouth daily.   • doxycycline (VIBRAMYICN) 100 MG tablet Take 1 tablet by mouth 2 (Two) Times a Day.   • esomeprazole (nexIUM) 40 MG capsule Take 1 capsule by mouth Every Morning Before Breakfast.   • FLUoxetine (PROzac) 20 MG capsule TAKE 1 CAPSULE BY MOUTH  DAILY   • glucose blood (Contour Next Test) test strip Check blood glucose daily   • hydrALAZINE (APRESOLINE) 50 MG  tablet TAKE 1 TABLET BY MOUTH 3  TIMES A DAY   • hydrOXYzine (ATARAX) 25 MG tablet Take 1 tablet by mouth At Night As Needed (PRN).   • Leuprolide Acetate (ELIGARD SC) Inject 1 application under the skin into the appropriate area as directed Every 6 (Six) Months. 3/2019 first one month shot   • meclizine (ANTIVERT) 25 MG tablet Take 1 tablet by mouth 3 (three) times a day as needed for dizziness.   • metoprolol tartrate (LOPRESSOR) 25 MG tablet TAKE 1 TABLET BY MOUTH  TWICE DAILY   • Microlet Lancets misc 1 stick Daily.   • Pyridoxine HCl (VITAMIN B-6 PO) Take  by mouth daily.   • rosuvastatin (CRESTOR) 20 MG tablet Take 1 tablet by mouth Every Night.   • SITagliptin (Januvia) 100 MG tablet Take 1 tablet by mouth Daily.   • [DISCONTINUED] folic acid (FOLVITE) 1 MG tablet Take 1 tablet by mouth Daily.   • [DISCONTINUED] HYDROcodone-acetaminophen (NORCO) 7.5-325 MG per tablet Take 1 tablet by mouth 3 (Three) Times a Day As Needed for Moderate Pain . Due 3/30   • [DISCONTINUED] loratadine (Claritin) 10 MG tablet Take 1 tablet by mouth Daily.     No current facility-administered medications on file prior to visit.         The following portions of the patient's history were reviewed and updated as appropriate: allergies, current medications, past family history, past medical history, past social history, past surgical history and problem list.    Review of Systems   Constitutional: Negative.   HENT: Negative.  Negative for congestion.    Eyes: Negative.    Cardiovascular: Negative.  Negative for chest pain, cyanosis, dyspnea on exertion, irregular heartbeat, leg swelling, near-syncope, orthopnea, palpitations, paroxysmal nocturnal dyspnea and syncope.   Respiratory: Negative.  Negative for shortness of breath.    Hematologic/Lymphatic: Negative.    Musculoskeletal: Positive for arthritis and back pain.   Gastrointestinal: Negative.    Neurological: Negative.  Negative for headaches.          Objective:     /68 (BP  "Location: Left arm, Patient Position: Sitting, Cuff Size: Adult)   Pulse 61   Temp 97.1 °F (36.2 °C) (Infrared)   Ht 180.3 cm (71\")   Wt 96.8 kg (213 lb 6.4 oz)   SpO2 98%   BMI 29.76 kg/m²   Pulmonary:      Effort: Pulmonary effort is normal.      Breath sounds: Normal breath sounds. No stridor. No wheezing. No rhonchi. No rales.   Cardiovascular:      PMI at left midclavicular line. Normal rate. Regular rhythm. Normal S1. Normal S2.      Murmurs: There is no murmur.      No gallop. No click. No rub.   Pulses:     Intact distal pulses.   Edema:     Peripheral edema absent.           Lab Review  Lab Results   Component Value Date     01/07/2021    K 3.9 01/07/2021     01/07/2021    BUN 19 01/07/2021    CREATININE 1.26 01/07/2021    GLUCOSE 190 (H) 01/07/2021    CALCIUM 9.9 01/07/2021    ALT 22 01/07/2021    ALKPHOS 56 01/07/2021    LABIL2 1.8 03/07/2016     Lab Results   Component Value Date    CKTOTAL 130 01/07/2021     Lab Results   Component Value Date    WBC 10.35 01/07/2021    HGB 15.1 01/07/2021    HCT 44.4 01/07/2021     01/07/2021     No results found for: INR  No results found for: MG  Lab Results   Component Value Date    PSA <0.014 12/09/2019    TSH 2.807 09/28/2015     No results found for: BNP  Lab Results   Component Value Date    CHLPL 227 (H) 03/07/2016    CHOL 175 01/07/2021    TRIG 275 (H) 01/07/2021    HDL 40 01/07/2021    VLDL 46 (H) 01/07/2021    LDLHDL 2.00 01/07/2021     Lab Results   Component Value Date    LDL 89 01/07/2021     (H) 10/30/2020       Procedures       I personally viewed and interpreted the patient's LAB data         Assessment:     1. Essential hypertension    2. DDD (degenerative disc disease), lumbar    3. Mixed hyperlipidemia    4. Anxiety    5. Prostate cancer (CMS/HCC)    6. BURKS (dyspnea on exertion)    7. Chest pain in adult          Plan:     Blood pressure is very well controlled he will continue current medications including clonidine " on as needed basis.  Degenerative disc disease chronic back pain Norco refill was given.  Side effects explained.  Hyperlipidemia is better with LDL of 89.  Lab work scheduled.  Patient complains of mild chest discomfort and dyspnea on exertion will arrange a stress test.  Healthy lifestyle emphasized follow-up scheduled        No follow-ups on file.

## 2021-06-30 RX ORDER — AMLODIPINE BESYLATE 10 MG/1
10 TABLET ORAL DAILY
Qty: 90 TABLET | Refills: 3 | Status: SHIPPED | OUTPATIENT
Start: 2021-06-30 | End: 2022-06-20

## 2021-06-30 RX ORDER — HYDRALAZINE HYDROCHLORIDE 50 MG/1
50 TABLET, FILM COATED ORAL 3 TIMES DAILY
Qty: 270 TABLET | Refills: 2 | Status: SHIPPED | OUTPATIENT
Start: 2021-06-30 | End: 2022-01-12

## 2021-06-30 RX ORDER — FLUOXETINE HYDROCHLORIDE 20 MG/1
20 CAPSULE ORAL DAILY
Qty: 90 CAPSULE | Refills: 3 | Status: SHIPPED | OUTPATIENT
Start: 2021-06-30 | End: 2022-06-20

## 2021-06-30 RX ORDER — LORATADINE 10 MG/1
10 TABLET ORAL DAILY
Qty: 90 TABLET | Refills: 0 | Status: SHIPPED | OUTPATIENT
Start: 2021-06-30 | End: 2022-07-15 | Stop reason: SDUPTHER

## 2021-07-13 ENCOUNTER — HOSPITAL ENCOUNTER (OUTPATIENT)
Dept: NUCLEAR MEDICINE | Facility: HOSPITAL | Age: 76
Discharge: HOME | End: 2021-07-13

## 2021-07-13 ENCOUNTER — HOSPITAL ENCOUNTER (OUTPATIENT)
Dept: CARDIOLOGY | Facility: HOSPITAL | Age: 76
Discharge: HOME | End: 2021-07-13

## 2021-07-13 DIAGNOSIS — R06.09 DOE (DYSPNEA ON EXERTION): ICD-10-CM

## 2021-07-13 DIAGNOSIS — R07.9 CHEST PAIN IN ADULT: ICD-10-CM

## 2021-07-13 LAB
BH CV ECHO MEAS - % IVS THICK: 150.6 %
BH CV ECHO MEAS - % LVPW THICK: 83 %
BH CV ECHO MEAS - ACS: 2.4 CM
BH CV ECHO MEAS - AO ROOT AREA (BSA CORRECTED): 1.8
BH CV ECHO MEAS - AO ROOT AREA: 11.3 CM^2
BH CV ECHO MEAS - AO ROOT DIAM: 3.8 CM
BH CV ECHO MEAS - BSA(HAYCOCK): 2.2 M^2
BH CV ECHO MEAS - BSA: 2.2 M^2
BH CV ECHO MEAS - BZI_BMI: 29.7 KILOGRAMS/M^2
BH CV ECHO MEAS - BZI_METRIC_HEIGHT: 180.3 CM
BH CV ECHO MEAS - BZI_METRIC_WEIGHT: 96.6 KG
BH CV ECHO MEAS - EDV(CUBED): 143.1 ML
BH CV ECHO MEAS - EDV(MOD-SP4): 61.5 ML
BH CV ECHO MEAS - EDV(TEICH): 131.2 ML
BH CV ECHO MEAS - EF(CUBED): 90.9 %
BH CV ECHO MEAS - EF(MOD-SP4): 72.5 %
BH CV ECHO MEAS - EF(TEICH): 85.4 %
BH CV ECHO MEAS - ESV(CUBED): 13 ML
BH CV ECHO MEAS - ESV(MOD-SP4): 16.9 ML
BH CV ECHO MEAS - ESV(TEICH): 19.1 ML
BH CV ECHO MEAS - FS: 55.1 %
BH CV ECHO MEAS - IVS/LVPW: 0.87
BH CV ECHO MEAS - IVSD: 0.8 CM
BH CV ECHO MEAS - IVSS: 2 CM
BH CV ECHO MEAS - LA DIMENSION: 4.4 CM
BH CV ECHO MEAS - LA/AO: 1.2
BH CV ECHO MEAS - LV DIASTOLIC VOL/BSA (35-75): 28.4 ML/M^2
BH CV ECHO MEAS - LV MASS(C)D: 160.4 GRAMS
BH CV ECHO MEAS - LV MASS(C)DI: 74 GRAMS/M^2
BH CV ECHO MEAS - LV MASS(C)S: 172.2 GRAMS
BH CV ECHO MEAS - LV MASS(C)SI: 79.5 GRAMS/M^2
BH CV ECHO MEAS - LV SYSTOLIC VOL/BSA (12-30): 7.8 ML/M^2
BH CV ECHO MEAS - LVIDD: 5.2 CM
BH CV ECHO MEAS - LVIDS: 2.4 CM
BH CV ECHO MEAS - LVLD AP4: 7.5 CM
BH CV ECHO MEAS - LVLS AP4: 6.3 CM
BH CV ECHO MEAS - LVPWD: 0.92 CM
BH CV ECHO MEAS - LVPWS: 1.7 CM
BH CV ECHO MEAS - MV A MAX VEL: 59.1 CM/SEC
BH CV ECHO MEAS - MV E MAX VEL: 76.3 CM/SEC
BH CV ECHO MEAS - MV E/A: 1.3
BH CV ECHO MEAS - PA ACC TIME: 0.13 SEC
BH CV ECHO MEAS - PA PR(ACCEL): 21.9 MMHG
BH CV ECHO MEAS - RVDD: 1.6 CM
BH CV ECHO MEAS - SI(CUBED): 60.1 ML/M^2
BH CV ECHO MEAS - SI(MOD-SP4): 20.6 ML/M^2
BH CV ECHO MEAS - SI(TEICH): 51.8 ML/M^2
BH CV ECHO MEAS - SV(CUBED): 130.1 ML
BH CV ECHO MEAS - SV(MOD-SP4): 44.6 ML
BH CV ECHO MEAS - SV(TEICH): 112.1 ML
MAXIMAL PREDICTED HEART RATE: 145 BPM
STRESS TARGET HR: 123 BPM

## 2021-07-13 PROCEDURE — 93306 TTE W/DOPPLER COMPLETE: CPT | Performed by: INTERNAL MEDICINE

## 2021-07-13 PROCEDURE — 78452 HT MUSCLE IMAGE SPECT MULT: CPT | Performed by: INTERNAL MEDICINE

## 2021-07-13 PROCEDURE — 93017 CV STRESS TEST TRACING ONLY: CPT

## 2021-07-13 PROCEDURE — 0 TECHNETIUM SESTAMIBI: Performed by: INTERNAL MEDICINE

## 2021-07-13 PROCEDURE — 93306 TTE W/DOPPLER COMPLETE: CPT

## 2021-07-13 PROCEDURE — 78452 HT MUSCLE IMAGE SPECT MULT: CPT

## 2021-07-13 PROCEDURE — A9500 TC99M SESTAMIBI: HCPCS | Performed by: INTERNAL MEDICINE

## 2021-07-13 PROCEDURE — 93018 CV STRESS TEST I&R ONLY: CPT | Performed by: INTERNAL MEDICINE

## 2021-07-13 RX ADMIN — TECHNETIUM TC 99M SESTAMIBI 1 DOSE: 1 INJECTION INTRAVENOUS at 10:40

## 2021-07-14 ENCOUNTER — LAB (OUTPATIENT)
Dept: LAB | Facility: HOSPITAL | Age: 76
End: 2021-07-14

## 2021-07-14 DIAGNOSIS — E78.2 MIXED HYPERLIPIDEMIA: ICD-10-CM

## 2021-07-14 DIAGNOSIS — E11.21 TYPE 2 DIABETES MELLITUS WITH DIABETIC NEPHROPATHY, WITHOUT LONG-TERM CURRENT USE OF INSULIN (HCC): ICD-10-CM

## 2021-07-14 LAB
ALBUMIN SERPL-MCNC: 4.5 G/DL (ref 3.5–5.2)
ALBUMIN/GLOB SERPL: 2.1 G/DL
ALP SERPL-CCNC: 58 U/L (ref 39–117)
ALT SERPL W P-5'-P-CCNC: 39 U/L (ref 1–41)
ANION GAP SERPL CALCULATED.3IONS-SCNC: 10.8 MMOL/L (ref 5–15)
AST SERPL-CCNC: 23 U/L (ref 1–40)
BASOPHILS # BLD AUTO: 0.07 10*3/MM3 (ref 0–0.2)
BASOPHILS NFR BLD AUTO: 0.8 % (ref 0–1.5)
BH CV NUCLEAR PRIOR STUDY: 3
BH CV REST NUCLEAR ISOTOPE DOSE: 11.1 MCI
BH CV STRESS BP STAGE 1: NORMAL
BH CV STRESS BP STAGE 2: NORMAL
BH CV STRESS COMMENTS STAGE 1: NORMAL
BH CV STRESS COMMENTS STAGE 2: NORMAL
BH CV STRESS DOSE REGADENOSON STAGE 1: 0.4
BH CV STRESS DURATION MIN STAGE 1: 0
BH CV STRESS DURATION MIN STAGE 2: 4
BH CV STRESS DURATION SEC STAGE 1: 10
BH CV STRESS DURATION SEC STAGE 2: 0
BH CV STRESS HR STAGE 1: 72
BH CV STRESS HR STAGE 2: 79
BH CV STRESS NUCLEAR ISOTOPE DOSE: 32.1 MCI
BH CV STRESS PROTOCOL 1: NORMAL
BH CV STRESS RECOVERY BP: NORMAL MMHG
BH CV STRESS RECOVERY HR: 71 BPM
BH CV STRESS STAGE 1: 1
BH CV STRESS STAGE 2: 2
BILIRUB SERPL-MCNC: 0.6 MG/DL (ref 0–1.2)
BUN SERPL-MCNC: 12 MG/DL (ref 8–23)
BUN/CREAT SERPL: 9.8 (ref 7–25)
CALCIUM SPEC-SCNC: 9.4 MG/DL (ref 8.6–10.5)
CHLORIDE SERPL-SCNC: 106 MMOL/L (ref 98–107)
CHOLEST SERPL-MCNC: 208 MG/DL (ref 0–200)
CK SERPL-CCNC: 76 U/L (ref 20–200)
CO2 SERPL-SCNC: 24.2 MMOL/L (ref 22–29)
CREAT SERPL-MCNC: 1.23 MG/DL (ref 0.76–1.27)
DEPRECATED RDW RBC AUTO: 46.3 FL (ref 37–54)
EOSINOPHIL # BLD AUTO: 1.49 10*3/MM3 (ref 0–0.4)
EOSINOPHIL NFR BLD AUTO: 17.8 % (ref 0.3–6.2)
ERYTHROCYTE [DISTWIDTH] IN BLOOD BY AUTOMATED COUNT: 13 % (ref 12.3–15.4)
GFR SERPL CREATININE-BSD FRML MDRD: 57 ML/MIN/1.73
GLOBULIN UR ELPH-MCNC: 2.1 GM/DL
GLUCOSE SERPL-MCNC: 212 MG/DL (ref 65–99)
HBA1C MFR BLD: 8.26 % (ref 4.8–5.6)
HCT VFR BLD AUTO: 42.4 % (ref 37.5–51)
HDLC SERPL-MCNC: 36 MG/DL (ref 40–60)
HGB BLD-MCNC: 14.6 G/DL (ref 13–17.7)
IMM GRANULOCYTES # BLD AUTO: 0.02 10*3/MM3 (ref 0–0.05)
IMM GRANULOCYTES NFR BLD AUTO: 0.2 % (ref 0–0.5)
LDLC SERPL CALC-MCNC: 114 MG/DL (ref 0–100)
LDLC/HDLC SERPL: 2.94 {RATIO}
LV EF NUC BP: 75 %
LYMPHOCYTES # BLD AUTO: 1.98 10*3/MM3 (ref 0.7–3.1)
LYMPHOCYTES NFR BLD AUTO: 23.7 % (ref 19.6–45.3)
MAXIMAL PREDICTED HEART RATE: 145 BPM
MCH RBC QN AUTO: 33.1 PG (ref 26.6–33)
MCHC RBC AUTO-ENTMCNC: 34.4 G/DL (ref 31.5–35.7)
MCV RBC AUTO: 96.1 FL (ref 79–97)
MONOCYTES # BLD AUTO: 0.74 10*3/MM3 (ref 0.1–0.9)
MONOCYTES NFR BLD AUTO: 8.9 % (ref 5–12)
NEUTROPHILS NFR BLD AUTO: 4.05 10*3/MM3 (ref 1.7–7)
NEUTROPHILS NFR BLD AUTO: 48.6 % (ref 42.7–76)
NRBC BLD AUTO-RTO: 0 /100 WBC (ref 0–0.2)
PERCENT MAX PREDICTED HR: 54.48 %
PLATELET # BLD AUTO: 153 10*3/MM3 (ref 140–450)
PMV BLD AUTO: 12.7 FL (ref 6–12)
POTASSIUM SERPL-SCNC: 4.2 MMOL/L (ref 3.5–5.2)
PROT SERPL-MCNC: 6.6 G/DL (ref 6–8.5)
RBC # BLD AUTO: 4.41 10*6/MM3 (ref 4.14–5.8)
SODIUM SERPL-SCNC: 141 MMOL/L (ref 136–145)
STRESS BASELINE BP: NORMAL MMHG
STRESS BASELINE HR: 63 BPM
STRESS PERCENT HR: 64 %
STRESS POST PEAK BP: NORMAL MMHG
STRESS POST PEAK HR: 79 BPM
STRESS TARGET HR: 123 BPM
TRIGL SERPL-MCNC: 331 MG/DL (ref 0–150)
VLDLC SERPL-MCNC: 58 MG/DL (ref 5–40)
WBC # BLD AUTO: 8.35 10*3/MM3 (ref 3.4–10.8)

## 2021-07-14 PROCEDURE — 83036 HEMOGLOBIN GLYCOSYLATED A1C: CPT

## 2021-07-14 PROCEDURE — 82550 ASSAY OF CK (CPK): CPT

## 2021-07-14 PROCEDURE — A9500 TC99M SESTAMIBI: HCPCS | Performed by: INTERNAL MEDICINE

## 2021-07-14 PROCEDURE — 85025 COMPLETE CBC W/AUTO DIFF WBC: CPT

## 2021-07-14 PROCEDURE — 25010000002 REGADENOSON 0.4 MG/5ML SOLUTION: Performed by: INTERNAL MEDICINE

## 2021-07-14 PROCEDURE — 80053 COMPREHEN METABOLIC PANEL: CPT

## 2021-07-14 PROCEDURE — 0 TECHNETIUM SESTAMIBI: Performed by: INTERNAL MEDICINE

## 2021-07-14 PROCEDURE — 36415 COLL VENOUS BLD VENIPUNCTURE: CPT

## 2021-07-14 PROCEDURE — 80061 LIPID PANEL: CPT

## 2021-07-14 RX ADMIN — TECHNETIUM TC 99M SESTAMIBI 1 DOSE: 1 INJECTION INTRAVENOUS at 08:41

## 2021-07-14 RX ADMIN — REGADENOSON 0.4 MG: 0.08 INJECTION, SOLUTION INTRAVENOUS at 08:41

## 2021-07-15 ENCOUNTER — TELEPHONE (OUTPATIENT)
Dept: CARDIOLOGY | Facility: CLINIC | Age: 76
End: 2021-07-15

## 2021-07-15 NOTE — TELEPHONE ENCOUNTER
V/O patient cholesterol is high need to increase crestor to 40mg qd, sugar is also high need to add metformin 500mg qd and farxiga 10mg qd and recheck labs in 3 months.

## 2021-07-15 NOTE — TELEPHONE ENCOUNTER
----- Message from Antione De Santiago MD sent at 7/15/2021  9:21 AM EDT -----  Cholesterol is high, increase Crestor 40 mg dailySugar is also quite high with hemoglobin A1c 8.26Metformin 500 mg dailyFarxiga 10 mg daily and continue Januvia

## 2021-07-16 ENCOUNTER — OFFICE VISIT (OUTPATIENT)
Dept: CARDIOLOGY | Facility: CLINIC | Age: 76
End: 2021-07-16

## 2021-07-16 VITALS
TEMPERATURE: 97.7 F | HEIGHT: 71 IN | OXYGEN SATURATION: 96 % | HEART RATE: 65 BPM | SYSTOLIC BLOOD PRESSURE: 120 MMHG | DIASTOLIC BLOOD PRESSURE: 78 MMHG | WEIGHT: 218.2 LBS | BODY MASS INDEX: 30.55 KG/M2

## 2021-07-16 DIAGNOSIS — E11.21 TYPE 2 DIABETES MELLITUS WITH DIABETIC NEPHROPATHY, WITHOUT LONG-TERM CURRENT USE OF INSULIN (HCC): ICD-10-CM

## 2021-07-16 DIAGNOSIS — I10 ESSENTIAL HYPERTENSION: Primary | ICD-10-CM

## 2021-07-16 DIAGNOSIS — E78.2 MIXED HYPERLIPIDEMIA: ICD-10-CM

## 2021-07-16 DIAGNOSIS — F41.1 GENERALIZED ANXIETY DISORDER: ICD-10-CM

## 2021-07-16 DIAGNOSIS — M51.36 DDD (DEGENERATIVE DISC DISEASE), LUMBAR: ICD-10-CM

## 2021-07-16 PROCEDURE — 99213 OFFICE O/P EST LOW 20 MIN: CPT | Performed by: INTERNAL MEDICINE

## 2021-07-16 RX ORDER — HYDROCODONE BITARTRATE AND ACETAMINOPHEN 7.5; 325 MG/1; MG/1
1 TABLET ORAL 3 TIMES DAILY PRN
Qty: 90 TABLET | Refills: 0 | Status: SHIPPED | OUTPATIENT
Start: 2021-07-16 | End: 2021-08-27 | Stop reason: SDUPTHER

## 2021-07-16 NOTE — PROGRESS NOTES
subjective     Chief Complaint   Patient presents with   • Essential hypertension     History of Present Illness  Patient is 75 years old white male who is here for follow-up.  He had recent lab work done which showed elevated blood sugar and hemoglobin A1c.    Lipid panel was also significantly abnormal.  Patient states that he has been eating a lot of ice cream blood pressure has been very well controlled.  Patient has degenerative disc disease lumbar spine and has been taking Norco and needs refill he is compliant with medications.  Anxiety disorder is controlled with Xanax.    Blood pressure is controlled with clonidine, Norvasc, hydralazine and Lopressor.  He is taking clonidine only on as needed basis.    Crestor was started for hyperlipidemia  He was also advised to take Metformin, Januvia and farxiga    Past Surgical History:   Procedure Laterality Date   • COLONOSCOPY  03/2018   • EYE SURGERY     • FOOT SURGERY     • HERNIA REPAIR     • HYDROCELECTOMY  06/15/2015   • PROSTATE BIOPSY  2018   • PROSTATE FIDUCIAL MARKER PLACEMENT  09/14/2018   • RHINOPLASTY     • UPPER GASTROINTESTINAL ENDOSCOPY  03/24/2014    WITH BIOPSIES AND DILATION     Family History   Problem Relation Age of Onset   • Kidney disease Other    • Other Other         CHRONIC DISABLING DISEASES URINARY TRACT DISEASE   • Diabetes Other    • Hypertension Other    • Other Mother    • Heart failure Father    • Stroke Sister    • Arthritis Sister    • Heart disease Brother    • No Known Problems Brother    • No Known Problems Brother      Past Medical History:   Diagnosis Date   • Anxiety    • Arthritis    • Colitis    • Depression    • GERD (gastroesophageal reflux disease)    • Gout    • Heart murmur    • History of EKG 12/22/2015    NORMAL   • Hyperlipidemia    • Hypertension    • Obesity    • Pancreatitis     history of   • Prostate cancer (CMS/HCC)    • Renal failure     MILD one functioning kidney   • Skin cancer     basal cell cancer on  back     Patient Active Problem List   Diagnosis   • Essential hypertension, labile   • GERD (gastroesophageal reflux disease)   • Renal failure   • Hyperlipidemia   • Anxiety   • Depression   • Type 2 diabetes mellitus (CMS/HCC)   • Periumbilical abdominal pain   • DDD (degenerative disc disease), lumbar   • Prostate cancer (CMS/HCC)   • Hyperuricemia   • BURKS (dyspnea on exertion)   • Bradycardia   • Lower urinary tract symptoms due to benign prostatic hyperplasia   • Raised prostate specific antigen   • Chest pain in adult   • Generalized anxiety disorder       Social History     Tobacco Use   • Smoking status: Former Smoker     Packs/day: 1.00     Types: Cigarettes     Quit date:      Years since quittin.   • Smokeless tobacco: Never Used   Substance Use Topics   • Alcohol use: Yes     Alcohol/week: 7.0 standard drinks     Types: 7 Shots of liquor per week     Comment:  once per day   • Drug use: No       No Known Allergies    Current Outpatient Medications on File Prior to Visit   Medication Sig   • ALPRAZolam (XANAX) 0.25 MG tablet TAKE 1 TABLET BY MOUTH TWICE DAILY AS NEEDED FOR ANXIETY   • amLODIPine (NORVASC) 10 MG tablet Take 1 tablet by mouth Daily.   • aspirin 81 MG tablet Take 81 mg by mouth daily.   • bicalutamide (CASODEX) 50 MG chemo tablet Casodex 50 mg tablet   Take 1 tablet every day by oral route for 30 days.   • Blood Glucose Monitoring Suppl w/Device kit Check blood sugar once a day.    DX: E11.9   • cholecalciferol (VITAMIN D3) 1000 UNITS tablet Take 1,000 Units by mouth daily.   • cloNIDine (CATAPRES) 0.1 MG tablet Take 0.05 mg by mouth As Needed. TID TO QID PRN    • Cyanocobalamin (VITAMIN B12 PO) Take  by mouth daily.   • esomeprazole (nexIUM) 40 MG capsule Take 1 capsule by mouth Every Morning Before Breakfast.   • FLUoxetine (PROzac) 20 MG capsule Take 1 capsule by mouth Daily.   • folic acid (FOLVITE) 1 MG tablet Take 1 tablet by mouth Daily.   • glucose blood (Contour Next  Test) test strip Check blood glucose daily   • hydrALAZINE (APRESOLINE) 50 MG tablet Take 1 tablet by mouth 3 (Three) Times a Day.   • hydrOXYzine (ATARAX) 25 MG tablet Take 1 tablet by mouth At Night As Needed (PRN).   • Leuprolide Acetate (ELIGARD SC) Inject 1 application under the skin into the appropriate area as directed Every 6 (Six) Months. 3/2019 first one month shot   • loratadine (Claritin) 10 MG tablet Take 1 tablet by mouth Daily.   • meclizine (ANTIVERT) 25 MG tablet Take 1 tablet by mouth 3 (three) times a day as needed for dizziness.   • metoprolol tartrate (LOPRESSOR) 25 MG tablet Take 1 tablet by mouth 2 (Two) Times a Day.   • Microlet Lancets misc 1 stick Daily.   • Pyridoxine HCl (VITAMIN B-6 PO) Take  by mouth daily.   • rosuvastatin (CRESTOR) 20 MG tablet Take 1 tablet by mouth Every Night.   • SITagliptin (Januvia) 100 MG tablet Take 1 tablet by mouth Daily.   • [DISCONTINUED] HYDROcodone-acetaminophen (NORCO) 7.5-325 MG per tablet Take 1 tablet by mouth 3 (Three) Times a Day As Needed for Moderate Pain . Due 3/30   • [DISCONTINUED] doxycycline (VIBRAMYICN) 100 MG tablet Take 1 tablet by mouth 2 (Two) Times a Day.     No current facility-administered medications on file prior to visit.         The following portions of the patient's history were reviewed and updated as appropriate: allergies, current medications, past family history, past medical history, past social history, past surgical history and problem list.    Review of Systems   Constitutional: Negative.   HENT: Negative.  Negative for congestion.    Eyes: Negative.    Cardiovascular: Negative.  Negative for chest pain, cyanosis, dyspnea on exertion, irregular heartbeat, leg swelling, near-syncope, orthopnea, palpitations, paroxysmal nocturnal dyspnea and syncope.   Respiratory: Negative.  Negative for shortness of breath.    Hematologic/Lymphatic: Negative.    Musculoskeletal: Positive for back pain.   Gastrointestinal: Negative.   "  Neurological: Negative.  Negative for headaches.   Psychiatric/Behavioral: The patient is nervous/anxious.           Objective:     /78 (BP Location: Left arm, Patient Position: Sitting, Cuff Size: Adult)   Pulse 65   Temp 97.7 °F (36.5 °C) (Infrared)   Ht 180.3 cm (71\")   Wt 99 kg (218 lb 3.2 oz)   SpO2 96%   BMI 30.43 kg/m²   Pulmonary:      Effort: Pulmonary effort is normal.      Breath sounds: Normal breath sounds. No stridor. No wheezing. No rhonchi. No rales.   Cardiovascular:      PMI at left midclavicular line. Normal rate. Regular rhythm. Normal S1. Normal S2.      Murmurs: There is no murmur.      No gallop. No click. No rub.   Pulses:     Intact distal pulses.   Edema:     Peripheral edema absent.           Lab Review  Lab Results   Component Value Date     07/14/2021    K 4.2 07/14/2021     07/14/2021    BUN 12 07/14/2021    CREATININE 1.23 07/14/2021    GLUCOSE 212 (H) 07/14/2021    CALCIUM 9.4 07/14/2021    ALT 39 07/14/2021    ALKPHOS 58 07/14/2021    LABIL2 1.8 03/07/2016     Lab Results   Component Value Date    CKTOTAL 76 07/14/2021     Lab Results   Component Value Date    WBC 8.35 07/14/2021    HGB 14.6 07/14/2021    HCT 42.4 07/14/2021     07/14/2021     No results found for: INR  No results found for: MG  Lab Results   Component Value Date    PSA <0.014 12/09/2019    TSH 2.807 09/28/2015     No results found for: BNP  Lab Results   Component Value Date    CHLPL 227 (H) 03/07/2016    CHOL 208 (H) 07/14/2021    TRIG 331 (H) 07/14/2021    HDL 36 (L) 07/14/2021    VLDL 58 (H) 07/14/2021    LDLHDL 2.94 07/14/2021     Lab Results   Component Value Date     (H) 07/14/2021    LDL 89 01/07/2021       Procedures       I personally viewed and interpreted the patient's LAB data         Assessment:     1. Essential hypertension    2. DDD (degenerative disc disease), lumbar    3. Mixed hyperlipidemia    4. Type 2 diabetes mellitus with diabetic nephropathy, without " long-term current use of insulin (CMS/HCC)    5. Generalized anxiety disorder          Plan:     Norco for pain control was given  Xanax refill was given he will continue Prozac also.    Lipid panel is abnormal he was advised to take Crestor 20 mg daily.  Blood sugar is also very poorly controlled he was advised to take Januvia along with Metformin and Farxiga.  Diet restrictions were emphasized.  Follow-up scheduled        No follow-ups on file.

## 2021-07-29 RX ORDER — ESOMEPRAZOLE MAGNESIUM 40 MG/1
40 CAPSULE, DELAYED RELEASE ORAL
Qty: 90 CAPSULE | Refills: 2 | Status: SHIPPED | OUTPATIENT
Start: 2021-07-29 | End: 2022-01-10 | Stop reason: SDUPTHER

## 2021-08-27 ENCOUNTER — OFFICE VISIT (OUTPATIENT)
Dept: CARDIOLOGY | Facility: CLINIC | Age: 76
End: 2021-08-27

## 2021-08-27 VITALS
HEIGHT: 71 IN | SYSTOLIC BLOOD PRESSURE: 122 MMHG | HEART RATE: 52 BPM | TEMPERATURE: 97.1 F | DIASTOLIC BLOOD PRESSURE: 68 MMHG | WEIGHT: 218 LBS | BODY MASS INDEX: 30.52 KG/M2 | OXYGEN SATURATION: 97 %

## 2021-08-27 DIAGNOSIS — N18.31 STAGE 3A CHRONIC KIDNEY DISEASE (HCC): ICD-10-CM

## 2021-08-27 DIAGNOSIS — F41.9 ANXIETY: ICD-10-CM

## 2021-08-27 DIAGNOSIS — M51.36 DDD (DEGENERATIVE DISC DISEASE), LUMBAR: ICD-10-CM

## 2021-08-27 DIAGNOSIS — I10 ESSENTIAL HYPERTENSION: Primary | ICD-10-CM

## 2021-08-27 DIAGNOSIS — E78.2 MIXED HYPERLIPIDEMIA: ICD-10-CM

## 2021-08-27 DIAGNOSIS — E11.21 TYPE 2 DIABETES MELLITUS WITH DIABETIC NEPHROPATHY, WITHOUT LONG-TERM CURRENT USE OF INSULIN (HCC): ICD-10-CM

## 2021-08-27 PROCEDURE — 99213 OFFICE O/P EST LOW 20 MIN: CPT | Performed by: INTERNAL MEDICINE

## 2021-08-27 RX ORDER — HYDROCODONE BITARTRATE AND ACETAMINOPHEN 7.5; 325 MG/1; MG/1
1 TABLET ORAL 3 TIMES DAILY PRN
Qty: 90 TABLET | Refills: 0 | Status: SHIPPED | OUTPATIENT
Start: 2021-08-27 | End: 2021-09-24 | Stop reason: SDUPTHER

## 2021-08-27 RX ORDER — ALPRAZOLAM 0.25 MG/1
0.25 TABLET ORAL 2 TIMES DAILY PRN
Qty: 60 TABLET | Refills: 2 | Status: CANCELLED | OUTPATIENT
Start: 2021-08-27

## 2021-08-27 NOTE — PROGRESS NOTES
subjective     Chief Complaint   Patient presents with   • Anxiety     follow up   • Back Pain     follow up   • Med Refill     pending     History of Present Illness    Patient is 75 years old white male who is here for follow-up of multiple chronic medical problems.  Patient has lot of anxiety and has been taking Xanax and Prozac which seems to be working. Patient is compliant with medications.    Chronic low back pain well controlled with Norco 7.5 3 times daily and as needed basis.  Patient is aware of the risks including addiction is compliant with medication.    Blood pressure is well controlled with Norvasc hydralazine and Lopressor.  He has clonidine but he does not take it.    Hyperlipidemia on Crestor.  Is also diabetic.  Past Surgical History:   Procedure Laterality Date   • COLONOSCOPY  03/2018   • EYE SURGERY     • FOOT SURGERY     • HERNIA REPAIR     • HYDROCELECTOMY  06/15/2015   • PROSTATE BIOPSY  2018   • PROSTATE FIDUCIAL MARKER PLACEMENT  09/14/2018   • RHINOPLASTY     • UPPER GASTROINTESTINAL ENDOSCOPY  03/24/2014    WITH BIOPSIES AND DILATION     Family History   Problem Relation Age of Onset   • Kidney disease Other    • Other Other         CHRONIC DISABLING DISEASES URINARY TRACT DISEASE   • Diabetes Other    • Hypertension Other    • Other Mother    • Heart failure Father    • Stroke Sister    • Arthritis Sister    • Heart disease Brother    • No Known Problems Brother    • No Known Problems Brother      Past Medical History:   Diagnosis Date   • Anxiety    • Arthritis    • Colitis    • Depression    • GERD (gastroesophageal reflux disease)    • Gout    • Heart murmur    • History of EKG 12/22/2015    NORMAL   • Hyperlipidemia    • Hypertension    • Obesity    • Pancreatitis     history of   • Prostate cancer (CMS/HCC)    • Renal failure     MILD one functioning kidney   • Skin cancer     basal cell cancer on back     Patient Active Problem List   Diagnosis   • Essential hypertension, labile    • GERD (gastroesophageal reflux disease)   • Renal failure   • Hyperlipidemia   • Anxiety   • Depression   • Type 2 diabetes mellitus (CMS/HCC)   • Periumbilical abdominal pain   • DDD (degenerative disc disease), lumbar   • Prostate cancer (CMS/HCC)   • Hyperuricemia   • BURKS (dyspnea on exertion)   • Bradycardia   • Lower urinary tract symptoms due to benign prostatic hyperplasia   • Raised prostate specific antigen   • Chest pain in adult   • Generalized anxiety disorder       Social History     Tobacco Use   • Smoking status: Former Smoker     Packs/day: 1.00     Types: Cigarettes     Quit date:      Years since quittin.6   • Smokeless tobacco: Never Used   Substance Use Topics   • Alcohol use: Yes     Alcohol/week: 7.0 standard drinks     Types: 7 Shots of liquor per week     Comment:  once per day   • Drug use: No       No Known Allergies    Current Outpatient Medications on File Prior to Visit   Medication Sig   • ALPRAZolam (XANAX) 0.25 MG tablet TAKE 1 TABLET BY MOUTH TWICE DAILY AS NEEDED FOR ANXIETY   • amLODIPine (NORVASC) 10 MG tablet Take 1 tablet by mouth Daily.   • aspirin 81 MG tablet Take 81 mg by mouth daily.   • bicalutamide (CASODEX) 50 MG chemo tablet Casodex 50 mg tablet   Take 1 tablet every day by oral route for 30 days.   • Blood Glucose Monitoring Suppl w/Device kit Check blood sugar once a day.    DX: E11.9   • cholecalciferol (VITAMIN D3) 1000 UNITS tablet Take 1,000 Units by mouth daily.   • cloNIDine (CATAPRES) 0.1 MG tablet Take 0.05 mg by mouth As Needed. TID TO QID PRN    • Cyanocobalamin (VITAMIN B12 PO) Take  by mouth daily.   • esomeprazole (nexIUM) 40 MG capsule Take 1 capsule by mouth Every Morning Before Breakfast.   • FLUoxetine (PROzac) 20 MG capsule Take 1 capsule by mouth Daily.   • folic acid (FOLVITE) 1 MG tablet Take 1 tablet by mouth Daily.   • glucose blood (Contour Next Test) test strip Check blood glucose daily   • hydrALAZINE (APRESOLINE) 50 MG tablet  Take 1 tablet by mouth 3 (Three) Times a Day.   • hydrOXYzine (ATARAX) 25 MG tablet Take 1 tablet by mouth At Night As Needed (PRN).   • Leuprolide Acetate (ELIGARD SC) Inject 1 application under the skin into the appropriate area as directed Every 6 (Six) Months. 3/2019 first one month shot   • loratadine (Claritin) 10 MG tablet Take 1 tablet by mouth Daily.   • meclizine (ANTIVERT) 25 MG tablet Take 1 tablet by mouth 3 (three) times a day as needed for dizziness.   • metFORMIN (Glucophage) 500 MG tablet Take 1 tablet by mouth Daily With Breakfast.   • metoprolol tartrate (LOPRESSOR) 25 MG tablet Take 1 tablet by mouth 2 (Two) Times a Day.   • Microlet Lancets misc 1 stick Daily.   • Pyridoxine HCl (VITAMIN B-6 PO) Take  by mouth daily.   • rosuvastatin (CRESTOR) 20 MG tablet Take 1 tablet by mouth Every Night.   • SITagliptin (Januvia) 100 MG tablet Take 1 tablet by mouth Daily.   • [DISCONTINUED] HYDROcodone-acetaminophen (NORCO) 7.5-325 MG per tablet Take 1 tablet by mouth 3 (Three) Times a Day As Needed for Moderate Pain . Due 3/30     No current facility-administered medications on file prior to visit.         The following portions of the patient's history were reviewed and updated as appropriate: allergies, current medications, past family history, past medical history, past social history, past surgical history and problem list.    Review of Systems   Constitutional: Negative.   HENT: Negative.  Negative for congestion.    Eyes: Negative.    Cardiovascular: Negative.  Negative for chest pain, cyanosis, dyspnea on exertion, irregular heartbeat, leg swelling, near-syncope, orthopnea, palpitations, paroxysmal nocturnal dyspnea and syncope.   Respiratory: Negative.  Negative for shortness of breath.    Hematologic/Lymphatic: Negative.    Musculoskeletal: Positive for arthritis and back pain.   Gastrointestinal: Negative.    Neurological: Negative.  Negative for headaches.   Psychiatric/Behavioral: The patient  "is nervous/anxious.           Objective:     /68 (BP Location: Left arm, Patient Position: Sitting, Cuff Size: Adult)   Pulse 52   Temp 97.1 °F (36.2 °C)   Ht 180.3 cm (71\")   Wt 98.9 kg (218 lb)   SpO2 97%   BMI 30.40 kg/m²   Pulmonary:      Effort: Pulmonary effort is normal.      Breath sounds: Normal breath sounds. No stridor. No wheezing. No rhonchi. No rales.   Cardiovascular:      PMI at left midclavicular line. Normal rate. Regular rhythm. Normal S1. Normal S2.      Murmurs: There is no murmur.      No gallop. No click. No rub.   Pulses:     Intact distal pulses.   Edema:     Peripheral edema absent.           Lab Review  Lab Results   Component Value Date     07/14/2021    K 4.2 07/14/2021     07/14/2021    BUN 12 07/14/2021    CREATININE 1.23 07/14/2021    GLUCOSE 212 (H) 07/14/2021    CALCIUM 9.4 07/14/2021    ALT 39 07/14/2021    ALKPHOS 58 07/14/2021    LABIL2 1.8 03/07/2016     Lab Results   Component Value Date    CKTOTAL 76 07/14/2021     Lab Results   Component Value Date    WBC 8.35 07/14/2021    HGB 14.6 07/14/2021    HCT 42.4 07/14/2021     07/14/2021     No results found for: INR  No results found for: MG  Lab Results   Component Value Date    PSA <0.014 12/09/2019    TSH 2.807 09/28/2015     No results found for: BNP  Lab Results   Component Value Date    CHLPL 227 (H) 03/07/2016    CHOL 208 (H) 07/14/2021    TRIG 331 (H) 07/14/2021    HDL 36 (L) 07/14/2021    VLDL 58 (H) 07/14/2021    LDLHDL 2.94 07/14/2021     Lab Results   Component Value Date     (H) 07/14/2021    LDL 89 01/07/2021       Procedures       I personally viewed and interpreted the patient's LAB data         Assessment:     1. Essential hypertension    2. Anxiety    3. DDD (degenerative disc disease), lumbar    4. Mixed hyperlipidemia    5. Stage 3a chronic kidney disease (CMS/HCC)    6. Type 2 diabetes mellitus with diabetic nephropathy, without long-term current use of insulin (CMS/HCC)  "         Plan:     Blood pressure is very well controlled patient will continue Lopressor Norvasc and hydralazine. He will take clonidine only on as needed basis.    Anxiety is controlled with Prozac and Xanax which were refilled.    Chronic back pain Norco refill was given.  Lipid control is poor patient Crestor was increased to 40 but he still taking 20 he wants to wait till next lab work.  Diabetic control and renal failure was also discussed.  Follow-up scheduled refills were given        No follow-ups on file.

## 2021-09-24 ENCOUNTER — OFFICE VISIT (OUTPATIENT)
Dept: CARDIOLOGY | Facility: CLINIC | Age: 76
End: 2021-09-24

## 2021-09-24 VITALS
HEIGHT: 71 IN | OXYGEN SATURATION: 97 % | BODY MASS INDEX: 30.32 KG/M2 | TEMPERATURE: 96.6 F | WEIGHT: 216.6 LBS | HEART RATE: 62 BPM | DIASTOLIC BLOOD PRESSURE: 80 MMHG | SYSTOLIC BLOOD PRESSURE: 140 MMHG

## 2021-09-24 DIAGNOSIS — E78.2 MIXED HYPERLIPIDEMIA: ICD-10-CM

## 2021-09-24 DIAGNOSIS — F41.1 GENERALIZED ANXIETY DISORDER: Primary | ICD-10-CM

## 2021-09-24 DIAGNOSIS — F41.9 ANXIETY: ICD-10-CM

## 2021-09-24 DIAGNOSIS — I10 ESSENTIAL HYPERTENSION: ICD-10-CM

## 2021-09-24 DIAGNOSIS — N18.31 STAGE 3A CHRONIC KIDNEY DISEASE (HCC): ICD-10-CM

## 2021-09-24 DIAGNOSIS — E11.21 TYPE 2 DIABETES MELLITUS WITH DIABETIC NEPHROPATHY, WITHOUT LONG-TERM CURRENT USE OF INSULIN (HCC): ICD-10-CM

## 2021-09-24 DIAGNOSIS — M51.36 DDD (DEGENERATIVE DISC DISEASE), LUMBAR: ICD-10-CM

## 2021-09-24 DIAGNOSIS — C61 PROSTATE CANCER (HCC): ICD-10-CM

## 2021-09-24 PROCEDURE — 99213 OFFICE O/P EST LOW 20 MIN: CPT | Performed by: INTERNAL MEDICINE

## 2021-09-24 RX ORDER — ALPRAZOLAM 0.25 MG/1
0.25 TABLET ORAL 2 TIMES DAILY PRN
Qty: 60 TABLET | Refills: 2 | Status: SHIPPED | OUTPATIENT
Start: 2021-09-24 | End: 2021-10-29 | Stop reason: SDUPTHER

## 2021-09-24 RX ORDER — HYDROCODONE BITARTRATE AND ACETAMINOPHEN 7.5; 325 MG/1; MG/1
1 TABLET ORAL 3 TIMES DAILY PRN
Qty: 90 TABLET | Refills: 0 | Status: SHIPPED | OUTPATIENT
Start: 2021-09-24 | End: 2021-10-29 | Stop reason: SDUPTHER

## 2021-09-24 NOTE — PROGRESS NOTES
subjective     Chief Complaint   Patient presents with   • Anxiety     Follow up   • Hypertension     today   • Back Pain     Follow up   • Med Refill     pending     History of Present Illness    Patient is 76 years old white male who is here for follow-up. Patient has a lot of anxiety and has been taking Xanax he is doing very well with current dose.  Patient also has lot of back pain degenerative disc disease of the lumbar spine he is taking Norco 7.5 3 times daily as needed and needs refill. He is compliant with medications.    Blood pressure is very well controlled with current medication.  Patient also has hyperlipidemia which is controlled with Crestor patient has no side effects.    Diabetes mellitus, blood sugar at home around low 100s.  Past Surgical History:   Procedure Laterality Date   • COLONOSCOPY  03/2018   • EYE SURGERY     • FOOT SURGERY     • HERNIA REPAIR     • HYDROCELECTOMY  06/15/2015   • PROSTATE BIOPSY  2018   • PROSTATE FIDUCIAL MARKER PLACEMENT  09/14/2018   • RHINOPLASTY     • UPPER GASTROINTESTINAL ENDOSCOPY  03/24/2014    WITH BIOPSIES AND DILATION     Family History   Problem Relation Age of Onset   • Kidney disease Other    • Other Other         CHRONIC DISABLING DISEASES URINARY TRACT DISEASE   • Diabetes Other    • Hypertension Other    • Other Mother    • Heart failure Father    • Stroke Sister    • Arthritis Sister    • Heart disease Brother    • No Known Problems Brother    • No Known Problems Brother      Past Medical History:   Diagnosis Date   • Anxiety    • Arthritis    • Colitis    • Depression    • GERD (gastroesophageal reflux disease)    • Gout    • Heart murmur    • History of EKG 12/22/2015    NORMAL   • Hyperlipidemia    • Hypertension    • Obesity    • Pancreatitis     history of   • Prostate cancer (CMS/HCC)    • Renal failure     MILD one functioning kidney   • Skin cancer     basal cell cancer on back     Patient Active Problem List   Diagnosis   • Essential  hypertension, labile   • GERD (gastroesophageal reflux disease)   • Renal failure   • Hyperlipidemia   • Anxiety   • Depression   • Type 2 diabetes mellitus (CMS/HCC)   • Periumbilical abdominal pain   • DDD (degenerative disc disease), lumbar   • Prostate cancer (CMS/HCC)   • Hyperuricemia   • BURKS (dyspnea on exertion)   • Bradycardia   • Lower urinary tract symptoms due to benign prostatic hyperplasia   • Raised prostate specific antigen   • Chest pain in adult   • Generalized anxiety disorder       Social History     Tobacco Use   • Smoking status: Former Smoker     Packs/day: 1.00     Types: Cigarettes     Quit date:      Years since quittin.7   • Smokeless tobacco: Never Used   Substance Use Topics   • Alcohol use: Yes     Alcohol/week: 7.0 standard drinks     Types: 7 Shots of liquor per week     Comment:  once per day   • Drug use: No       No Known Allergies    Current Outpatient Medications on File Prior to Visit   Medication Sig   • amLODIPine (NORVASC) 10 MG tablet Take 1 tablet by mouth Daily.   • aspirin 81 MG tablet Take 81 mg by mouth daily.   • bicalutamide (CASODEX) 50 MG chemo tablet Casodex 50 mg tablet   Take 1 tablet every day by oral route for 30 days.   • Blood Glucose Monitoring Suppl w/Device kit Check blood sugar once a day.    DX: E11.9   • cholecalciferol (VITAMIN D3) 1000 UNITS tablet Take 1,000 Units by mouth daily.   • cloNIDine (CATAPRES) 0.1 MG tablet Take 0.05 mg by mouth As Needed. TID TO QID PRN    • Cyanocobalamin (VITAMIN B12 PO) Take  by mouth daily.   • esomeprazole (nexIUM) 40 MG capsule Take 1 capsule by mouth Every Morning Before Breakfast.   • FLUoxetine (PROzac) 20 MG capsule Take 1 capsule by mouth Daily.   • folic acid (FOLVITE) 1 MG tablet Take 1 tablet by mouth Daily.   • glucose blood (Contour Next Test) test strip Check blood glucose daily   • hydrALAZINE (APRESOLINE) 50 MG tablet Take 1 tablet by mouth 3 (Three) Times a Day.   • hydrOXYzine (ATARAX) 25 MG  tablet Take 1 tablet by mouth At Night As Needed (PRN).   • Leuprolide Acetate (ELIGARD SC) Inject 1 application under the skin into the appropriate area as directed Every 6 (Six) Months. 3/2019 first one month shot   • loratadine (Claritin) 10 MG tablet Take 1 tablet by mouth Daily.   • meclizine (ANTIVERT) 25 MG tablet Take 1 tablet by mouth 3 (three) times a day as needed for dizziness.   • metFORMIN (Glucophage) 500 MG tablet Take 1 tablet by mouth Daily With Breakfast.   • metoprolol tartrate (LOPRESSOR) 25 MG tablet Take 1 tablet by mouth 2 (Two) Times a Day.   • Microlet Lancets misc 1 stick Daily.   • Pyridoxine HCl (VITAMIN B-6 PO) Take  by mouth daily.   • rosuvastatin (CRESTOR) 20 MG tablet Take 1 tablet by mouth Every Night.   • SITagliptin (Januvia) 100 MG tablet Take 1 tablet by mouth Daily.   • [DISCONTINUED] ALPRAZolam (XANAX) 0.25 MG tablet TAKE 1 TABLET BY MOUTH TWICE DAILY AS NEEDED FOR ANXIETY   • [DISCONTINUED] HYDROcodone-acetaminophen (NORCO) 7.5-325 MG per tablet Take 1 tablet by mouth 3 (Three) Times a Day As Needed for Moderate Pain . Due 3/30     No current facility-administered medications on file prior to visit.         The following portions of the patient's history were reviewed and updated as appropriate: allergies, current medications, past family history, past medical history, past social history, past surgical history and problem list.    Review of Systems   Constitutional: Negative.   HENT: Negative.  Negative for congestion.    Eyes: Negative.    Cardiovascular: Negative.  Negative for chest pain, cyanosis, dyspnea on exertion, irregular heartbeat, leg swelling, near-syncope, orthopnea, palpitations, paroxysmal nocturnal dyspnea and syncope.   Respiratory: Negative.  Negative for shortness of breath.    Hematologic/Lymphatic: Negative.    Musculoskeletal: Positive for back pain.   Gastrointestinal: Negative.    Neurological: Negative.  Negative for headaches.  "  Psychiatric/Behavioral: The patient is nervous/anxious.           Objective:     /80   Pulse 62   Temp 96.6 °F (35.9 °C)   Ht 180.3 cm (71\")   Wt 98.2 kg (216 lb 9.6 oz)   SpO2 97%   BMI 30.21 kg/m²   Cardiovascular:      PMI at left midclavicular line. Normal rate. Regular rhythm. Normal S1. Normal S2.      Murmurs: There is no murmur.      No gallop. No click. No rub.   Pulses:     Intact distal pulses.   Edema:     Peripheral edema absent.           Lab Review  Lab Results   Component Value Date     07/14/2021    K 4.2 07/14/2021     07/14/2021    BUN 12 07/14/2021    CREATININE 1.23 07/14/2021    GLUCOSE 212 (H) 07/14/2021    CALCIUM 9.4 07/14/2021    ALT 39 07/14/2021    ALKPHOS 58 07/14/2021    LABIL2 1.8 03/07/2016     Lab Results   Component Value Date    CKTOTAL 76 07/14/2021     Lab Results   Component Value Date    WBC 8.35 07/14/2021    HGB 14.6 07/14/2021    HCT 42.4 07/14/2021     07/14/2021     No results found for: INR  No results found for: MG  Lab Results   Component Value Date    PSA <0.014 12/09/2019    TSH 2.807 09/28/2015     No results found for: BNP  Lab Results   Component Value Date    CHLPL 227 (H) 03/07/2016    CHOL 208 (H) 07/14/2021    TRIG 331 (H) 07/14/2021    HDL 36 (L) 07/14/2021    VLDL 58 (H) 07/14/2021    LDLHDL 2.94 07/14/2021     Lab Results   Component Value Date     (H) 07/14/2021    LDL 89 01/07/2021       Procedures       I personally viewed and interpreted the patient's LAB data         Assessment:     1. Generalized anxiety disorder    2. DDD (degenerative disc disease), lumbar    3. Anxiety    4. Prostate cancer (CMS/HCC)    5. Essential hypertension    6. Mixed hyperlipidemia    7. Stage 3a chronic kidney disease (HCC)    8. Type 2 diabetes mellitus with diabetic nephropathy, without long-term current use of insulin (CMS/[facility])          Plan:     Patient complains of anxiety and needs refill. He is compliant with medications anxiety " is controlled with low-dose Xanax.    Chronic low back pain degenerative disc disease lumbar spine also has history of prostate CA. Norco refill was given.    Blood pressure is upper limits of normal. He states his blood pressure is very well controlled at home he is taking multiple different medications which will be continued.    Prostate cancer following closely with the urologist.    Hyperlipidemia, Crestor was continued.  Diabetes mellitus on Glucophage and Januvia.  Renal functions are stable. Refills were given follow-up scheduled        No follow-ups on file.

## 2021-10-29 ENCOUNTER — OFFICE VISIT (OUTPATIENT)
Dept: CARDIOLOGY | Facility: CLINIC | Age: 76
End: 2021-10-29

## 2021-10-29 VITALS
HEART RATE: 96 BPM | SYSTOLIC BLOOD PRESSURE: 140 MMHG | DIASTOLIC BLOOD PRESSURE: 72 MMHG | TEMPERATURE: 97.3 F | BODY MASS INDEX: 30.1 KG/M2 | WEIGHT: 215 LBS | HEIGHT: 71 IN | OXYGEN SATURATION: 99 %

## 2021-10-29 DIAGNOSIS — M51.36 DDD (DEGENERATIVE DISC DISEASE), LUMBAR: ICD-10-CM

## 2021-10-29 DIAGNOSIS — F41.9 ANXIETY: ICD-10-CM

## 2021-10-29 DIAGNOSIS — F51.01 PRIMARY INSOMNIA: ICD-10-CM

## 2021-10-29 DIAGNOSIS — E11.21 TYPE 2 DIABETES MELLITUS WITH DIABETIC NEPHROPATHY, WITHOUT LONG-TERM CURRENT USE OF INSULIN (HCC): ICD-10-CM

## 2021-10-29 DIAGNOSIS — E78.2 MIXED HYPERLIPIDEMIA: Primary | ICD-10-CM

## 2021-10-29 PROCEDURE — 90662 IIV NO PRSV INCREASED AG IM: CPT | Performed by: INTERNAL MEDICINE

## 2021-10-29 PROCEDURE — 99213 OFFICE O/P EST LOW 20 MIN: CPT | Performed by: INTERNAL MEDICINE

## 2021-10-29 PROCEDURE — G0008 ADMIN INFLUENZA VIRUS VAC: HCPCS | Performed by: INTERNAL MEDICINE

## 2021-10-29 RX ORDER — HYDROCODONE BITARTRATE AND ACETAMINOPHEN 7.5; 325 MG/1; MG/1
1 TABLET ORAL 3 TIMES DAILY PRN
Qty: 90 TABLET | Refills: 0 | Status: SHIPPED | OUTPATIENT
Start: 2021-10-29 | End: 2021-12-03 | Stop reason: SDUPTHER

## 2021-10-29 RX ORDER — TEMAZEPAM 15 MG/1
15 CAPSULE ORAL NIGHTLY PRN
Qty: 30 CAPSULE | Refills: 0 | Status: SHIPPED | OUTPATIENT
Start: 2021-10-29 | End: 2022-01-10 | Stop reason: SDUPTHER

## 2021-10-29 RX ORDER — ALPRAZOLAM 0.25 MG/1
0.25 TABLET ORAL 2 TIMES DAILY PRN
Qty: 60 TABLET | Refills: 2 | Status: SHIPPED | OUTPATIENT
Start: 2021-10-29 | End: 2022-01-10 | Stop reason: SDUPTHER

## 2021-10-29 NOTE — PROGRESS NOTES
subjective     Chief Complaint   Patient presents with   • Back Pain     follow up   • Anxiety     follow up   • Med Refill     pending     History of Present Illness  Patient is here for follow-up of multiple chronic medical problems.  He also states that he cannot sleep and wants to add some sleeping medication.  He has tried Ambien before which caused him confusion and does not want to take that.  Back pain is well controlled with Norco which he is taking regularly and is compliant with medications.  Anxiety is also controlled with Xanax.    Patient also has essential hypertension hyperlipidemia and diabetes mellitus is taking medications regularly, blood pressure is very well controlled.  He has prostate cancer and urinary symptoms are better.    Past Surgical History:   Procedure Laterality Date   • COLONOSCOPY  03/2018   • EYE SURGERY     • FOOT SURGERY     • HERNIA REPAIR     • HYDROCELECTOMY  06/15/2015   • PROSTATE BIOPSY  2018   • PROSTATE FIDUCIAL MARKER PLACEMENT  09/14/2018   • RHINOPLASTY     • UPPER GASTROINTESTINAL ENDOSCOPY  03/24/2014    WITH BIOPSIES AND DILATION     Family History   Problem Relation Age of Onset   • Kidney disease Other    • Other Other         CHRONIC DISABLING DISEASES URINARY TRACT DISEASE   • Diabetes Other    • Hypertension Other    • Other Mother    • Heart failure Father    • Stroke Sister    • Arthritis Sister    • Heart disease Brother    • No Known Problems Brother    • No Known Problems Brother      Past Medical History:   Diagnosis Date   • Anxiety    • Arthritis    • Colitis    • Depression    • GERD (gastroesophageal reflux disease)    • Gout    • Heart murmur    • History of EKG 12/22/2015    NORMAL   • Hyperlipidemia    • Hypertension    • Obesity    • Pancreatitis     history of   • Prostate cancer (HCC)    • Renal failure     MILD one functioning kidney   • Skin cancer     basal cell cancer on back     Patient Active Problem List   Diagnosis   • Essential  hypertension, labile   • GERD (gastroesophageal reflux disease)   • Renal failure   • Hyperlipidemia   • Anxiety   • Depression   • Type 2 diabetes mellitus (HCC)   • Periumbilical abdominal pain   • DDD (degenerative disc disease), lumbar   • Prostate cancer (HCC)   • Hyperuricemia   • BURKS (dyspnea on exertion)   • Bradycardia   • Lower urinary tract symptoms due to benign prostatic hyperplasia   • Raised prostate specific antigen   • Chest pain in adult   • Generalized anxiety disorder   • Primary insomnia       Social History     Tobacco Use   • Smoking status: Former Smoker     Packs/day: 1.00     Types: Cigarettes     Quit date:      Years since quittin.8   • Smokeless tobacco: Never Used   Substance Use Topics   • Alcohol use: Yes     Alcohol/week: 7.0 standard drinks     Types: 7 Shots of liquor per week     Comment:  once per day   • Drug use: No       No Known Allergies    Current Outpatient Medications on File Prior to Visit   Medication Sig   • amLODIPine (NORVASC) 10 MG tablet Take 1 tablet by mouth Daily.   • aspirin 81 MG tablet Take 81 mg by mouth daily.   • bicalutamide (CASODEX) 50 MG chemo tablet Casodex 50 mg tablet   Take 1 tablet every day by oral route for 30 days.   • Blood Glucose Monitoring Suppl w/Device kit Check blood sugar once a day.    DX: E11.9   • cholecalciferol (VITAMIN D3) 1000 UNITS tablet Take 1,000 Units by mouth daily.   • cloNIDine (CATAPRES) 0.1 MG tablet Take 0.05 mg by mouth As Needed. TID TO QID PRN    • Cyanocobalamin (VITAMIN B12 PO) Take  by mouth daily.   • esomeprazole (nexIUM) 40 MG capsule Take 1 capsule by mouth Every Morning Before Breakfast.   • FLUoxetine (PROzac) 20 MG capsule Take 1 capsule by mouth Daily.   • folic acid (FOLVITE) 1 MG tablet Take 1 tablet by mouth Daily.   • glucose blood (Contour Next Test) test strip Check blood glucose daily   • hydrALAZINE (APRESOLINE) 50 MG tablet Take 1 tablet by mouth 3 (Three) Times a Day.   • hydrOXYzine  (ATARAX) 25 MG tablet Take 1 tablet by mouth At Night As Needed (PRN).   • Leuprolide Acetate (ELIGARD SC) Inject 1 application under the skin into the appropriate area as directed Every 6 (Six) Months. 3/2019 first one month shot   • loratadine (Claritin) 10 MG tablet Take 1 tablet by mouth Daily.   • meclizine (ANTIVERT) 25 MG tablet Take 1 tablet by mouth 3 (three) times a day as needed for dizziness.   • metFORMIN (Glucophage) 500 MG tablet Take 1 tablet by mouth Daily With Breakfast.   • metoprolol tartrate (LOPRESSOR) 25 MG tablet Take 1 tablet by mouth 2 (Two) Times a Day.   • Microlet Lancets misc 1 stick Daily.   • Pyridoxine HCl (VITAMIN B-6 PO) Take  by mouth daily.   • rosuvastatin (CRESTOR) 20 MG tablet Take 1 tablet by mouth Every Night.   • SITagliptin (Januvia) 100 MG tablet Take 1 tablet by mouth Daily.   • [DISCONTINUED] ALPRAZolam (XANAX) 0.25 MG tablet Take 1 tablet by mouth 2 (Two) Times a Day As Needed for Anxiety or Sleep. for anxiety   • [DISCONTINUED] HYDROcodone-acetaminophen (NORCO) 7.5-325 MG per tablet Take 1 tablet by mouth 3 (Three) Times a Day As Needed for Moderate Pain . Due 3/30     No current facility-administered medications on file prior to visit.         The following portions of the patient's history were reviewed and updated as appropriate: allergies, current medications, past family history, past medical history, past social history, past surgical history and problem list.    Review of Systems   Constitutional: Negative.   HENT: Negative.  Negative for congestion.    Eyes: Negative.    Cardiovascular: Negative.  Negative for chest pain, cyanosis, dyspnea on exertion, irregular heartbeat, leg swelling, near-syncope, orthopnea, palpitations, paroxysmal nocturnal dyspnea and syncope.   Respiratory: Negative.  Negative for shortness of breath.    Hematologic/Lymphatic: Negative.    Musculoskeletal: Positive for arthritis and back pain.   Gastrointestinal: Negative.   "  Neurological: Negative.  Negative for headaches.   Psychiatric/Behavioral: The patient has insomnia and is nervous/anxious.           Objective:     /72 (BP Location: Left arm, Patient Position: Sitting, Cuff Size: Adult)   Pulse 96   Temp 97.3 °F (36.3 °C)   Ht 180.3 cm (71\")   Wt 97.5 kg (215 lb)   SpO2 99%   BMI 29.99 kg/m²   Pulmonary:      Effort: Pulmonary effort is normal.      Breath sounds: Normal breath sounds. No stridor. No wheezing. No rhonchi. No rales.   Cardiovascular:      PMI at left midclavicular line. Normal rate. Regular rhythm. Normal S1. Normal S2.      Murmurs: There is no murmur.      No gallop. No click. No rub.   Pulses:     Intact distal pulses.   Edema:     Peripheral edema absent.           Lab Review  Lab Results   Component Value Date     07/14/2021    K 4.2 07/14/2021     07/14/2021    BUN 12 07/14/2021    CREATININE 1.23 07/14/2021    GLUCOSE 212 (H) 07/14/2021    CALCIUM 9.4 07/14/2021    ALT 39 07/14/2021    ALKPHOS 58 07/14/2021    LABIL2 1.8 03/07/2016     Lab Results   Component Value Date    CKTOTAL 76 07/14/2021     Lab Results   Component Value Date    WBC 8.35 07/14/2021    HGB 14.6 07/14/2021    HCT 42.4 07/14/2021     07/14/2021     No results found for: INR  No results found for: MG  Lab Results   Component Value Date    PSA <0.014 12/09/2019    TSH 2.807 09/28/2015     No results found for: BNP  Lab Results   Component Value Date    CHLPL 227 (H) 03/07/2016    CHOL 208 (H) 07/14/2021    TRIG 331 (H) 07/14/2021    HDL 36 (L) 07/14/2021    VLDL 58 (H) 07/14/2021    LDLHDL 2.94 07/14/2021     Lab Results   Component Value Date     (H) 07/14/2021    LDL 89 01/07/2021       Procedures       I personally viewed and interpreted the patient's LAB data         Assessment:     1. Mixed hyperlipidemia    2. Anxiety    3. DDD (degenerative disc disease), lumbar    4. Type 2 diabetes mellitus with diabetic nephropathy, without long-term " current use of insulin (HCC)    5. Primary insomnia          Plan:     Patient has hyperlipidemia.  He was advised to continue Crestor 20 mg daily.  Healthy lifestyle emphasized.  Diabetes mellitus, he will continue Metformin Januvia.  Norco was refilled for back pain.  Xanax was refilled for anxiety.  Patient was also given Restoril 15 at bedtime and as needed basis for insomnia.  Blood pressure is very well controlled with current medications.  Follow-up scheduled        No follow-ups on file.

## 2021-11-03 RX ORDER — INDOMETHACIN 25 MG/1
25 CAPSULE ORAL 3 TIMES DAILY PRN
Qty: 90 CAPSULE | Refills: 0 | Status: SHIPPED | OUTPATIENT
Start: 2021-11-03

## 2021-12-03 ENCOUNTER — OFFICE VISIT (OUTPATIENT)
Dept: CARDIOLOGY | Facility: CLINIC | Age: 76
End: 2021-12-03

## 2021-12-03 VITALS
HEIGHT: 71 IN | DIASTOLIC BLOOD PRESSURE: 64 MMHG | HEART RATE: 62 BPM | BODY MASS INDEX: 29.96 KG/M2 | SYSTOLIC BLOOD PRESSURE: 124 MMHG | OXYGEN SATURATION: 97 % | WEIGHT: 214 LBS | TEMPERATURE: 97.6 F

## 2021-12-03 DIAGNOSIS — I10 PRIMARY HYPERTENSION: Primary | ICD-10-CM

## 2021-12-03 DIAGNOSIS — M51.36 DDD (DEGENERATIVE DISC DISEASE), LUMBAR: ICD-10-CM

## 2021-12-03 DIAGNOSIS — K21.9 GASTROESOPHAGEAL REFLUX DISEASE WITHOUT ESOPHAGITIS: ICD-10-CM

## 2021-12-03 DIAGNOSIS — E78.2 MIXED HYPERLIPIDEMIA: ICD-10-CM

## 2021-12-03 DIAGNOSIS — E11.21 TYPE 2 DIABETES MELLITUS WITH DIABETIC NEPHROPATHY, WITHOUT LONG-TERM CURRENT USE OF INSULIN (HCC): ICD-10-CM

## 2021-12-03 DIAGNOSIS — F41.1 GENERALIZED ANXIETY DISORDER: ICD-10-CM

## 2021-12-03 PROCEDURE — 99213 OFFICE O/P EST LOW 20 MIN: CPT | Performed by: INTERNAL MEDICINE

## 2021-12-03 RX ORDER — HYDROCODONE BITARTRATE AND ACETAMINOPHEN 7.5; 325 MG/1; MG/1
1 TABLET ORAL 3 TIMES DAILY PRN
Qty: 90 TABLET | Refills: 0 | Status: SHIPPED | OUTPATIENT
Start: 2021-12-03 | End: 2022-01-10 | Stop reason: SDUPTHER

## 2022-01-05 ENCOUNTER — LAB (OUTPATIENT)
Dept: LAB | Facility: HOSPITAL | Age: 77
End: 2022-01-05

## 2022-01-05 DIAGNOSIS — E11.21 TYPE 2 DIABETES MELLITUS WITH DIABETIC NEPHROPATHY, WITHOUT LONG-TERM CURRENT USE OF INSULIN: ICD-10-CM

## 2022-01-05 DIAGNOSIS — E78.2 MIXED HYPERLIPIDEMIA: ICD-10-CM

## 2022-01-05 PROCEDURE — 83036 HEMOGLOBIN GLYCOSYLATED A1C: CPT

## 2022-01-05 PROCEDURE — 80061 LIPID PANEL: CPT

## 2022-01-05 PROCEDURE — 80053 COMPREHEN METABOLIC PANEL: CPT

## 2022-01-05 PROCEDURE — 36415 COLL VENOUS BLD VENIPUNCTURE: CPT

## 2022-01-05 PROCEDURE — 82550 ASSAY OF CK (CPK): CPT

## 2022-01-05 PROCEDURE — 85025 COMPLETE CBC W/AUTO DIFF WBC: CPT

## 2022-01-06 ENCOUNTER — LAB (OUTPATIENT)
Dept: LAB | Facility: HOSPITAL | Age: 77
End: 2022-01-06

## 2022-01-06 DIAGNOSIS — M51.36 DDD (DEGENERATIVE DISC DISEASE), LUMBAR: ICD-10-CM

## 2022-01-06 LAB
ALBUMIN SERPL-MCNC: 4.5 G/DL (ref 3.5–5.2)
ALBUMIN/GLOB SERPL: 2.4 G/DL
ALP SERPL-CCNC: 44 U/L (ref 39–117)
ALT SERPL W P-5'-P-CCNC: 20 U/L (ref 1–41)
ANION GAP SERPL CALCULATED.3IONS-SCNC: 11.8 MMOL/L (ref 5–15)
AST SERPL-CCNC: 19 U/L (ref 1–40)
BASOPHILS # BLD AUTO: 0.12 10*3/MM3 (ref 0–0.2)
BASOPHILS NFR BLD AUTO: 1.4 % (ref 0–1.5)
BILIRUB SERPL-MCNC: 0.7 MG/DL (ref 0–1.2)
BUN SERPL-MCNC: 19 MG/DL (ref 8–23)
BUN/CREAT SERPL: 13.2 (ref 7–25)
CALCIUM SPEC-SCNC: 9.5 MG/DL (ref 8.6–10.5)
CHLORIDE SERPL-SCNC: 105 MMOL/L (ref 98–107)
CHOLEST SERPL-MCNC: 141 MG/DL (ref 0–200)
CK SERPL-CCNC: 92 U/L (ref 20–200)
CO2 SERPL-SCNC: 23.2 MMOL/L (ref 22–29)
CREAT SERPL-MCNC: 1.44 MG/DL (ref 0.76–1.27)
DEPRECATED RDW RBC AUTO: 42.7 FL (ref 37–54)
EOSINOPHIL # BLD AUTO: 1.2 10*3/MM3 (ref 0–0.4)
EOSINOPHIL NFR BLD AUTO: 14.1 % (ref 0.3–6.2)
ERYTHROCYTE [DISTWIDTH] IN BLOOD BY AUTOMATED COUNT: 12.5 % (ref 12.3–15.4)
GFR SERPL CREATININE-BSD FRML MDRD: 48 ML/MIN/1.73
GLOBULIN UR ELPH-MCNC: 1.9 GM/DL
GLUCOSE SERPL-MCNC: 122 MG/DL (ref 65–99)
HBA1C MFR BLD: 6.26 % (ref 4.8–5.6)
HCT VFR BLD AUTO: 41.1 % (ref 37.5–51)
HDLC SERPL-MCNC: 43 MG/DL (ref 40–60)
HGB BLD-MCNC: 14.3 G/DL (ref 13–17.7)
IMM GRANULOCYTES # BLD AUTO: 0.02 10*3/MM3 (ref 0–0.05)
IMM GRANULOCYTES NFR BLD AUTO: 0.2 % (ref 0–0.5)
LDLC SERPL CALC-MCNC: 66 MG/DL (ref 0–100)
LDLC/HDLC SERPL: 1.39 {RATIO}
LYMPHOCYTES # BLD AUTO: 1.98 10*3/MM3 (ref 0.7–3.1)
LYMPHOCYTES NFR BLD AUTO: 23.3 % (ref 19.6–45.3)
MCH RBC QN AUTO: 32.8 PG (ref 26.6–33)
MCHC RBC AUTO-ENTMCNC: 34.8 G/DL (ref 31.5–35.7)
MCV RBC AUTO: 94.3 FL (ref 79–97)
MONOCYTES # BLD AUTO: 0.7 10*3/MM3 (ref 0.1–0.9)
MONOCYTES NFR BLD AUTO: 8.2 % (ref 5–12)
NEUTROPHILS NFR BLD AUTO: 4.49 10*3/MM3 (ref 1.7–7)
NEUTROPHILS NFR BLD AUTO: 52.8 % (ref 42.7–76)
NRBC BLD AUTO-RTO: 0.1 /100 WBC (ref 0–0.2)
PLATELET # BLD AUTO: 147 10*3/MM3 (ref 140–450)
PMV BLD AUTO: 12.4 FL (ref 6–12)
POTASSIUM SERPL-SCNC: 4.5 MMOL/L (ref 3.5–5.2)
PROT SERPL-MCNC: 6.4 G/DL (ref 6–8.5)
RBC # BLD AUTO: 4.36 10*6/MM3 (ref 4.14–5.8)
SODIUM SERPL-SCNC: 140 MMOL/L (ref 136–145)
TRIGL SERPL-MCNC: 192 MG/DL (ref 0–150)
VLDLC SERPL-MCNC: 32 MG/DL (ref 5–40)
WBC NRBC COR # BLD: 8.51 10*3/MM3 (ref 3.4–10.8)

## 2022-01-06 PROCEDURE — 82043 UR ALBUMIN QUANTITATIVE: CPT

## 2022-01-07 LAB — ALBUMIN UR-MCNC: 6.6 MG/DL

## 2022-01-10 ENCOUNTER — OFFICE VISIT (OUTPATIENT)
Dept: CARDIOLOGY | Facility: CLINIC | Age: 77
End: 2022-01-10

## 2022-01-10 VITALS
WEIGHT: 217 LBS | DIASTOLIC BLOOD PRESSURE: 64 MMHG | TEMPERATURE: 96.8 F | SYSTOLIC BLOOD PRESSURE: 110 MMHG | BODY MASS INDEX: 30.38 KG/M2 | HEART RATE: 53 BPM | OXYGEN SATURATION: 98 % | HEIGHT: 71 IN

## 2022-01-10 DIAGNOSIS — F41.1 GENERALIZED ANXIETY DISORDER: ICD-10-CM

## 2022-01-10 DIAGNOSIS — Z12.11 SCREENING FOR COLON CANCER: ICD-10-CM

## 2022-01-10 DIAGNOSIS — I10 PRIMARY HYPERTENSION: ICD-10-CM

## 2022-01-10 DIAGNOSIS — N18.31 STAGE 3A CHRONIC KIDNEY DISEASE: ICD-10-CM

## 2022-01-10 DIAGNOSIS — M51.36 DDD (DEGENERATIVE DISC DISEASE), LUMBAR: ICD-10-CM

## 2022-01-10 DIAGNOSIS — E78.2 MIXED HYPERLIPIDEMIA: Primary | ICD-10-CM

## 2022-01-10 DIAGNOSIS — F41.9 ANXIETY: ICD-10-CM

## 2022-01-10 DIAGNOSIS — E79.0 HYPERURICEMIA: ICD-10-CM

## 2022-01-10 DIAGNOSIS — E11.21 TYPE 2 DIABETES MELLITUS WITH DIABETIC NEPHROPATHY, WITHOUT LONG-TERM CURRENT USE OF INSULIN: ICD-10-CM

## 2022-01-10 PROCEDURE — 99213 OFFICE O/P EST LOW 20 MIN: CPT | Performed by: INTERNAL MEDICINE

## 2022-01-10 RX ORDER — ALPRAZOLAM 0.25 MG/1
0.25 TABLET ORAL 2 TIMES DAILY PRN
Qty: 60 TABLET | Refills: 2 | Status: SHIPPED | OUTPATIENT
Start: 2022-01-10 | End: 2022-02-04 | Stop reason: SDUPTHER

## 2022-01-10 RX ORDER — TEMAZEPAM 15 MG/1
15 CAPSULE ORAL NIGHTLY PRN
Qty: 30 CAPSULE | Refills: 0 | Status: SHIPPED | OUTPATIENT
Start: 2022-01-10 | End: 2023-03-10

## 2022-01-10 RX ORDER — ESOMEPRAZOLE MAGNESIUM 40 MG/1
40 CAPSULE, DELAYED RELEASE ORAL
Qty: 90 CAPSULE | Refills: 2 | Status: SHIPPED | OUTPATIENT
Start: 2022-01-10 | End: 2022-04-08 | Stop reason: SDUPTHER

## 2022-01-10 RX ORDER — HYDROCODONE BITARTRATE AND ACETAMINOPHEN 7.5; 325 MG/1; MG/1
1 TABLET ORAL 3 TIMES DAILY PRN
Qty: 90 TABLET | Refills: 0 | Status: SHIPPED | OUTPATIENT
Start: 2022-01-10 | End: 2022-02-04 | Stop reason: SDUPTHER

## 2022-01-10 NOTE — PROGRESS NOTES
subjective     Chief Complaint   Patient presents with   • Back Pain     follow up   • Anxiety     follow up   • Heartburn     follow up   • Med Refill     pending   • Results     labs   • Fatigue     History of Present Illness  Patient is 76 years old white male who is here for follow-up because of multiple chronic medical problems.  Patient has degenerative disc disease and chronic back pain.  He is taking Norco 7.53 times daily on as needed basis and is compliant with medications.    Anxiety is very well controlled with Xanax 0.25 twice daily on as needed basis.  He is also having sleeping difficulty and takes Restoril only on as needed basis.    He needs refill of all the medication.    Essential hypertension  Blood pressure is very well controlled.  He is taking Norvasc, hydralazine and Lopressor.    Diabetes mellitus is well controlled with Januvia and metformin.  Patient has renal failure however estimated GFR is acceptable for metformin and Januvia therapy at this time.    Past Surgical History:   Procedure Laterality Date   • COLONOSCOPY  03/2018   • EYE SURGERY     • FOOT SURGERY     • HERNIA REPAIR     • HYDROCELECTOMY  06/15/2015   • PROSTATE BIOPSY  2018   • PROSTATE FIDUCIAL MARKER PLACEMENT  09/14/2018   • RHINOPLASTY     • UPPER GASTROINTESTINAL ENDOSCOPY  03/24/2014    WITH BIOPSIES AND DILATION     Family History   Problem Relation Age of Onset   • Kidney disease Other    • Other Other         CHRONIC DISABLING DISEASES URINARY TRACT DISEASE   • Diabetes Other    • Hypertension Other    • Other Mother    • Heart failure Father    • Stroke Sister    • Arthritis Sister    • Heart disease Brother    • No Known Problems Brother    • No Known Problems Brother      Past Medical History:   Diagnosis Date   • Anxiety    • Arthritis    • Colitis    • Depression    • GERD (gastroesophageal reflux disease)    • Gout    • Heart murmur    • History of EKG 12/22/2015    NORMAL   • Hyperlipidemia    •  Hypertension    • Obesity    • Pancreatitis     history of   • Prostate cancer (HCC)    • Renal failure     MILD one functioning kidney   • Skin cancer     basal cell cancer on back     Patient Active Problem List   Diagnosis   • Essential hypertension, labile   • GERD (gastroesophageal reflux disease)   • Renal failure   • Hyperlipidemia   • Anxiety   • Depression   • Type 2 diabetes mellitus (HCC)   • Periumbilical abdominal pain   • DDD (degenerative disc disease), lumbar   • Prostate cancer (HCC)   • Hyperuricemia   • BURKS (dyspnea on exertion)   • Bradycardia   • Lower urinary tract symptoms due to benign prostatic hyperplasia   • Raised prostate specific antigen   • Chest pain in adult   • Generalized anxiety disorder   • Primary insomnia       Social History     Tobacco Use   • Smoking status: Former Smoker     Packs/day: 1.00     Types: Cigarettes     Quit date:      Years since quittin.0   • Smokeless tobacco: Never Used   Vaping Use   • Vaping Use: Never used   Substance Use Topics   • Alcohol use: Yes     Alcohol/week: 7.0 standard drinks     Types: 7 Shots of liquor per week     Comment:  once per day   • Drug use: No       No Known Allergies    Current Outpatient Medications on File Prior to Visit   Medication Sig   • amLODIPine (NORVASC) 10 MG tablet Take 1 tablet by mouth Daily.   • aspirin 81 MG tablet Take 81 mg by mouth daily.   • bicalutamide (CASODEX) 50 MG chemo tablet Casodex 50 mg tablet   Take 1 tablet every day by oral route for 30 days.   • Blood Glucose Monitoring Suppl w/Device kit Check blood sugar once a day.    DX: E11.9   • cholecalciferol (VITAMIN D3) 1000 UNITS tablet Take 1,000 Units by mouth daily.   • cloNIDine (CATAPRES) 0.1 MG tablet Take 0.05 mg by mouth As Needed. TID TO QID PRN    • Cyanocobalamin (VITAMIN B12 PO) Take  by mouth daily.   • FLUoxetine (PROzac) 20 MG capsule Take 1 capsule by mouth Daily.   • folic acid (FOLVITE) 1 MG tablet Take 1 tablet by mouth  Daily.   • glucose blood (Contour Next Test) test strip Check blood glucose daily   • hydrALAZINE (APRESOLINE) 50 MG tablet Take 1 tablet by mouth 3 (Three) Times a Day.   • hydrOXYzine (ATARAX) 25 MG tablet Take 1 tablet by mouth At Night As Needed (PRN).   • indomethacin (INDOCIN) 25 MG capsule Take 1 capsule by mouth 3 (Three) Times a Day As Needed for Mild Pain .   • Leuprolide Acetate (ELIGARD SC) Inject 1 application under the skin into the appropriate area as directed Every 6 (Six) Months. 3/2019 first one month shot   • loratadine (Claritin) 10 MG tablet Take 1 tablet by mouth Daily.   • meclizine (ANTIVERT) 25 MG tablet Take 1 tablet by mouth 3 (three) times a day as needed for dizziness.   • metFORMIN (Glucophage) 500 MG tablet Take 1 tablet by mouth Daily With Breakfast.   • metoprolol tartrate (LOPRESSOR) 25 MG tablet Take 1 tablet by mouth 2 (Two) Times a Day.   • Microlet Lancets misc 1 stick Daily.   • Pyridoxine HCl (VITAMIN B-6 PO) Take  by mouth daily.   • rosuvastatin (CRESTOR) 20 MG tablet Take 1 tablet by mouth Every Night.   • SITagliptin (Januvia) 100 MG tablet Take 1 tablet by mouth Daily.   • [DISCONTINUED] ALPRAZolam (XANAX) 0.25 MG tablet Take 1 tablet by mouth 2 (Two) Times a Day As Needed for Anxiety or Sleep. for anxiety   • [DISCONTINUED] esomeprazole (nexIUM) 40 MG capsule Take 1 capsule by mouth Every Morning Before Breakfast.   • [DISCONTINUED] HYDROcodone-acetaminophen (NORCO) 7.5-325 MG per tablet Take 1 tablet by mouth 3 (Three) Times a Day As Needed for Moderate Pain . Due 3/30   • [DISCONTINUED] temazepam (RESTORIL) 15 MG capsule Take 1 capsule by mouth At Night As Needed for Sleep.     No current facility-administered medications on file prior to visit.         The following portions of the patient's history were reviewed and updated as appropriate: allergies, current medications, past family history, past medical history, past social history, past surgical history and problem  "list.    Review of Systems   Constitutional: Negative.   HENT: Negative.  Negative for congestion.    Eyes: Negative.    Cardiovascular: Negative.  Negative for chest pain, cyanosis, dyspnea on exertion, irregular heartbeat, leg swelling, near-syncope, orthopnea, palpitations, paroxysmal nocturnal dyspnea and syncope.   Respiratory: Negative.  Negative for shortness of breath.    Hematologic/Lymphatic: Negative.    Musculoskeletal: Positive for arthritis, back pain and stiffness.   Gastrointestinal: Negative.    Neurological: Negative.  Negative for headaches.   Psychiatric/Behavioral: The patient has insomnia and is nervous/anxious.           Objective:     /64 (BP Location: Left arm, Patient Position: Sitting, Cuff Size: Adult)   Pulse 53   Temp 96.8 °F (36 °C)   Ht 180.3 cm (71\")   Wt 98.4 kg (217 lb)   SpO2 98%   BMI 30.27 kg/m²   Pulmonary:      Effort: Pulmonary effort is normal.      Breath sounds: Normal breath sounds. No stridor. No wheezing. No rhonchi. No rales.   Cardiovascular:      PMI at left midclavicular line. Normal rate. Regular rhythm. Normal S1. Normal S2.      Murmurs: There is no murmur.      No gallop. No click. No rub.   Pulses:     Intact distal pulses.   Edema:     Peripheral edema absent.           Lab Review  Lab Results   Component Value Date     01/05/2022    K 4.5 01/05/2022     01/05/2022    BUN 19 01/05/2022    CREATININE 1.44 (H) 01/05/2022    GLUCOSE 122 (H) 01/05/2022    CALCIUM 9.5 01/05/2022    ALT 20 01/05/2022    ALKPHOS 44 01/05/2022    LABIL2 1.8 03/07/2016     Lab Results   Component Value Date    CKTOTAL 92 01/05/2022     Lab Results   Component Value Date    WBC 8.51 01/05/2022    HGB 14.3 01/05/2022    HCT 41.1 01/05/2022     01/05/2022     No results found for: INR  No results found for: MG  Lab Results   Component Value Date    PSA <0.014 12/09/2019    TSH 2.807 09/28/2015     No results found for: BNP  Lab Results   Component Value Date "    CHLPL 227 (H) 03/07/2016    CHOL 141 01/05/2022    TRIG 192 (H) 01/05/2022    HDL 43 01/05/2022    VLDL 32 01/05/2022    LDLHDL 1.39 01/05/2022     Lab Results   Component Value Date    LDL 66 01/05/2022     (H) 07/14/2021       Procedures       I personally viewed and interpreted the patient's LAB data         Assessment:     1. Mixed hyperlipidemia    2. Screening for colon cancer    3. Anxiety    4. DDD (degenerative disc disease), lumbar    5. Primary hypertension    6. Stage 3a chronic kidney disease (HCC)    7. Type 2 diabetes mellitus with diabetic nephropathy, without long-term current use of insulin (HCC)    8. Generalized anxiety disorder    9. Hyperuricemia          Plan:     Lab work reviewed and discussed with the patient.  Lipid panel is normal with LDL of 66, Crestor 20 was continued.    Blood pressure is very well controlled he will continue hydralazine Norvasc and Lopressor.    Diabetic control is good metformin and Januvia was continued.    Renal functions are abnormal patient has chronic renal failure he was advised to drink more fluids.  He was advised not to take Indocin.  Lab work scheduled including uric acid.  Refill of pain and her medications were given side effects explained          No follow-ups on file.

## 2022-01-12 RX ORDER — HYDRALAZINE HYDROCHLORIDE 50 MG/1
TABLET, FILM COATED ORAL
Qty: 270 TABLET | Refills: 3 | Status: SHIPPED | OUTPATIENT
Start: 2022-01-12 | End: 2022-08-19 | Stop reason: SDUPTHER

## 2022-02-04 ENCOUNTER — OFFICE VISIT (OUTPATIENT)
Dept: CARDIOLOGY | Facility: CLINIC | Age: 77
End: 2022-02-04

## 2022-02-04 VITALS
DIASTOLIC BLOOD PRESSURE: 58 MMHG | HEART RATE: 54 BPM | TEMPERATURE: 97.9 F | OXYGEN SATURATION: 97 % | HEIGHT: 71 IN | WEIGHT: 218 LBS | BODY MASS INDEX: 30.52 KG/M2 | SYSTOLIC BLOOD PRESSURE: 112 MMHG

## 2022-02-04 DIAGNOSIS — N18.31 STAGE 3A CHRONIC KIDNEY DISEASE: ICD-10-CM

## 2022-02-04 DIAGNOSIS — E78.2 MIXED HYPERLIPIDEMIA: ICD-10-CM

## 2022-02-04 DIAGNOSIS — R00.1 BRADYCARDIA: ICD-10-CM

## 2022-02-04 DIAGNOSIS — I10 PRIMARY HYPERTENSION: Primary | ICD-10-CM

## 2022-02-04 DIAGNOSIS — F41.9 ANXIETY: ICD-10-CM

## 2022-02-04 DIAGNOSIS — M51.36 DDD (DEGENERATIVE DISC DISEASE), LUMBAR: ICD-10-CM

## 2022-02-04 PROCEDURE — 99213 OFFICE O/P EST LOW 20 MIN: CPT | Performed by: INTERNAL MEDICINE

## 2022-02-04 RX ORDER — HYDROCODONE BITARTRATE AND ACETAMINOPHEN 7.5; 325 MG/1; MG/1
1 TABLET ORAL 3 TIMES DAILY PRN
Qty: 90 TABLET | Refills: 0 | Status: SHIPPED | OUTPATIENT
Start: 2022-02-04 | End: 2022-03-11 | Stop reason: SDUPTHER

## 2022-02-04 RX ORDER — ALPRAZOLAM 0.25 MG/1
0.25 TABLET ORAL 2 TIMES DAILY PRN
Qty: 60 TABLET | Refills: 2 | Status: SHIPPED | OUTPATIENT
Start: 2022-02-04 | End: 2022-03-11 | Stop reason: SDUPTHER

## 2022-02-04 NOTE — PROGRESS NOTES
subjective     Chief Complaint   Patient presents with   • Back Pain     follow up   • Anxiety     follow up    • Med Refill     pending      History of Present Illness  Patient is 76 years old white male who is here for follow-up.  Patient has chronic back pain and is taking hydrocodone.  Patient is compliant with medications and is here for pain management follow-up.    Patient also has essential hypertension which is very well controlled with current medications    Hyperlipidemia on Crestor therapy no drug side effects.    Diabetes mellitus patient currently is taking Januvia and Metformin  Anxiety disorder on Prozac and Xanax.    Dyslipidemia better with Restoril.  Denies any chest pain palpitations or shortness of breath.  He has history of prostate cancer is doing fairly well.    Past Surgical History:   Procedure Laterality Date   • COLONOSCOPY  03/2018   • EYE SURGERY     • FOOT SURGERY     • HERNIA REPAIR     • HYDROCELECTOMY  06/15/2015   • PROSTATE BIOPSY  2018   • PROSTATE FIDUCIAL MARKER PLACEMENT  09/14/2018   • RHINOPLASTY     • UPPER GASTROINTESTINAL ENDOSCOPY  03/24/2014    WITH BIOPSIES AND DILATION     Family History   Problem Relation Age of Onset   • Kidney disease Other    • Other Other         CHRONIC DISABLING DISEASES URINARY TRACT DISEASE   • Diabetes Other    • Hypertension Other    • Other Mother    • Heart failure Father    • Stroke Sister    • Arthritis Sister    • Heart disease Brother    • No Known Problems Brother    • No Known Problems Brother      Past Medical History:   Diagnosis Date   • Anxiety    • Arthritis    • Colitis    • Depression    • GERD (gastroesophageal reflux disease)    • Gout    • Heart murmur    • History of EKG 12/22/2015    NORMAL   • Hyperlipidemia    • Hypertension    • Obesity    • Pancreatitis     history of   • Prostate cancer (HCC)    • Renal failure     MILD one functioning kidney   • Skin cancer     basal cell cancer on back     Patient Active Problem  List   Diagnosis   • Essential hypertension, labile   • GERD (gastroesophageal reflux disease)   • Renal failure   • Hyperlipidemia   • Anxiety   • Depression   • Type 2 diabetes mellitus (HCC)   • Periumbilical abdominal pain   • DDD (degenerative disc disease), lumbar   • Prostate cancer (HCC)   • Hyperuricemia   • BURKS (dyspnea on exertion)   • Bradycardia   • Lower urinary tract symptoms due to benign prostatic hyperplasia   • Raised prostate specific antigen   • Chest pain in adult   • Generalized anxiety disorder   • Primary insomnia       Social History     Tobacco Use   • Smoking status: Former Smoker     Packs/day: 1.00     Types: Cigarettes     Quit date:      Years since quittin.   • Smokeless tobacco: Never Used   Vaping Use   • Vaping Use: Never used   Substance Use Topics   • Alcohol use: Yes     Alcohol/week: 7.0 standard drinks     Types: 7 Shots of liquor per week     Comment:  once per day   • Drug use: No       No Known Allergies    Current Outpatient Medications on File Prior to Visit   Medication Sig   • amLODIPine (NORVASC) 10 MG tablet Take 1 tablet by mouth Daily.   • aspirin 81 MG tablet Take 81 mg by mouth daily.   • bicalutamide (CASODEX) 50 MG chemo tablet Casodex 50 mg tablet   Take 1 tablet every day by oral route for 30 days.   • Blood Glucose Monitoring Suppl w/Device kit Check blood sugar once a day.    DX: E11.9   • cholecalciferol (VITAMIN D3) 1000 UNITS tablet Take 1,000 Units by mouth daily.   • cloNIDine (CATAPRES) 0.1 MG tablet Take 0.05 mg by mouth As Needed. TID TO QID PRN    • Cyanocobalamin (VITAMIN B12 PO) Take  by mouth daily.   • esomeprazole (nexIUM) 40 MG capsule Take 1 capsule by mouth Every Morning Before Breakfast.   • FLUoxetine (PROzac) 20 MG capsule Take 1 capsule by mouth Daily.   • folic acid (FOLVITE) 1 MG tablet Take 1 tablet by mouth Daily.   • glucose blood (Contour Next Test) test strip Check blood glucose daily   • hydrALAZINE (APRESOLINE) 50 MG  tablet TAKE 1 TABLET BY MOUTH 3  TIMES DAILY   • hydrOXYzine (ATARAX) 25 MG tablet Take 1 tablet by mouth At Night As Needed (PRN).   • indomethacin (INDOCIN) 25 MG capsule Take 1 capsule by mouth 3 (Three) Times a Day As Needed for Mild Pain .   • Leuprolide Acetate (ELIGARD SC) Inject 1 application under the skin into the appropriate area as directed Every 6 (Six) Months. 3/2019 first one month shot   • loratadine (Claritin) 10 MG tablet Take 1 tablet by mouth Daily.   • meclizine (ANTIVERT) 25 MG tablet Take 1 tablet by mouth 3 (three) times a day as needed for dizziness.   • metFORMIN (Glucophage) 500 MG tablet Take 1 tablet by mouth Daily With Breakfast.   • metoprolol tartrate (LOPRESSOR) 25 MG tablet Take 1 tablet by mouth 2 (Two) Times a Day.   • Microlet Lancets misc 1 stick Daily.   • Pyridoxine HCl (VITAMIN B-6 PO) Take  by mouth daily.   • rosuvastatin (CRESTOR) 20 MG tablet Take 1 tablet by mouth Every Night.   • SITagliptin (Januvia) 100 MG tablet Take 1 tablet by mouth Daily.   • temazepam (RESTORIL) 15 MG capsule Take 1 capsule by mouth At Night As Needed for Sleep.   • [DISCONTINUED] ALPRAZolam (XANAX) 0.25 MG tablet Take 1 tablet by mouth 2 (Two) Times a Day As Needed for Anxiety or Sleep. for anxiety   • [DISCONTINUED] HYDROcodone-acetaminophen (NORCO) 7.5-325 MG per tablet Take 1 tablet by mouth 3 (Three) Times a Day As Needed for Moderate Pain . Due 3/30     No current facility-administered medications on file prior to visit.         The following portions of the patient's history were reviewed and updated as appropriate: allergies, current medications, past family history, past medical history, past social history, past surgical history and problem list.    Review of Systems   Constitutional: Negative.   HENT: Negative.  Negative for congestion.    Eyes: Negative.    Cardiovascular: Negative.  Negative for chest pain, cyanosis, dyspnea on exertion, irregular heartbeat, leg swelling,  "near-syncope, orthopnea, palpitations, paroxysmal nocturnal dyspnea and syncope.   Respiratory: Negative.  Negative for shortness of breath.    Hematologic/Lymphatic: Negative.    Musculoskeletal: Positive for back pain.   Gastrointestinal: Negative.    Neurological: Negative.  Negative for headaches.   Psychiatric/Behavioral: The patient has insomnia and is nervous/anxious.           Objective:     /58 (BP Location: Left arm, Patient Position: Sitting, Cuff Size: Adult)   Pulse 54   Temp 97.9 °F (36.6 °C)   Ht 180.3 cm (71\")   Wt 98.9 kg (218 lb)   SpO2 97%   BMI 30.40 kg/m²   Pulmonary:      Effort: Pulmonary effort is normal.      Breath sounds: Normal breath sounds. No stridor. No wheezing. No rhonchi. No rales.   Cardiovascular:      PMI at left midclavicular line. Normal rate. Regular rhythm. Normal S1. Normal S2.      Murmurs: There is no murmur.      No gallop. No click. No rub.   Pulses:     Intact distal pulses.   Edema:     Peripheral edema absent.           Lab Review  Lab Results   Component Value Date     01/05/2022    K 4.5 01/05/2022     01/05/2022    BUN 19 01/05/2022    CREATININE 1.44 (H) 01/05/2022    GLUCOSE 122 (H) 01/05/2022    CALCIUM 9.5 01/05/2022    ALT 20 01/05/2022    ALKPHOS 44 01/05/2022    LABIL2 1.8 03/07/2016     Lab Results   Component Value Date    CKTOTAL 92 01/05/2022     Lab Results   Component Value Date    WBC 8.51 01/05/2022    HGB 14.3 01/05/2022    HCT 41.1 01/05/2022     01/05/2022     No results found for: INR  No results found for: MG  Lab Results   Component Value Date    PSA <0.014 12/09/2019    TSH 2.807 09/28/2015     No results found for: BNP  Lab Results   Component Value Date    CHLPL 227 (H) 03/07/2016    CHOL 141 01/05/2022    TRIG 192 (H) 01/05/2022    HDL 43 01/05/2022    VLDL 32 01/05/2022    LDLHDL 1.39 01/05/2022     Lab Results   Component Value Date    LDL 66 01/05/2022     (H) 07/14/2021       Procedures       I " personally viewed and interpreted the patient's LAB data         Assessment:     1. Primary hypertension    2. Anxiety    3. DDD (degenerative disc disease), lumbar    4. Mixed hyperlipidemia    5. Bradycardia    6. Stage 3a chronic kidney disease (HCC)          Plan:     Essential hypertension  Blood pressure is very well controlled patient was advised to continue Norvasc, hydralazine and Lopressor    Anxiety disorder, Zoloft and Xanax was continued.  Patient is doing very well and is compliant with medication.    Insomnia Restoril was continued.  Side effects discussed.    Chronic back pain and prostate cancer.  Refill of Norco was given.    Diabetes mellitus Januvia and Metformin were continued.  Patient has chronic renal failure however it is stable  Hyperlipidemia Crestor was continued.  Follow-up scheduled      No follow-ups on file.

## 2022-03-11 ENCOUNTER — OFFICE VISIT (OUTPATIENT)
Dept: CARDIOLOGY | Facility: CLINIC | Age: 77
End: 2022-03-11

## 2022-03-11 VITALS
WEIGHT: 215 LBS | BODY MASS INDEX: 30.1 KG/M2 | OXYGEN SATURATION: 95 % | HEIGHT: 71 IN | TEMPERATURE: 97.1 F | DIASTOLIC BLOOD PRESSURE: 68 MMHG | SYSTOLIC BLOOD PRESSURE: 122 MMHG | HEART RATE: 55 BPM

## 2022-03-11 DIAGNOSIS — C61 PROSTATE CANCER: ICD-10-CM

## 2022-03-11 DIAGNOSIS — F41.9 ANXIETY: ICD-10-CM

## 2022-03-11 DIAGNOSIS — M51.36 DDD (DEGENERATIVE DISC DISEASE), LUMBAR: ICD-10-CM

## 2022-03-11 DIAGNOSIS — E11.21 TYPE 2 DIABETES MELLITUS WITH DIABETIC NEPHROPATHY, WITHOUT LONG-TERM CURRENT USE OF INSULIN: ICD-10-CM

## 2022-03-11 DIAGNOSIS — E78.2 MIXED HYPERLIPIDEMIA: ICD-10-CM

## 2022-03-11 DIAGNOSIS — I10 PRIMARY HYPERTENSION: Primary | ICD-10-CM

## 2022-03-11 DIAGNOSIS — N18.31 STAGE 3A CHRONIC KIDNEY DISEASE: ICD-10-CM

## 2022-03-11 PROCEDURE — 99213 OFFICE O/P EST LOW 20 MIN: CPT | Performed by: INTERNAL MEDICINE

## 2022-03-11 RX ORDER — ALPRAZOLAM 0.25 MG/1
0.25 TABLET ORAL 2 TIMES DAILY PRN
Qty: 60 TABLET | Refills: 2 | Status: SHIPPED | OUTPATIENT
Start: 2022-03-11 | End: 2022-04-08 | Stop reason: SDUPTHER

## 2022-03-11 RX ORDER — HYDROCODONE BITARTRATE AND ACETAMINOPHEN 7.5; 325 MG/1; MG/1
1 TABLET ORAL 3 TIMES DAILY PRN
Qty: 90 TABLET | Refills: 0 | Status: SHIPPED | OUTPATIENT
Start: 2022-03-11 | End: 2022-04-08 | Stop reason: SDUPTHER

## 2022-03-11 NOTE — PROGRESS NOTES
subjective     Chief Complaint   Patient presents with   • Back Pain     Follow up   • Anxiety     Follow up   • Med Refill     pending     History of Present Illness  Patient is 76 years old white male who has history of degenerative disc disease and is complaining of a lot of back pain.  He is taking Indocin and Norco.  Pain is better but he is unable to sleep because of pain at night.  He is not interested in back surgery.  Pain clinic referral for local injection was discussed patient declined at this time.    Anxiety is controlled with Xanax and Zoloft  .  Blood pressure is much better controlled with current medications.  Patient is taking metoprolol, hydralazine Norvasc and clonidine on as needed basis    Hyperlipidemia on Crestor therapy    Diabetes mellitus type 2  Januvia, Metformin  Diabetic control is good.    Patient also complains of peripheral neuropathy particularly at night both feet and toes get hot and tingle and burn.    Past Surgical History:   Procedure Laterality Date   • COLONOSCOPY  03/2018   • EYE SURGERY     • FOOT SURGERY     • HERNIA REPAIR     • HYDROCELECTOMY  06/15/2015   • PROSTATE BIOPSY  2018   • PROSTATE FIDUCIAL MARKER PLACEMENT  09/14/2018   • RHINOPLASTY     • UPPER GASTROINTESTINAL ENDOSCOPY  03/24/2014    WITH BIOPSIES AND DILATION     Family History   Problem Relation Age of Onset   • Kidney disease Other    • Other Other         CHRONIC DISABLING DISEASES URINARY TRACT DISEASE   • Diabetes Other    • Hypertension Other    • Other Mother    • Heart failure Father    • Stroke Sister    • Arthritis Sister    • Heart disease Brother    • No Known Problems Brother    • No Known Problems Brother      Past Medical History:   Diagnosis Date   • Anxiety    • Arthritis    • Colitis    • Depression    • GERD (gastroesophageal reflux disease)    • Gout    • Heart murmur    • History of EKG 12/22/2015    NORMAL   • Hyperlipidemia    • Hypertension    • Obesity    • Pancreatitis      history of   • Prostate cancer (HCC)    • Renal failure     MILD one functioning kidney   • Skin cancer     basal cell cancer on back     Patient Active Problem List   Diagnosis   • Essential hypertension, labile   • GERD (gastroesophageal reflux disease)   • Renal failure   • Hyperlipidemia   • Anxiety   • Depression   • Type 2 diabetes mellitus (HCC)   • Periumbilical abdominal pain   • DDD (degenerative disc disease), lumbar   • Prostate cancer (HCC)   • Hyperuricemia   • BURKS (dyspnea on exertion)   • Bradycardia   • Lower urinary tract symptoms due to benign prostatic hyperplasia   • Raised prostate specific antigen   • Chest pain in adult   • Generalized anxiety disorder   • Primary insomnia       Social History     Tobacco Use   • Smoking status: Former Smoker     Packs/day: 1.00     Types: Cigarettes     Quit date:      Years since quittin.2   • Smokeless tobacco: Never Used   Vaping Use   • Vaping Use: Never used   Substance Use Topics   • Alcohol use: Yes     Alcohol/week: 7.0 standard drinks     Types: 7 Shots of liquor per week     Comment:  once per day   • Drug use: No       No Known Allergies    Current Outpatient Medications on File Prior to Visit   Medication Sig   • amLODIPine (NORVASC) 10 MG tablet Take 1 tablet by mouth Daily.   • aspirin 81 MG tablet Take 81 mg by mouth daily.   • bicalutamide (CASODEX) 50 MG chemo tablet Casodex 50 mg tablet   Take 1 tablet every day by oral route for 30 days.   • Blood Glucose Monitoring Suppl w/Device kit Check blood sugar once a day.    DX: E11.9   • cholecalciferol (VITAMIN D3) 1000 UNITS tablet Take 1,000 Units by mouth daily.   • cloNIDine (CATAPRES) 0.1 MG tablet Take 0.05 mg by mouth As Needed. TID TO QID PRN   • Cyanocobalamin (VITAMIN B12 PO) Take  by mouth daily.   • esomeprazole (nexIUM) 40 MG capsule Take 1 capsule by mouth Every Morning Before Breakfast.   • FLUoxetine (PROzac) 20 MG capsule Take 1 capsule by mouth Daily.   • folic acid  (FOLVITE) 1 MG tablet Take 1 tablet by mouth Daily.   • glucose blood (Contour Next Test) test strip Check blood glucose daily   • hydrALAZINE (APRESOLINE) 50 MG tablet TAKE 1 TABLET BY MOUTH 3  TIMES DAILY   • hydrOXYzine (ATARAX) 25 MG tablet Take 1 tablet by mouth At Night As Needed (PRN).   • indomethacin (INDOCIN) 25 MG capsule Take 1 capsule by mouth 3 (Three) Times a Day As Needed for Mild Pain .   • Leuprolide Acetate (ELIGARD SC) Inject 1 application under the skin into the appropriate area as directed Every 6 (Six) Months. 3/2019 first one month shot   • loratadine (Claritin) 10 MG tablet Take 1 tablet by mouth Daily.   • meclizine (ANTIVERT) 25 MG tablet Take 1 tablet by mouth 3 (three) times a day as needed for dizziness.   • metFORMIN (Glucophage) 500 MG tablet Take 1 tablet by mouth Daily With Breakfast.   • metoprolol tartrate (LOPRESSOR) 25 MG tablet Take 1 tablet by mouth 2 (Two) Times a Day.   • Microlet Lancets misc 1 stick Daily.   • Pyridoxine HCl (VITAMIN B-6 PO) Take  by mouth daily.   • rosuvastatin (CRESTOR) 20 MG tablet Take 1 tablet by mouth Every Night.   • SITagliptin (Januvia) 100 MG tablet Take 1 tablet by mouth Daily.   • temazepam (RESTORIL) 15 MG capsule Take 1 capsule by mouth At Night As Needed for Sleep.   • [DISCONTINUED] ALPRAZolam (XANAX) 0.25 MG tablet Take 1 tablet by mouth 2 (Two) Times a Day As Needed for Anxiety or Sleep. for anxiety   • [DISCONTINUED] HYDROcodone-acetaminophen (NORCO) 7.5-325 MG per tablet Take 1 tablet by mouth 3 (Three) Times a Day As Needed for Moderate Pain . Due 3/30     No current facility-administered medications on file prior to visit.         The following portions of the patient's history were reviewed and updated as appropriate: allergies, current medications, past family history, past medical history, past social history, past surgical history and problem list.    Review of Systems   Constitutional: Negative.   HENT: Negative.  Negative for  "congestion.    Eyes: Negative.    Cardiovascular: Negative.  Negative for chest pain, cyanosis, dyspnea on exertion, irregular heartbeat, leg swelling, near-syncope, orthopnea, palpitations, paroxysmal nocturnal dyspnea and syncope.   Respiratory: Negative.  Negative for shortness of breath.    Hematologic/Lymphatic: Negative.    Musculoskeletal: Positive for back pain.   Gastrointestinal: Negative.    Neurological: Positive for numbness and paresthesias. Negative for headaches.   Psychiatric/Behavioral: The patient is nervous/anxious.           Objective:     /68 (BP Location: Left arm, Patient Position: Sitting, Cuff Size: Adult)   Pulse 55   Temp 97.1 °F (36.2 °C)   Ht 180.3 cm (71\")   Wt 97.5 kg (215 lb)   SpO2 95%   BMI 29.99 kg/m²   Pulmonary:      Effort: Pulmonary effort is normal.      Breath sounds: Normal breath sounds. No stridor. No wheezing. No rhonchi. No rales.   Cardiovascular:      PMI at left midclavicular line. Normal rate. Regular rhythm. Normal S1. Normal S2.      Murmurs: There is no murmur.      No gallop. No click. No rub.   Pulses:     Intact distal pulses.   Edema:     Peripheral edema absent.           Lab Review  Lab Results   Component Value Date     01/05/2022    K 4.5 01/05/2022     01/05/2022    BUN 19 01/05/2022    CREATININE 1.44 (H) 01/05/2022    GLUCOSE 122 (H) 01/05/2022    CALCIUM 9.5 01/05/2022    ALT 20 01/05/2022    ALKPHOS 44 01/05/2022    LABIL2 1.8 03/07/2016     Lab Results   Component Value Date    CKTOTAL 92 01/05/2022     Lab Results   Component Value Date    WBC 8.51 01/05/2022    HGB 14.3 01/05/2022    HCT 41.1 01/05/2022     01/05/2022     No results found for: INR  No results found for: MG  Lab Results   Component Value Date    PSA <0.014 12/09/2019    TSH 2.807 09/28/2015     No results found for: BNP  Lab Results   Component Value Date    CHLPL 227 (H) 03/07/2016    CHOL 141 01/05/2022    TRIG 192 (H) 01/05/2022    HDL 43 " 01/05/2022    VLDL 32 01/05/2022    LDLHDL 1.39 01/05/2022     Lab Results   Component Value Date    LDL 66 01/05/2022     (H) 07/14/2021       Procedures       I personally viewed and interpreted the patient's LAB data         Assessment:     1. Primary hypertension    2. Anxiety    3. DDD (degenerative disc disease), lumbar    4. Mixed hyperlipidemia    5. Prostate cancer (HCC)    6. Type 2 diabetes mellitus with diabetic nephropathy, without long-term current use of insulin (HCC)    7. Stage 3a chronic kidney disease ([facility])          Plan:     Essential hypertension  Blood pressure is very well controlled with current medications.  Patient was advised to continue Norvasc hydralazine metoprolol and clonidine on as needed basis.    Anxiety is controlled with Zoloft and Xanax which was continued.    Chronic back pain and degenerative disc disease  Surgical consult and pain clinic consult was offered patient declined.  Refill of Norco was given.    Hyperlipidemia  Patient will continue Crestor lab work scheduled.  Patient also has diabetic nephropathy.  He is drinking a lot of fluids and avoiding NSAID therapy.  Follow-up scheduled        No follow-ups on file.

## 2022-04-08 ENCOUNTER — OFFICE VISIT (OUTPATIENT)
Dept: CARDIOLOGY | Facility: CLINIC | Age: 77
End: 2022-04-08

## 2022-04-08 VITALS
SYSTOLIC BLOOD PRESSURE: 126 MMHG | OXYGEN SATURATION: 97 % | DIASTOLIC BLOOD PRESSURE: 64 MMHG | WEIGHT: 217 LBS | HEART RATE: 62 BPM | BODY MASS INDEX: 30.38 KG/M2 | HEIGHT: 71 IN

## 2022-04-08 DIAGNOSIS — F41.9 ANXIETY: ICD-10-CM

## 2022-04-08 DIAGNOSIS — E78.2 MIXED HYPERLIPIDEMIA: ICD-10-CM

## 2022-04-08 DIAGNOSIS — I10 PRIMARY HYPERTENSION: Primary | ICD-10-CM

## 2022-04-08 DIAGNOSIS — M51.36 DDD (DEGENERATIVE DISC DISEASE), LUMBAR: ICD-10-CM

## 2022-04-08 PROCEDURE — 99213 OFFICE O/P EST LOW 20 MIN: CPT | Performed by: INTERNAL MEDICINE

## 2022-04-08 RX ORDER — MECLIZINE HYDROCHLORIDE 25 MG/1
25 TABLET ORAL 3 TIMES DAILY PRN
Qty: 90 TABLET | Refills: 1 | Status: SHIPPED | OUTPATIENT
Start: 2022-04-08 | End: 2022-09-02 | Stop reason: SDUPTHER

## 2022-04-08 RX ORDER — HYDROCODONE BITARTRATE AND ACETAMINOPHEN 7.5; 325 MG/1; MG/1
1 TABLET ORAL 3 TIMES DAILY PRN
Qty: 90 TABLET | Refills: 0 | Status: SHIPPED | OUTPATIENT
Start: 2022-04-08 | End: 2022-05-13 | Stop reason: SDUPTHER

## 2022-04-08 RX ORDER — ALPRAZOLAM 0.25 MG/1
0.25 TABLET ORAL 2 TIMES DAILY PRN
Qty: 60 TABLET | Refills: 2 | Status: SHIPPED | OUTPATIENT
Start: 2022-04-08 | End: 2022-05-13 | Stop reason: SDUPTHER

## 2022-04-08 RX ORDER — ESOMEPRAZOLE MAGNESIUM 40 MG/1
40 CAPSULE, DELAYED RELEASE ORAL
Qty: 90 CAPSULE | Refills: 2 | Status: SHIPPED | OUTPATIENT
Start: 2022-04-08

## 2022-04-08 NOTE — PROGRESS NOTES
subjective     Chief Complaint   Patient presents with   • Follow-up     Med refill     • Hypertension   • Hyperlipidemia     History of Present Illness  Mr. Stapleton is here for follow-up.  Patient has multiple chronic medical problems including hypertension, hyperlipidemia, anxiety disorder degenerative disc disease GERD and has a history of prostate cancer.    He states that he has difficulty sleeping although he is taking Restoril 15 at bedtime he also takes Xanax 0.25 twice daily and takes hydrocodone 7.5 3 times daily for his back pain.  He states that he could not sleep even with Restoril and he tried melatonin which caused him some diarrhea and was very weak blood pressure drop.  Patient's blood pressure is around 126/60 for now.  Heart rate is 64.  He denies any chest pain palpitations but complains of being weak and gets short of breath easily.  He also states that he is dizzy which has been for quite a while and takes meclizine and needs refill.    Pain is compliant with the pain and nerve medications and needs refill.        Past Surgical History:   Procedure Laterality Date   • COLONOSCOPY  03/2018   • EYE SURGERY     • FOOT SURGERY     • HERNIA REPAIR     • HYDROCELECTOMY  06/15/2015   • PROSTATE BIOPSY  2018   • PROSTATE FIDUCIAL MARKER PLACEMENT  09/14/2018   • RHINOPLASTY     • UPPER GASTROINTESTINAL ENDOSCOPY  03/24/2014    WITH BIOPSIES AND DILATION     Family History   Problem Relation Age of Onset   • Kidney disease Other    • Other Other         CHRONIC DISABLING DISEASES URINARY TRACT DISEASE   • Diabetes Other    • Hypertension Other    • Other Mother    • Heart failure Father    • Stroke Sister    • Arthritis Sister    • Heart disease Brother    • No Known Problems Brother    • No Known Problems Brother      Past Medical History:   Diagnosis Date   • Anxiety    • Arthritis    • Colitis    • Depression    • GERD (gastroesophageal reflux disease)    • Gout    • Heart murmur    • History of EKG  2015    NORMAL   • Hyperlipidemia    • Hypertension    • Obesity    • Pancreatitis     history of   • Prostate cancer (HCC)    • Renal failure     MILD one functioning kidney   • Skin cancer     basal cell cancer on back     Patient Active Problem List   Diagnosis   • Essential hypertension, labile   • GERD (gastroesophageal reflux disease)   • Renal failure   • Hyperlipidemia   • Anxiety   • Depression   • Type 2 diabetes mellitus (HCC)   • Periumbilical abdominal pain   • DDD (degenerative disc disease), lumbar   • Prostate cancer (HCC)   • Hyperuricemia   • BURKS (dyspnea on exertion)   • Bradycardia   • Lower urinary tract symptoms due to benign prostatic hyperplasia   • Raised prostate specific antigen   • Chest pain in adult   • Generalized anxiety disorder   • Primary insomnia       Social History     Tobacco Use   • Smoking status: Former Smoker     Packs/day: 1.00     Types: Cigarettes     Quit date:      Years since quittin.   • Smokeless tobacco: Never Used   Vaping Use   • Vaping Use: Never used   Substance Use Topics   • Alcohol use: Yes     Alcohol/week: 7.0 standard drinks     Types: 7 Shots of liquor per week     Comment:  once per day   • Drug use: No       No Known Allergies    Current Outpatient Medications on File Prior to Visit   Medication Sig   • ALPRAZolam (XANAX) 0.25 MG tablet Take 1 tablet by mouth 2 (Two) Times a Day As Needed for Anxiety or Sleep. for anxiety   • amLODIPine (NORVASC) 10 MG tablet Take 1 tablet by mouth Daily.   • aspirin 81 MG tablet Take 81 mg by mouth daily.   • bicalutamide (CASODEX) 50 MG chemo tablet Casodex 50 mg tablet   Take 1 tablet every day by oral route for 30 days.   • Blood Glucose Monitoring Suppl w/Device kit Check blood sugar once a day.    DX: E11.9   • cholecalciferol (VITAMIN D3) 1000 UNITS tablet Take 1,000 Units by mouth daily.   • cloNIDine (CATAPRES) 0.1 MG tablet Take 0.05 mg by mouth As Needed. TID TO QID PRN   • Cyanocobalamin  (VITAMIN B12 PO) Take  by mouth daily.   • esomeprazole (nexIUM) 40 MG capsule Take 1 capsule by mouth Every Morning Before Breakfast.   • FLUoxetine (PROzac) 20 MG capsule Take 1 capsule by mouth Daily.   • folic acid (FOLVITE) 1 MG tablet Take 1 tablet by mouth Daily.   • glucose blood (Contour Next Test) test strip Check blood glucose daily   • hydrALAZINE (APRESOLINE) 50 MG tablet TAKE 1 TABLET BY MOUTH 3  TIMES DAILY   • HYDROcodone-acetaminophen (NORCO) 7.5-325 MG per tablet Take 1 tablet by mouth 3 (Three) Times a Day As Needed for Moderate Pain . Due 3/30   • hydrOXYzine (ATARAX) 25 MG tablet Take 1 tablet by mouth At Night As Needed (PRN).   • indomethacin (INDOCIN) 25 MG capsule Take 1 capsule by mouth 3 (Three) Times a Day As Needed for Mild Pain .   • Leuprolide Acetate (ELIGARD SC) Inject 1 application under the skin into the appropriate area as directed Every 6 (Six) Months. 3/2019 first one month shot   • loratadine (Claritin) 10 MG tablet Take 1 tablet by mouth Daily.   • meclizine (ANTIVERT) 25 MG tablet Take 1 tablet by mouth 3 (three) times a day as needed for dizziness.   • metFORMIN (Glucophage) 500 MG tablet Take 1 tablet by mouth Daily With Breakfast.   • metoprolol tartrate (LOPRESSOR) 25 MG tablet Take 1 tablet by mouth 2 (Two) Times a Day.   • Microlet Lancets misc 1 stick Daily.   • Pyridoxine HCl (VITAMIN B-6 PO) Take  by mouth daily.   • rosuvastatin (CRESTOR) 20 MG tablet Take 1 tablet by mouth Every Night.   • SITagliptin (Januvia) 100 MG tablet Take 1 tablet by mouth Daily.   • temazepam (RESTORIL) 15 MG capsule Take 1 capsule by mouth At Night As Needed for Sleep.     No current facility-administered medications on file prior to visit.         The following portions of the patient's history were reviewed and updated as appropriate: allergies, current medications, past family history, past medical history, past social history, past surgical history and problem list.    Review of  "Systems   Constitutional: Negative.   HENT: Negative.  Negative for congestion.    Eyes: Negative.    Cardiovascular: Negative.  Negative for chest pain, cyanosis, dyspnea on exertion, irregular heartbeat, leg swelling, near-syncope, orthopnea, palpitations, paroxysmal nocturnal dyspnea and syncope.   Respiratory: Negative.  Negative for shortness of breath.    Hematologic/Lymphatic: Negative.    Musculoskeletal: Positive for arthritis and back pain.   Gastrointestinal: Positive for diarrhea.   Neurological: Positive for dizziness. Negative for headaches.   Psychiatric/Behavioral: The patient has insomnia and is nervous/anxious.           Objective:     /64   Pulse 62   Ht 180.3 cm (70.98\")   Wt 98.4 kg (217 lb)   SpO2 97%   BMI 30.28 kg/m²   Pulmonary:      Effort: Pulmonary effort is normal.      Breath sounds: Normal breath sounds. No stridor. No wheezing. No rhonchi. No rales.   Cardiovascular:      PMI at left midclavicular line. Normal rate. Regular rhythm. Normal S1. Normal S2.      Murmurs: There is no murmur.      No gallop. No click. No rub.   Pulses:     Intact distal pulses.   Edema:     Peripheral edema absent.           Lab Review  Lab Results   Component Value Date     01/05/2022    K 4.5 01/05/2022     01/05/2022    BUN 19 01/05/2022    CREATININE 1.44 (H) 01/05/2022    GLUCOSE 122 (H) 01/05/2022    CALCIUM 9.5 01/05/2022    ALT 20 01/05/2022    ALKPHOS 44 01/05/2022    LABIL2 1.8 03/07/2016     Lab Results   Component Value Date    CKTOTAL 92 01/05/2022     Lab Results   Component Value Date    WBC 8.51 01/05/2022    HGB 14.3 01/05/2022    HCT 41.1 01/05/2022     01/05/2022     No results found for: INR  No results found for: MG  Lab Results   Component Value Date    PSA <0.014 12/09/2019    TSH 2.807 09/28/2015     No results found for: BNP  Lab Results   Component Value Date    CHLPL 227 (H) 03/07/2016    CHOL 141 01/05/2022    TRIG 192 (H) 01/05/2022    HDL 43 " 01/05/2022    VLDL 32 01/05/2022    LDLHDL 1.39 01/05/2022     Lab Results   Component Value Date    LDL 66 01/05/2022     (H) 07/14/2021       Procedures       I personally viewed and interpreted the patient's LAB data         Assessment:     1. Primary hypertension    2. Anxiety    3. DDD (degenerative disc disease), lumbar    4. Mixed hyperlipidemia          Plan:     Hypertension  Blood pressure is very well controlled.  Patient was advised to check blood pressure closely because of diarrhea he could be dehydrated and get low blood pressures clinically he is stable at this time and will continue current medications.    His diarrhea is stopped now, no abdominal discomfort no nausea or vomiting.    Refill of pain medications were given, side effects were explained.  He will continue Xanax and as needed basis along with a daily Prozac.  Restoril was also refilled however patient was advised to take it only on as needed basis  Lipids have been normal, he will continue Crestor.  Diabetes mellitus he will continue Metformin and Januvia.        No follow-ups on file.

## 2022-05-10 ENCOUNTER — LAB (OUTPATIENT)
Dept: LAB | Facility: HOSPITAL | Age: 77
End: 2022-05-10

## 2022-05-10 DIAGNOSIS — R05.9 COUGH: ICD-10-CM

## 2022-05-10 DIAGNOSIS — R09.89 RUNNY NOSE: ICD-10-CM

## 2022-05-10 DIAGNOSIS — Z91.89 AT INCREASED RISK OF EXPOSURE TO COVID-19 VIRUS: Primary | ICD-10-CM

## 2022-05-10 DIAGNOSIS — Z91.89 AT INCREASED RISK OF EXPOSURE TO COVID-19 VIRUS: ICD-10-CM

## 2022-05-10 LAB — SARS-COV-2 RNA RESP QL NAA+PROBE: NOT DETECTED

## 2022-05-10 PROCEDURE — 87635 SARS-COV-2 COVID-19 AMP PRB: CPT | Performed by: INTERNAL MEDICINE

## 2022-05-10 PROCEDURE — C9803 HOPD COVID-19 SPEC COLLECT: HCPCS

## 2022-05-13 ENCOUNTER — OFFICE VISIT (OUTPATIENT)
Dept: CARDIOLOGY | Facility: CLINIC | Age: 77
End: 2022-05-13

## 2022-05-13 VITALS
BODY MASS INDEX: 30.52 KG/M2 | DIASTOLIC BLOOD PRESSURE: 70 MMHG | TEMPERATURE: 97.1 F | OXYGEN SATURATION: 97 % | WEIGHT: 218 LBS | HEART RATE: 59 BPM | SYSTOLIC BLOOD PRESSURE: 118 MMHG | HEIGHT: 71 IN

## 2022-05-13 DIAGNOSIS — E78.2 MIXED HYPERLIPIDEMIA: ICD-10-CM

## 2022-05-13 DIAGNOSIS — F41.9 ANXIETY: ICD-10-CM

## 2022-05-13 DIAGNOSIS — E11.21 TYPE 2 DIABETES MELLITUS WITH DIABETIC NEPHROPATHY, WITHOUT LONG-TERM CURRENT USE OF INSULIN: ICD-10-CM

## 2022-05-13 DIAGNOSIS — F51.01 PRIMARY INSOMNIA: ICD-10-CM

## 2022-05-13 DIAGNOSIS — I10 PRIMARY HYPERTENSION: Primary | ICD-10-CM

## 2022-05-13 DIAGNOSIS — R00.1 BRADYCARDIA: ICD-10-CM

## 2022-05-13 DIAGNOSIS — M51.36 DDD (DEGENERATIVE DISC DISEASE), LUMBAR: ICD-10-CM

## 2022-05-13 DIAGNOSIS — E79.0 HYPERURICEMIA: ICD-10-CM

## 2022-05-13 DIAGNOSIS — N18.31 STAGE 3A CHRONIC KIDNEY DISEASE: ICD-10-CM

## 2022-05-13 PROCEDURE — 99213 OFFICE O/P EST LOW 20 MIN: CPT | Performed by: INTERNAL MEDICINE

## 2022-05-13 RX ORDER — CEFDINIR 300 MG/1
CAPSULE ORAL
COMMUNITY
Start: 2022-05-10 | End: 2022-07-15

## 2022-05-13 RX ORDER — ALPRAZOLAM 0.25 MG/1
0.25 TABLET ORAL 2 TIMES DAILY PRN
Qty: 60 TABLET | Refills: 2 | Status: SHIPPED | OUTPATIENT
Start: 2022-05-13 | End: 2022-06-17 | Stop reason: SDUPTHER

## 2022-05-13 RX ORDER — HYDROCODONE BITARTRATE AND ACETAMINOPHEN 7.5; 325 MG/1; MG/1
1 TABLET ORAL 3 TIMES DAILY PRN
Qty: 90 TABLET | Refills: 0 | Status: SHIPPED | OUTPATIENT
Start: 2022-05-13 | End: 2022-06-17 | Stop reason: SDUPTHER

## 2022-05-13 NOTE — PROGRESS NOTES
subjective     Chief Complaint   Patient presents with   • Back Pain     Follow up   • Med Refill     Follow up     History of Present Illness    Mr. Stapleton is here for follow-up of back pain  Patient has history of prostate cancer and degenerative disc disease of lumbar spine.  He has been taking Norco every 8 hours as needed and Indocin 25 3 times daily as needed.  Needs refill.  He is compliant with medications.    Is sleeping better with the Restoril    Blood pressure is much better controlled.  Takes clonidine on as needed basis, Norvasc 10 mg regularly, hydralazine 50 mg 3 times daily and Lopressor 25 twice daily.    Hyperlipidemia on Crestor therapy, diabetes mellitus on Januvia and metformin.  Overall patient is doing better recently had multiple renal stones and complains of mild dysuria he plans to see urologist very soon.    Past Surgical History:   Procedure Laterality Date   • COLONOSCOPY  03/2018   • EYE SURGERY     • FOOT SURGERY     • HERNIA REPAIR     • HYDROCELECTOMY  06/15/2015   • PROSTATE BIOPSY  2018   • PROSTATE FIDUCIAL MARKER PLACEMENT  09/14/2018   • RHINOPLASTY     • UPPER GASTROINTESTINAL ENDOSCOPY  03/24/2014    WITH BIOPSIES AND DILATION     Family History   Problem Relation Age of Onset   • Kidney disease Other    • Other Other         CHRONIC DISABLING DISEASES URINARY TRACT DISEASE   • Diabetes Other    • Hypertension Other    • Other Mother    • Heart failure Father    • Stroke Sister    • Arthritis Sister    • Heart disease Brother    • No Known Problems Brother    • No Known Problems Brother      Past Medical History:   Diagnosis Date   • Anxiety    • Arthritis    • Colitis    • Depression    • GERD (gastroesophageal reflux disease)    • Gout    • Heart murmur    • History of EKG 12/22/2015    NORMAL   • Hyperlipidemia    • Hypertension    • Obesity    • Pancreatitis     history of   • Prostate cancer (HCC)    • Renal failure     MILD one functioning kidney   • Skin cancer      basal cell cancer on back     Patient Active Problem List   Diagnosis   • Essential hypertension, labile   • GERD (gastroesophageal reflux disease)   • Renal failure   • Hyperlipidemia   • Anxiety   • Depression   • Type 2 diabetes mellitus (HCC)   • Periumbilical abdominal pain   • DDD (degenerative disc disease), lumbar   • Prostate cancer (HCC)   • Hyperuricemia   • BURKS (dyspnea on exertion)   • Bradycardia   • Lower urinary tract symptoms due to benign prostatic hyperplasia   • Raised prostate specific antigen   • Chest pain in adult   • Generalized anxiety disorder   • Primary insomnia       Social History     Tobacco Use   • Smoking status: Former Smoker     Packs/day: 1.00     Types: Cigarettes     Quit date:      Years since quittin.3   • Smokeless tobacco: Never Used   Vaping Use   • Vaping Use: Never used   Substance Use Topics   • Alcohol use: Yes     Alcohol/week: 7.0 standard drinks     Types: 7 Shots of liquor per week     Comment:  once per day   • Drug use: No       No Known Allergies    Current Outpatient Medications on File Prior to Visit   Medication Sig   • amLODIPine (NORVASC) 10 MG tablet Take 1 tablet by mouth Daily.   • aspirin 81 MG tablet Take 81 mg by mouth daily.   • bicalutamide (CASODEX) 50 MG chemo tablet Casodex 50 mg tablet   Take 1 tablet every day by oral route for 30 days.   • Blood Glucose Monitoring Suppl w/Device kit Check blood sugar once a day.    DX: E11.9   • cefdinir (OMNICEF) 300 MG capsule TAKE ONE CAPSULE BY MOUTH TWICE DAILY for 10 DAYS FOR INFECTION   • cholecalciferol (VITAMIN D3) 1000 UNITS tablet Take 1,000 Units by mouth daily.   • cloNIDine (CATAPRES) 0.1 MG tablet Take 0.05 mg by mouth As Needed. TID TO QID PRN   • Cyanocobalamin (VITAMIN B12 PO) Take  by mouth daily.   • esomeprazole (nexIUM) 40 MG capsule Take 1 capsule by mouth Every Morning Before Breakfast.   • FLUoxetine (PROzac) 20 MG capsule Take 1 capsule by mouth Daily.   • folic acid  (FOLVITE) 1 MG tablet Take 1 tablet by mouth Daily.   • glucose blood (Contour Next Test) test strip Check blood glucose daily   • hydrALAZINE (APRESOLINE) 50 MG tablet TAKE 1 TABLET BY MOUTH 3  TIMES DAILY   • hydrOXYzine (ATARAX) 25 MG tablet Take 1 tablet by mouth At Night As Needed (PRN).   • indomethacin (INDOCIN) 25 MG capsule Take 1 capsule by mouth 3 (Three) Times a Day As Needed for Mild Pain .   • Leuprolide Acetate (ELIGARD SC) Inject 1 application under the skin into the appropriate area as directed Every 6 (Six) Months. 3/2019 first one month shot   • loratadine (Claritin) 10 MG tablet Take 1 tablet by mouth Daily.   • meclizine (ANTIVERT) 25 MG tablet Take 1 tablet by mouth 3 (Three) Times a Day As Needed for Dizziness.   • metFORMIN (Glucophage) 500 MG tablet Take 1 tablet by mouth Daily With Breakfast.   • metoprolol tartrate (LOPRESSOR) 25 MG tablet Take 1 tablet by mouth 2 (Two) Times a Day.   • Microlet Lancets misc 1 stick Daily.   • Mirabegron ER (MYRBETRIQ) 50 MG tablet sustained-release 24 hour 24 hr tablet Myrbetriq 50 mg tablet,extended release   Take 1 tablet every day by oral route.   • Pyridoxine HCl (VITAMIN B-6 PO) Take  by mouth daily.   • rosuvastatin (CRESTOR) 20 MG tablet Take 1 tablet by mouth Every Night.   • SITagliptin (Januvia) 100 MG tablet Take 1 tablet by mouth Daily.   • temazepam (RESTORIL) 15 MG capsule Take 1 capsule by mouth At Night As Needed for Sleep.   • [DISCONTINUED] ALPRAZolam (XANAX) 0.25 MG tablet Take 1 tablet by mouth 2 (Two) Times a Day As Needed for Anxiety or Sleep. for anxiety   • [DISCONTINUED] HYDROcodone-acetaminophen (NORCO) 7.5-325 MG per tablet Take 1 tablet by mouth 3 (Three) Times a Day As Needed for Moderate Pain . Due 3/30     No current facility-administered medications on file prior to visit.         The following portions of the patient's history were reviewed and updated as appropriate: allergies, current medications, past family history,  "past medical history, past social history, past surgical history and problem list.    Review of Systems   Constitutional: Negative.   HENT: Negative.  Negative for congestion.    Eyes: Negative.    Cardiovascular: Negative.  Negative for chest pain, cyanosis, dyspnea on exertion, irregular heartbeat, leg swelling, near-syncope, orthopnea, palpitations, paroxysmal nocturnal dyspnea and syncope.   Respiratory: Negative.  Negative for shortness of breath.    Hematologic/Lymphatic: Negative.    Musculoskeletal: Positive for arthritis and back pain.   Gastrointestinal: Negative.    Genitourinary: Positive for dysuria and urgency.   Neurological: Negative.  Negative for headaches.   Psychiatric/Behavioral: The patient has insomnia and is nervous/anxious.           Objective:     /70 (BP Location: Left arm, Patient Position: Sitting, Cuff Size: Adult)   Pulse 59   Temp 97.1 °F (36.2 °C)   Ht 180.3 cm (71\")   Wt 98.9 kg (218 lb)   SpO2 97%   BMI 30.40 kg/m²   Pulmonary:      Effort: Pulmonary effort is normal.      Breath sounds: Normal breath sounds. No stridor. No wheezing. No rhonchi. No rales.   Cardiovascular:      PMI at left midclavicular line. Normal rate. Regular rhythm. Normal S1. Normal S2.      Murmurs: There is no murmur.      No gallop. No click. No rub.   Pulses:     Intact distal pulses.   Edema:     Peripheral edema absent.           Lab Review  Lab Results   Component Value Date     01/05/2022    K 4.5 01/05/2022     01/05/2022    BUN 19 01/05/2022    CREATININE 1.44 (H) 01/05/2022    GLUCOSE 122 (H) 01/05/2022    CALCIUM 9.5 01/05/2022    ALT 20 01/05/2022    ALKPHOS 44 01/05/2022    LABIL2 1.8 03/07/2016     Lab Results   Component Value Date    CKTOTAL 92 01/05/2022     Lab Results   Component Value Date    WBC 8.51 01/05/2022    HGB 14.3 01/05/2022    HCT 41.1 01/05/2022     01/05/2022     No results found for: INR  No results found for: MG  Lab Results   Component Value " Date    PSA <0.014 12/09/2019    TSH 2.807 09/28/2015     No results found for: BNP  Lab Results   Component Value Date    CHLPL 227 (H) 03/07/2016    CHOL 141 01/05/2022    TRIG 192 (H) 01/05/2022    HDL 43 01/05/2022    VLDL 32 01/05/2022    LDLHDL 1.39 01/05/2022     Lab Results   Component Value Date    LDL 66 01/05/2022     (H) 07/14/2021       Procedures       I personally viewed and interpreted the patient's LAB data         Assessment:     1. Primary hypertension    2. DDD (degenerative disc disease), lumbar    3. Anxiety    4. Mixed hyperlipidemia    5. Bradycardia    6. Primary insomnia    7. Type 2 diabetes mellitus with diabetic nephropathy, without long-term current use of insulin (HCC)    8. Stage 3a chronic kidney disease (HCC)    9. Hyperuricemia          Plan:     Blood pressure is very well controlled with current medications he will continue, Norvasc, hydralazine, Lopressor and clonidine on a as needed basis    Crestor 20 mg daily was continued last LDL was 66.  Metformin and Januvia was also continued.    Norco refill was given  He will also continue Indocin on a as needed basis we will check uric acid because of prior history of gouty arthritis and hyperuricemia.    Refill of Restoril and Xanax was also given.  Follow-up scheduled            No follow-ups on file.

## 2022-06-13 ENCOUNTER — TELEPHONE (OUTPATIENT)
Dept: CARDIOLOGY | Facility: CLINIC | Age: 77
End: 2022-06-13

## 2022-06-17 ENCOUNTER — OFFICE VISIT (OUTPATIENT)
Dept: CARDIOLOGY | Facility: CLINIC | Age: 77
End: 2022-06-17

## 2022-06-17 VITALS
BODY MASS INDEX: 29.96 KG/M2 | WEIGHT: 214 LBS | HEIGHT: 71 IN | TEMPERATURE: 97.1 F | OXYGEN SATURATION: 97 % | DIASTOLIC BLOOD PRESSURE: 62 MMHG | SYSTOLIC BLOOD PRESSURE: 120 MMHG | HEART RATE: 56 BPM

## 2022-06-17 DIAGNOSIS — C61 PROSTATE CANCER: ICD-10-CM

## 2022-06-17 DIAGNOSIS — E78.2 MIXED HYPERLIPIDEMIA: Primary | ICD-10-CM

## 2022-06-17 DIAGNOSIS — I10 PRIMARY HYPERTENSION: ICD-10-CM

## 2022-06-17 DIAGNOSIS — F41.9 ANXIETY: ICD-10-CM

## 2022-06-17 DIAGNOSIS — M51.36 DDD (DEGENERATIVE DISC DISEASE), LUMBAR: ICD-10-CM

## 2022-06-17 DIAGNOSIS — F51.01 PRIMARY INSOMNIA: ICD-10-CM

## 2022-06-17 DIAGNOSIS — E11.21 TYPE 2 DIABETES MELLITUS WITH DIABETIC NEPHROPATHY, WITHOUT LONG-TERM CURRENT USE OF INSULIN: ICD-10-CM

## 2022-06-17 PROCEDURE — 99213 OFFICE O/P EST LOW 20 MIN: CPT | Performed by: INTERNAL MEDICINE

## 2022-06-17 RX ORDER — ALPRAZOLAM 0.25 MG/1
0.25 TABLET ORAL 2 TIMES DAILY PRN
Qty: 60 TABLET | Refills: 2 | Status: SHIPPED | OUTPATIENT
Start: 2022-06-17 | End: 2022-08-26 | Stop reason: SDUPTHER

## 2022-06-17 RX ORDER — HYDROCODONE BITARTRATE AND ACETAMINOPHEN 7.5; 325 MG/1; MG/1
1 TABLET ORAL 3 TIMES DAILY PRN
Qty: 90 TABLET | Refills: 0 | Status: SHIPPED | OUTPATIENT
Start: 2022-06-17 | End: 2022-07-15 | Stop reason: SDUPTHER

## 2022-06-17 NOTE — PROGRESS NOTES
subjective     Chief Complaint   Patient presents with   • Anxiety   • Back Pain     History of Present Illness  Patient is 76 years old white male who is here for follow-up of multiple chronic medical problems.  He has history of essential hypertension, diabetes mellitus, hyperlipidemia, prostate cancer.  He has chronic back pain, anxiety and insomnia.    He states that he cannot sleep much because of his back pain and prostate problems.  He is planning to have another cystoscopy soon because of multiple bladder stones.  Denies any chest pain palpitations or shortness of breath.    Past Surgical History:   Procedure Laterality Date   • COLONOSCOPY  03/2018   • EYE SURGERY     • FOOT SURGERY     • HERNIA REPAIR     • HYDROCELECTOMY  06/15/2015   • PROSTATE BIOPSY  2018   • PROSTATE FIDUCIAL MARKER PLACEMENT  09/14/2018   • RHINOPLASTY     • UPPER GASTROINTESTINAL ENDOSCOPY  03/24/2014    WITH BIOPSIES AND DILATION     Family History   Problem Relation Age of Onset   • Kidney disease Other    • Other Other         CHRONIC DISABLING DISEASES URINARY TRACT DISEASE   • Diabetes Other    • Hypertension Other    • Other Mother    • Heart failure Father    • Stroke Sister    • Arthritis Sister    • Heart disease Brother    • No Known Problems Brother    • No Known Problems Brother      Past Medical History:   Diagnosis Date   • Anxiety    • Arthritis    • Colitis    • Depression    • GERD (gastroesophageal reflux disease)    • Gout    • Heart murmur    • History of EKG 12/22/2015    NORMAL   • Hyperlipidemia    • Hypertension    • Obesity    • Pancreatitis     history of   • Prostate cancer (HCC)    • Renal failure     MILD one functioning kidney   • Skin cancer     basal cell cancer on back     Patient Active Problem List   Diagnosis   • Essential hypertension, labile   • GERD (gastroesophageal reflux disease)   • Renal failure   • Hyperlipidemia   • Anxiety   • Depression   • Type 2 diabetes mellitus (HCC)   •  Periumbilical abdominal pain   • DDD (degenerative disc disease), lumbar   • Prostate cancer (HCC)   • Hyperuricemia   • BURKS (dyspnea on exertion)   • Bradycardia   • Lower urinary tract symptoms due to benign prostatic hyperplasia   • Raised prostate specific antigen   • Chest pain in adult   • Generalized anxiety disorder   • Primary insomnia       Social History     Tobacco Use   • Smoking status: Former Smoker     Packs/day: 1.00     Types: Cigarettes     Quit date:      Years since quittin.4   • Smokeless tobacco: Never Used   Vaping Use   • Vaping Use: Never used   Substance Use Topics   • Alcohol use: Yes     Alcohol/week: 7.0 standard drinks     Types: 7 Shots of liquor per week     Comment:  once per day   • Drug use: No       No Known Allergies    Current Outpatient Medications on File Prior to Visit   Medication Sig   • amLODIPine (NORVASC) 10 MG tablet Take 1 tablet by mouth Daily.   • aspirin 81 MG tablet Take 81 mg by mouth daily.   • bicalutamide (CASODEX) 50 MG chemo tablet Casodex 50 mg tablet   Take 1 tablet every day by oral route for 30 days.   • Blood Glucose Monitoring Suppl w/Device kit Check blood sugar once a day.    DX: E11.9   • cefdinir (OMNICEF) 300 MG capsule TAKE ONE CAPSULE BY MOUTH TWICE DAILY for 10 DAYS FOR INFECTION   • cholecalciferol (VITAMIN D3) 1000 UNITS tablet Take 1,000 Units by mouth daily.   • cloNIDine (CATAPRES) 0.1 MG tablet Take 0.05 mg by mouth As Needed. TID TO QID PRN   • Cyanocobalamin (VITAMIN B12 PO) Take  by mouth daily.   • esomeprazole (nexIUM) 40 MG capsule Take 1 capsule by mouth Every Morning Before Breakfast.   • FLUoxetine (PROzac) 20 MG capsule Take 1 capsule by mouth Daily.   • folic acid (FOLVITE) 1 MG tablet Take 1 tablet by mouth Daily.   • glucose blood (Contour Next Test) test strip Check blood glucose daily   • hydrALAZINE (APRESOLINE) 50 MG tablet TAKE 1 TABLET BY MOUTH 3  TIMES DAILY   • hydrOXYzine (ATARAX) 25 MG tablet Take 1 tablet  by mouth At Night As Needed (PRN).   • indomethacin (INDOCIN) 25 MG capsule Take 1 capsule by mouth 3 (Three) Times a Day As Needed for Mild Pain .   • Leuprolide Acetate (ELIGARD SC) Inject 1 application under the skin into the appropriate area as directed Every 6 (Six) Months. 3/2019 first one month shot   • loratadine (Claritin) 10 MG tablet Take 1 tablet by mouth Daily.   • meclizine (ANTIVERT) 25 MG tablet Take 1 tablet by mouth 3 (Three) Times a Day As Needed for Dizziness.   • metFORMIN (Glucophage) 500 MG tablet Take 1 tablet by mouth Daily With Breakfast.   • metoprolol tartrate (LOPRESSOR) 25 MG tablet Take 1 tablet by mouth 2 (Two) Times a Day.   • Microlet Lancets misc 1 stick Daily.   • Mirabegron ER (MYRBETRIQ) 50 MG tablet sustained-release 24 hour 24 hr tablet Myrbetriq 50 mg tablet,extended release   Take 1 tablet every day by oral route.   • Pyridoxine HCl (VITAMIN B-6 PO) Take  by mouth daily.   • rosuvastatin (CRESTOR) 20 MG tablet Take 1 tablet by mouth Every Night.   • SITagliptin (Januvia) 100 MG tablet Take 1 tablet by mouth Daily.   • temazepam (RESTORIL) 15 MG capsule Take 1 capsule by mouth At Night As Needed for Sleep.     No current facility-administered medications on file prior to visit.         The following portions of the patient's history were reviewed and updated as appropriate: allergies, current medications, past family history, past medical history, past social history, past surgical history and problem list.    Review of Systems   Constitutional: Negative.   HENT: Negative.  Negative for congestion.    Eyes: Negative.    Cardiovascular: Negative.  Negative for chest pain, cyanosis, dyspnea on exertion, irregular heartbeat, leg swelling, near-syncope, orthopnea, palpitations, paroxysmal nocturnal dyspnea and syncope.   Respiratory: Negative.  Negative for shortness of breath.    Hematologic/Lymphatic: Negative.    Musculoskeletal: Positive for back pain.   Gastrointestinal:  "Negative.    Neurological: Negative.  Negative for headaches.   Psychiatric/Behavioral: The patient has insomnia and is nervous/anxious.           Objective:     /62 (BP Location: Left arm, Patient Position: Sitting, Cuff Size: Adult)   Pulse 56   Temp 97.1 °F (36.2 °C) (Temporal)   Ht 180.3 cm (71\")   Wt 97.1 kg (214 lb)   SpO2 97%   BMI 29.85 kg/m²   Pulmonary:      Effort: Pulmonary effort is normal.      Breath sounds: No stridor. No wheezing. No rhonchi. No rales.   Cardiovascular:      PMI at left midclavicular line. Normal rate. Regular rhythm. Normal S1. Normal S2.      Murmurs: There is no murmur.      No gallop. No click. No rub.   Pulses:     Intact distal pulses.   Edema:     Peripheral edema absent.           Lab Review  Lab Results   Component Value Date     01/05/2022    K 4.5 01/05/2022     01/05/2022    BUN 19 01/05/2022    CREATININE 1.44 (H) 01/05/2022    GLUCOSE 122 (H) 01/05/2022    CALCIUM 9.5 01/05/2022    ALT 20 01/05/2022    ALKPHOS 44 01/05/2022    LABIL2 1.8 03/07/2016     Lab Results   Component Value Date    CKTOTAL 92 01/05/2022     Lab Results   Component Value Date    WBC 8.51 01/05/2022    HGB 14.3 01/05/2022    HCT 41.1 01/05/2022     01/05/2022     No results found for: INR  No results found for: MG  Lab Results   Component Value Date    PSA <0.014 12/09/2019    TSH 2.807 09/28/2015     No results found for: BNP  Lab Results   Component Value Date    CHLPL 227 (H) 03/07/2016    CHOL 141 01/05/2022    TRIG 192 (H) 01/05/2022    HDL 43 01/05/2022    VLDL 32 01/05/2022    LDLHDL 1.39 01/05/2022     Lab Results   Component Value Date    LDL 66 01/05/2022     (H) 07/14/2021       Procedures       I personally viewed and interpreted the patient's LAB data         Assessment:     1. Mixed hyperlipidemia    2. DDD (degenerative disc disease), lumbar    3. Anxiety    4. Primary hypertension    5. Type 2 diabetes mellitus with diabetic nephropathy, " without long-term current use of insulin (HCC)    6. Prostate cancer (HCC)    7. Primary insomnia          Plan:     Hyperlipidemia is well controlled, last LDL was 66.  Lab work scheduled.  He will continue current medications.  Patient has arthritis and cannot take NSAIDs because of renal failure.  History of prostate CA and multiple stones.  He will continue Norco 7.5 every 8 hours as needed refill was given.  Anxiety disorder Prozac was continued along with Xanax 0.25 twice daily as needed.  Insomnia Restoril was continued.  Blood pressure control is good he will continue current medications.  Lab work scheduled including hemoglobin A1c for diabetes mellitus.          No follow-ups on file.

## 2022-06-20 RX ORDER — AMLODIPINE BESYLATE 10 MG/1
10 TABLET ORAL DAILY
Qty: 90 TABLET | Refills: 3 | Status: SHIPPED | OUTPATIENT
Start: 2022-06-20 | End: 2022-06-22

## 2022-06-20 RX ORDER — FLUOXETINE HYDROCHLORIDE 20 MG/1
20 CAPSULE ORAL DAILY
Qty: 90 CAPSULE | Refills: 3 | Status: SHIPPED | OUTPATIENT
Start: 2022-06-20 | End: 2022-06-22

## 2022-06-22 RX ORDER — AMLODIPINE BESYLATE 10 MG/1
10 TABLET ORAL DAILY
Qty: 90 TABLET | Refills: 3 | Status: SHIPPED | OUTPATIENT
Start: 2022-06-22 | End: 2022-08-19 | Stop reason: SDUPTHER

## 2022-06-22 RX ORDER — FLUOXETINE HYDROCHLORIDE 20 MG/1
20 CAPSULE ORAL DAILY
Qty: 90 CAPSULE | Refills: 3 | Status: SHIPPED | OUTPATIENT
Start: 2022-06-22 | End: 2022-08-19 | Stop reason: SDUPTHER

## 2022-07-12 ENCOUNTER — LAB (OUTPATIENT)
Dept: LAB | Facility: HOSPITAL | Age: 77
End: 2022-07-12

## 2022-07-12 DIAGNOSIS — E79.0 HYPERURICEMIA: ICD-10-CM

## 2022-07-12 DIAGNOSIS — E11.21 TYPE 2 DIABETES MELLITUS WITH DIABETIC NEPHROPATHY, WITHOUT LONG-TERM CURRENT USE OF INSULIN: ICD-10-CM

## 2022-07-12 DIAGNOSIS — E78.2 MIXED HYPERLIPIDEMIA: ICD-10-CM

## 2022-07-12 PROCEDURE — 82550 ASSAY OF CK (CPK): CPT

## 2022-07-12 PROCEDURE — 85025 COMPLETE CBC W/AUTO DIFF WBC: CPT

## 2022-07-12 PROCEDURE — 80061 LIPID PANEL: CPT

## 2022-07-12 PROCEDURE — 83036 HEMOGLOBIN GLYCOSYLATED A1C: CPT

## 2022-07-12 PROCEDURE — 80053 COMPREHEN METABOLIC PANEL: CPT

## 2022-07-12 PROCEDURE — 36415 COLL VENOUS BLD VENIPUNCTURE: CPT

## 2022-07-12 PROCEDURE — 84550 ASSAY OF BLOOD/URIC ACID: CPT

## 2022-07-13 LAB
ALBUMIN SERPL-MCNC: 4.8 G/DL (ref 3.5–5.2)
ALBUMIN/GLOB SERPL: 2.4 G/DL
ALP SERPL-CCNC: 43 U/L (ref 39–117)
ALT SERPL W P-5'-P-CCNC: 16 U/L (ref 1–41)
ANION GAP SERPL CALCULATED.3IONS-SCNC: 12 MMOL/L (ref 5–15)
AST SERPL-CCNC: 18 U/L (ref 1–40)
BASOPHILS # BLD AUTO: 0.12 10*3/MM3 (ref 0–0.2)
BASOPHILS NFR BLD AUTO: 1.3 % (ref 0–1.5)
BILIRUB SERPL-MCNC: 0.6 MG/DL (ref 0–1.2)
BUN SERPL-MCNC: 19 MG/DL (ref 8–23)
BUN/CREAT SERPL: 15.1 (ref 7–25)
CALCIUM SPEC-SCNC: 9.4 MG/DL (ref 8.6–10.5)
CHLORIDE SERPL-SCNC: 106 MMOL/L (ref 98–107)
CHOLEST SERPL-MCNC: 133 MG/DL (ref 0–200)
CK SERPL-CCNC: 155 U/L (ref 20–200)
CO2 SERPL-SCNC: 22 MMOL/L (ref 22–29)
CREAT SERPL-MCNC: 1.26 MG/DL (ref 0.76–1.27)
DEPRECATED RDW RBC AUTO: 44.9 FL (ref 37–54)
EGFRCR SERPLBLD CKD-EPI 2021: 59.1 ML/MIN/1.73
EOSINOPHIL # BLD AUTO: 1.95 10*3/MM3 (ref 0–0.4)
EOSINOPHIL NFR BLD AUTO: 20.5 % (ref 0.3–6.2)
ERYTHROCYTE [DISTWIDTH] IN BLOOD BY AUTOMATED COUNT: 12.7 % (ref 12.3–15.4)
GLOBULIN UR ELPH-MCNC: 2 GM/DL
GLUCOSE SERPL-MCNC: 108 MG/DL (ref 65–99)
HBA1C MFR BLD: 6.1 % (ref 4.8–5.6)
HCT VFR BLD AUTO: 40.1 % (ref 37.5–51)
HDLC SERPL-MCNC: 43 MG/DL (ref 40–60)
HGB BLD-MCNC: 13.5 G/DL (ref 13–17.7)
IMM GRANULOCYTES # BLD AUTO: 0.02 10*3/MM3 (ref 0–0.05)
IMM GRANULOCYTES NFR BLD AUTO: 0.2 % (ref 0–0.5)
LDLC SERPL CALC-MCNC: 63 MG/DL (ref 0–100)
LDLC/HDLC SERPL: 1.37 {RATIO}
LYMPHOCYTES # BLD AUTO: 2.64 10*3/MM3 (ref 0.7–3.1)
LYMPHOCYTES NFR BLD AUTO: 27.8 % (ref 19.6–45.3)
MCH RBC QN AUTO: 32.5 PG (ref 26.6–33)
MCHC RBC AUTO-ENTMCNC: 33.7 G/DL (ref 31.5–35.7)
MCV RBC AUTO: 96.6 FL (ref 79–97)
MONOCYTES # BLD AUTO: 0.59 10*3/MM3 (ref 0.1–0.9)
MONOCYTES NFR BLD AUTO: 6.2 % (ref 5–12)
NEUTROPHILS NFR BLD AUTO: 4.17 10*3/MM3 (ref 1.7–7)
NEUTROPHILS NFR BLD AUTO: 44 % (ref 42.7–76)
NRBC BLD AUTO-RTO: 0 /100 WBC (ref 0–0.2)
PLATELET # BLD AUTO: 193 10*3/MM3 (ref 140–450)
PMV BLD AUTO: 12.2 FL (ref 6–12)
POTASSIUM SERPL-SCNC: 4.5 MMOL/L (ref 3.5–5.2)
PROT SERPL-MCNC: 6.8 G/DL (ref 6–8.5)
RBC # BLD AUTO: 4.15 10*6/MM3 (ref 4.14–5.8)
SODIUM SERPL-SCNC: 140 MMOL/L (ref 136–145)
TRIGL SERPL-MCNC: 156 MG/DL (ref 0–150)
URATE SERPL-MCNC: 7.3 MG/DL (ref 3.4–7)
VLDLC SERPL-MCNC: 27 MG/DL (ref 5–40)
WBC NRBC COR # BLD: 9.49 10*3/MM3 (ref 3.4–10.8)

## 2022-07-13 RX ORDER — ALLOPURINOL 100 MG/1
100 TABLET ORAL DAILY
Qty: 90 TABLET | Refills: 1 | Status: SHIPPED | OUTPATIENT
Start: 2022-07-13 | End: 2022-08-19 | Stop reason: SDUPTHER

## 2022-07-15 ENCOUNTER — OFFICE VISIT (OUTPATIENT)
Dept: CARDIOLOGY | Facility: CLINIC | Age: 77
End: 2022-07-15

## 2022-07-15 VITALS
WEIGHT: 214 LBS | BODY MASS INDEX: 29.96 KG/M2 | TEMPERATURE: 97.1 F | DIASTOLIC BLOOD PRESSURE: 66 MMHG | HEART RATE: 50 BPM | OXYGEN SATURATION: 95 % | HEIGHT: 71 IN | SYSTOLIC BLOOD PRESSURE: 126 MMHG

## 2022-07-15 DIAGNOSIS — E11.21 TYPE 2 DIABETES MELLITUS WITH DIABETIC NEPHROPATHY, WITHOUT LONG-TERM CURRENT USE OF INSULIN: ICD-10-CM

## 2022-07-15 DIAGNOSIS — E79.0 HYPERURICEMIA: ICD-10-CM

## 2022-07-15 DIAGNOSIS — I10 PRIMARY HYPERTENSION: ICD-10-CM

## 2022-07-15 DIAGNOSIS — Z12.11 SCREENING FOR COLON CANCER: ICD-10-CM

## 2022-07-15 DIAGNOSIS — F41.1 GENERALIZED ANXIETY DISORDER: ICD-10-CM

## 2022-07-15 DIAGNOSIS — R13.10 DYSPHAGIA, UNSPECIFIED TYPE: ICD-10-CM

## 2022-07-15 DIAGNOSIS — M51.36 DDD (DEGENERATIVE DISC DISEASE), LUMBAR: Primary | ICD-10-CM

## 2022-07-15 DIAGNOSIS — E78.2 MIXED HYPERLIPIDEMIA: ICD-10-CM

## 2022-07-15 PROCEDURE — 99213 OFFICE O/P EST LOW 20 MIN: CPT | Performed by: INTERNAL MEDICINE

## 2022-07-15 RX ORDER — LORATADINE 10 MG/1
10 TABLET ORAL DAILY
Qty: 90 TABLET | Refills: 0 | Status: SHIPPED | OUTPATIENT
Start: 2022-07-15

## 2022-07-15 RX ORDER — HYDROCODONE BITARTRATE AND ACETAMINOPHEN 7.5; 325 MG/1; MG/1
1 TABLET ORAL 3 TIMES DAILY PRN
Qty: 90 TABLET | Refills: 0 | Status: SHIPPED | OUTPATIENT
Start: 2022-07-15 | End: 2022-08-26 | Stop reason: SDUPTHER

## 2022-07-15 NOTE — PROGRESS NOTES
subjective     Chief Complaint   Patient presents with   • Results     Labs     • Diabetes     Follow up   • Pain     Shooting pains right frontal head   • Hypertension     Follow up     History of Present Illness  Patient is 76 years old white male who is here for follow-up of multiple chronic medical problems.  Patient states that he had attack of gout in the left foot    Blood pressure is very well controlled with current medication.  Hyperlipidemia on Crestor 20 mg daily, lipid panel is normal      diabetes mellitus on Januvia and metformin.    Patient complains of difficulty swallowing a lot of burping and indigestion feeling  He is taking Nexium  Will arrange GI consult.    Chronic back pain.  Needs refill on his Norco.  He is compliant with medications  Recently has been getting headaches her blood pressure is normal.          Past Surgical History:   Procedure Laterality Date   • COLONOSCOPY  03/2018   • EYE SURGERY     • FOOT SURGERY     • HERNIA REPAIR     • HYDROCELECTOMY  06/15/2015   • PROSTATE BIOPSY  2018   • PROSTATE FIDUCIAL MARKER PLACEMENT  09/14/2018   • RHINOPLASTY     • UPPER GASTROINTESTINAL ENDOSCOPY  03/24/2014    WITH BIOPSIES AND DILATION     Family History   Problem Relation Age of Onset   • Kidney disease Other    • Other Other         CHRONIC DISABLING DISEASES URINARY TRACT DISEASE   • Diabetes Other    • Hypertension Other    • Other Mother    • Heart failure Father    • Stroke Sister    • Arthritis Sister    • Heart disease Brother    • No Known Problems Brother    • No Known Problems Brother      Past Medical History:   Diagnosis Date   • Anxiety    • Arthritis    • Colitis    • Depression    • GERD (gastroesophageal reflux disease)    • Gout    • Heart murmur    • History of EKG 12/22/2015    NORMAL   • Hyperlipidemia    • Hypertension    • Obesity    • Pancreatitis     history of   • Prostate cancer (HCC)    • Renal failure     MILD one functioning kidney   • Skin cancer     basal  cell cancer on back     Patient Active Problem List   Diagnosis   • Essential hypertension, labile   • GERD (gastroesophageal reflux disease)   • Renal failure   • Hyperlipidemia   • Anxiety   • Depression   • Type 2 diabetes mellitus (HCC)   • Periumbilical abdominal pain   • DDD (degenerative disc disease), lumbar   • Prostate cancer (HCC)   • Hyperuricemia   • BURKS (dyspnea on exertion)   • Bradycardia   • Lower urinary tract symptoms due to benign prostatic hyperplasia   • Raised prostate specific antigen   • Chest pain in adult   • Generalized anxiety disorder   • Primary insomnia       Social History     Tobacco Use   • Smoking status: Former Smoker     Packs/day: 1.00     Types: Cigarettes     Quit date:      Years since quittin.   • Smokeless tobacco: Never Used   Vaping Use   • Vaping Use: Never used   Substance Use Topics   • Alcohol use: Yes     Alcohol/week: 7.0 standard drinks     Types: 7 Shots of liquor per week     Comment:  once per day   • Drug use: No       No Known Allergies    Current Outpatient Medications on File Prior to Visit   Medication Sig   • allopurinol (ZYLOPRIM) 100 MG tablet Take 1 tablet by mouth Daily.   • ALPRAZolam (XANAX) 0.25 MG tablet Take 1 tablet by mouth 2 (Two) Times a Day As Needed for Anxiety or Sleep. for anxiety   • amLODIPine (NORVASC) 10 MG tablet TAKE 1 TABLET BY MOUTH  DAILY   • aspirin 81 MG tablet Take 81 mg by mouth daily.   • bicalutamide (CASODEX) 50 MG chemo tablet Casodex 50 mg tablet   Take 1 tablet every day by oral route for 30 days.   • Blood Glucose Monitoring Suppl w/Device kit Check blood sugar once a day.    DX: E11.9   • cholecalciferol (VITAMIN D3) 1000 UNITS tablet Take 1,000 Units by mouth daily.   • Cyanocobalamin (VITAMIN B12 PO) Take  by mouth daily.   • esomeprazole (nexIUM) 40 MG capsule Take 1 capsule by mouth Every Morning Before Breakfast.   • FLUoxetine (PROzac) 20 MG capsule TAKE 1 CAPSULE BY MOUTH  DAILY   • folic acid  (FOLVITE) 1 MG tablet Take 1 tablet by mouth Daily.   • glucose blood (Contour Next Test) test strip Check blood glucose daily   • hydrALAZINE (APRESOLINE) 50 MG tablet TAKE 1 TABLET BY MOUTH 3  TIMES DAILY   • hydrOXYzine (ATARAX) 25 MG tablet Take 1 tablet by mouth At Night As Needed (PRN).   • indomethacin (INDOCIN) 25 MG capsule Take 1 capsule by mouth 3 (Three) Times a Day As Needed for Mild Pain .   • Leuprolide Acetate (ELIGARD SC) Inject 1 application under the skin into the appropriate area as directed Every 6 (Six) Months. 3/2019 first one month shot   • meclizine (ANTIVERT) 25 MG tablet Take 1 tablet by mouth 3 (Three) Times a Day As Needed for Dizziness.   • metoprolol tartrate (LOPRESSOR) 25 MG tablet TAKE 1 TABLET BY MOUTH  TWICE DAILY   • Microlet Lancets misc 1 stick Daily.   • Mirabegron ER (MYRBETRIQ) 50 MG tablet sustained-release 24 hour 24 hr tablet Myrbetriq 50 mg tablet,extended release   Take 1 tablet every day by oral route.   • Pyridoxine HCl (VITAMIN B-6 PO) Take  by mouth daily.   • rosuvastatin (CRESTOR) 20 MG tablet Take 1 tablet by mouth Every Night.   • SITagliptin (Januvia) 100 MG tablet Take 1 tablet by mouth Daily.   • temazepam (RESTORIL) 15 MG capsule Take 1 capsule by mouth At Night As Needed for Sleep.   • [DISCONTINUED] cefdinir (OMNICEF) 300 MG capsule TAKE ONE CAPSULE BY MOUTH TWICE DAILY for 10 DAYS FOR INFECTION   • [DISCONTINUED] HYDROcodone-acetaminophen (NORCO) 7.5-325 MG per tablet Take 1 tablet by mouth 3 (Three) Times a Day As Needed for Moderate Pain . Due 3/30   • [DISCONTINUED] loratadine (Claritin) 10 MG tablet Take 1 tablet by mouth Daily.   • [DISCONTINUED] metFORMIN (Glucophage) 500 MG tablet Take 1 tablet by mouth Daily With Breakfast.   • cloNIDine (CATAPRES) 0.1 MG tablet Take 0.05 mg by mouth As Needed. TID TO QID PRN     No current facility-administered medications on file prior to visit.         The following portions of the patient's history were  "reviewed and updated as appropriate: allergies, current medications, past family history, past medical history, past social history, past surgical history and problem list.    Review of Systems   Constitutional: Negative.   HENT: Negative for congestion.    Eyes: Negative.    Cardiovascular: Negative.  Negative for chest pain, cyanosis, dyspnea on exertion, irregular heartbeat, leg swelling, near-syncope, orthopnea, palpitations, paroxysmal nocturnal dyspnea and syncope.   Respiratory: Negative.  Negative for shortness of breath.    Hematologic/Lymphatic: Negative.    Musculoskeletal: Positive for arthritis, back pain and joint pain.   Gastrointestinal: Positive for heartburn.   Genitourinary: Positive for hesitancy and nocturia.   Neurological: Positive for headaches.   Psychiatric/Behavioral: The patient has insomnia and is nervous/anxious.           Objective:     /66 (BP Location: Left arm, Patient Position: Sitting, Cuff Size: Adult)   Pulse 50   Temp 97.1 °F (36.2 °C)   Ht 180.3 cm (71\")   Wt 97.1 kg (214 lb)   SpO2 95%   BMI 29.85 kg/m²   Pulmonary:      Effort: Pulmonary effort is normal.      Breath sounds: Normal breath sounds. No stridor. No wheezing. No rhonchi. No rales.   Cardiovascular:      PMI at left midclavicular line. Normal rate. Regular rhythm. Normal S1. Normal S2.      Murmurs: There is no murmur.      No gallop. No click. No rub.   Pulses:     Intact distal pulses.   Edema:     Peripheral edema absent.           Lab Review  Lab Results   Component Value Date     07/12/2022    K 4.5 07/12/2022     07/12/2022    BUN 19 07/12/2022    CREATININE 1.26 07/12/2022    GLUCOSE 108 (H) 07/12/2022    CALCIUM 9.4 07/12/2022    ALT 16 07/12/2022    ALKPHOS 43 07/12/2022    LABIL2 1.8 03/07/2016     Lab Results   Component Value Date    CKTOTAL 155 07/12/2022     Lab Results   Component Value Date    WBC 9.49 07/12/2022    HGB 13.5 07/12/2022    HCT 40.1 07/12/2022     " 07/12/2022     No results found for: INR  No results found for: MG  Lab Results   Component Value Date    PSA <0.014 12/09/2019    TSH 2.807 09/28/2015     No results found for: BNP  Lab Results   Component Value Date    CHLPL 227 (H) 03/07/2016    CHOL 133 07/12/2022    TRIG 156 (H) 07/12/2022    HDL 43 07/12/2022    VLDL 27 07/12/2022    LDLHDL 1.37 07/12/2022     Lab Results   Component Value Date    LDL 63 07/12/2022    LDL 66 01/05/2022       Procedures       I personally viewed and interpreted the patient's LAB data         Assessment:     1. DDD (degenerative disc disease), lumbar    2. Screening for colon cancer    3. Mixed hyperlipidemia    4. Primary hypertension    5. Type 2 diabetes mellitus with diabetic nephropathy, without long-term current use of insulin (HCC)    6. Generalized anxiety disorder    7. Hyperuricemia          Plan:     Chronic back pain and degenerative disc disease lumbar spine, Norco refill was given.  Patient is compliant with medication.  Colon cancer screening, Cologuard was arranged, GI consult was also arranged patient complains of dysphagia and a lot of burping he will continue Nexium.    Hypertension is controlled with current medication which were continued.  Lab work discussed with the patient lipid and diabetic control is good.    Uric acid is elevated patient will take Zyloprim 100 mg daily.  Renal functions are better.  Follow-up scheduled        No follow-ups on file.

## 2022-07-21 ENCOUNTER — TELEPHONE (OUTPATIENT)
Dept: CARDIOLOGY | Facility: CLINIC | Age: 77
End: 2022-07-21

## 2022-07-21 DIAGNOSIS — R07.81 RIB PAIN ON LEFT SIDE: Primary | ICD-10-CM

## 2022-07-21 RX ORDER — DICYCLOMINE HCL 20 MG
20 TABLET ORAL 2 TIMES DAILY
Qty: 60 TABLET | Refills: 0 | Status: SHIPPED | OUTPATIENT
Start: 2022-07-21

## 2022-07-21 NOTE — TELEPHONE ENCOUNTER
Patient c/o has had diarrhea for 3 days, last night he fell in the bathroom floor and hit his left side thinks he may have cracked his ribs, last night was the first time in 3 days that he ate anything that did not go straight through him, please advise on what he should do.

## 2022-07-22 ENCOUNTER — HOSPITAL ENCOUNTER (OUTPATIENT)
Dept: GENERAL RADIOLOGY | Facility: HOSPITAL | Age: 77
Discharge: HOME | End: 2022-07-22
Admitting: INTERNAL MEDICINE

## 2022-07-22 DIAGNOSIS — R07.81 RIB PAIN ON LEFT SIDE: ICD-10-CM

## 2022-07-22 PROCEDURE — 71100 X-RAY EXAM RIBS UNI 2 VIEWS: CPT | Performed by: RADIOLOGY

## 2022-07-22 PROCEDURE — 71100 X-RAY EXAM RIBS UNI 2 VIEWS: CPT

## 2022-08-19 ENCOUNTER — TELEPHONE (OUTPATIENT)
Dept: CARDIOLOGY | Facility: CLINIC | Age: 77
End: 2022-08-19

## 2022-08-19 RX ORDER — FLUOXETINE HYDROCHLORIDE 20 MG/1
20 CAPSULE ORAL DAILY
Qty: 90 CAPSULE | Refills: 3 | Status: SHIPPED | OUTPATIENT
Start: 2022-08-19

## 2022-08-19 RX ORDER — ALLOPURINOL 100 MG/1
100 TABLET ORAL DAILY
Qty: 90 TABLET | Refills: 3 | Status: SHIPPED | OUTPATIENT
Start: 2022-08-19

## 2022-08-19 RX ORDER — HYDRALAZINE HYDROCHLORIDE 50 MG/1
50 TABLET, FILM COATED ORAL 3 TIMES DAILY
Qty: 270 TABLET | Refills: 3 | Status: SHIPPED | OUTPATIENT
Start: 2022-08-19

## 2022-08-19 RX ORDER — FOLIC ACID 1 MG/1
1 TABLET ORAL DAILY
Qty: 90 TABLET | Refills: 3 | Status: SHIPPED | OUTPATIENT
Start: 2022-08-19

## 2022-08-19 RX ORDER — FOLIC ACID 1 MG/1
1 TABLET ORAL DAILY
Qty: 90 TABLET | Refills: 3 | Status: SHIPPED | OUTPATIENT
Start: 2022-08-19 | End: 2022-08-19 | Stop reason: SDUPTHER

## 2022-08-19 RX ORDER — HYDRALAZINE HYDROCHLORIDE 50 MG/1
50 TABLET, FILM COATED ORAL 3 TIMES DAILY
Qty: 270 TABLET | Refills: 3 | Status: SHIPPED | OUTPATIENT
Start: 2022-08-19 | End: 2022-08-19 | Stop reason: SDUPTHER

## 2022-08-19 RX ORDER — ALLOPURINOL 100 MG/1
100 TABLET ORAL DAILY
Qty: 90 TABLET | Refills: 3 | Status: SHIPPED | OUTPATIENT
Start: 2022-08-19 | End: 2022-08-19 | Stop reason: SDUPTHER

## 2022-08-19 RX ORDER — FLUOXETINE HYDROCHLORIDE 20 MG/1
20 CAPSULE ORAL DAILY
Qty: 90 CAPSULE | Refills: 3 | Status: SHIPPED | OUTPATIENT
Start: 2022-08-19 | End: 2022-08-19 | Stop reason: SDUPTHER

## 2022-08-19 RX ORDER — AMLODIPINE BESYLATE 10 MG/1
10 TABLET ORAL DAILY
Qty: 90 TABLET | Refills: 3 | Status: SHIPPED | OUTPATIENT
Start: 2022-08-19

## 2022-08-19 RX ORDER — AMLODIPINE BESYLATE 10 MG/1
10 TABLET ORAL DAILY
Qty: 90 TABLET | Refills: 3 | Status: SHIPPED | OUTPATIENT
Start: 2022-08-19 | End: 2022-08-19 | Stop reason: SDUPTHER

## 2022-08-26 ENCOUNTER — OFFICE VISIT (OUTPATIENT)
Dept: CARDIOLOGY | Facility: CLINIC | Age: 77
End: 2022-08-26

## 2022-08-26 VITALS
OXYGEN SATURATION: 97 % | HEART RATE: 52 BPM | DIASTOLIC BLOOD PRESSURE: 60 MMHG | BODY MASS INDEX: 29.26 KG/M2 | SYSTOLIC BLOOD PRESSURE: 110 MMHG | TEMPERATURE: 97.1 F | WEIGHT: 209 LBS | HEIGHT: 71 IN

## 2022-08-26 DIAGNOSIS — S22.42XD CLOSED FRACTURE OF MULTIPLE RIBS OF LEFT SIDE WITH ROUTINE HEALING, SUBSEQUENT ENCOUNTER: ICD-10-CM

## 2022-08-26 DIAGNOSIS — C61 PROSTATE CANCER: ICD-10-CM

## 2022-08-26 DIAGNOSIS — F41.1 GENERALIZED ANXIETY DISORDER: ICD-10-CM

## 2022-08-26 DIAGNOSIS — F41.9 ANXIETY: ICD-10-CM

## 2022-08-26 DIAGNOSIS — M51.36 DDD (DEGENERATIVE DISC DISEASE), LUMBAR: ICD-10-CM

## 2022-08-26 DIAGNOSIS — I10 PRIMARY HYPERTENSION: Primary | ICD-10-CM

## 2022-08-26 PROBLEM — S22.49XA MULTIPLE RIB FRACTURES: Status: ACTIVE | Noted: 2022-08-26

## 2022-08-26 PROCEDURE — 99213 OFFICE O/P EST LOW 20 MIN: CPT | Performed by: INTERNAL MEDICINE

## 2022-08-26 RX ORDER — HYDROCODONE BITARTRATE AND ACETAMINOPHEN 7.5; 325 MG/1; MG/1
1 TABLET ORAL 3 TIMES DAILY PRN
Qty: 90 TABLET | Refills: 0 | Status: SHIPPED | OUTPATIENT
Start: 2022-08-26 | End: 2022-09-29 | Stop reason: SDUPTHER

## 2022-08-26 RX ORDER — ALPRAZOLAM 0.25 MG/1
0.25 TABLET ORAL 2 TIMES DAILY PRN
Qty: 60 TABLET | Refills: 2 | Status: SHIPPED | OUTPATIENT
Start: 2022-08-26 | End: 2022-11-01 | Stop reason: SDUPTHER

## 2022-08-26 NOTE — PROGRESS NOTES
subjective     Chief Complaint   Patient presents with   • Back Pain     Follow up   • Anxiety     Follow up   • Med Refill     Pending      History of Present Illness  Patient is 76 years old white male who is here for follow-up for chronic back pain degenerative disc disease and recent fall with multiple rib fractures    He is taking Norco 7.5 every 8 hours as needed.  He is compliant with medications and needs refill.    He also has anxiety disorder and has difficulty sleeping is taking Xanax and Restoril.    Blood pressure is very well controlled he is taking Norvasc clonidine, Lopressor and hydralazine.    Hyperlipidemia on Crestor therapy.  Overall he is doing well    Past Surgical History:   Procedure Laterality Date   • COLONOSCOPY  03/2018   • EYE SURGERY     • FOOT SURGERY     • HERNIA REPAIR     • HYDROCELECTOMY  06/15/2015   • PROSTATE BIOPSY  2018   • PROSTATE FIDUCIAL MARKER PLACEMENT  09/14/2018   • RHINOPLASTY     • UPPER GASTROINTESTINAL ENDOSCOPY  03/24/2014    WITH BIOPSIES AND DILATION     Family History   Problem Relation Age of Onset   • Kidney disease Other    • Other Other         CHRONIC DISABLING DISEASES URINARY TRACT DISEASE   • Diabetes Other    • Hypertension Other    • Other Mother    • Heart failure Father    • Stroke Sister    • Arthritis Sister    • Heart disease Brother    • No Known Problems Brother    • No Known Problems Brother      Past Medical History:   Diagnosis Date   • Anxiety    • Arthritis    • Colitis    • Depression    • GERD (gastroesophageal reflux disease)    • Gout    • Heart murmur    • History of EKG 12/22/2015    NORMAL   • Hyperlipidemia    • Hypertension    • Obesity    • Pancreatitis     history of   • Prostate cancer (HCC)    • Renal failure     MILD one functioning kidney   • Skin cancer     basal cell cancer on back     Patient Active Problem List   Diagnosis   • Essential hypertension, labile   • GERD (gastroesophageal reflux disease)   • Renal failure    • Hyperlipidemia   • Anxiety   • Depression   • Type 2 diabetes mellitus (HCC)   • Periumbilical abdominal pain   • DDD (degenerative disc disease), lumbar   • Prostate cancer (HCC)   • Hyperuricemia   • BURKS (dyspnea on exertion)   • Bradycardia   • Lower urinary tract symptoms due to benign prostatic hyperplasia   • Raised prostate specific antigen   • Chest pain in adult   • Generalized anxiety disorder   • Primary insomnia   • Multiple rib fractures       Social History     Tobacco Use   • Smoking status: Former Smoker     Packs/day: 1.00     Types: Cigarettes     Quit date:      Years since quittin.6   • Smokeless tobacco: Never Used   Vaping Use   • Vaping Use: Never used   Substance Use Topics   • Alcohol use: Yes     Alcohol/week: 7.0 standard drinks     Types: 7 Shots of liquor per week     Comment:  once per day   • Drug use: No       No Known Allergies    Current Outpatient Medications on File Prior to Visit   Medication Sig   • allopurinol (ZYLOPRIM) 100 MG tablet Take 1 tablet by mouth Daily.   • amLODIPine (NORVASC) 10 MG tablet Take 1 tablet by mouth Daily.   • aspirin 81 MG tablet Take 81 mg by mouth daily.   • bicalutamide (CASODEX) 50 MG chemo tablet Casodex 50 mg tablet   Take 1 tablet every day by oral route for 30 days.   • Blood Glucose Monitoring Suppl w/Device kit Check blood sugar once a day.    DX: E11.9   • cholecalciferol (VITAMIN D3) 1000 UNITS tablet Take 1,000 Units by mouth daily.   • cloNIDine (CATAPRES) 0.1 MG tablet Take 0.05 mg by mouth As Needed. TID TO QID PRN   • Cyanocobalamin (VITAMIN B12 PO) Take  by mouth daily.   • dicyclomine (BENTYL) 20 MG tablet Take 1 tablet by mouth 2 (Two) Times a Day.   • esomeprazole (nexIUM) 40 MG capsule Take 1 capsule by mouth Every Morning Before Breakfast.   • FLUoxetine (PROzac) 20 MG capsule Take 1 capsule by mouth Daily.   • folic acid (FOLVITE) 1 MG tablet Take 1 tablet by mouth Daily.   • glucose blood (Contour Next Test) test  strip Check blood glucose daily   • hydrALAZINE (APRESOLINE) 50 MG tablet Take 1 tablet by mouth 3 (Three) Times a Day.   • hydrOXYzine (ATARAX) 25 MG tablet Take 1 tablet by mouth At Night As Needed (PRN).   • indomethacin (INDOCIN) 25 MG capsule Take 1 capsule by mouth 3 (Three) Times a Day As Needed for Mild Pain .   • Leuprolide Acetate (ELIGARD SC) Inject 1 application under the skin into the appropriate area as directed Every 6 (Six) Months. 3/2019 first one month shot   • loratadine (Claritin) 10 MG tablet Take 1 tablet by mouth Daily.   • meclizine (ANTIVERT) 25 MG tablet Take 1 tablet by mouth 3 (Three) Times a Day As Needed for Dizziness.   • metFORMIN (Glucophage) 500 MG tablet Take 1 tablet by mouth Daily With Breakfast.   • metoprolol tartrate (LOPRESSOR) 25 MG tablet Take 1 tablet by mouth 2 (Two) Times a Day.   • Microlet Lancets misc 1 stick Daily.   • Mirabegron ER (MYRBETRIQ) 50 MG tablet sustained-release 24 hour 24 hr tablet Myrbetriq 50 mg tablet,extended release   Take 1 tablet every day by oral route.   • Pyridoxine HCl (VITAMIN B-6 PO) Take  by mouth daily.   • rosuvastatin (CRESTOR) 20 MG tablet Take 1 tablet by mouth Every Night.   • SITagliptin (Januvia) 100 MG tablet Take 1 tablet by mouth Daily.   • temazepam (RESTORIL) 15 MG capsule Take 1 capsule by mouth At Night As Needed for Sleep.   • [DISCONTINUED] ALPRAZolam (XANAX) 0.25 MG tablet Take 1 tablet by mouth 2 (Two) Times a Day As Needed for Anxiety or Sleep. for anxiety   • [DISCONTINUED] HYDROcodone-acetaminophen (NORCO) 7.5-325 MG per tablet Take 1 tablet by mouth 3 (Three) Times a Day As Needed for Moderate Pain . Due 3/30     No current facility-administered medications on file prior to visit.         The following portions of the patient's history were reviewed and updated as appropriate: allergies, current medications, past family history, past medical history, past social history, past surgical history and problem  "list.    Review of Systems   Constitutional: Negative.   HENT: Negative.  Negative for congestion.    Eyes: Negative.    Cardiovascular: Negative.  Negative for chest pain, cyanosis, dyspnea on exertion, irregular heartbeat, leg swelling, near-syncope, orthopnea, palpitations, paroxysmal nocturnal dyspnea and syncope.   Respiratory: Negative.  Negative for shortness of breath.    Hematologic/Lymphatic: Negative.    Musculoskeletal: Positive for back pain.        Chest pain   Gastrointestinal: Negative.    Neurological: Negative.  Negative for headaches.          Objective:     /60 (BP Location: Left arm, Patient Position: Sitting, Cuff Size: Adult)   Pulse 52   Temp 97.1 °F (36.2 °C)   Ht 180.3 cm (71\")   Wt 94.8 kg (209 lb)   SpO2 97%   BMI 29.15 kg/m²   Pulmonary:      Effort: Pulmonary effort is normal.      Breath sounds: Normal breath sounds. No stridor. No wheezing. No rhonchi. No rales.   Cardiovascular:      PMI at left midclavicular line. Normal rate. Regular rhythm. Normal S1. Normal S2.      Murmurs: There is no murmur.      No gallop. No click. No rub.   Pulses:     Intact distal pulses.   Edema:     Peripheral edema absent.           Lab Review  Lab Results   Component Value Date     07/12/2022    K 4.5 07/12/2022     07/12/2022    BUN 19 07/12/2022    CREATININE 1.26 07/12/2022    GLUCOSE 108 (H) 07/12/2022    CALCIUM 9.4 07/12/2022    ALT 16 07/12/2022    ALKPHOS 43 07/12/2022    LABIL2 1.8 03/07/2016     Lab Results   Component Value Date    CKTOTAL 155 07/12/2022     Lab Results   Component Value Date    WBC 9.49 07/12/2022    HGB 13.5 07/12/2022    HCT 40.1 07/12/2022     07/12/2022     No results found for: INR  No results found for: MG  Lab Results   Component Value Date    PSA <0.014 12/09/2019    TSH 2.807 09/28/2015     No results found for: BNP  Lab Results   Component Value Date    CHLPL 227 (H) 03/07/2016    CHOL 133 07/12/2022    TRIG 156 (H) 07/12/2022    " HDL 43 07/12/2022    VLDL 27 07/12/2022    LDLHDL 1.37 07/12/2022     Lab Results   Component Value Date    LDL 63 07/12/2022    LDL 66 01/05/2022       Procedures       I personally viewed and interpreted the patient's LAB data         Assessment:     1. Primary hypertension    2. Anxiety    3. DDD (degenerative disc disease), lumbar    4. Prostate cancer (HCC)    5. Generalized anxiety disorder    6. Closed fracture of multiple ribs of left side with routine healing, subsequent encounter          Plan:     Hyperlipidemia is controlled with Crestor which was continued.  Last LDL was 63.  Patient has anxiety disorder and insomnia refill of medication were given.  History of prostate cancer degenerative disc disease and recent multiple rib fractures  Norco refill was given.  Blood pressure is very well controlled he will continue current medication.  Follow-up scheduled        No follow-ups on file.

## 2022-09-02 RX ORDER — MECLIZINE HYDROCHLORIDE 25 MG/1
25 TABLET ORAL 3 TIMES DAILY PRN
Qty: 90 TABLET | Refills: 1 | Status: SHIPPED | OUTPATIENT
Start: 2022-09-02

## 2022-09-20 ENCOUNTER — TELEPHONE (OUTPATIENT)
Dept: CARDIOLOGY | Facility: CLINIC | Age: 77
End: 2022-09-20

## 2022-09-20 DIAGNOSIS — H91.93 BILATERAL HEARING LOSS, UNSPECIFIED HEARING LOSS TYPE: Primary | ICD-10-CM

## 2022-09-20 NOTE — TELEPHONE ENCOUNTER
Patient c/o can not hear very well wants consult to ENT, not sure if it is wax build up or if there is a hearing problem that needs to be addressed

## 2022-09-29 ENCOUNTER — OFFICE VISIT (OUTPATIENT)
Dept: CARDIOLOGY | Facility: CLINIC | Age: 77
End: 2022-09-29

## 2022-09-29 VITALS
DIASTOLIC BLOOD PRESSURE: 68 MMHG | HEART RATE: 54 BPM | SYSTOLIC BLOOD PRESSURE: 126 MMHG | WEIGHT: 212 LBS | BODY MASS INDEX: 29.68 KG/M2 | OXYGEN SATURATION: 98 % | HEIGHT: 71 IN

## 2022-09-29 DIAGNOSIS — F41.9 ANXIETY: ICD-10-CM

## 2022-09-29 DIAGNOSIS — F51.01 PRIMARY INSOMNIA: ICD-10-CM

## 2022-09-29 DIAGNOSIS — Z23 NEED FOR PROPHYLACTIC VACCINATION AND INOCULATION AGAINST INFLUENZA: ICD-10-CM

## 2022-09-29 DIAGNOSIS — M51.36 DDD (DEGENERATIVE DISC DISEASE), LUMBAR: Primary | ICD-10-CM

## 2022-09-29 DIAGNOSIS — S22.42XD CLOSED FRACTURE OF MULTIPLE RIBS OF LEFT SIDE WITH ROUTINE HEALING, SUBSEQUENT ENCOUNTER: ICD-10-CM

## 2022-09-29 PROCEDURE — 90662 IIV NO PRSV INCREASED AG IM: CPT | Performed by: INTERNAL MEDICINE

## 2022-09-29 PROCEDURE — 99213 OFFICE O/P EST LOW 20 MIN: CPT | Performed by: INTERNAL MEDICINE

## 2022-09-29 PROCEDURE — G0008 ADMIN INFLUENZA VIRUS VAC: HCPCS | Performed by: INTERNAL MEDICINE

## 2022-09-29 RX ORDER — HYDROCODONE BITARTRATE AND ACETAMINOPHEN 7.5; 325 MG/1; MG/1
1 TABLET ORAL 3 TIMES DAILY PRN
Qty: 90 TABLET | Refills: 0 | Status: SHIPPED | OUTPATIENT
Start: 2022-09-29 | End: 2022-11-01 | Stop reason: SDUPTHER

## 2022-11-01 ENCOUNTER — OFFICE VISIT (OUTPATIENT)
Dept: CARDIOLOGY | Facility: CLINIC | Age: 77
End: 2022-11-01

## 2022-11-01 VITALS
OXYGEN SATURATION: 98 % | BODY MASS INDEX: 29.68 KG/M2 | HEART RATE: 57 BPM | DIASTOLIC BLOOD PRESSURE: 70 MMHG | SYSTOLIC BLOOD PRESSURE: 128 MMHG | WEIGHT: 212 LBS | HEIGHT: 71 IN

## 2022-11-01 DIAGNOSIS — F41.1 GENERALIZED ANXIETY DISORDER: ICD-10-CM

## 2022-11-01 DIAGNOSIS — C61 PROSTATE CANCER: ICD-10-CM

## 2022-11-01 DIAGNOSIS — E78.2 MIXED HYPERLIPIDEMIA: Primary | ICD-10-CM

## 2022-11-01 DIAGNOSIS — M51.36 DDD (DEGENERATIVE DISC DISEASE), LUMBAR: ICD-10-CM

## 2022-11-01 DIAGNOSIS — I10 PRIMARY HYPERTENSION: ICD-10-CM

## 2022-11-01 DIAGNOSIS — F41.9 ANXIETY: ICD-10-CM

## 2022-11-01 PROCEDURE — 99213 OFFICE O/P EST LOW 20 MIN: CPT | Performed by: INTERNAL MEDICINE

## 2022-11-01 RX ORDER — HYDROCODONE BITARTRATE AND ACETAMINOPHEN 7.5; 325 MG/1; MG/1
1 TABLET ORAL 3 TIMES DAILY PRN
Qty: 90 TABLET | Refills: 0 | Status: SHIPPED | OUTPATIENT
Start: 2022-11-01 | End: 2022-12-06 | Stop reason: SDUPTHER

## 2022-11-01 RX ORDER — ALPRAZOLAM 0.25 MG/1
0.25 TABLET ORAL 2 TIMES DAILY PRN
Qty: 60 TABLET | Refills: 2 | Status: SHIPPED | OUTPATIENT
Start: 2022-11-01 | End: 2022-12-06 | Stop reason: SDUPTHER

## 2022-11-01 NOTE — PROGRESS NOTES
subjective     Chief Complaint   Patient presents with   • Chronic Back pain     Follow up    • Med Refill     pending     History of Present Illness  Patient is 77 years old white male who is here for follow-up.  Patient has degenerative disc disease lumbar spine chronic back pain and has been taking Norco.  He needs refill he is compliant with medication.    He also has prostate cancer and needs diagnostic PSA for follow-up  He complains of urinary incontinence using diapers.  Patient is quite bothered with it and will consider Botox injections.    Dizziness and vertigo is some better with ear washing but he still gets dizzy very frequently.  Anxiety is controlled with low-dose Xanax and also taking Restoril for sleep.  Blood pressure is well controlled.  He is also on Zyloprim for hyperuricemia and has hyperlipidemia    Past Surgical History:   Procedure Laterality Date   • COLONOSCOPY  03/2018   • EYE SURGERY     • FOOT SURGERY     • HERNIA REPAIR     • HYDROCELECTOMY  06/15/2015   • PROSTATE BIOPSY  2018   • PROSTATE FIDUCIAL MARKER PLACEMENT  09/14/2018   • RHINOPLASTY     • UPPER GASTROINTESTINAL ENDOSCOPY  03/24/2014    WITH BIOPSIES AND DILATION     Family History   Problem Relation Age of Onset   • Kidney disease Other    • Other Other         CHRONIC DISABLING DISEASES URINARY TRACT DISEASE   • Diabetes Other    • Hypertension Other    • Other Mother    • Heart failure Father    • Stroke Sister    • Arthritis Sister    • Heart disease Brother    • No Known Problems Brother    • No Known Problems Brother      Past Medical History:   Diagnosis Date   • Anxiety    • Arthritis    • Colitis    • Depression    • GERD (gastroesophageal reflux disease)    • Gout    • Heart murmur    • History of EKG 12/22/2015    NORMAL   • Hyperlipidemia    • Hypertension    • Obesity    • Pancreatitis     history of   • Prostate cancer (HCC)    • Renal failure     MILD one functioning kidney   • Skin cancer     basal cell  cancer on back     Patient Active Problem List   Diagnosis   • Essential hypertension, labile   • GERD (gastroesophageal reflux disease)   • Renal failure   • Hyperlipidemia   • Anxiety   • Depression   • Type 2 diabetes mellitus (HCC)   • Periumbilical abdominal pain   • DDD (degenerative disc disease), lumbar   • Prostate cancer (HCC)   • Hyperuricemia   • BURKS (dyspnea on exertion)   • Bradycardia   • Lower urinary tract symptoms due to benign prostatic hyperplasia   • Raised prostate specific antigen   • Chest pain in adult   • Generalized anxiety disorder   • Primary insomnia   • Multiple rib fractures       Social History     Tobacco Use   • Smoking status: Former     Packs/day: 1.00     Types: Cigarettes     Quit date:      Years since quittin.8   • Smokeless tobacco: Never   Vaping Use   • Vaping Use: Never used   Substance Use Topics   • Alcohol use: Yes     Alcohol/week: 7.0 standard drinks     Types: 7 Shots of liquor per week     Comment:  once per day   • Drug use: No       No Known Allergies    Current Outpatient Medications on File Prior to Visit   Medication Sig   • allopurinol (ZYLOPRIM) 100 MG tablet Take 1 tablet by mouth Daily.   • amLODIPine (NORVASC) 10 MG tablet Take 1 tablet by mouth Daily.   • aspirin 81 MG tablet Take 81 mg by mouth daily.   • bicalutamide (CASODEX) 50 MG chemo tablet Casodex 50 mg tablet   Take 1 tablet every day by oral route for 30 days.   • Blood Glucose Monitoring Suppl w/Device kit Check blood sugar once a day.    DX: E11.9   • cholecalciferol (VITAMIN D3) 1000 UNITS tablet Take 1,000 Units by mouth daily.   • cloNIDine (CATAPRES) 0.1 MG tablet Take 0.05 mg by mouth As Needed. TID TO QID PRN   • Cyanocobalamin (VITAMIN B12 PO) Take  by mouth daily.   • dicyclomine (BENTYL) 20 MG tablet Take 1 tablet by mouth 2 (Two) Times a Day.   • esomeprazole (nexIUM) 40 MG capsule Take 1 capsule by mouth Every Morning Before Breakfast.   • FLUoxetine (PROzac) 20 MG capsule  Take 1 capsule by mouth Daily.   • folic acid (FOLVITE) 1 MG tablet Take 1 tablet by mouth Daily.   • glucose blood (Contour Next Test) test strip Check blood glucose daily   • hydrALAZINE (APRESOLINE) 50 MG tablet Take 1 tablet by mouth 3 (Three) Times a Day.   • hydrOXYzine (ATARAX) 25 MG tablet Take 1 tablet by mouth At Night As Needed (PRN).   • indomethacin (INDOCIN) 25 MG capsule Take 1 capsule by mouth 3 (Three) Times a Day As Needed for Mild Pain .   • Leuprolide Acetate (ELIGARD SC) Inject 1 application under the skin into the appropriate area as directed Every 6 (Six) Months. 3/2019 first one month shot   • loratadine (Claritin) 10 MG tablet Take 1 tablet by mouth Daily.   • meclizine (ANTIVERT) 25 MG tablet Take 1 tablet by mouth 3 (Three) Times a Day As Needed for Dizziness.   • metFORMIN (Glucophage) 500 MG tablet Take 1 tablet by mouth Daily With Breakfast.   • metoprolol tartrate (LOPRESSOR) 25 MG tablet Take 1 tablet by mouth 2 (Two) Times a Day.   • Microlet Lancets misc 1 stick Daily.   • Mirabegron ER (MYRBETRIQ) 50 MG tablet sustained-release 24 hour 24 hr tablet Myrbetriq 50 mg tablet,extended release   Take 1 tablet every day by oral route.   • Pyridoxine HCl (VITAMIN B-6 PO) Take  by mouth daily.   • rosuvastatin (CRESTOR) 20 MG tablet Take 1 tablet by mouth Every Night.   • SITagliptin (Januvia) 100 MG tablet Take 1 tablet by mouth Daily.   • temazepam (RESTORIL) 15 MG capsule Take 1 capsule by mouth At Night As Needed for Sleep.   • [DISCONTINUED] ALPRAZolam (XANAX) 0.25 MG tablet Take 1 tablet by mouth 2 (Two) Times a Day As Needed for Anxiety or Sleep. for anxiety   • [DISCONTINUED] HYDROcodone-acetaminophen (NORCO) 7.5-325 MG per tablet Take 1 tablet by mouth 3 (Three) Times a Day As Needed for Moderate Pain. Due 3/30     No current facility-administered medications on file prior to visit.         The following portions of the patient's history were reviewed and updated as appropriate:  "allergies, current medications, past family history, past medical history, past social history, past surgical history and problem list.    Review of Systems   Constitutional: Negative.   HENT: Negative.  Negative for congestion.    Eyes: Negative.    Cardiovascular: Negative.  Negative for chest pain, cyanosis, dyspnea on exertion, irregular heartbeat, leg swelling, near-syncope, orthopnea, palpitations, paroxysmal nocturnal dyspnea and syncope.   Respiratory: Negative.  Negative for shortness of breath.    Hematologic/Lymphatic: Negative.    Musculoskeletal: Positive for back pain and stiffness.   Gastrointestinal: Negative.    Genitourinary: Positive for bladder incontinence.   Neurological: Positive for dizziness and vertigo. Negative for headaches.   Psychiatric/Behavioral: The patient has insomnia and is nervous/anxious.           Objective:     /70 (BP Location: Left arm, Patient Position: Sitting, Cuff Size: Adult)   Pulse 57   Ht 180.3 cm (71\")   Wt 96.2 kg (212 lb)   SpO2 98%   BMI 29.57 kg/m²   Pulmonary:      Effort: Pulmonary effort is normal.      Breath sounds: Normal breath sounds. No stridor. No wheezing. No rhonchi. No rales.   Cardiovascular:      PMI at left midclavicular line. Normal rate. Regular rhythm. Normal S1. Normal S2.      Murmurs: There is no murmur.      No gallop. No click. No rub.   Pulses:     Intact distal pulses.   Edema:     Peripheral edema absent.           Lab Review  Lab Results   Component Value Date     07/12/2022    K 4.5 07/12/2022     07/12/2022    BUN 19 07/12/2022    CREATININE 1.26 07/12/2022    GLUCOSE 108 (H) 07/12/2022    CALCIUM 9.4 07/12/2022    ALT 16 07/12/2022    ALKPHOS 43 07/12/2022    LABIL2 1.8 03/07/2016     Lab Results   Component Value Date    CKTOTAL 155 07/12/2022     Lab Results   Component Value Date    WBC 9.49 07/12/2022    HGB 13.5 07/12/2022    HCT 40.1 07/12/2022     07/12/2022     No results found for: INR  No " results found for: MG  Lab Results   Component Value Date    PSA <0.014 12/09/2019    TSH 2.807 09/28/2015     No results found for: BNP  Lab Results   Component Value Date    CHLPL 227 (H) 03/07/2016    CHOL 133 07/12/2022    TRIG 156 (H) 07/12/2022    HDL 43 07/12/2022    VLDL 27 07/12/2022    LDLHDL 1.37 07/12/2022     Lab Results   Component Value Date    LDL 63 07/12/2022    LDL 66 01/05/2022       Procedures       I personally viewed and interpreted the patient's LAB data         Assessment:     1. Mixed hyperlipidemia    2. DDD (degenerative disc disease), lumbar    3. Anxiety    4. Primary hypertension    5. Generalized anxiety disorder    6. Prostate cancer (HCC)          Plan:   Hyperlipidemia last LDL was 63.  Patient will continue Crestor.  Degenerative disc disease and chronic back pain.  Norco refill was given patient is compliant with medication side effect including addiction explained.  Anxiety is well controlled with Xanax.  Insomnia Restoril prescription was also given.  Blood pressure is very well controlled  Refills were given follow-up scheduled           No follow-ups on file.

## 2022-12-06 ENCOUNTER — OFFICE VISIT (OUTPATIENT)
Dept: CARDIOLOGY | Facility: CLINIC | Age: 77
End: 2022-12-06

## 2022-12-06 VITALS
DIASTOLIC BLOOD PRESSURE: 74 MMHG | WEIGHT: 215 LBS | HEART RATE: 63 BPM | HEIGHT: 71 IN | SYSTOLIC BLOOD PRESSURE: 152 MMHG | OXYGEN SATURATION: 96 % | BODY MASS INDEX: 30.1 KG/M2

## 2022-12-06 DIAGNOSIS — F41.9 ANXIETY: ICD-10-CM

## 2022-12-06 DIAGNOSIS — E11.21 TYPE 2 DIABETES MELLITUS WITH DIABETIC NEPHROPATHY, WITHOUT LONG-TERM CURRENT USE OF INSULIN: ICD-10-CM

## 2022-12-06 DIAGNOSIS — M51.36 DDD (DEGENERATIVE DISC DISEASE), LUMBAR: Primary | ICD-10-CM

## 2022-12-06 DIAGNOSIS — E78.2 MIXED HYPERLIPIDEMIA: ICD-10-CM

## 2022-12-06 DIAGNOSIS — I10 PRIMARY HYPERTENSION: ICD-10-CM

## 2022-12-06 PROCEDURE — 99213 OFFICE O/P EST LOW 20 MIN: CPT | Performed by: INTERNAL MEDICINE

## 2022-12-06 RX ORDER — ALPRAZOLAM 0.25 MG/1
0.25 TABLET ORAL 2 TIMES DAILY PRN
Qty: 60 TABLET | Refills: 2 | Status: SHIPPED | OUTPATIENT
Start: 2022-12-06 | End: 2023-01-09 | Stop reason: SDUPTHER

## 2022-12-06 RX ORDER — CIPROFLOXACIN 500 MG/1
TABLET, FILM COATED ORAL
COMMUNITY
Start: 2022-11-26 | End: 2023-02-10

## 2022-12-06 RX ORDER — HYDROCODONE BITARTRATE AND ACETAMINOPHEN 7.5; 325 MG/1; MG/1
1 TABLET ORAL 3 TIMES DAILY PRN
Qty: 90 TABLET | Refills: 0 | Status: SHIPPED | OUTPATIENT
Start: 2022-12-06 | End: 2023-01-09 | Stop reason: SDUPTHER

## 2022-12-06 NOTE — PROGRESS NOTES
subjective     Chief Complaint   Patient presents with   • Degenerative Disc Disease     Follow up   • Anxiety     Follow up   • Med Refill     pending     History of Present Illness    Patient is 77 years old white male who is here for follow-up.  Patient has history of prostate CA and chronic back pain.  He is taking hydrocodone 7.5 every 8 hours as needed.  He is compliant with medications and needs refill.    Anxiety is controlled with Xanax.  He also is taking Restoril to sleep.  He needs refill on all 3 medications.    Other problems include hypertension hyperlipidemia  and renal failure.    He denies any chest pain palpitations or shortness of breath.  Blood pressure has been normal  Past Surgical History:   Procedure Laterality Date   • COLONOSCOPY  03/2018   • EYE SURGERY     • FOOT SURGERY     • HERNIA REPAIR     • HYDROCELECTOMY  06/15/2015   • PROSTATE BIOPSY  2018   • PROSTATE FIDUCIAL MARKER PLACEMENT  09/14/2018   • RHINOPLASTY     • UPPER GASTROINTESTINAL ENDOSCOPY  03/24/2014    WITH BIOPSIES AND DILATION     Family History   Problem Relation Age of Onset   • Kidney disease Other    • Other Other         CHRONIC DISABLING DISEASES URINARY TRACT DISEASE   • Diabetes Other    • Hypertension Other    • Other Mother    • Heart failure Father    • Stroke Sister    • Arthritis Sister    • Heart disease Brother    • No Known Problems Brother    • No Known Problems Brother      Past Medical History:   Diagnosis Date   • Anxiety    • Arthritis    • Colitis    • Depression    • GERD (gastroesophageal reflux disease)    • Gout    • Heart murmur    • History of EKG 12/22/2015    NORMAL   • Hyperlipidemia    • Hypertension    • Obesity    • Pancreatitis     history of   • Prostate cancer (HCC)    • Renal failure     MILD one functioning kidney   • Skin cancer     basal cell cancer on back     Patient Active Problem List   Diagnosis   • Essential hypertension, labile   • GERD (gastroesophageal reflux disease)   •  Renal failure   • Hyperlipidemia   • Anxiety   • Depression   • Type 2 diabetes mellitus (HCC)   • Periumbilical abdominal pain   • DDD (degenerative disc disease), lumbar   • Prostate cancer (HCC)   • Hyperuricemia   • BURKS (dyspnea on exertion)   • Bradycardia   • Lower urinary tract symptoms due to benign prostatic hyperplasia   • Raised prostate specific antigen   • Chest pain in adult   • Generalized anxiety disorder   • Primary insomnia   • Multiple rib fractures       Social History     Tobacco Use   • Smoking status: Former     Packs/day: 1.00     Types: Cigarettes     Quit date:      Years since quittin.9   • Smokeless tobacco: Never   Vaping Use   • Vaping Use: Never used   Substance Use Topics   • Alcohol use: Yes     Alcohol/week: 7.0 standard drinks     Types: 7 Shots of liquor per week     Comment:  once per day   • Drug use: No       No Known Allergies    Current Outpatient Medications on File Prior to Visit   Medication Sig   • allopurinol (ZYLOPRIM) 100 MG tablet Take 1 tablet by mouth Daily.   • amLODIPine (NORVASC) 10 MG tablet Take 1 tablet by mouth Daily.   • aspirin 81 MG tablet Take 81 mg by mouth daily.   • bicalutamide (CASODEX) 50 MG chemo tablet Casodex 50 mg tablet   Take 1 tablet every day by oral route for 30 days.   • Blood Glucose Monitoring Suppl w/Device kit Check blood sugar once a day.    DX: E11.9   • cholecalciferol (VITAMIN D3) 1000 UNITS tablet Take 1,000 Units by mouth daily.   • ciprofloxacin (CIPRO) 500 MG tablet TAKE 1 TABLET BY MOUTH TWICE DAILY STARTING THREE DAYS BEFORE procedure   • cloNIDine (CATAPRES) 0.1 MG tablet Take 0.05 mg by mouth As Needed. TID TO QID PRN   • Cyanocobalamin (VITAMIN B12 PO) Take  by mouth daily.   • dicyclomine (BENTYL) 20 MG tablet Take 1 tablet by mouth 2 (Two) Times a Day.   • esomeprazole (nexIUM) 40 MG capsule Take 1 capsule by mouth Every Morning Before Breakfast.   • FLUoxetine (PROzac) 20 MG capsule Take 1 capsule by mouth  Daily.   • folic acid (FOLVITE) 1 MG tablet Take 1 tablet by mouth Daily.   • glucose blood (Contour Next Test) test strip Check blood glucose daily   • hydrALAZINE (APRESOLINE) 50 MG tablet Take 1 tablet by mouth 3 (Three) Times a Day.   • hydrOXYzine (ATARAX) 25 MG tablet Take 1 tablet by mouth At Night As Needed (PRN).   • indomethacin (INDOCIN) 25 MG capsule Take 1 capsule by mouth 3 (Three) Times a Day As Needed for Mild Pain .   • Leuprolide Acetate (ELIGARD SC) Inject 1 application under the skin into the appropriate area as directed Every 6 (Six) Months. 3/2019 first one month shot   • loratadine (Claritin) 10 MG tablet Take 1 tablet by mouth Daily.   • meclizine (ANTIVERT) 25 MG tablet Take 1 tablet by mouth 3 (Three) Times a Day As Needed for Dizziness.   • metFORMIN (Glucophage) 500 MG tablet Take 1 tablet by mouth Daily With Breakfast.   • metoprolol tartrate (LOPRESSOR) 25 MG tablet Take 1 tablet by mouth 2 (Two) Times a Day.   • Microlet Lancets misc 1 stick Daily.   • Mirabegron ER (MYRBETRIQ) 50 MG tablet sustained-release 24 hour 24 hr tablet Myrbetriq 50 mg tablet,extended release   Take 1 tablet every day by oral route.   • Pyridoxine HCl (VITAMIN B-6 PO) Take  by mouth daily.   • rosuvastatin (CRESTOR) 20 MG tablet Take 1 tablet by mouth Every Night.   • SITagliptin (Januvia) 100 MG tablet Take 1 tablet by mouth Daily.   • temazepam (RESTORIL) 15 MG capsule Take 1 capsule by mouth At Night As Needed for Sleep.     No current facility-administered medications on file prior to visit.         The following portions of the patient's history were reviewed and updated as appropriate: allergies, current medications, past family history, past medical history, past social history, past surgical history and problem list.    Review of Systems   Constitutional: Positive for malaise/fatigue.   HENT: Negative.  Negative for congestion.    Eyes: Negative.    Cardiovascular: Negative.  Negative for chest pain,  "cyanosis, dyspnea on exertion, irregular heartbeat, leg swelling, near-syncope, orthopnea, palpitations, paroxysmal nocturnal dyspnea and syncope.   Respiratory: Negative.  Negative for shortness of breath.    Hematologic/Lymphatic: Negative.    Musculoskeletal: Positive for back pain, myalgias and stiffness.   Gastrointestinal: Negative.    Genitourinary: Positive for frequency, hesitancy and urgency.   Neurological: Negative.  Negative for headaches.   Psychiatric/Behavioral: The patient has insomnia and is nervous/anxious.           Objective:     /74 (BP Location: Left arm, Patient Position: Sitting, Cuff Size: Adult)   Pulse 63   Ht 180.3 cm (71\")   Wt 97.5 kg (215 lb)   SpO2 96%   BMI 29.99 kg/m²   Pulmonary:      Effort: Pulmonary effort is normal.      Breath sounds: Normal breath sounds. No wheezing. No rhonchi. No rales.   Cardiovascular:      PMI at left midclavicular line. Normal rate. Regular rhythm. Normal S1. Normal S2.      Murmurs: There is no murmur.      No gallop. No click. No rub.   Pulses:     Intact distal pulses.   Edema:     Peripheral edema absent.           Lab Review  Lab Results   Component Value Date     07/12/2022    K 4.5 07/12/2022     07/12/2022    BUN 19 07/12/2022    CREATININE 1.26 07/12/2022    GLUCOSE 108 (H) 07/12/2022    CALCIUM 9.4 07/12/2022    ALT 16 07/12/2022    ALKPHOS 43 07/12/2022    LABIL2 1.8 03/07/2016     Lab Results   Component Value Date    CKTOTAL 155 07/12/2022     Lab Results   Component Value Date    WBC 9.49 07/12/2022    HGB 13.5 07/12/2022    HCT 40.1 07/12/2022     07/12/2022     No results found for: INR  No results found for: MG  Lab Results   Component Value Date    PSA <0.014 12/09/2019    TSH 2.807 09/28/2015     No results found for: BNP  Lab Results   Component Value Date    CHLPL 227 (H) 03/07/2016    CHOL 133 07/12/2022    TRIG 156 (H) 07/12/2022    HDL 43 07/12/2022    VLDL 27 07/12/2022    LDLHDL 1.37 07/12/2022 "     Lab Results   Component Value Date    LDL 63 07/12/2022    LDL 66 01/05/2022     No results found for: PROBNP  CARDIAC LABS:          Procedures       I personally viewed and interpreted the patient's LAB data         Assessment:     1. DDD (degenerative disc disease), lumbar    2. Anxiety    3. Mixed hyperlipidemia    4. Primary hypertension    5. Type 2 diabetes mellitus with diabetic nephropathy, without long-term current use of insulin ([facility])          Plan:     Patient has chronic back pain he is compliant with medications refill of [place] was given.  He has renal failure and has prostatic CA overall he is doing fairly well.  Anxiety is controlled with Xanax which was refilled.  He has insomnia and is taking Restoril which was refilled.  Hyperlipidemia is very well controlled last LDL was 63    Blood pressure is slightly elevated today however he says the blood pressure fluctuates and is mostly around 1 20-1 30.    Diabetes mellitus, metformin was continued.  Follow-up scheduled        No follow-ups on file.

## 2023-01-04 ENCOUNTER — LAB (OUTPATIENT)
Dept: LAB | Facility: HOSPITAL | Age: 78
End: 2023-01-04
Payer: MEDICARE

## 2023-01-04 DIAGNOSIS — E11.21 TYPE 2 DIABETES MELLITUS WITH DIABETIC NEPHROPATHY, WITHOUT LONG-TERM CURRENT USE OF INSULIN: ICD-10-CM

## 2023-01-04 DIAGNOSIS — E78.2 MIXED HYPERLIPIDEMIA: ICD-10-CM

## 2023-01-04 PROCEDURE — 82043 UR ALBUMIN QUANTITATIVE: CPT

## 2023-01-04 PROCEDURE — 36415 COLL VENOUS BLD VENIPUNCTURE: CPT

## 2023-01-04 PROCEDURE — 85025 COMPLETE CBC W/AUTO DIFF WBC: CPT

## 2023-01-04 PROCEDURE — 82550 ASSAY OF CK (CPK): CPT

## 2023-01-04 PROCEDURE — 80061 LIPID PANEL: CPT

## 2023-01-04 PROCEDURE — 80053 COMPREHEN METABOLIC PANEL: CPT

## 2023-01-04 PROCEDURE — 83036 HEMOGLOBIN GLYCOSYLATED A1C: CPT

## 2023-01-05 LAB
ALBUMIN SERPL-MCNC: 4.6 G/DL (ref 3.5–5.2)
ALBUMIN UR-MCNC: 7.7 MG/DL
ALBUMIN/GLOB SERPL: 1.8 G/DL
ALP SERPL-CCNC: 55 U/L (ref 39–117)
ALT SERPL W P-5'-P-CCNC: 18 U/L (ref 1–41)
ANION GAP SERPL CALCULATED.3IONS-SCNC: 13.7 MMOL/L (ref 5–15)
AST SERPL-CCNC: 19 U/L (ref 1–40)
BASOPHILS # BLD AUTO: 0.17 10*3/MM3 (ref 0–0.2)
BASOPHILS NFR BLD AUTO: 1.4 % (ref 0–1.5)
BILIRUB SERPL-MCNC: 0.5 MG/DL (ref 0–1.2)
BUN SERPL-MCNC: 20 MG/DL (ref 8–23)
BUN/CREAT SERPL: 14.3 (ref 7–25)
CALCIUM SPEC-SCNC: 9.6 MG/DL (ref 8.6–10.5)
CHLORIDE SERPL-SCNC: 105 MMOL/L (ref 98–107)
CHOLEST SERPL-MCNC: 270 MG/DL (ref 0–200)
CK SERPL-CCNC: 152 U/L (ref 20–200)
CO2 SERPL-SCNC: 23.3 MMOL/L (ref 22–29)
CREAT SERPL-MCNC: 1.4 MG/DL (ref 0.76–1.27)
DEPRECATED RDW RBC AUTO: 45.6 FL (ref 37–54)
EGFRCR SERPLBLD CKD-EPI 2021: 51.8 ML/MIN/1.73
EOSINOPHIL # BLD AUTO: 2.66 10*3/MM3 (ref 0–0.4)
EOSINOPHIL NFR BLD AUTO: 22.2 % (ref 0.3–6.2)
ERYTHROCYTE [DISTWIDTH] IN BLOOD BY AUTOMATED COUNT: 12.8 % (ref 12.3–15.4)
GLOBULIN UR ELPH-MCNC: 2.6 GM/DL
GLUCOSE SERPL-MCNC: 109 MG/DL (ref 65–99)
HBA1C MFR BLD: 6 % (ref 4.8–5.6)
HCT VFR BLD AUTO: 43.5 % (ref 37.5–51)
HDLC SERPL-MCNC: 46 MG/DL (ref 40–60)
HGB BLD-MCNC: 14.8 G/DL (ref 13–17.7)
IMM GRANULOCYTES # BLD AUTO: 0.05 10*3/MM3 (ref 0–0.05)
IMM GRANULOCYTES NFR BLD AUTO: 0.4 % (ref 0–0.5)
LDLC SERPL CALC-MCNC: 168 MG/DL (ref 0–100)
LDLC/HDLC SERPL: 3.58 {RATIO}
LYMPHOCYTES # BLD AUTO: 3.76 10*3/MM3 (ref 0.7–3.1)
LYMPHOCYTES NFR BLD AUTO: 31.4 % (ref 19.6–45.3)
MCH RBC QN AUTO: 32.5 PG (ref 26.6–33)
MCHC RBC AUTO-ENTMCNC: 34 G/DL (ref 31.5–35.7)
MCV RBC AUTO: 95.6 FL (ref 79–97)
MONOCYTES # BLD AUTO: 0.73 10*3/MM3 (ref 0.1–0.9)
MONOCYTES NFR BLD AUTO: 6.1 % (ref 5–12)
NEUTROPHILS NFR BLD AUTO: 38.5 % (ref 42.7–76)
NEUTROPHILS NFR BLD AUTO: 4.62 10*3/MM3 (ref 1.7–7)
NRBC BLD AUTO-RTO: 0 /100 WBC (ref 0–0.2)
PLATELET # BLD AUTO: 190 10*3/MM3 (ref 140–450)
PMV BLD AUTO: 12.4 FL (ref 6–12)
POTASSIUM SERPL-SCNC: 4.2 MMOL/L (ref 3.5–5.2)
PROT SERPL-MCNC: 7.2 G/DL (ref 6–8.5)
RBC # BLD AUTO: 4.55 10*6/MM3 (ref 4.14–5.8)
SODIUM SERPL-SCNC: 142 MMOL/L (ref 136–145)
TRIGL SERPL-MCNC: 296 MG/DL (ref 0–150)
VLDLC SERPL-MCNC: 56 MG/DL (ref 5–40)
WBC NRBC COR # BLD: 11.99 10*3/MM3 (ref 3.4–10.8)

## 2023-01-09 ENCOUNTER — OFFICE VISIT (OUTPATIENT)
Dept: CARDIOLOGY | Facility: CLINIC | Age: 78
End: 2023-01-09
Payer: MEDICARE

## 2023-01-09 VITALS
HEART RATE: 58 BPM | WEIGHT: 215 LBS | OXYGEN SATURATION: 98 % | DIASTOLIC BLOOD PRESSURE: 72 MMHG | HEIGHT: 71 IN | SYSTOLIC BLOOD PRESSURE: 148 MMHG | BODY MASS INDEX: 30.1 KG/M2

## 2023-01-09 DIAGNOSIS — F41.9 ANXIETY: ICD-10-CM

## 2023-01-09 DIAGNOSIS — I10 PRIMARY HYPERTENSION: ICD-10-CM

## 2023-01-09 DIAGNOSIS — E78.2 MIXED HYPERLIPIDEMIA: Primary | ICD-10-CM

## 2023-01-09 DIAGNOSIS — F41.1 GENERALIZED ANXIETY DISORDER: ICD-10-CM

## 2023-01-09 DIAGNOSIS — M51.36 DDD (DEGENERATIVE DISC DISEASE), LUMBAR: ICD-10-CM

## 2023-01-09 DIAGNOSIS — E11.21 TYPE 2 DIABETES MELLITUS WITH DIABETIC NEPHROPATHY, WITHOUT LONG-TERM CURRENT USE OF INSULIN: ICD-10-CM

## 2023-01-09 PROCEDURE — 99213 OFFICE O/P EST LOW 20 MIN: CPT | Performed by: INTERNAL MEDICINE

## 2023-01-09 RX ORDER — HYDROCODONE BITARTRATE AND ACETAMINOPHEN 7.5; 325 MG/1; MG/1
1 TABLET ORAL 3 TIMES DAILY PRN
Qty: 90 TABLET | Refills: 0 | Status: SHIPPED | OUTPATIENT
Start: 2023-01-09 | End: 2023-02-10 | Stop reason: SDUPTHER

## 2023-01-09 RX ORDER — ALPRAZOLAM 0.25 MG/1
0.25 TABLET ORAL 2 TIMES DAILY PRN
Qty: 60 TABLET | Refills: 2 | Status: SHIPPED | OUTPATIENT
Start: 2023-01-09 | End: 2023-02-10 | Stop reason: SDUPTHER

## 2023-01-09 NOTE — PROGRESS NOTES
subjective     Chief Complaint   Patient presents with   • Anxiety     Follow up   • Back Pain     Follow up   • Hyperlipidemia     Follow up   • Results     Labs     • Med Refill     pending     History of Present Illness  Sae is 77 years old white male who is here for follow-up.  He had lab work done which is markedly abnormal with significantly elevated lipids.  He states that he has not been taking Crestor but plans to restart today.  He supposed be taking Crestor 20 mg daily.    He also complains of anxiety and has been taking Prozac and Xanax.  Insomnia on Restoril 15 mg at bedtime as needed.    He also has prostate cancer and chronic back pain he is on Norco 7.53 times daily as needed he is compliant with medications and needs refill.    Blood pressure has been very well controlled sometimes it goes too low.  He is taking Norvasc 10 mg daily, hydralazine 50 mg 3 times daily, Lopressor 25 twice daily and has clonidine which he takes only as needed.  Diabetes mellitus on Januvia metformin    Past Surgical History:   Procedure Laterality Date   • COLONOSCOPY  03/2018   • EYE SURGERY     • FOOT SURGERY     • HERNIA REPAIR     • HYDROCELECTOMY  06/15/2015   • PROSTATE BIOPSY  2018   • PROSTATE FIDUCIAL MARKER PLACEMENT  09/14/2018   • RHINOPLASTY     • UPPER GASTROINTESTINAL ENDOSCOPY  03/24/2014    WITH BIOPSIES AND DILATION     Family History   Problem Relation Age of Onset   • Kidney disease Other    • Other Other         CHRONIC DISABLING DISEASES URINARY TRACT DISEASE   • Diabetes Other    • Hypertension Other    • Other Mother    • Heart failure Father    • Stroke Sister    • Arthritis Sister    • Heart disease Brother    • No Known Problems Brother    • No Known Problems Brother      Past Medical History:   Diagnosis Date   • Anxiety    • Arthritis    • Colitis    • Depression    • GERD (gastroesophageal reflux disease)    • Gout    • Heart murmur    • History of EKG 12/22/2015    NORMAL   • Hyperlipidemia     • Hypertension    • Obesity    • Pancreatitis     history of   • Prostate cancer (HCC)    • Renal failure     MILD one functioning kidney   • Skin cancer     basal cell cancer on back     Patient Active Problem List   Diagnosis   • Essential hypertension, labile   • GERD (gastroesophageal reflux disease)   • Renal failure   • Hyperlipidemia   • Anxiety   • Depression   • Type 2 diabetes mellitus (HCC)   • Periumbilical abdominal pain   • DDD (degenerative disc disease), lumbar   • Prostate cancer (HCC)   • Hyperuricemia   • BURKS (dyspnea on exertion)   • Bradycardia   • Lower urinary tract symptoms due to benign prostatic hyperplasia   • Raised prostate specific antigen   • Chest pain in adult   • Generalized anxiety disorder   • Primary insomnia   • Multiple rib fractures       Social History     Tobacco Use   • Smoking status: Former     Packs/day: 1.00     Types: Cigarettes     Quit date:      Years since quittin.0   • Smokeless tobacco: Never   Vaping Use   • Vaping Use: Never used   Substance Use Topics   • Alcohol use: Yes     Alcohol/week: 7.0 standard drinks     Types: 7 Shots of liquor per week     Comment:  once per day   • Drug use: No       No Known Allergies    Current Outpatient Medications on File Prior to Visit   Medication Sig   • allopurinol (ZYLOPRIM) 100 MG tablet Take 1 tablet by mouth Daily.   • amLODIPine (NORVASC) 10 MG tablet Take 1 tablet by mouth Daily.   • aspirin 81 MG tablet Take 81 mg by mouth daily.   • bicalutamide (CASODEX) 50 MG chemo tablet Casodex 50 mg tablet   Take 1 tablet every day by oral route for 30 days.   • Blood Glucose Monitoring Suppl w/Device kit Check blood sugar once a day.    DX: E11.9   • cholecalciferol (VITAMIN D3) 1000 UNITS tablet Take 1,000 Units by mouth daily.   • ciprofloxacin (CIPRO) 500 MG tablet TAKE 1 TABLET BY MOUTH TWICE DAILY STARTING THREE DAYS BEFORE procedure   • cloNIDine (CATAPRES) 0.1 MG tablet Take 0.05 mg by mouth As Needed. TID  TO QID PRN   • Cyanocobalamin (VITAMIN B12 PO) Take  by mouth daily.   • dicyclomine (BENTYL) 20 MG tablet Take 1 tablet by mouth 2 (Two) Times a Day.   • esomeprazole (nexIUM) 40 MG capsule Take 1 capsule by mouth Every Morning Before Breakfast.   • FLUoxetine (PROzac) 20 MG capsule Take 1 capsule by mouth Daily.   • folic acid (FOLVITE) 1 MG tablet Take 1 tablet by mouth Daily.   • glucose blood (Contour Next Test) test strip Check blood glucose daily   • hydrALAZINE (APRESOLINE) 50 MG tablet Take 1 tablet by mouth 3 (Three) Times a Day.   • hydrOXYzine (ATARAX) 25 MG tablet Take 1 tablet by mouth At Night As Needed (PRN).   • indomethacin (INDOCIN) 25 MG capsule Take 1 capsule by mouth 3 (Three) Times a Day As Needed for Mild Pain .   • Leuprolide Acetate (ELIGARD SC) Inject 1 application under the skin into the appropriate area as directed Every 6 (Six) Months. 3/2019 first one month shot   • loratadine (Claritin) 10 MG tablet Take 1 tablet by mouth Daily.   • meclizine (ANTIVERT) 25 MG tablet Take 1 tablet by mouth 3 (Three) Times a Day As Needed for Dizziness.   • metFORMIN (Glucophage) 500 MG tablet Take 1 tablet by mouth Daily With Breakfast.   • metoprolol tartrate (LOPRESSOR) 25 MG tablet Take 1 tablet by mouth 2 (Two) Times a Day.   • Microlet Lancets misc 1 stick Daily.   • Mirabegron ER (MYRBETRIQ) 50 MG tablet sustained-release 24 hour 24 hr tablet Myrbetriq 50 mg tablet,extended release   Take 1 tablet every day by oral route.   • Pyridoxine HCl (VITAMIN B-6 PO) Take  by mouth daily.   • rosuvastatin (CRESTOR) 20 MG tablet Take 1 tablet by mouth Every Night.   • SITagliptin (Januvia) 100 MG tablet Take 1 tablet by mouth Daily.   • temazepam (RESTORIL) 15 MG capsule Take 1 capsule by mouth At Night As Needed for Sleep.   • [DISCONTINUED] ALPRAZolam (XANAX) 0.25 MG tablet Take 1 tablet by mouth 2 (Two) Times a Day As Needed for Anxiety or Sleep. for anxiety   • [DISCONTINUED] HYDROcodone-acetaminophen  (NORCO) 7.5-325 MG per tablet Take 1 tablet by mouth 3 (Three) Times a Day As Needed for Moderate Pain.     No current facility-administered medications on file prior to visit.         The following portions of the patient's history were reviewed and updated as appropriate: allergies, current medications, past family history, past medical history, past social history, past surgical history and problem list.    Review of Systems   Constitutional: Negative.   HENT: Negative.  Negative for congestion.    Eyes: Negative.    Cardiovascular: Negative.  Negative for chest pain, cyanosis, dyspnea on exertion, irregular heartbeat, leg swelling, near-syncope, orthopnea, palpitations, paroxysmal nocturnal dyspnea and syncope.   Respiratory: Negative.  Negative for shortness of breath.    Hematologic/Lymphatic: Negative.    Musculoskeletal: Positive for arthritis, back pain, myalgias and stiffness.   Gastrointestinal: Negative.    Neurological: Negative.  Negative for headaches.   Psychiatric/Behavioral: The patient has insomnia.           Objective:     /72 (BP Location: Left arm, Patient Position: Sitting, Cuff Size: Adult)   Pulse 58   Ht 180.3 cm (71\")   Wt 97.5 kg (215 lb)   SpO2 98%   BMI 29.99 kg/m²   Pulmonary:      Effort: Pulmonary effort is normal.      Breath sounds: Normal breath sounds. No stridor. No wheezing. No rales.   Cardiovascular:      PMI at left midclavicular line. Normal rate. Regular rhythm. Normal S1. Normal S2.      Murmurs: There is no murmur.      No gallop. No click. No rub.   Pulses:     Intact distal pulses.   Edema:     Peripheral edema absent.           Lab Review  Lab Results   Component Value Date     01/04/2023    K 4.2 01/04/2023     01/04/2023    BUN 20 01/04/2023    CREATININE 1.40 (H) 01/04/2023    GLUCOSE 109 (H) 01/04/2023    CALCIUM 9.6 01/04/2023    ALT 18 01/04/2023    ALKPHOS 55 01/04/2023    LABIL2 1.8 03/07/2016     Lab Results   Component Value Date     CKTOTAL 152 01/04/2023     Lab Results   Component Value Date    WBC 11.99 (H) 01/04/2023    HGB 14.8 01/04/2023    HCT 43.5 01/04/2023     01/04/2023     No results found for: INR  No results found for: MG  Lab Results   Component Value Date    PSA <0.014 12/09/2019    TSH 2.807 09/28/2015     No results found for: BNP  Lab Results   Component Value Date    CHLPL 227 (H) 03/07/2016    CHOL 270 (H) 01/04/2023    TRIG 296 (H) 01/04/2023    HDL 46 01/04/2023    VLDL 56 (H) 01/04/2023    LDLHDL 3.58 01/04/2023     Lab Results   Component Value Date     (H) 01/04/2023    LDL 63 07/12/2022     No results found for: PROBNP  CARDIAC LABS:           Lab 01/04/23  1343   HEMOGLOBIN A1C 6.00*      Procedures       I personally viewed and interpreted the patient's LAB data         Assessment:     1. Mixed hyperlipidemia    2. Anxiety    3. DDD (degenerative disc disease), lumbar    4. Primary hypertension    5. Type 2 diabetes mellitus with diabetic nephropathy, without long-term current use of insulin (HCC)    6. Generalized anxiety disorder          Plan:        Hyperlipidemia  Lab work reviewed LDL is significantly elevated.  Patient is not taking Crestor he was advised to resume Crestor.  If he has any problems with Crestor, will start PCSK9 inhibitor therapy.  Anxiety is well controlled with Prozac and Xanax which was renewed.  Chronic back pain, Norco refill was given.  Insomnia Restoril continued.  Labile hypertension which is fairly well controlled today is mildly elevated however patient states that he goes down to 110 systolic at home.  Diabetes mellitus metformin and Januvia continued.  Follow-up scheduled      No follow-ups on file.

## 2023-02-10 ENCOUNTER — OFFICE VISIT (OUTPATIENT)
Dept: CARDIOLOGY | Facility: CLINIC | Age: 78
End: 2023-02-10
Payer: MEDICARE

## 2023-02-10 VITALS
BODY MASS INDEX: 29.82 KG/M2 | WEIGHT: 213 LBS | HEART RATE: 55 BPM | OXYGEN SATURATION: 98 % | SYSTOLIC BLOOD PRESSURE: 144 MMHG | DIASTOLIC BLOOD PRESSURE: 83 MMHG | HEIGHT: 71 IN

## 2023-02-10 DIAGNOSIS — F41.9 ANXIETY: ICD-10-CM

## 2023-02-10 DIAGNOSIS — M51.36 DDD (DEGENERATIVE DISC DISEASE), LUMBAR: ICD-10-CM

## 2023-02-10 DIAGNOSIS — I10 PRIMARY HYPERTENSION: Primary | ICD-10-CM

## 2023-02-10 DIAGNOSIS — E78.2 MIXED HYPERLIPIDEMIA: ICD-10-CM

## 2023-02-10 PROCEDURE — 99213 OFFICE O/P EST LOW 20 MIN: CPT | Performed by: INTERNAL MEDICINE

## 2023-02-10 RX ORDER — HYDROCODONE BITARTRATE AND ACETAMINOPHEN 7.5; 325 MG/1; MG/1
1 TABLET ORAL 3 TIMES DAILY PRN
Qty: 90 TABLET | Refills: 0 | Status: SHIPPED | OUTPATIENT
Start: 2023-02-10 | End: 2023-03-10 | Stop reason: SDUPTHER

## 2023-02-10 RX ORDER — CLONIDINE HYDROCHLORIDE 0.1 MG/1
0.05 TABLET ORAL AS NEEDED
Qty: 90 TABLET | Refills: 1 | Status: SHIPPED | OUTPATIENT
Start: 2023-02-10

## 2023-02-10 RX ORDER — ALPRAZOLAM 0.25 MG/1
0.25 TABLET ORAL 2 TIMES DAILY PRN
Qty: 60 TABLET | Refills: 2 | Status: SHIPPED | OUTPATIENT
Start: 2023-02-10 | End: 2023-03-10 | Stop reason: SDUPTHER

## 2023-02-10 NOTE — PROGRESS NOTES
subjective     Chief Complaint   Patient presents with   • Anxiety     Follow up pt states he doesn't sleep well at night   • Back Pain     Follow up   • Med Refill     pending   • Hypertension     today     History of Present Illness  Patient is 77 years old white male who is here for follow-up of multiple chronic medical problems.  Patient had stopped his Crestor last visit and lipids were significantly abnormal.  He is now taking Crestor 20 mg daily and states that he has been having some myalgias.  However he wants to continue for now.  We will check lab work next visit and then decide if you want to go to PCSK9 inhibitor therapy    He also complains of anxiety and insomnia he is taking Xanax and Restoril.  He is compliant with medications and needs refill.    Chronic back pain on hydrocodone as needed for pain refills will be given.    Blood pressure has been running high off-and-on, he is taking Norvasc, hydralazine and Lopressor.  He ran out of clonidine will need refill for clonidine which he takes on a as needed basis.        Past Surgical History:   Procedure Laterality Date   • COLONOSCOPY  03/2018   • EYE SURGERY     • FOOT SURGERY     • HERNIA REPAIR     • HYDROCELECTOMY  06/15/2015   • PROSTATE BIOPSY  2018   • PROSTATE FIDUCIAL MARKER PLACEMENT  09/14/2018   • RHINOPLASTY     • UPPER GASTROINTESTINAL ENDOSCOPY  03/24/2014    WITH BIOPSIES AND DILATION     Family History   Problem Relation Age of Onset   • Kidney disease Other    • Other Other         CHRONIC DISABLING DISEASES URINARY TRACT DISEASE   • Diabetes Other    • Hypertension Other    • Other Mother    • Heart failure Father    • Stroke Sister    • Arthritis Sister    • Heart disease Brother    • No Known Problems Brother    • No Known Problems Brother      Past Medical History:   Diagnosis Date   • Anxiety    • Arthritis    • Colitis    • Depression    • GERD (gastroesophageal reflux disease)    • Gout    • Heart murmur    • History of EKG  2015    NORMAL   • Hyperlipidemia    • Hypertension    • Obesity    • Pancreatitis     history of   • Prostate cancer (HCC)    • Renal failure     MILD one functioning kidney   • Skin cancer     basal cell cancer on back     Patient Active Problem List   Diagnosis   • Essential hypertension, labile   • GERD (gastroesophageal reflux disease)   • Renal failure   • Hyperlipidemia   • Anxiety   • Depression   • Type 2 diabetes mellitus (HCC)   • Periumbilical abdominal pain   • DDD (degenerative disc disease), lumbar   • Prostate cancer (HCC)   • Hyperuricemia   • BURKS (dyspnea on exertion)   • Bradycardia   • Lower urinary tract symptoms due to benign prostatic hyperplasia   • Raised prostate specific antigen   • Chest pain in adult   • Generalized anxiety disorder   • Primary insomnia   • Multiple rib fractures       Social History     Tobacco Use   • Smoking status: Former     Packs/day: 1.00     Types: Cigarettes     Quit date:      Years since quittin.1   • Smokeless tobacco: Never   Vaping Use   • Vaping Use: Never used   Substance Use Topics   • Alcohol use: Yes     Alcohol/week: 7.0 standard drinks     Types: 7 Shots of liquor per week     Comment:  once per day   • Drug use: No       No Known Allergies    Current Outpatient Medications on File Prior to Visit   Medication Sig   • allopurinol (ZYLOPRIM) 100 MG tablet Take 1 tablet by mouth Daily.   • amLODIPine (NORVASC) 10 MG tablet Take 1 tablet by mouth Daily.   • aspirin 81 MG tablet Take 81 mg by mouth daily.   • bicalutamide (CASODEX) 50 MG chemo tablet Casodex 50 mg tablet   Take 1 tablet every day by oral route for 30 days.   • Blood Glucose Monitoring Suppl w/Device kit Check blood sugar once a day.    DX: E11.9   • cholecalciferol (VITAMIN D3) 1000 UNITS tablet Take 1,000 Units by mouth daily.   • Cyanocobalamin (VITAMIN B12 PO) Take  by mouth daily.   • dicyclomine (BENTYL) 20 MG tablet Take 1 tablet by mouth 2 (Two) Times a Day.   •  esomeprazole (nexIUM) 40 MG capsule Take 1 capsule by mouth Every Morning Before Breakfast.   • FLUoxetine (PROzac) 20 MG capsule Take 1 capsule by mouth Daily.   • folic acid (FOLVITE) 1 MG tablet Take 1 tablet by mouth Daily.   • glucose blood (Contour Next Test) test strip Check blood glucose daily   • hydrALAZINE (APRESOLINE) 50 MG tablet Take 1 tablet by mouth 3 (Three) Times a Day.   • hydrOXYzine (ATARAX) 25 MG tablet Take 1 tablet by mouth At Night As Needed (PRN).   • indomethacin (INDOCIN) 25 MG capsule Take 1 capsule by mouth 3 (Three) Times a Day As Needed for Mild Pain .   • Leuprolide Acetate (ELIGARD SC) Inject 1 application under the skin into the appropriate area as directed Every 6 (Six) Months. 3/2019 first one month shot   • loratadine (Claritin) 10 MG tablet Take 1 tablet by mouth Daily.   • meclizine (ANTIVERT) 25 MG tablet Take 1 tablet by mouth 3 (Three) Times a Day As Needed for Dizziness.   • metFORMIN (Glucophage) 500 MG tablet Take 1 tablet by mouth Daily With Breakfast.   • metoprolol tartrate (LOPRESSOR) 25 MG tablet Take 1 tablet by mouth 2 (Two) Times a Day.   • Microlet Lancets misc 1 stick Daily.   • Mirabegron ER (MYRBETRIQ) 50 MG tablet sustained-release 24 hour 24 hr tablet Myrbetriq 50 mg tablet,extended release   Take 1 tablet every day by oral route.   • Pyridoxine HCl (VITAMIN B-6 PO) Take  by mouth daily.   • rosuvastatin (CRESTOR) 20 MG tablet Take 1 tablet by mouth Every Night.   • SITagliptin (Januvia) 100 MG tablet Take 1 tablet by mouth Daily.   • temazepam (RESTORIL) 15 MG capsule Take 1 capsule by mouth At Night As Needed for Sleep.   • [DISCONTINUED] ALPRAZolam (XANAX) 0.25 MG tablet Take 1 tablet by mouth 2 (Two) Times a Day As Needed for Anxiety or Sleep. for anxiety   • [DISCONTINUED] cloNIDine (CATAPRES) 0.1 MG tablet Take 0.05 mg by mouth As Needed. TID TO QID PRN   • [DISCONTINUED] HYDROcodone-acetaminophen (NORCO) 7.5-325 MG per tablet Take 1 tablet by mouth 3  "(Three) Times a Day As Needed for Moderate Pain.   • [DISCONTINUED] ciprofloxacin (CIPRO) 500 MG tablet TAKE 1 TABLET BY MOUTH TWICE DAILY STARTING THREE DAYS BEFORE procedure     No current facility-administered medications on file prior to visit.         The following portions of the patient's history were reviewed and updated as appropriate: allergies, current medications, past family history, past medical history, past social history, past surgical history and problem list.    Review of Systems   Constitutional: Negative.   HENT: Negative.  Negative for congestion.    Eyes: Negative.    Cardiovascular: Negative.  Negative for chest pain, cyanosis, dyspnea on exertion, irregular heartbeat, leg swelling, near-syncope, orthopnea, palpitations, paroxysmal nocturnal dyspnea and syncope.   Respiratory: Negative.  Negative for shortness of breath.    Hematologic/Lymphatic: Negative.    Musculoskeletal: Positive for arthritis and back pain.   Gastrointestinal: Negative.    Neurological: Negative.  Negative for headaches.   Psychiatric/Behavioral: The patient has insomnia and is nervous/anxious.           Objective:     /83 (BP Location: Left arm, Patient Position: Sitting, Cuff Size: Adult)   Pulse 55   Ht 180.3 cm (71\")   Wt 96.6 kg (213 lb)   SpO2 98%   BMI 29.71 kg/m²   Pulmonary:      Effort: Pulmonary effort is normal.      Breath sounds: Normal breath sounds. No stridor. No wheezing. No rhonchi. No rales.   Cardiovascular:      PMI at left midclavicular line. Normal rate. Regular rhythm. Normal S1. Normal S2.      Murmurs: There is no murmur.      No gallop. No click. No rub.   Pulses:     Intact distal pulses.   Edema:     Peripheral edema absent.           Lab Review  Lab Results   Component Value Date     01/04/2023    K 4.2 01/04/2023     01/04/2023    BUN 20 01/04/2023    CREATININE 1.40 (H) 01/04/2023    GLUCOSE 109 (H) 01/04/2023    CALCIUM 9.6 01/04/2023    ALT 18 01/04/2023    " ALKPHOS 55 01/04/2023    LABIL2 1.8 03/07/2016     Lab Results   Component Value Date    CKTOTAL 152 01/04/2023     Lab Results   Component Value Date    WBC 11.99 (H) 01/04/2023    HGB 14.8 01/04/2023    HCT 43.5 01/04/2023     01/04/2023     No results found for: INR  No results found for: MG  Lab Results   Component Value Date    PSA <0.014 12/09/2019    TSH 2.807 09/28/2015     No results found for: BNP  Lab Results   Component Value Date    CHLPL 227 (H) 03/07/2016    CHOL 270 (H) 01/04/2023    TRIG 296 (H) 01/04/2023    HDL 46 01/04/2023    VLDL 56 (H) 01/04/2023    LDLHDL 3.58 01/04/2023     Lab Results   Component Value Date     (H) 01/04/2023    LDL 63 07/12/2022     No results found for: PROBNP  CARDIAC LABS:          Procedures       I personally viewed and interpreted the patient's LAB data         Assessment:     1. Primary hypertension    2. Anxiety    3. DDD (degenerative disc disease), lumbar    4. Mixed hyperlipidemia          Plan:     Hypertension  Blood pressure is mildly elevated today he was advised to check his blood pressure twice a day and keep a record.  Healthy lifestyle, salt restriction and dietary modification discussed.  Clonidine refill was given he will take it on as needed basis of blood pressure is more than 160 systolic.  He will continue metoprolol, losartan and hydralazine.    Anxiety  Xanax was renewed.    Degenerative disc disease chronic back pain and arthritis  Norco refill was given.    Hyperlipidemia  Patient was advised to continue Crestor 20 mg daily, will check lab work next visit and decide regarding PCSK9 inhibitor therapy.    He is also diabetic he will continue current medications.  Follow-up scheduled weight loss emphasized.        No follow-ups on file.

## 2023-03-08 ENCOUNTER — LAB (OUTPATIENT)
Dept: LAB | Facility: HOSPITAL | Age: 78
End: 2023-03-08
Payer: MEDICARE

## 2023-03-08 DIAGNOSIS — E78.2 MIXED HYPERLIPIDEMIA: ICD-10-CM

## 2023-03-09 ENCOUNTER — LAB (OUTPATIENT)
Dept: LAB | Facility: HOSPITAL | Age: 78
End: 2023-03-09
Payer: MEDICARE

## 2023-03-09 DIAGNOSIS — E78.2 MIXED HYPERLIPIDEMIA: ICD-10-CM

## 2023-03-09 LAB
ALBUMIN SERPL-MCNC: 4.8 G/DL (ref 3.5–5.2)
ALBUMIN/GLOB SERPL: 2.5 G/DL
ALP SERPL-CCNC: 52 U/L (ref 39–117)
ALT SERPL W P-5'-P-CCNC: 16 U/L (ref 1–41)
ANION GAP SERPL CALCULATED.3IONS-SCNC: 9.6 MMOL/L (ref 5–15)
AST SERPL-CCNC: 17 U/L (ref 1–40)
BASOPHILS # BLD AUTO: 0.12 10*3/MM3 (ref 0–0.2)
BASOPHILS NFR BLD AUTO: 1.2 % (ref 0–1.5)
BILIRUB SERPL-MCNC: 0.6 MG/DL (ref 0–1.2)
BUN SERPL-MCNC: 15 MG/DL (ref 8–23)
BUN/CREAT SERPL: 10.9 (ref 7–25)
CALCIUM SPEC-SCNC: 9.6 MG/DL (ref 8.6–10.5)
CHLORIDE SERPL-SCNC: 103 MMOL/L (ref 98–107)
CHOLEST SERPL-MCNC: 181 MG/DL (ref 0–200)
CK SERPL-CCNC: 101 U/L (ref 20–200)
CO2 SERPL-SCNC: 28.4 MMOL/L (ref 22–29)
CREAT SERPL-MCNC: 1.37 MG/DL (ref 0.76–1.27)
DEPRECATED RDW RBC AUTO: 43 FL (ref 37–54)
EGFRCR SERPLBLD CKD-EPI 2021: 53.1 ML/MIN/1.73
EOSINOPHIL # BLD AUTO: 2.07 10*3/MM3 (ref 0–0.4)
EOSINOPHIL NFR BLD AUTO: 21 % (ref 0.3–6.2)
ERYTHROCYTE [DISTWIDTH] IN BLOOD BY AUTOMATED COUNT: 12.6 % (ref 12.3–15.4)
GLOBULIN UR ELPH-MCNC: 1.9 GM/DL
GLUCOSE SERPL-MCNC: 129 MG/DL (ref 65–99)
HCT VFR BLD AUTO: 38.6 % (ref 37.5–51)
HDLC SERPL-MCNC: 50 MG/DL (ref 40–60)
HGB BLD-MCNC: 13.8 G/DL (ref 13–17.7)
IMM GRANULOCYTES # BLD AUTO: 0.03 10*3/MM3 (ref 0–0.05)
IMM GRANULOCYTES NFR BLD AUTO: 0.3 % (ref 0–0.5)
LDLC SERPL CALC-MCNC: 96 MG/DL (ref 0–100)
LDLC/HDLC SERPL: 1.8 {RATIO}
LYMPHOCYTES # BLD AUTO: 2.31 10*3/MM3 (ref 0.7–3.1)
LYMPHOCYTES NFR BLD AUTO: 23.4 % (ref 19.6–45.3)
MCH RBC QN AUTO: 33.2 PG (ref 26.6–33)
MCHC RBC AUTO-ENTMCNC: 35.8 G/DL (ref 31.5–35.7)
MCV RBC AUTO: 92.8 FL (ref 79–97)
MONOCYTES # BLD AUTO: 0.69 10*3/MM3 (ref 0.1–0.9)
MONOCYTES NFR BLD AUTO: 7 % (ref 5–12)
NEUTROPHILS NFR BLD AUTO: 4.65 10*3/MM3 (ref 1.7–7)
NEUTROPHILS NFR BLD AUTO: 47.1 % (ref 42.7–76)
NRBC BLD AUTO-RTO: 0 /100 WBC (ref 0–0.2)
PLATELET # BLD AUTO: 164 10*3/MM3 (ref 140–450)
PMV BLD AUTO: 12 FL (ref 6–12)
POTASSIUM SERPL-SCNC: 4.5 MMOL/L (ref 3.5–5.2)
PROT SERPL-MCNC: 6.7 G/DL (ref 6–8.5)
RBC # BLD AUTO: 4.16 10*6/MM3 (ref 4.14–5.8)
SODIUM SERPL-SCNC: 141 MMOL/L (ref 136–145)
TRIGL SERPL-MCNC: 204 MG/DL (ref 0–150)
VLDLC SERPL-MCNC: 35 MG/DL (ref 5–40)
WBC NRBC COR # BLD: 9.87 10*3/MM3 (ref 3.4–10.8)

## 2023-03-09 PROCEDURE — 80053 COMPREHEN METABOLIC PANEL: CPT

## 2023-03-09 PROCEDURE — 80061 LIPID PANEL: CPT

## 2023-03-09 PROCEDURE — 85025 COMPLETE CBC W/AUTO DIFF WBC: CPT

## 2023-03-09 PROCEDURE — 82550 ASSAY OF CK (CPK): CPT

## 2023-03-09 PROCEDURE — 36415 COLL VENOUS BLD VENIPUNCTURE: CPT

## 2023-03-10 ENCOUNTER — OFFICE VISIT (OUTPATIENT)
Dept: CARDIOLOGY | Facility: CLINIC | Age: 78
End: 2023-03-10
Payer: MEDICARE

## 2023-03-10 VITALS
DIASTOLIC BLOOD PRESSURE: 66 MMHG | WEIGHT: 216 LBS | OXYGEN SATURATION: 96 % | HEIGHT: 71 IN | HEART RATE: 53 BPM | BODY MASS INDEX: 30.24 KG/M2 | SYSTOLIC BLOOD PRESSURE: 140 MMHG

## 2023-03-10 DIAGNOSIS — I10 PRIMARY HYPERTENSION: ICD-10-CM

## 2023-03-10 DIAGNOSIS — F41.9 ANXIETY: ICD-10-CM

## 2023-03-10 DIAGNOSIS — E78.2 MIXED HYPERLIPIDEMIA: ICD-10-CM

## 2023-03-10 DIAGNOSIS — E11.21 TYPE 2 DIABETES MELLITUS WITH DIABETIC NEPHROPATHY, WITHOUT LONG-TERM CURRENT USE OF INSULIN: ICD-10-CM

## 2023-03-10 DIAGNOSIS — M51.36 DDD (DEGENERATIVE DISC DISEASE), LUMBAR: Primary | ICD-10-CM

## 2023-03-10 DIAGNOSIS — Z12.11 SCREENING FOR COLON CANCER: ICD-10-CM

## 2023-03-10 PROCEDURE — 99213 OFFICE O/P EST LOW 20 MIN: CPT | Performed by: INTERNAL MEDICINE

## 2023-03-10 RX ORDER — ALPRAZOLAM 0.25 MG/1
0.25 TABLET ORAL 2 TIMES DAILY PRN
Qty: 60 TABLET | Refills: 2 | Status: SHIPPED | OUTPATIENT
Start: 2023-03-10 | End: 2023-04-07 | Stop reason: SDUPTHER

## 2023-03-10 RX ORDER — HYDROCODONE BITARTRATE AND ACETAMINOPHEN 7.5; 325 MG/1; MG/1
1 TABLET ORAL 3 TIMES DAILY PRN
Qty: 90 TABLET | Refills: 0 | Status: SHIPPED | OUTPATIENT
Start: 2023-03-10 | End: 2023-04-07 | Stop reason: SDUPTHER

## 2023-03-11 NOTE — PROGRESS NOTES
subjective     Chief Complaint   Patient presents with   • Follow-up     1 month   • Back Pain     Follow up   • Anxiety     Follow up   • Med Refill   • Hypertension     today     History of Present Illness  Patient is 77 years old white male who is here for follow-up.  He states that his back pain is controlled with medication but is requiring Norco 7.53 times a day along with Indocin on as needed basis.  Anxiety is controlled with Xanax and Prozac.  He has history of prostate cancer and states that lately he cannot control his urine at all he has gone to see urologist multiple times with Botox injections and also taking Myrbetriq, still he has no control.    Blood pressure is well controlled according to him he has taking clonidine Norvasc    Hyperlipidemia patient is taking Crestor 20 mg daily.  Diabetes mellitus on Januvia and metformin.  No drug side effects.    Past Surgical History:   Procedure Laterality Date   • COLONOSCOPY  03/2018   • EYE SURGERY     • FOOT SURGERY     • HERNIA REPAIR     • HYDROCELECTOMY  06/15/2015   • PROSTATE BIOPSY  2018   • PROSTATE FIDUCIAL MARKER PLACEMENT  09/14/2018   • RHINOPLASTY     • UPPER GASTROINTESTINAL ENDOSCOPY  03/24/2014    WITH BIOPSIES AND DILATION     Family History   Problem Relation Age of Onset   • Kidney disease Other    • Other Other         CHRONIC DISABLING DISEASES URINARY TRACT DISEASE   • Diabetes Other    • Hypertension Other    • Other Mother    • Heart failure Father    • Stroke Sister    • Arthritis Sister    • Heart disease Brother    • No Known Problems Brother    • No Known Problems Brother      Past Medical History:   Diagnosis Date   • Anxiety    • Arthritis    • Colitis    • Depression    • GERD (gastroesophageal reflux disease)    • Gout    • Heart murmur    • History of EKG 12/22/2015    NORMAL   • Hyperlipidemia    • Hypertension    • Obesity    • Pancreatitis     history of   • Prostate cancer (HCC)    • Renal failure     MILD one  functioning kidney   • Skin cancer     basal cell cancer on back     Patient Active Problem List   Diagnosis   • Essential hypertension, labile   • GERD (gastroesophageal reflux disease)   • Renal failure   • Hyperlipidemia   • Anxiety   • Depression   • Type 2 diabetes mellitus (HCC)   • Periumbilical abdominal pain   • DDD (degenerative disc disease), lumbar   • Prostate cancer (HCC)   • Hyperuricemia   • BURKS (dyspnea on exertion)   • Bradycardia   • Lower urinary tract symptoms due to benign prostatic hyperplasia   • Raised prostate specific antigen   • Chest pain in adult   • Generalized anxiety disorder   • Primary insomnia   • Multiple rib fractures       Social History     Tobacco Use   • Smoking status: Former     Packs/day: 1.00     Types: Cigarettes     Quit date:      Years since quittin.2   • Smokeless tobacco: Never   Vaping Use   • Vaping Use: Never used   Substance Use Topics   • Alcohol use: Yes     Alcohol/week: 7.0 standard drinks     Types: 7 Shots of liquor per week     Comment:  once per day   • Drug use: No       No Known Allergies    Current Outpatient Medications on File Prior to Visit   Medication Sig   • allopurinol (ZYLOPRIM) 100 MG tablet Take 1 tablet by mouth Daily.   • amLODIPine (NORVASC) 10 MG tablet Take 1 tablet by mouth Daily.   • aspirin 81 MG tablet Take 1 tablet by mouth Daily.   • bicalutamide (CASODEX) 50 MG chemo tablet Casodex 50 mg tablet   Take 1 tablet every day by oral route for 30 days.   • Blood Glucose Monitoring Suppl w/Device kit Check blood sugar once a day.    DX: E11.9   • cholecalciferol (VITAMIN D3) 1000 UNITS tablet Take 1 tablet by mouth Daily.   • cloNIDine (CATAPRES) 0.1 MG tablet Take 0.5 tablets by mouth As Needed for High Blood Pressure. TID TO QID PRN   • Cyanocobalamin (VITAMIN B12 PO) Take  by mouth daily.   • dicyclomine (BENTYL) 20 MG tablet Take 1 tablet by mouth 2 (Two) Times a Day.   • esomeprazole (nexIUM) 40 MG capsule Take 1 capsule  by mouth Every Morning Before Breakfast.   • FLUoxetine (PROzac) 20 MG capsule Take 1 capsule by mouth Daily.   • folic acid (FOLVITE) 1 MG tablet Take 1 tablet by mouth Daily.   • glucose blood (Contour Next Test) test strip Check blood glucose daily   • hydrALAZINE (APRESOLINE) 50 MG tablet Take 1 tablet by mouth 3 (Three) Times a Day.   • hydrOXYzine (ATARAX) 25 MG tablet Take 1 tablet by mouth At Night As Needed (PRN).   • indomethacin (INDOCIN) 25 MG capsule Take 1 capsule by mouth 3 (Three) Times a Day As Needed for Mild Pain .   • Leuprolide Acetate (ELIGARD SC) Inject 1 application under the skin into the appropriate area as directed Every 6 (Six) Months. 3/2019 first one month shot   • loratadine (Claritin) 10 MG tablet Take 1 tablet by mouth Daily.   • meclizine (ANTIVERT) 25 MG tablet Take 1 tablet by mouth 3 (Three) Times a Day As Needed for Dizziness.   • metFORMIN (Glucophage) 500 MG tablet Take 1 tablet by mouth Daily With Breakfast.   • metoprolol tartrate (LOPRESSOR) 25 MG tablet Take 1 tablet by mouth 2 (Two) Times a Day.   • Microlet Lancets misc 1 stick Daily.   • Mirabegron ER (MYRBETRIQ) 50 MG tablet sustained-release 24 hour 24 hr tablet Myrbetriq 50 mg tablet,extended release   Take 1 tablet every day by oral route.   • Pyridoxine HCl (VITAMIN B-6 PO) Take  by mouth daily.   • rosuvastatin (CRESTOR) 20 MG tablet Take 1 tablet by mouth Every Night.   • SITagliptin (Januvia) 100 MG tablet Take 1 tablet by mouth Daily.     No current facility-administered medications on file prior to visit.         The following portions of the patient's history were reviewed and updated as appropriate: allergies, current medications, past family history, past medical history, past social history, past surgical history and problem list.    Review of Systems   Constitutional: Negative.   HENT: Negative.  Negative for congestion.    Eyes: Negative.    Cardiovascular: Negative.  Negative for chest pain, cyanosis,  "dyspnea on exertion, irregular heartbeat, leg swelling, near-syncope, orthopnea, palpitations, paroxysmal nocturnal dyspnea and syncope.   Respiratory: Negative.  Negative for shortness of breath.    Hematologic/Lymphatic: Negative.    Musculoskeletal: Positive for arthritis and back pain.   Gastrointestinal: Negative.    Genitourinary: Positive for bladder incontinence.   Neurological: Negative.  Negative for headaches.   Psychiatric/Behavioral: The patient is nervous/anxious.           Objective:     /66 (BP Location: Left arm, Patient Position: Sitting, Cuff Size: Adult)   Pulse 53   Ht 180.3 cm (71\")   Wt 98 kg (216 lb)   SpO2 96%   BMI 30.13 kg/m²   Cardiovascular:      PMI at left midclavicular line. Normal rate. Regular rhythm. Normal S1. Normal S2.      Murmurs: There is no murmur.      No gallop. No click. No rub.   Pulses:     Intact distal pulses.   Edema:     Peripheral edema absent.           Lab Review  Lab Results   Component Value Date     03/09/2023    K 4.5 03/09/2023     03/09/2023    BUN 15 03/09/2023    CREATININE 1.37 (H) 03/09/2023    GLUCOSE 129 (H) 03/09/2023    CALCIUM 9.6 03/09/2023    ALT 16 03/09/2023    ALKPHOS 52 03/09/2023    LABIL2 1.8 03/07/2016     Lab Results   Component Value Date    CKTOTAL 101 03/09/2023     Lab Results   Component Value Date    WBC 9.87 03/09/2023    HGB 13.8 03/09/2023    HCT 38.6 03/09/2023     03/09/2023     No results found for: INR  No results found for: MG  Lab Results   Component Value Date    PSA <0.014 12/09/2019    TSH 2.807 09/28/2015     No results found for: BNP  Lab Results   Component Value Date    CHLPL 227 (H) 03/07/2016    CHOL 181 03/09/2023    TRIG 204 (H) 03/09/2023    HDL 50 03/09/2023    VLDL 35 03/09/2023    LDLHDL 1.80 03/09/2023     Lab Results   Component Value Date    LDL 96 03/09/2023     (H) 01/04/2023     No results found for: PROBNP  CARDIAC LABS:          Procedures       I personally viewed " and interpreted the patient's LAB data         Assessment:     1. DDD (degenerative disc disease), lumbar    2. Screening for colon cancer    3. Anxiety    4. Mixed hyperlipidemia    5. Primary hypertension    6. Type 2 diabetes mellitus with diabetic nephropathy, without long-term current use of insulin (HCC)          Plan:     Patient has a lot of back pain and history of prostate cancer he is taking Norco along with Indocin.  Because of renal functions he was advised to avoid Indocin.  [place] refill was given.    Anxiety is controlled with Xanax and Prozac which was continued.    Screening for colon cancer discussed GI consultation arranged.    Hyperlipidemia  Crestor was continued dietary restrictions were discussed.    Hypertension  Blood pressure is mildly elevated today but he says that his blood pressure is quite labile and most often is good.  He will check his blood pressure closely.    Diabetes mellitus Januvia and metformin continued.  Follow-up scheduled refill was given  No follow-ups on file.

## 2023-04-07 ENCOUNTER — OFFICE VISIT (OUTPATIENT)
Dept: CARDIOLOGY | Facility: CLINIC | Age: 78
End: 2023-04-07
Payer: MEDICARE

## 2023-04-07 VITALS
OXYGEN SATURATION: 93 % | HEART RATE: 54 BPM | WEIGHT: 217 LBS | DIASTOLIC BLOOD PRESSURE: 72 MMHG | BODY MASS INDEX: 30.38 KG/M2 | HEIGHT: 71 IN | SYSTOLIC BLOOD PRESSURE: 120 MMHG

## 2023-04-07 DIAGNOSIS — I10 PRIMARY HYPERTENSION: Primary | ICD-10-CM

## 2023-04-07 DIAGNOSIS — M51.36 DDD (DEGENERATIVE DISC DISEASE), LUMBAR: ICD-10-CM

## 2023-04-07 DIAGNOSIS — F41.9 ANXIETY: ICD-10-CM

## 2023-04-07 PROCEDURE — 99213 OFFICE O/P EST LOW 20 MIN: CPT | Performed by: INTERNAL MEDICINE

## 2023-04-07 PROCEDURE — 3078F DIAST BP <80 MM HG: CPT | Performed by: INTERNAL MEDICINE

## 2023-04-07 PROCEDURE — 3074F SYST BP LT 130 MM HG: CPT | Performed by: INTERNAL MEDICINE

## 2023-04-07 RX ORDER — HYDROCODONE BITARTRATE AND ACETAMINOPHEN 7.5; 325 MG/1; MG/1
1 TABLET ORAL 3 TIMES DAILY PRN
Qty: 90 TABLET | Refills: 0 | Status: SHIPPED | OUTPATIENT
Start: 2023-04-07

## 2023-04-07 RX ORDER — ALPRAZOLAM 0.25 MG/1
0.25 TABLET ORAL 2 TIMES DAILY PRN
Qty: 60 TABLET | Refills: 2 | Status: SHIPPED | OUTPATIENT
Start: 2023-04-07

## 2023-04-07 NOTE — PROGRESS NOTES
subjective     Chief Complaint   Patient presents with   • Back Pain     Follow up   • Med Refill     pending     History of Present Illness    Patient is 77 years old white male he is here today for follow-up on back pain.  He is taking Norco 7.5 3 times daily as needed and is doing very well.  He is compliant with medications and needs refill.    He also has anxiety and is doing well with Xanax refill will be given.  Blood pressure is very well controlled on current medications.    Past Surgical History:   Procedure Laterality Date   • COLONOSCOPY  03/2018   • EYE SURGERY     • FOOT SURGERY     • HERNIA REPAIR     • HYDROCELECTOMY  06/15/2015   • PROSTATE BIOPSY  2018   • PROSTATE FIDUCIAL MARKER PLACEMENT  09/14/2018   • RHINOPLASTY     • UPPER GASTROINTESTINAL ENDOSCOPY  03/24/2014    WITH BIOPSIES AND DILATION     Family History   Problem Relation Age of Onset   • Kidney disease Other    • Other Other         CHRONIC DISABLING DISEASES URINARY TRACT DISEASE   • Diabetes Other    • Hypertension Other    • Other Mother    • Heart failure Father    • Stroke Sister    • Arthritis Sister    • Heart disease Brother    • No Known Problems Brother    • No Known Problems Brother      Past Medical History:   Diagnosis Date   • Anxiety    • Arthritis    • Colitis    • Depression    • GERD (gastroesophageal reflux disease)    • Gout    • Heart murmur    • History of EKG 12/22/2015    NORMAL   • Hyperlipidemia    • Hypertension    • Obesity    • Pancreatitis     history of   • Prostate cancer    • Renal failure     MILD one functioning kidney   • Skin cancer     basal cell cancer on back     Patient Active Problem List   Diagnosis   • Essential hypertension, labile   • GERD (gastroesophageal reflux disease)   • Renal failure   • Hyperlipidemia   • Anxiety   • Depression   • Type 2 diabetes mellitus   • Periumbilical abdominal pain   • DDD (degenerative disc disease), lumbar   • Prostate cancer   • Hyperuricemia   • BURKS  (dyspnea on exertion)   • Bradycardia   • Lower urinary tract symptoms due to benign prostatic hyperplasia   • Raised prostate specific antigen   • Chest pain in adult   • Generalized anxiety disorder   • Primary insomnia   • Multiple rib fractures       Social History     Tobacco Use   • Smoking status: Former     Packs/day: 1.00     Types: Cigarettes     Quit date:      Years since quittin.2   • Smokeless tobacco: Never   Vaping Use   • Vaping Use: Never used   Substance Use Topics   • Alcohol use: Yes     Alcohol/week: 7.0 standard drinks     Types: 7 Shots of liquor per week     Comment:  once per day   • Drug use: No       No Known Allergies    Current Outpatient Medications on File Prior to Visit   Medication Sig   • allopurinol (ZYLOPRIM) 100 MG tablet Take 1 tablet by mouth Daily.   • amLODIPine (NORVASC) 10 MG tablet Take 1 tablet by mouth Daily.   • aspirin 81 MG tablet Take 1 tablet by mouth Daily.   • bicalutamide (CASODEX) 50 MG chemo tablet Casodex 50 mg tablet   Take 1 tablet every day by oral route for 30 days.   • Blood Glucose Monitoring Suppl w/Device kit Check blood sugar once a day.    DX: E11.9   • cholecalciferol (VITAMIN D3) 1000 UNITS tablet Take 1 tablet by mouth Daily.   • cloNIDine (CATAPRES) 0.1 MG tablet Take 0.5 tablets by mouth As Needed for High Blood Pressure. TID TO QID PRN   • Cyanocobalamin (VITAMIN B12 PO) Take  by mouth daily.   • dicyclomine (BENTYL) 20 MG tablet Take 1 tablet by mouth 2 (Two) Times a Day.   • esomeprazole (nexIUM) 40 MG capsule Take 1 capsule by mouth Every Morning Before Breakfast.   • FLUoxetine (PROzac) 20 MG capsule Take 1 capsule by mouth Daily.   • folic acid (FOLVITE) 1 MG tablet Take 1 tablet by mouth Daily.   • glucose blood (Contour Next Test) test strip Check blood glucose daily   • hydrALAZINE (APRESOLINE) 50 MG tablet Take 1 tablet by mouth 3 (Three) Times a Day.   • hydrOXYzine (ATARAX) 25 MG tablet Take 1 tablet by mouth At Night As  Needed (PRN).   • indomethacin (INDOCIN) 25 MG capsule Take 1 capsule by mouth 3 (Three) Times a Day As Needed for Mild Pain .   • Leuprolide Acetate (ELIGARD SC) Inject 1 application under the skin into the appropriate area as directed Every 6 (Six) Months. 3/2019 first one month shot   • loratadine (Claritin) 10 MG tablet Take 1 tablet by mouth Daily.   • meclizine (ANTIVERT) 25 MG tablet Take 1 tablet by mouth 3 (Three) Times a Day As Needed for Dizziness.   • metFORMIN (Glucophage) 500 MG tablet Take 1 tablet by mouth Daily With Breakfast.   • metoprolol tartrate (LOPRESSOR) 25 MG tablet Take 1 tablet by mouth 2 (Two) Times a Day.   • Microlet Lancets misc 1 stick Daily.   • Mirabegron ER (MYRBETRIQ) 50 MG tablet sustained-release 24 hour 24 hr tablet Myrbetriq 50 mg tablet,extended release   Take 1 tablet every day by oral route.   • Pyridoxine HCl (VITAMIN B-6 PO) Take  by mouth daily.   • rosuvastatin (CRESTOR) 20 MG tablet Take 1 tablet by mouth Every Night.   • SITagliptin (Januvia) 100 MG tablet Take 1 tablet by mouth Daily.   • [DISCONTINUED] ALPRAZolam (XANAX) 0.25 MG tablet Take 1 tablet by mouth 2 (Two) Times a Day As Needed for Anxiety or Sleep. for anxiety   • [DISCONTINUED] HYDROcodone-acetaminophen (NORCO) 7.5-325 MG per tablet Take 1 tablet by mouth 3 (Three) Times a Day As Needed for Moderate Pain.     No current facility-administered medications on file prior to visit.         The following portions of the patient's history were reviewed and updated as appropriate: allergies, current medications, past family history, past medical history, past social history, past surgical history and problem list.    Review of Systems   Constitutional: Negative.   HENT: Negative.  Negative for congestion.    Eyes: Negative.    Cardiovascular: Negative.  Negative for chest pain, cyanosis, dyspnea on exertion, irregular heartbeat, leg swelling, near-syncope, orthopnea, palpitations, paroxysmal nocturnal dyspnea  "and syncope.   Respiratory: Negative.  Negative for shortness of breath.    Hematologic/Lymphatic: Negative.    Musculoskeletal: Positive for arthritis and back pain.   Gastrointestinal: Negative.    Neurological: Negative.  Negative for headaches.   Psychiatric/Behavioral: The patient is nervous/anxious.           Objective:     /72 (BP Location: Left arm, Patient Position: Sitting, Cuff Size: Adult)   Pulse 54   Ht 180.3 cm (71\")   Wt 98.4 kg (217 lb)   SpO2 93%   BMI 30.27 kg/m²   Cardiovascular:      PMI at left midclavicular line. Normal rate. Regular rhythm. Normal S1. Normal S2.      Murmurs: There is no murmur.      No gallop. No click. No rub.   Pulses:     Intact distal pulses.   Edema:     Peripheral edema absent.   Musculoskeletal:         General: No tenderness or deformity.      Comments: Restricted range of motion.  Patient cannot bend without pain         Lab Review  Lab Results   Component Value Date     03/09/2023    K 4.5 03/09/2023     03/09/2023    BUN 15 03/09/2023    CREATININE 1.37 (H) 03/09/2023    GLUCOSE 129 (H) 03/09/2023    CALCIUM 9.6 03/09/2023    ALT 16 03/09/2023    ALKPHOS 52 03/09/2023    LABIL2 1.8 03/07/2016     Lab Results   Component Value Date    CKTOTAL 101 03/09/2023     Lab Results   Component Value Date    WBC 9.87 03/09/2023    HGB 13.8 03/09/2023    HCT 38.6 03/09/2023     03/09/2023     No results found for: INR  No results found for: MG  Lab Results   Component Value Date    PSA <0.014 12/09/2019    TSH 2.807 09/28/2015     No results found for: BNP  Lab Results   Component Value Date    CHLPL 227 (H) 03/07/2016    CHOL 181 03/09/2023    TRIG 204 (H) 03/09/2023    HDL 50 03/09/2023    VLDL 35 03/09/2023    LDLHDL 1.80 03/09/2023     Lab Results   Component Value Date    LDL 96 03/09/2023     (H) 01/04/2023     No results found for: PROBNP            Procedures       I personally viewed and interpreted the patient's LAB data     "     Assessment:     1. Primary hypertension    2. Anxiety    3. DDD (degenerative disc disease), lumbar          Plan:     Hypertension is very well controlled patient will continue current medications including hydralazine, Lopressor, Norvasc and hydralazine.    Anxiety is controlled with Xanax refill was given.    Chronic back pain.  Patient is requiring Norco and is compliant with medications.  Refill was given he has renal failure and cannot take NSAIDs.  Follow-up scheduled        No follow-ups on file.

## 2023-05-05 ENCOUNTER — OFFICE VISIT (OUTPATIENT)
Dept: CARDIOLOGY | Facility: CLINIC | Age: 78
End: 2023-05-05
Payer: MEDICARE

## 2023-05-05 VITALS
DIASTOLIC BLOOD PRESSURE: 69 MMHG | OXYGEN SATURATION: 96 % | SYSTOLIC BLOOD PRESSURE: 125 MMHG | WEIGHT: 215 LBS | HEIGHT: 71 IN | BODY MASS INDEX: 30.1 KG/M2 | HEART RATE: 62 BPM

## 2023-05-05 DIAGNOSIS — I10 PRIMARY HYPERTENSION: Primary | ICD-10-CM

## 2023-05-05 DIAGNOSIS — E78.2 MIXED HYPERLIPIDEMIA: ICD-10-CM

## 2023-05-05 DIAGNOSIS — F41.9 ANXIETY: ICD-10-CM

## 2023-05-05 DIAGNOSIS — M51.36 DDD (DEGENERATIVE DISC DISEASE), LUMBAR: ICD-10-CM

## 2023-05-05 PROCEDURE — 99213 OFFICE O/P EST LOW 20 MIN: CPT | Performed by: INTERNAL MEDICINE

## 2023-05-05 PROCEDURE — 3078F DIAST BP <80 MM HG: CPT | Performed by: INTERNAL MEDICINE

## 2023-05-05 PROCEDURE — 3074F SYST BP LT 130 MM HG: CPT | Performed by: INTERNAL MEDICINE

## 2023-05-05 RX ORDER — HYDROCODONE BITARTRATE AND ACETAMINOPHEN 7.5; 325 MG/1; MG/1
1 TABLET ORAL 3 TIMES DAILY PRN
Qty: 90 TABLET | Refills: 0 | Status: SHIPPED | OUTPATIENT
Start: 2023-05-05

## 2023-05-05 NOTE — PROGRESS NOTES
subjective     Chief Complaint   Patient presents with   • Back pain     Follow up   • Med Refill     pending     History of Present Illness    Patient is 77 years old white male with history of prostate cancer  He complains of urinary incontinence.  He is seeing his urologist however is very disappointed because Botox injections and medications are not working.    Blood pressure is very well controlled with current medications    Hyperuricemia and gout on Zyloprim.   Patient is very anxious and has anxiety he is taking Xanax and needs refills.  Degenerative disc disease on Norco therapy patient is compliant with the medications and needs refill.      Past Surgical History:   Procedure Laterality Date   • COLONOSCOPY  03/2018   • EYE SURGERY     • FOOT SURGERY     • HERNIA REPAIR     • HYDROCELECTOMY  06/15/2015   • PROSTATE BIOPSY  2018   • PROSTATE FIDUCIAL MARKER PLACEMENT  09/14/2018   • RHINOPLASTY     • UPPER GASTROINTESTINAL ENDOSCOPY  03/24/2014    WITH BIOPSIES AND DILATION     Family History   Problem Relation Age of Onset   • Kidney disease Other    • Other Other         CHRONIC DISABLING DISEASES URINARY TRACT DISEASE   • Diabetes Other    • Hypertension Other    • Other Mother    • Heart failure Father    • Stroke Sister    • Arthritis Sister    • Heart disease Brother    • No Known Problems Brother    • No Known Problems Brother      Past Medical History:   Diagnosis Date   • Anxiety    • Arthritis    • Colitis    • Depression    • GERD (gastroesophageal reflux disease)    • Gout    • Heart murmur    • History of EKG 12/22/2015    NORMAL   • Hyperlipidemia    • Hypertension    • Obesity    • Pancreatitis     history of   • Prostate cancer    • Renal failure     MILD one functioning kidney   • Skin cancer     basal cell cancer on back     Patient Active Problem List   Diagnosis   • Essential hypertension, labile   • GERD (gastroesophageal reflux disease)   • Renal failure   • Hyperlipidemia   • Anxiety    • Depression   • Type 2 diabetes mellitus   • Periumbilical abdominal pain   • DDD (degenerative disc disease), lumbar   • Prostate cancer   • Hyperuricemia   • BURKS (dyspnea on exertion)   • Bradycardia   • Lower urinary tract symptoms due to benign prostatic hyperplasia   • Raised prostate specific antigen   • Chest pain in adult   • Generalized anxiety disorder   • Primary insomnia   • Multiple rib fractures       Social History     Tobacco Use   • Smoking status: Former     Packs/day: 1.00     Types: Cigarettes     Quit date:      Years since quittin.3   • Smokeless tobacco: Never   Vaping Use   • Vaping Use: Never used   Substance Use Topics   • Alcohol use: Yes     Alcohol/week: 7.0 standard drinks     Types: 7 Shots of liquor per week     Comment:  once per day   • Drug use: No       No Known Allergies    Current Outpatient Medications on File Prior to Visit   Medication Sig   • allopurinol (ZYLOPRIM) 100 MG tablet Take 1 tablet by mouth Daily.   • ALPRAZolam (XANAX) 0.25 MG tablet Take 1 tablet by mouth 2 (Two) Times a Day As Needed for Anxiety or Sleep. for anxiety   • amLODIPine (NORVASC) 10 MG tablet Take 1 tablet by mouth Daily.   • aspirin 81 MG tablet Take 1 tablet by mouth Daily.   • bicalutamide (CASODEX) 50 MG chemo tablet Casodex 50 mg tablet   Take 1 tablet every day by oral route for 30 days.   • Blood Glucose Monitoring Suppl w/Device kit Check blood sugar once a day.    DX: E11.9   • cholecalciferol (VITAMIN D3) 1000 UNITS tablet Take 1 tablet by mouth Daily.   • cloNIDine (CATAPRES) 0.1 MG tablet Take 0.5 tablets by mouth As Needed for High Blood Pressure. TID TO QID PRN   • Cyanocobalamin (VITAMIN B12 PO) Take  by mouth daily.   • dicyclomine (BENTYL) 20 MG tablet Take 1 tablet by mouth 2 (Two) Times a Day.   • esomeprazole (nexIUM) 40 MG capsule Take 1 capsule by mouth Every Morning Before Breakfast.   • FLUoxetine (PROzac) 20 MG capsule Take 1 capsule by mouth Daily.   • folic  acid (FOLVITE) 1 MG tablet Take 1 tablet by mouth Daily.   • glucose blood (Contour Next Test) test strip Check blood glucose daily   • hydrALAZINE (APRESOLINE) 50 MG tablet Take 1 tablet by mouth 3 (Three) Times a Day.   • hydrOXYzine (ATARAX) 25 MG tablet Take 1 tablet by mouth At Night As Needed (PRN).   • indomethacin (INDOCIN) 25 MG capsule Take 1 capsule by mouth 3 (Three) Times a Day As Needed for Mild Pain .   • leuprolide (LUPRON) 30 MG injection Inject 30 mg into the appropriate muscle as directed by prescriber Every 4 (Four) Months.   • loratadine (Claritin) 10 MG tablet Take 1 tablet by mouth Daily.   • meclizine (ANTIVERT) 25 MG tablet Take 1 tablet by mouth 3 (Three) Times a Day As Needed for Dizziness.   • metFORMIN (Glucophage) 500 MG tablet Take 1 tablet by mouth Daily With Breakfast.   • metoprolol tartrate (LOPRESSOR) 25 MG tablet Take 1 tablet by mouth 2 (Two) Times a Day.   • Microlet Lancets misc 1 stick Daily.   • Mirabegron ER (MYRBETRIQ) 50 MG tablet sustained-release 24 hour 24 hr tablet Myrbetriq 50 mg tablet,extended release   Take 1 tablet every day by oral route.   • Pyridoxine HCl (VITAMIN B-6 PO) Take  by mouth daily.   • rosuvastatin (CRESTOR) 20 MG tablet Take 1 tablet by mouth Every Night.   • SITagliptin (Januvia) 100 MG tablet Take 1 tablet by mouth Daily.   • [DISCONTINUED] HYDROcodone-acetaminophen (NORCO) 7.5-325 MG per tablet Take 1 tablet by mouth 3 (Three) Times a Day As Needed for Moderate Pain.   • Leuprolide Acetate (ELIGARD SC) Inject 1 application under the skin into the appropriate area as directed Every 6 (Six) Months. 3/2019 first one month shot (Patient not taking: Reported on 5/5/2023)     No current facility-administered medications on file prior to visit.         The following portions of the patient's history were reviewed and updated as appropriate: allergies, current medications, past family history, past medical history, past social history, past surgical  "history and problem list.    Review of Systems   Constitutional: Negative.   HENT: Negative.  Negative for congestion.    Eyes: Negative.    Cardiovascular: Negative.  Negative for chest pain, cyanosis, dyspnea on exertion, irregular heartbeat, leg swelling, near-syncope, orthopnea, palpitations, paroxysmal nocturnal dyspnea and syncope.   Respiratory: Negative.  Negative for shortness of breath.    Hematologic/Lymphatic: Negative.    Musculoskeletal: Positive for arthritis, back pain and stiffness.   Gastrointestinal: Negative.    Genitourinary: Positive for bladder incontinence, frequency and urgency.   Neurological: Negative.  Negative for headaches.   Psychiatric/Behavioral: The patient is nervous/anxious.           Objective:     /69 (BP Location: Left arm, Patient Position: Sitting, Cuff Size: Large Adult)   Pulse 62   Ht 180.3 cm (71\")   Wt 97.5 kg (215 lb)   SpO2 96%   BMI 29.99 kg/m²   Cardiovascular:      PMI at left midclavicular line. Normal rate. Regular rhythm. Normal S1. Normal S2.      Murmurs: There is no murmur.      No gallop. No click. No rub.   Pulses:     Intact distal pulses.   Edema:     Peripheral edema absent.           Lab Review  Lab Results   Component Value Date     03/09/2023    K 4.5 03/09/2023     03/09/2023    BUN 15 03/09/2023    CREATININE 1.37 (H) 03/09/2023    GLUCOSE 129 (H) 03/09/2023    CALCIUM 9.6 03/09/2023    ALT 16 03/09/2023    ALKPHOS 52 03/09/2023    LABIL2 1.8 03/07/2016     Lab Results   Component Value Date    CKTOTAL 101 03/09/2023     Lab Results   Component Value Date    WBC 9.87 03/09/2023    HGB 13.8 03/09/2023    HCT 38.6 03/09/2023     03/09/2023     No results found for: INR  No results found for: MG  Lab Results   Component Value Date    PSA <0.014 12/09/2019    TSH 2.807 09/28/2015     No results found for: BNP  Lab Results   Component Value Date    CHLPL 227 (H) 03/07/2016    CHOL 181 03/09/2023    TRIG 204 (H) 03/09/2023    " HDL 50 03/09/2023    VLDL 35 03/09/2023    LDLHDL 1.80 03/09/2023     Lab Results   Component Value Date    LDL 96 03/09/2023     (H) 01/04/2023     No results found for: PROBNP            Procedures       I personally viewed and interpreted the patient's LAB data         Assessment:     1. Primary hypertension    2. DDD (degenerative disc disease), lumbar    3. Mixed hyperlipidemia    4. Anxiety          Plan:     Blood pressure is very well controlled he will continue current medications.    Chronic back pain and degenerative disc disease.  He is compliant with medications and needs [place] refill which was given.  Patient has renal failure and is not allowed to take NSAID therapy.    Hyperlipidemia LDL is significantly better but is still elevated at 96.  He will take Crestor 20 mg daily.    Anxiety disorder Xanax was continued.  He is also diabetic healthy lifestyle emphasized along with his current medications which includes metformin Januvia  Follow-up scheduled      No follow-ups on file.

## 2023-05-12 ENCOUNTER — TELEPHONE (OUTPATIENT)
Dept: CARDIOLOGY | Facility: CLINIC | Age: 78
End: 2023-05-12
Payer: MEDICARE

## 2023-05-12 RX ORDER — CEFDINIR 300 MG/1
300 CAPSULE ORAL 2 TIMES DAILY
Qty: 14 CAPSULE | Refills: 0 | Status: SHIPPED | OUTPATIENT
Start: 2023-05-12 | End: 2023-05-19

## 2023-05-12 NOTE — TELEPHONE ENCOUNTER
Patient c/o chest congestion can't get it out, coughing, no fever but has body aches has had it for three days and it is getting worse would like antibiotic called it.  No covid just congestion and body aches

## 2023-06-09 ENCOUNTER — OFFICE VISIT (OUTPATIENT)
Dept: CARDIOLOGY | Facility: CLINIC | Age: 78
End: 2023-06-09
Payer: MEDICARE

## 2023-06-09 VITALS
RESPIRATION RATE: 18 BRPM | WEIGHT: 219 LBS | BODY MASS INDEX: 30.66 KG/M2 | SYSTOLIC BLOOD PRESSURE: 142 MMHG | HEIGHT: 71 IN | DIASTOLIC BLOOD PRESSURE: 74 MMHG | HEART RATE: 59 BPM | OXYGEN SATURATION: 96 %

## 2023-06-09 DIAGNOSIS — I10 PRIMARY HYPERTENSION: Primary | ICD-10-CM

## 2023-06-09 DIAGNOSIS — E78.2 MIXED HYPERLIPIDEMIA: ICD-10-CM

## 2023-06-09 DIAGNOSIS — F41.9 ANXIETY: ICD-10-CM

## 2023-06-09 DIAGNOSIS — M51.36 DDD (DEGENERATIVE DISC DISEASE), LUMBAR: ICD-10-CM

## 2023-06-09 PROCEDURE — 3077F SYST BP >= 140 MM HG: CPT | Performed by: INTERNAL MEDICINE

## 2023-06-09 PROCEDURE — 3078F DIAST BP <80 MM HG: CPT | Performed by: INTERNAL MEDICINE

## 2023-06-09 PROCEDURE — 99213 OFFICE O/P EST LOW 20 MIN: CPT | Performed by: INTERNAL MEDICINE

## 2023-06-09 PROCEDURE — 93000 ELECTROCARDIOGRAM COMPLETE: CPT | Performed by: INTERNAL MEDICINE

## 2023-06-09 RX ORDER — HYDROCODONE BITARTRATE AND ACETAMINOPHEN 7.5; 325 MG/1; MG/1
1 TABLET ORAL 3 TIMES DAILY PRN
Qty: 90 TABLET | Refills: 0 | Status: SHIPPED | OUTPATIENT
Start: 2023-06-09

## 2023-06-09 NOTE — PROGRESS NOTES
Chief Complaint   Patient presents with    Primary hypertension     Follow up     History of Present Illness    patient is 77 years old white male who is here for follow-up.  Patient has chronic back pain ,degenerative disc disease lumbar spine .  He is taking Norco 7.53 times a day on as needed basis and is compliant with medications.  He needs refill.  He is also taking Indocin on as needed basis.    anxiety disorder on low-dose Xanax and Zoloft.    Hypertension which has been difficult to control currently he is doing very well with Norvasc 10 mg daily, clonidine on as needed basis, hydralazine 50 mg 3 times daily and Lopressor 25 twice daily.    Hyperlipidemia  Patient is taking Crestor regularly lipid panel has been good.  He has gained 1 pound .dietary restrictions were again discussed.    Diabetes mellitus  He is doing well with Januvia and metformin.    Prostate cancer status post resection currently having a lot of problem with the bladder control      Past Surgical History:   Procedure Laterality Date    COLONOSCOPY  03/2018    EYE SURGERY      FOOT SURGERY      HERNIA REPAIR      HYDROCELECTOMY  06/15/2015    PROSTATE BIOPSY  2018    PROSTATE FIDUCIAL MARKER PLACEMENT  09/14/2018    RHINOPLASTY      UPPER GASTROINTESTINAL ENDOSCOPY  03/24/2014    WITH BIOPSIES AND DILATION     Family History   Problem Relation Age of Onset    Kidney disease Other     Other Other         CHRONIC DISABLING DISEASES URINARY TRACT DISEASE    Diabetes Other     Hypertension Other     Other Mother     Heart failure Father     Stroke Sister     Arthritis Sister     Heart disease Brother     No Known Problems Brother     No Known Problems Brother      Past Medical History:   Diagnosis Date    Anxiety     Arthritis     Colitis     Depression     GERD (gastroesophageal reflux disease)     Gout     Heart murmur     History of EKG 12/22/2015    NORMAL    Hyperlipidemia     Hypertension     Obesity     Pancreatitis     history of     Prostate cancer     Renal failure     MILD one functioning kidney    Skin cancer     basal cell cancer on back     Patient Active Problem List   Diagnosis    Essential hypertension, labile    GERD (gastroesophageal reflux disease)    Renal failure    Hyperlipidemia    Anxiety    Depression    Type 2 diabetes mellitus    Periumbilical abdominal pain    DDD (degenerative disc disease), lumbar    Prostate cancer    Hyperuricemia    BURKS (dyspnea on exertion)    Bradycardia    Lower urinary tract symptoms due to benign prostatic hyperplasia    Raised prostate specific antigen    Chest pain in adult    Generalized anxiety disorder    Primary insomnia    Multiple rib fractures       Social History     Tobacco Use    Smoking status: Former     Packs/day: 1.00     Types: Cigarettes     Quit date:      Years since quittin.4    Smokeless tobacco: Never   Vaping Use    Vaping Use: Never used   Substance Use Topics    Alcohol use: Yes     Alcohol/week: 7.0 standard drinks     Types: 7 Shots of liquor per week     Comment:  once per day    Drug use: No       No Known Allergies    Current Outpatient Medications on File Prior to Visit   Medication Sig    allopurinol (ZYLOPRIM) 100 MG tablet Take 1 tablet by mouth Daily.    ALPRAZolam (XANAX) 0.25 MG tablet Take 1 tablet by mouth 2 (Two) Times a Day As Needed for Anxiety or Sleep. for anxiety    amLODIPine (NORVASC) 10 MG tablet Take 1 tablet by mouth Daily.    aspirin 81 MG tablet Take 1 tablet by mouth Daily.    bicalutamide (CASODEX) 50 MG chemo tablet Casodex 50 mg tablet   Take 1 tablet every day by oral route for 30 days.    Blood Glucose Monitoring Suppl w/Device kit Check blood sugar once a day.    DX: E11.9    cholecalciferol (VITAMIN D3) 1000 UNITS tablet Take 1 tablet by mouth Daily.    cloNIDine (CATAPRES) 0.1 MG tablet Take 0.5 tablets by mouth As Needed for High Blood Pressure. TID TO QID PRN    Cyanocobalamin (VITAMIN B12 PO) Take  by mouth daily.     dicyclomine (BENTYL) 20 MG tablet Take 1 tablet by mouth 2 (Two) Times a Day.    esomeprazole (nexIUM) 40 MG capsule Take 1 capsule by mouth Every Morning Before Breakfast.    FLUoxetine (PROzac) 20 MG capsule Take 1 capsule by mouth Daily.    folic acid (FOLVITE) 1 MG tablet Take 1 tablet by mouth Daily.    glucose blood (Contour Next Test) test strip Check blood glucose daily    hydrALAZINE (APRESOLINE) 50 MG tablet Take 1 tablet by mouth 3 (Three) Times a Day.    hydrOXYzine (ATARAX) 25 MG tablet Take 1 tablet by mouth At Night As Needed (PRN).    indomethacin (INDOCIN) 25 MG capsule Take 1 capsule by mouth 3 (Three) Times a Day As Needed for Mild Pain .    leuprolide (LUPRON) 30 MG injection Inject 30 mg into the appropriate muscle as directed by prescriber Every 4 (Four) Months.    loratadine (Claritin) 10 MG tablet Take 1 tablet by mouth Daily.    meclizine (ANTIVERT) 25 MG tablet Take 1 tablet by mouth 3 (Three) Times a Day As Needed for Dizziness.    metFORMIN (Glucophage) 500 MG tablet Take 1 tablet by mouth Daily With Breakfast.    metoprolol tartrate (LOPRESSOR) 25 MG tablet Take 1 tablet by mouth 2 (Two) Times a Day.    Microlet Lancets misc 1 stick Daily.    Mirabegron ER (MYRBETRIQ) 50 MG tablet sustained-release 24 hour 24 hr tablet Myrbetriq 50 mg tablet,extended release   Take 1 tablet every day by oral route.    Pyridoxine HCl (VITAMIN B-6 PO) Take  by mouth daily.    rosuvastatin (CRESTOR) 20 MG tablet Take 1 tablet by mouth Every Night.    SITagliptin (Januvia) 100 MG tablet Take 1 tablet by mouth Daily.    [DISCONTINUED] HYDROcodone-acetaminophen (NORCO) 7.5-325 MG per tablet Take 1 tablet by mouth 3 (Three) Times a Day As Needed for Moderate Pain.     No current facility-administered medications on file prior to visit.         The following portions of the patient's history were reviewed and updated as appropriate: allergies, current medications, past family history, past medical history, past  "social history, past surgical history and problem list.    Review of Systems   Constitutional: Negative.   HENT: Negative.  Negative for congestion.    Eyes: Negative.    Cardiovascular: Negative.  Negative for chest pain, cyanosis, dyspnea on exertion, irregular heartbeat, leg swelling, near-syncope, orthopnea, palpitations, paroxysmal nocturnal dyspnea and syncope.   Respiratory: Negative.  Negative for shortness of breath.    Hematologic/Lymphatic: Negative.    Musculoskeletal:  Positive for arthritis and back pain.   Gastrointestinal: Negative.    Genitourinary:  Positive for frequency, hesitancy, incomplete emptying and urgency.   Neurological: Negative.  Negative for headaches.   Psychiatric/Behavioral:  The patient is nervous/anxious.         Objective:     /74   Pulse 59   Resp 18   Ht 180.3 cm (71\")   Wt 99.3 kg (219 lb)   SpO2 96%   BMI 30.54 kg/m²   Pulmonary:      Effort: Pulmonary effort is normal.      Breath sounds: Normal breath sounds. No stridor. No wheezing. No rhonchi. No rales.   Cardiovascular:      PMI at left midclavicular line. Normal rate. Regular rhythm. Normal S1. Normal S2.       Murmurs: There is no murmur.      No gallop.  No click. No rub.   Pulses:     Intact distal pulses.   Edema:     Peripheral edema absent.         Lab Review  Lab Results   Component Value Date     03/09/2023    K 4.5 03/09/2023     03/09/2023    BUN 15 03/09/2023    CREATININE 1.37 (H) 03/09/2023    GLUCOSE 129 (H) 03/09/2023    CALCIUM 9.6 03/09/2023    ALT 16 03/09/2023    ALKPHOS 52 03/09/2023    LABIL2 1.8 03/07/2016     Lab Results   Component Value Date    CKTOTAL 101 03/09/2023     Lab Results   Component Value Date    WBC 9.87 03/09/2023    HGB 13.8 03/09/2023    HCT 38.6 03/09/2023     03/09/2023     No results found for: INR  No results found for: MG  Lab Results   Component Value Date    PSA <0.014 12/09/2019    TSH 2.807 09/28/2015     No results found for: BNP  Lab " Results   Component Value Date    CHLPL 227 (H) 03/07/2016    CHOL 181 03/09/2023    TRIG 204 (H) 03/09/2023    HDL 50 03/09/2023    VLDL 35 03/09/2023    LDLHDL 1.80 03/09/2023     Lab Results   Component Value Date    LDL 96 03/09/2023     (H) 01/04/2023     No results found for: PROBNP              ECG 12 Lead    Date/Time: 6/9/2023 11:33 AM  Performed by: Antione De Santiago MD  Authorized by: Antione De Santiago MD   Comparison: compared with previous ECG from 12/11/2020  Similar to previous ECG  Rhythm: sinus bradycardia  Rate: normal  BPM: 58  Conduction: incomplete right bundle branch block  QRS axis: normal  Other findings: non-specific ST-T wave changes    Clinical impression: non-specific ECG           I personally viewed and interpreted the patient's LAB data         Assessment:     1. Primary hypertension    2. DDD (degenerative disc disease), lumbar    3. Mixed hyperlipidemia    4. Anxiety          Plan:     Primary hypertension  Blood pressure is very well controlled patient will continue current medications including Norvasc, hydralazine, Lopressor and as needed clonidine.    Chronic back pain  Norco refills were given    Hyperlipidemia  Statin therapy was continued.  Lab results were discussed.    Anxiety  Zoloft and Xanax continued.    Because of chronic kidney disease patient was advised to avoid Indocin.  He will drink more fluids.  Follow-up scheduled          No follow-ups on file.

## 2023-07-14 PROBLEM — R13.19 ESOPHAGEAL DYSPHAGIA: Status: ACTIVE | Noted: 2023-07-14

## 2023-07-14 PROBLEM — L03.116 CELLULITIS OF LEFT FOOT: Status: ACTIVE | Noted: 2023-07-14

## 2023-08-11 ENCOUNTER — OFFICE VISIT (OUTPATIENT)
Dept: CARDIOLOGY | Facility: CLINIC | Age: 78
End: 2023-08-11
Payer: MEDICARE

## 2023-08-11 VITALS
OXYGEN SATURATION: 97 % | HEIGHT: 71 IN | DIASTOLIC BLOOD PRESSURE: 62 MMHG | SYSTOLIC BLOOD PRESSURE: 130 MMHG | BODY MASS INDEX: 30.24 KG/M2 | HEART RATE: 47 BPM | WEIGHT: 216 LBS

## 2023-08-11 DIAGNOSIS — R00.1 BRADYCARDIA: Primary | ICD-10-CM

## 2023-08-11 DIAGNOSIS — C61 PROSTATE CANCER: ICD-10-CM

## 2023-08-11 DIAGNOSIS — I10 PRIMARY HYPERTENSION: ICD-10-CM

## 2023-08-11 DIAGNOSIS — M51.36 DDD (DEGENERATIVE DISC DISEASE), LUMBAR: ICD-10-CM

## 2023-08-11 PROCEDURE — 99213 OFFICE O/P EST LOW 20 MIN: CPT | Performed by: INTERNAL MEDICINE

## 2023-08-11 PROCEDURE — 3075F SYST BP GE 130 - 139MM HG: CPT | Performed by: INTERNAL MEDICINE

## 2023-08-11 PROCEDURE — 3078F DIAST BP <80 MM HG: CPT | Performed by: INTERNAL MEDICINE

## 2023-08-11 RX ORDER — HYDROCODONE BITARTRATE AND ACETAMINOPHEN 7.5; 325 MG/1; MG/1
1 TABLET ORAL 3 TIMES DAILY PRN
Qty: 90 TABLET | Refills: 0 | Status: SHIPPED | OUTPATIENT
Start: 2023-08-11

## 2023-08-11 RX ORDER — COLCHICINE 0.6 MG/1
0.6 TABLET ORAL DAILY
COMMUNITY

## 2023-08-11 NOTE — PROGRESS NOTES
subjective     Chief Complaint   Patient presents with    Back Pain     Follow up      Slow Heart Rate     today    Med Refill     pending     History of Present Illness    Patient is here for follow-up because of narcotic use.  He is taking Norco 7.53 times a day and needs refill.  He is compliant with medication.  Patient has chronic back pain with history of prostate cancer.    Today he complains of slow heart rate weak and lethargic but no syncope or presyncope.  Heart rate is 47 bpm.  He is on beta-blocker therapy with metoprolol.    Patient also has hyperlipidemia, diabetes mellitus very well-controlled.  He is taking Crestor 20 mg daily for hyperlipidemia.    Blood pressure is very well-controlled on current medications.      Past Surgical History:   Procedure Laterality Date    COLONOSCOPY  03/2018    EYE SURGERY      FOOT SURGERY      HERNIA REPAIR      HYDROCELECTOMY  06/15/2015    PROSTATE BIOPSY  2018    PROSTATE FIDUCIAL MARKER PLACEMENT  09/14/2018    RHINOPLASTY      UPPER GASTROINTESTINAL ENDOSCOPY  03/24/2014    WITH BIOPSIES AND DILATION     Family History   Problem Relation Age of Onset    Kidney disease Other     Other Other         CHRONIC DISABLING DISEASES URINARY TRACT DISEASE    Diabetes Other     Hypertension Other     Other Mother     Heart failure Father     Stroke Sister     Arthritis Sister     Heart disease Brother     No Known Problems Brother     No Known Problems Brother      Past Medical History:   Diagnosis Date    Anxiety     Arthritis     Colitis     Depression     GERD (gastroesophageal reflux disease)     Gout     Heart murmur     History of EKG 12/22/2015    NORMAL    Hyperlipidemia     Hypertension     Obesity     Pancreatitis     history of    Prostate cancer     Renal failure     MILD one functioning kidney    Skin cancer     basal cell cancer on back     Patient Active Problem List   Diagnosis    Essential hypertension, labile    GERD (gastroesophageal reflux disease)     Renal failure    Hyperlipidemia    Anxiety    Depression    Type 2 diabetes mellitus    Periumbilical abdominal pain    DDD (degenerative disc disease), lumbar    Prostate cancer    Hyperuricemia    BURKS (dyspnea on exertion)    Bradycardia    Lower urinary tract symptoms due to benign prostatic hyperplasia    Raised prostate specific antigen    Chest pain in adult    Generalized anxiety disorder    Primary insomnia    Multiple rib fractures    Esophageal dysphagia    Cellulitis of left foot       Social History     Tobacco Use    Smoking status: Former     Packs/day: 1.00     Types: Cigarettes     Quit date:      Years since quittin.6    Smokeless tobacco: Never   Vaping Use    Vaping Use: Never used   Substance Use Topics    Alcohol use: Yes     Alcohol/week: 7.0 standard drinks     Types: 7 Shots of liquor per week     Comment:  once per day    Drug use: No       No Known Allergies    Current Outpatient Medications on File Prior to Visit   Medication Sig    allopurinol (ZYLOPRIM) 100 MG tablet Take 1 tablet by mouth Daily.    ALPRAZolam (XANAX) 0.25 MG tablet Take 1 tablet by mouth 2 (Two) Times a Day As Needed for Anxiety or Sleep. for anxiety    amLODIPine (NORVASC) 10 MG tablet TAKE 1 TABLET BY MOUTH  DAILY    aspirin 81 MG tablet Take 1 tablet by mouth Daily.    bicalutamide (CASODEX) 50 MG chemo tablet Casodex 50 mg tablet   Take 1 tablet every day by oral route for 30 days.    Blood Glucose Monitoring Suppl w/Device kit Check blood sugar once a day.    DX: E11.9    cholecalciferol (VITAMIN D3) 1000 UNITS tablet Take 1 tablet by mouth Daily.    cloNIDine (CATAPRES) 0.1 MG tablet Take 0.5 tablets by mouth As Needed for High Blood Pressure. TID TO QID PRN    colchicine 0.6 MG tablet Take 1 tablet by mouth Daily. PRN    Cyanocobalamin (VITAMIN B12 PO) Take  by mouth daily.    dicyclomine (BENTYL) 20 MG tablet Take 1 tablet by mouth 2 (Two) Times a Day.    esomeprazole (nexIUM) 40 MG capsule Take 1  capsule by mouth Every Morning Before Breakfast.    FLUoxetine (PROzac) 20 MG capsule TAKE 1 CAPSULE BY MOUTH  DAILY    folic acid (FOLVITE) 1 MG tablet TAKE 1 TABLET BY MOUTH  DAILY    glucose blood (Contour Next Test) test strip Check blood glucose daily    hydrALAZINE (APRESOLINE) 50 MG tablet Take 1 tablet by mouth 3 (Three) Times a Day.    hydrOXYzine (ATARAX) 25 MG tablet Take 1 tablet by mouth At Night As Needed (PRN).    indomethacin (INDOCIN) 25 MG capsule Take 1 capsule by mouth 3 (Three) Times a Day As Needed for Mild Pain .    leuprolide (LUPRON) 30 MG injection Inject 30 mg into the appropriate muscle as directed by prescriber Every 4 (Four) Months.    loratadine (Claritin) 10 MG tablet Take 1 tablet by mouth Daily.    meclizine (ANTIVERT) 25 MG tablet Take 1 tablet by mouth 3 (Three) Times a Day As Needed for Dizziness.    metFORMIN (GLUCOPHAGE) 500 MG tablet TAKE 1 TABLET BY MOUTH  DAILY WITH BREAKFAST    Microlet Lancets misc 1 stick Daily.    Mirabegron ER (MYRBETRIQ) 50 MG tablet sustained-release 24 hour 24 hr tablet Myrbetriq 50 mg tablet,extended release   Take 1 tablet every day by oral route.    Pyridoxine HCl (VITAMIN B-6 PO) Take  by mouth daily.    rosuvastatin (CRESTOR) 20 MG tablet Take 1 tablet by mouth Every Night.    SITagliptin (Januvia) 100 MG tablet Take 1 tablet by mouth Daily.    [DISCONTINUED] HYDROcodone-acetaminophen (NORCO) 7.5-325 MG per tablet Take 1 tablet by mouth 3 (Three) Times a Day As Needed for Moderate Pain.    [DISCONTINUED] metoprolol tartrate (LOPRESSOR) 25 MG tablet TAKE 1 TABLET BY MOUTH  TWICE DAILY    [DISCONTINUED] cefdinir (OMNICEF) 300 MG capsule Take 1 capsule by mouth 2 (Two) Times a Day. (Patient not taking: Reported on 8/11/2023)     No current facility-administered medications on file prior to visit.         The following portions of the patient's history were reviewed and updated as appropriate: allergies, current medications, past family history,  "past medical history, past social history, past surgical history and problem list.    Review of Systems   Constitutional: Positive for malaise/fatigue.   HENT: Negative.  Negative for congestion.    Eyes: Negative.    Cardiovascular: Negative.  Negative for chest pain, cyanosis, dyspnea on exertion, irregular heartbeat, leg swelling, near-syncope, orthopnea, palpitations, paroxysmal nocturnal dyspnea and syncope.   Respiratory: Negative.  Negative for shortness of breath.    Hematologic/Lymphatic: Negative.    Musculoskeletal:  Positive for arthritis and back pain.   Gastrointestinal: Negative.    Neurological: Negative.  Negative for headaches.        Objective:     /62 (BP Location: Left arm, Patient Position: Sitting, Cuff Size: Adult)   Pulse (!) 47   Ht 180.3 cm (71\")   Wt 98 kg (216 lb)   SpO2 97%   BMI 30.13 kg/mý   Pulmonary:      Effort: Pulmonary effort is normal.      Breath sounds: Normal breath sounds. No stridor. No wheezing. No rhonchi. No rales.   Cardiovascular:      PMI at left midclavicular line. Bradycardia present. Regular rhythm. Normal S1. Normal S2.       Murmurs: There is no murmur.      No gallop.  No click. No rub.   Pulses:     Intact distal pulses.   Edema:     Peripheral edema absent.         Lab Review  Lab Results   Component Value Date     03/09/2023    K 4.5 03/09/2023     03/09/2023    BUN 15 03/09/2023    CREATININE 1.37 (H) 03/09/2023    GLUCOSE 129 (H) 03/09/2023    CALCIUM 9.6 03/09/2023    ALT 16 03/09/2023    ALKPHOS 52 03/09/2023    LABIL2 1.8 03/07/2016     Lab Results   Component Value Date    CKTOTAL 101 03/09/2023     Lab Results   Component Value Date    WBC 12.33 (H) 07/10/2023    HGB 13.7 07/10/2023    HCT 39.3 07/10/2023     07/10/2023     No results found for: INR  No results found for: MG  Lab Results   Component Value Date    PSA <0.014 12/09/2019    TSH 2.807 09/28/2015     No results found for: BNP  Lab Results   Component Value " Date    CHLPL 227 (H) 03/07/2016    CHOL 181 03/09/2023    TRIG 204 (H) 03/09/2023    HDL 50 03/09/2023    VLDL 35 03/09/2023    LDLHDL 1.80 03/09/2023     Lab Results   Component Value Date    LDL 96 03/09/2023     (H) 01/04/2023     No results found for: PROBNP            Procedures       I personally viewed and interpreted the patient's LAB data         Assessment:     1. Bradycardia    2. DDD (degenerative disc disease), lumbar    3. Primary hypertension    4. Prostate cancer          Plan:     Heart rate is 47 bpm.  Patient is taking metoprolol 25 twice daily.  He was advised to decrease it to 12.5 twice daily.  He will check his heart rate closely may need to decrease medication further.    Chronic back pain degenerative disc disease he will continue Norco on as needed basis.    Hypertension is well-controlled he will continue Lopressor hydralazine and Norvasc.    Prostate cancer status post surgery currently complaining of urinary incontinence despite Botox therapy he plans to see his urologist soon.  Follow-up scheduled      No follow-ups on file.

## 2023-08-28 RX ORDER — HYDRALAZINE HYDROCHLORIDE 50 MG/1
50 TABLET, FILM COATED ORAL 3 TIMES DAILY
Qty: 270 TABLET | Refills: 3 | Status: SHIPPED | OUTPATIENT
Start: 2023-08-28

## 2023-09-07 ENCOUNTER — LAB (OUTPATIENT)
Dept: LAB | Facility: HOSPITAL | Age: 78
End: 2023-09-07
Payer: MEDICARE

## 2023-09-07 DIAGNOSIS — E78.2 MIXED HYPERLIPIDEMIA: ICD-10-CM

## 2023-09-07 DIAGNOSIS — E11.21 TYPE 2 DIABETES MELLITUS WITH DIABETIC NEPHROPATHY, WITHOUT LONG-TERM CURRENT USE OF INSULIN: ICD-10-CM

## 2023-09-07 DIAGNOSIS — I10 PRIMARY HYPERTENSION: Primary | ICD-10-CM

## 2023-09-07 DIAGNOSIS — I10 PRIMARY HYPERTENSION: ICD-10-CM

## 2023-09-07 LAB
ALBUMIN SERPL-MCNC: 4.8 G/DL (ref 3.5–5.2)
ALBUMIN/GLOB SERPL: 2.5 G/DL
ALP SERPL-CCNC: 48 U/L (ref 39–117)
ALT SERPL W P-5'-P-CCNC: 21 U/L (ref 1–41)
ANION GAP SERPL CALCULATED.3IONS-SCNC: 12 MMOL/L (ref 5–15)
AST SERPL-CCNC: 22 U/L (ref 1–40)
BASOPHILS # BLD AUTO: 0.12 10*3/MM3 (ref 0–0.2)
BASOPHILS NFR BLD AUTO: 1.2 % (ref 0–1.5)
BILIRUB SERPL-MCNC: 0.5 MG/DL (ref 0–1.2)
BUN SERPL-MCNC: 18 MG/DL (ref 8–23)
BUN/CREAT SERPL: 14.2 (ref 7–25)
CALCIUM SPEC-SCNC: 9.7 MG/DL (ref 8.6–10.5)
CHLORIDE SERPL-SCNC: 107 MMOL/L (ref 98–107)
CHOLEST SERPL-MCNC: 173 MG/DL (ref 0–200)
CK SERPL-CCNC: 119 U/L (ref 20–200)
CO2 SERPL-SCNC: 24 MMOL/L (ref 22–29)
CREAT SERPL-MCNC: 1.27 MG/DL (ref 0.76–1.27)
DEPRECATED RDW RBC AUTO: 44.4 FL (ref 37–54)
EGFRCR SERPLBLD CKD-EPI 2021: 58.2 ML/MIN/1.73
EOSINOPHIL # BLD AUTO: 2.28 10*3/MM3 (ref 0–0.4)
EOSINOPHIL NFR BLD AUTO: 22.4 % (ref 0.3–6.2)
ERYTHROCYTE [DISTWIDTH] IN BLOOD BY AUTOMATED COUNT: 12.8 % (ref 12.3–15.4)
GLOBULIN UR ELPH-MCNC: 1.9 GM/DL
GLUCOSE SERPL-MCNC: 121 MG/DL (ref 65–99)
HBA1C MFR BLD: 6.3 % (ref 4.8–5.6)
HCT VFR BLD AUTO: 41.4 % (ref 37.5–51)
HDLC SERPL-MCNC: 44 MG/DL (ref 40–60)
HGB BLD-MCNC: 14.3 G/DL (ref 13–17.7)
IMM GRANULOCYTES # BLD AUTO: 0.03 10*3/MM3 (ref 0–0.05)
IMM GRANULOCYTES NFR BLD AUTO: 0.3 % (ref 0–0.5)
LDLC SERPL CALC-MCNC: 91 MG/DL (ref 0–100)
LDLC/HDLC SERPL: 1.92 {RATIO}
LYMPHOCYTES # BLD AUTO: 2.55 10*3/MM3 (ref 0.7–3.1)
LYMPHOCYTES NFR BLD AUTO: 25 % (ref 19.6–45.3)
MCH RBC QN AUTO: 32.6 PG (ref 26.6–33)
MCHC RBC AUTO-ENTMCNC: 34.5 G/DL (ref 31.5–35.7)
MCV RBC AUTO: 94.3 FL (ref 79–97)
MONOCYTES # BLD AUTO: 0.58 10*3/MM3 (ref 0.1–0.9)
MONOCYTES NFR BLD AUTO: 5.7 % (ref 5–12)
NEUTROPHILS NFR BLD AUTO: 4.64 10*3/MM3 (ref 1.7–7)
NEUTROPHILS NFR BLD AUTO: 45.4 % (ref 42.7–76)
NRBC BLD AUTO-RTO: 0.1 /100 WBC (ref 0–0.2)
PLATELET # BLD AUTO: 177 10*3/MM3 (ref 140–450)
PMV BLD AUTO: 12.2 FL (ref 6–12)
POTASSIUM SERPL-SCNC: 4.4 MMOL/L (ref 3.5–5.2)
PROT SERPL-MCNC: 6.7 G/DL (ref 6–8.5)
RBC # BLD AUTO: 4.39 10*6/MM3 (ref 4.14–5.8)
SODIUM SERPL-SCNC: 143 MMOL/L (ref 136–145)
TRIGL SERPL-MCNC: 223 MG/DL (ref 0–150)
VLDLC SERPL-MCNC: 38 MG/DL (ref 5–40)
WBC NRBC COR # BLD: 10.2 10*3/MM3 (ref 3.4–10.8)

## 2023-09-07 PROCEDURE — 85025 COMPLETE CBC W/AUTO DIFF WBC: CPT

## 2023-09-07 PROCEDURE — 80061 LIPID PANEL: CPT

## 2023-09-07 PROCEDURE — 80053 COMPREHEN METABOLIC PANEL: CPT

## 2023-09-07 PROCEDURE — 36415 COLL VENOUS BLD VENIPUNCTURE: CPT

## 2023-09-07 PROCEDURE — 82550 ASSAY OF CK (CPK): CPT

## 2023-09-07 PROCEDURE — 83036 HEMOGLOBIN GLYCOSYLATED A1C: CPT

## 2023-09-08 ENCOUNTER — OFFICE VISIT (OUTPATIENT)
Dept: CARDIOLOGY | Facility: CLINIC | Age: 78
End: 2023-09-08
Payer: MEDICARE

## 2023-09-08 VITALS
WEIGHT: 214 LBS | HEIGHT: 71 IN | HEART RATE: 57 BPM | SYSTOLIC BLOOD PRESSURE: 136 MMHG | DIASTOLIC BLOOD PRESSURE: 64 MMHG | BODY MASS INDEX: 29.96 KG/M2 | OXYGEN SATURATION: 96 %

## 2023-09-08 DIAGNOSIS — I10 PRIMARY HYPERTENSION: ICD-10-CM

## 2023-09-08 DIAGNOSIS — E11.21 TYPE 2 DIABETES MELLITUS WITH DIABETIC NEPHROPATHY, WITHOUT LONG-TERM CURRENT USE OF INSULIN: ICD-10-CM

## 2023-09-08 DIAGNOSIS — F51.01 PRIMARY INSOMNIA: ICD-10-CM

## 2023-09-08 DIAGNOSIS — E78.2 MIXED HYPERLIPIDEMIA: Primary | ICD-10-CM

## 2023-09-08 DIAGNOSIS — M51.36 DDD (DEGENERATIVE DISC DISEASE), LUMBAR: ICD-10-CM

## 2023-09-08 DIAGNOSIS — R00.1 BRADYCARDIA: ICD-10-CM

## 2023-09-08 PROCEDURE — 99213 OFFICE O/P EST LOW 20 MIN: CPT | Performed by: INTERNAL MEDICINE

## 2023-09-08 PROCEDURE — 3075F SYST BP GE 130 - 139MM HG: CPT | Performed by: INTERNAL MEDICINE

## 2023-09-08 PROCEDURE — 3078F DIAST BP <80 MM HG: CPT | Performed by: INTERNAL MEDICINE

## 2023-09-08 RX ORDER — HYDROCODONE BITARTRATE AND ACETAMINOPHEN 7.5; 325 MG/1; MG/1
1 TABLET ORAL 3 TIMES DAILY PRN
Qty: 90 TABLET | Refills: 0 | Status: SHIPPED | OUTPATIENT
Start: 2023-09-08

## 2023-09-08 RX ORDER — HYDRALAZINE HYDROCHLORIDE 25 MG/1
50 TABLET, FILM COATED ORAL 3 TIMES DAILY
COMMUNITY

## 2023-09-08 NOTE — PROGRESS NOTES
subjective     Chief Complaint   Patient presents with    Hypertension     Follow up    Back Pain     Follow up    Med Refill     pending     History of Present Illness    Patient is 78 years old white male who is here for follow-up  Patient has multiple chronic medical problems including prostate cancer and urinary incontinence.  He is following with the urologist.  Hypertension  Blood pressure is very well controlled with current medications.  Hyperlipidemia  He is taking Crestor lab work will be reviewed.    Diabetes mellitus non-insulin-dependent he is trying to diet and taking his medication regularly.  No drug side effects.    Patient also has chronic renal dysfunction  Patient needs refill on his pain in her medications is compliant with medications.      Past Surgical History:   Procedure Laterality Date    COLONOSCOPY  03/2018    EYE SURGERY      FOOT SURGERY      HERNIA REPAIR      HYDROCELECTOMY  06/15/2015    PROSTATE BIOPSY  2018    PROSTATE FIDUCIAL MARKER PLACEMENT  09/14/2018    RHINOPLASTY      UPPER GASTROINTESTINAL ENDOSCOPY  03/24/2014    WITH BIOPSIES AND DILATION     Family History   Problem Relation Age of Onset    Kidney disease Other     Other Other         CHRONIC DISABLING DISEASES URINARY TRACT DISEASE    Diabetes Other     Hypertension Other     Other Mother     Heart failure Father     Stroke Sister     Arthritis Sister     Heart disease Brother     No Known Problems Brother     No Known Problems Brother      Past Medical History:   Diagnosis Date    Anxiety     Arthritis     Colitis     Depression     GERD (gastroesophageal reflux disease)     Gout     Heart murmur     History of EKG 12/22/2015    NORMAL    Hyperlipidemia     Hypertension     Obesity     Pancreatitis     history of    Prostate cancer     Renal failure     MILD one functioning kidney    Skin cancer     basal cell cancer on back     Patient Active Problem List   Diagnosis    Essential hypertension, labile    GERD  (gastroesophageal reflux disease)    Renal failure    Hyperlipidemia    Anxiety    Depression    Type 2 diabetes mellitus    Periumbilical abdominal pain    DDD (degenerative disc disease), lumbar    Prostate cancer    Hyperuricemia    BURKS (dyspnea on exertion)    Bradycardia    Lower urinary tract symptoms due to benign prostatic hyperplasia    Raised prostate specific antigen    Chest pain in adult    Generalized anxiety disorder    Primary insomnia    Multiple rib fractures    Esophageal dysphagia    Cellulitis of left foot       Social History     Tobacco Use    Smoking status: Former     Packs/day: 1.00     Types: Cigarettes     Quit date:      Years since quittin.7    Smokeless tobacco: Never   Vaping Use    Vaping Use: Never used   Substance Use Topics    Alcohol use: Yes     Alcohol/week: 7.0 standard drinks     Types: 7 Shots of liquor per week     Comment:  once per day    Drug use: No       No Known Allergies    Current Outpatient Medications on File Prior to Visit   Medication Sig    allopurinol (ZYLOPRIM) 100 MG tablet Take 1 tablet by mouth Daily.    ALPRAZolam (XANAX) 0.25 MG tablet Take 1 tablet by mouth 2 (Two) Times a Day As Needed for Anxiety or Sleep. for anxiety    amLODIPine (NORVASC) 10 MG tablet TAKE 1 TABLET BY MOUTH  DAILY    aspirin 81 MG tablet Take 1 tablet by mouth Daily.    bicalutamide (CASODEX) 50 MG chemo tablet Casodex 50 mg tablet   Take 1 tablet every day by oral route for 30 days.    Blood Glucose Monitoring Suppl w/Device kit Check blood sugar once a day.    DX: E11.9    cholecalciferol (VITAMIN D3) 1000 UNITS tablet Take 1 tablet by mouth Daily.    cloNIDine (CATAPRES) 0.1 MG tablet Take 0.5 tablets by mouth As Needed for High Blood Pressure. TID TO QID PRN    colchicine 0.6 MG tablet Take 1 tablet by mouth Daily. PRN    Cyanocobalamin (VITAMIN B12 PO) Take  by mouth daily.    dicyclomine (BENTYL) 20 MG tablet Take 1 tablet by mouth 2 (Two) Times a Day.     esomeprazole (nexIUM) 40 MG capsule Take 1 capsule by mouth Every Morning Before Breakfast.    FLUoxetine (PROzac) 20 MG capsule TAKE 1 CAPSULE BY MOUTH  DAILY    folic acid (FOLVITE) 1 MG tablet TAKE 1 TABLET BY MOUTH  DAILY    glucose blood (Contour Next Test) test strip Check blood glucose daily    hydrALAZINE (APRESOLINE) 50 MG tablet Take 1 tablet by mouth 3 (Three) Times a Day.    indomethacin (INDOCIN) 25 MG capsule Take 1 capsule by mouth 3 (Three) Times a Day As Needed for Mild Pain .    leuprolide (LUPRON) 30 MG injection Inject 30 mg into the appropriate muscle as directed by prescriber Every 4 (Four) Months.    loratadine (Claritin) 10 MG tablet Take 1 tablet by mouth Daily.    meclizine (ANTIVERT) 25 MG tablet Take 1 tablet by mouth 3 (Three) Times a Day As Needed for Dizziness.    metFORMIN (GLUCOPHAGE) 500 MG tablet TAKE 1 TABLET BY MOUTH  DAILY WITH BREAKFAST    metoprolol tartrate (LOPRESSOR) 25 MG tablet Take 0.5 tablets by mouth 2 (Two) Times a Day.    Microlet Lancets misc 1 stick Daily.    Mirabegron ER (MYRBETRIQ) 50 MG tablet sustained-release 24 hour 24 hr tablet Myrbetriq 50 mg tablet,extended release   Take 1 tablet every day by oral route.    Pyridoxine HCl (VITAMIN B-6 PO) Take  by mouth daily.    rosuvastatin (CRESTOR) 20 MG tablet Take 1 tablet by mouth Every Night.    SITagliptin (Januvia) 100 MG tablet Take 1 tablet by mouth Daily.    hydrALAZINE (APRESOLINE) 25 MG tablet Take 2 tablets by mouth 3 (Three) Times a Day.    hydrOXYzine (ATARAX) 25 MG tablet Take 1 tablet by mouth At Night As Needed (PRN). (Patient not taking: Reported on 9/8/2023)     No current facility-administered medications on file prior to visit.         The following portions of the patient's history were reviewed and updated as appropriate: allergies, current medications, past family history, past medical history, past social history, past surgical history and problem list.    Review of Systems   Constitutional:  "Negative.   HENT: Negative.  Negative for congestion.    Eyes: Negative.    Cardiovascular: Negative.  Negative for chest pain, cyanosis, dyspnea on exertion, irregular heartbeat, leg swelling, near-syncope, orthopnea, palpitations, paroxysmal nocturnal dyspnea and syncope.   Respiratory: Negative.  Negative for shortness of breath.    Hematologic/Lymphatic: Negative.    Musculoskeletal:  Positive for arthritis, back pain and stiffness.   Gastrointestinal: Negative.    Genitourinary:  Positive for bladder incontinence.   Neurological: Negative.  Negative for headaches.   Psychiatric/Behavioral:  The patient has insomnia and is nervous/anxious.         Objective:     /64 (BP Location: Left arm, Patient Position: Sitting, Cuff Size: Large Adult)   Pulse 57   Ht 180.3 cm (71\")   Wt 97.1 kg (214 lb)   SpO2 96%   BMI 29.85 kg/m²   Cardiovascular:      PMI at left midclavicular line. Normal rate. Regular rhythm. Normal S1. Normal S2.       Murmurs: There is no murmur.      No gallop.  No click. No rub.   Pulses:     Intact distal pulses.   Edema:     Peripheral edema absent.         Lab Review  Lab Results   Component Value Date     09/07/2023    K 4.4 09/07/2023     09/07/2023    BUN 18 09/07/2023    CREATININE 1.27 09/07/2023    GLUCOSE 121 (H) 09/07/2023    CALCIUM 9.7 09/07/2023    ALT 21 09/07/2023    ALKPHOS 48 09/07/2023    LABIL2 1.8 03/07/2016     Lab Results   Component Value Date    CKTOTAL 119 09/07/2023     Lab Results   Component Value Date    WBC 10.20 09/07/2023    HGB 14.3 09/07/2023    HCT 41.4 09/07/2023     09/07/2023     No results found for: INR  No results found for: MG  Lab Results   Component Value Date    PSA <0.014 12/09/2019    TSH 2.807 09/28/2015     No results found for: BNP  Lab Results   Component Value Date    CHLPL 227 (H) 03/07/2016    CHOL 173 09/07/2023    TRIG 223 (H) 09/07/2023    HDL 44 09/07/2023    VLDL 38 09/07/2023    LDLHDL 1.92 09/07/2023     Lab " Results   Component Value Date    LDL 91 09/07/2023    LDL 96 03/09/2023     No results found for: PROBNP             Lab 09/07/23  1033   HEMOGLOBIN A1C 6.30*      Procedures       I personally viewed and interpreted the patient's LAB data         Assessment:     1. Mixed hyperlipidemia    2. DDD (degenerative disc disease), lumbar    3. Primary hypertension    4. Bradycardia    5. Type 2 diabetes mellitus with diabetic nephropathy, without long-term current use of insulin    6. Primary insomnia          Plan:     Hyperlipidemia  Lab work reviewed total cholesterol is 173 with LDL of 91.  It is still not therapeutic but is better than before.  He will continue to diet and continue Crestor.  No drug side effects.    Chronic back pain on Norco he is compliant with medications refill was given.  Anxiety.  He will continue Xanax 0.25 twice daily as needed.  He will also continue Prozac 20 mg daily.    Diabetes mellitus patient is taking metformin and Januvia which were continued    Hypertension patient will continue hydralazine blood sugar is 121.,  Hemoglobin A1c is 6.3    Hypertension  Blood pressure is well controlled he will continue Norvasc, hydralazine, Lopressor along with clonidine on as needed basis.  Follow-up scheduled    No follow-ups on file.

## 2023-10-13 ENCOUNTER — OFFICE VISIT (OUTPATIENT)
Dept: CARDIOLOGY | Facility: CLINIC | Age: 78
End: 2023-10-13
Payer: MEDICARE

## 2023-10-13 VITALS
DIASTOLIC BLOOD PRESSURE: 71 MMHG | BODY MASS INDEX: 29.57 KG/M2 | WEIGHT: 211.2 LBS | OXYGEN SATURATION: 96 % | SYSTOLIC BLOOD PRESSURE: 145 MMHG | HEIGHT: 71 IN | HEART RATE: 58 BPM

## 2023-10-13 DIAGNOSIS — F41.9 ANXIETY: ICD-10-CM

## 2023-10-13 DIAGNOSIS — E78.2 MIXED HYPERLIPIDEMIA: Primary | ICD-10-CM

## 2023-10-13 DIAGNOSIS — I10 PRIMARY HYPERTENSION: ICD-10-CM

## 2023-10-13 DIAGNOSIS — M51.36 DDD (DEGENERATIVE DISC DISEASE), LUMBAR: ICD-10-CM

## 2023-10-13 PROCEDURE — 3078F DIAST BP <80 MM HG: CPT | Performed by: INTERNAL MEDICINE

## 2023-10-13 PROCEDURE — 3077F SYST BP >= 140 MM HG: CPT | Performed by: INTERNAL MEDICINE

## 2023-10-13 PROCEDURE — 99213 OFFICE O/P EST LOW 20 MIN: CPT | Performed by: INTERNAL MEDICINE

## 2023-10-13 RX ORDER — HYDROCODONE BITARTRATE AND ACETAMINOPHEN 7.5; 325 MG/1; MG/1
1 TABLET ORAL 3 TIMES DAILY PRN
Qty: 90 TABLET | Refills: 0 | Status: SHIPPED | OUTPATIENT
Start: 2023-10-13

## 2023-10-13 RX ORDER — ESOMEPRAZOLE MAGNESIUM 40 MG/1
40 CAPSULE, DELAYED RELEASE ORAL
Qty: 90 CAPSULE | Refills: 2 | Status: SHIPPED | OUTPATIENT
Start: 2023-10-13

## 2023-10-13 RX ORDER — ALPRAZOLAM 0.25 MG/1
0.25 TABLET ORAL 2 TIMES DAILY PRN
Qty: 60 TABLET | Refills: 2 | Status: SHIPPED | OUTPATIENT
Start: 2023-10-13

## 2023-10-15 NOTE — PROGRESS NOTES
subjective     Chief Complaint   Patient presents with    Hyperlipidemia     Follow up     Med Refill     Pended        Problems Addressed This Visit  1. Mixed hyperlipidemia    2. Anxiety    3. DDD (degenerative disc disease), lumbar    4. Primary hypertension        History of Present Illness    Patient is 78 years old white male is here for pain management.  Patient has prostate cancer and degenerative disc disease lumbar spine.  He is taking Norco 7.53 times a day as needed.  He also has lot of anxiety and is taking Xanax 0.25 twice daily as needed.  He is compliant with medications and needs refill.    Hyperlipidemia  He is taking Crestor and is trying to follow diet.    Diabetes mellitus type 2  He is taking Januvia, metformin and is following diet.    Hypertension  Blood pressure is mildly elevated today  He is taking Norvasc 10 mg daily, hydralazine 50 mg 3 times daily, Lopressor 12.5 twice daily and clonidine as needed.  He has not been taking his clonidine.  He was advised to take clonidine if blood pressure is elevated he will check his blood pressure twice a day.      Past Surgical History:   Procedure Laterality Date    COLONOSCOPY  03/2018    EYE SURGERY      FOOT SURGERY      HERNIA REPAIR      HYDROCELECTOMY  06/15/2015    PROSTATE BIOPSY  2018    PROSTATE FIDUCIAL MARKER PLACEMENT  09/14/2018    RHINOPLASTY      UPPER GASTROINTESTINAL ENDOSCOPY  03/24/2014    WITH BIOPSIES AND DILATION     Family History   Problem Relation Age of Onset    Kidney disease Other     Other Other         CHRONIC DISABLING DISEASES URINARY TRACT DISEASE    Diabetes Other     Hypertension Other     Other Mother     Heart failure Father     Stroke Sister     Arthritis Sister     Heart disease Brother     No Known Problems Brother     No Known Problems Brother      Past Medical History:   Diagnosis Date    Anxiety     Arthritis     Colitis     Depression     GERD (gastroesophageal reflux disease)     Gout     Heart murmur      History of EKG 2015    NORMAL    Hyperlipidemia     Hypertension     Obesity     Pancreatitis     history of    Prostate cancer     Renal failure     MILD one functioning kidney    Skin cancer     basal cell cancer on back     Patient Active Problem List   Diagnosis    Essential hypertension, labile    GERD (gastroesophageal reflux disease)    Renal failure    Hyperlipidemia    Anxiety    Depression    Type 2 diabetes mellitus    Periumbilical abdominal pain    DDD (degenerative disc disease), lumbar    Prostate cancer    Hyperuricemia    BURKS (dyspnea on exertion)    Bradycardia    Lower urinary tract symptoms due to benign prostatic hyperplasia    Raised prostate specific antigen    Chest pain in adult    Generalized anxiety disorder    Primary insomnia    Multiple rib fractures    Esophageal dysphagia    Cellulitis of left foot       Social History     Tobacco Use    Smoking status: Former     Packs/day: 1     Types: Cigarettes     Quit date:      Years since quittin.8    Smokeless tobacco: Never   Vaping Use    Vaping Use: Never used   Substance Use Topics    Alcohol use: Yes     Alcohol/week: 7.0 standard drinks of alcohol     Types: 7 Shots of liquor per week     Comment:  once per day    Drug use: No       No Known Allergies    Current Outpatient Medications on File Prior to Visit   Medication Sig    allopurinol (ZYLOPRIM) 100 MG tablet Take 1 tablet by mouth Daily.    amLODIPine (NORVASC) 10 MG tablet TAKE 1 TABLET BY MOUTH  DAILY    aspirin 81 MG tablet Take 1 tablet by mouth Daily.    bicalutamide (CASODEX) 50 MG chemo tablet Casodex 50 mg tablet   Take 1 tablet every day by oral route for 30 days.    Blood Glucose Monitoring Suppl w/Device kit Check blood sugar once a day.    DX: E11.9    cholecalciferol (VITAMIN D3) 1000 UNITS tablet Take 1 tablet by mouth Daily.    cloNIDine (CATAPRES) 0.1 MG tablet Take 0.5 tablets by mouth As Needed for High Blood Pressure. TID TO QID PRN     colchicine 0.6 MG tablet Take 1 tablet by mouth Daily. PRN    Cyanocobalamin (VITAMIN B12 PO) Take  by mouth daily.    dicyclomine (BENTYL) 20 MG tablet Take 1 tablet by mouth 2 (Two) Times a Day.    FLUoxetine (PROzac) 20 MG capsule TAKE 1 CAPSULE BY MOUTH  DAILY    folic acid (FOLVITE) 1 MG tablet TAKE 1 TABLET BY MOUTH  DAILY    glucose blood (Contour Next Test) test strip Check blood glucose daily    hydrALAZINE (APRESOLINE) 25 MG tablet Take 2 tablets by mouth 3 (Three) Times a Day.    hydrALAZINE (APRESOLINE) 50 MG tablet Take 1 tablet by mouth 3 (Three) Times a Day.    hydrOXYzine (ATARAX) 25 MG tablet Take 1 tablet by mouth At Night As Needed (PRN).    indomethacin (INDOCIN) 25 MG capsule Take 1 capsule by mouth 3 (Three) Times a Day As Needed for Mild Pain .    leuprolide (LUPRON) 30 MG injection Inject 30 mg into the appropriate muscle as directed by prescriber Every 4 (Four) Months.    loratadine (Claritin) 10 MG tablet Take 1 tablet by mouth Daily.    meclizine (ANTIVERT) 25 MG tablet Take 1 tablet by mouth 3 (Three) Times a Day As Needed for Dizziness.    metFORMIN (GLUCOPHAGE) 500 MG tablet TAKE 1 TABLET BY MOUTH  DAILY WITH BREAKFAST    metoprolol tartrate (LOPRESSOR) 25 MG tablet Take 0.5 tablets by mouth 2 (Two) Times a Day.    Microlet Lancets misc 1 stick Daily.    Mirabegron ER (MYRBETRIQ) 50 MG tablet sustained-release 24 hour 24 hr tablet Myrbetriq 50 mg tablet,extended release   Take 1 tablet every day by oral route.    Pyridoxine HCl (VITAMIN B-6 PO) Take  by mouth daily.    rosuvastatin (CRESTOR) 20 MG tablet Take 1 tablet by mouth Every Night.    SITagliptin (Januvia) 100 MG tablet Take 1 tablet by mouth Daily.     No current facility-administered medications on file prior to visit.         The following portions of the patient's history were reviewed and updated as appropriate: allergies, current medications, past family history, past medical history, past social history, past surgical  "history and problem list.    Review of Systems   Constitutional: Negative.   HENT: Negative.  Negative for congestion.    Eyes: Negative.    Cardiovascular: Negative.  Negative for chest pain, cyanosis, dyspnea on exertion, irregular heartbeat, leg swelling, near-syncope, orthopnea, palpitations, paroxysmal nocturnal dyspnea and syncope.   Respiratory: Negative.  Negative for shortness of breath.    Hematologic/Lymphatic: Negative.    Musculoskeletal: Negative.    Gastrointestinal: Negative.    Neurological: Negative.  Negative for headaches.          Objective:     /71 (BP Location: Right arm, Patient Position: Sitting, Cuff Size: Adult)   Pulse 58   Ht 180.3 cm (71\")   Wt 95.8 kg (211 lb 3.2 oz)   SpO2 96%   BMI 29.46 kg/mý   Cardiovascular:      PMI at left midclavicular line. Normal rate. Regular rhythm. Normal S1. Normal S2.       Murmurs: There is no murmur.      No gallop.  No click. No rub.   Pulses:     Intact distal pulses.   Edema:     Peripheral edema absent.           Lab Review  Lab Results   Component Value Date     09/07/2023    K 4.4 09/07/2023     09/07/2023    BUN 18 09/07/2023    CREATININE 1.27 09/07/2023    GLUCOSE 121 (H) 09/07/2023    CALCIUM 9.7 09/07/2023    ALT 21 09/07/2023    ALKPHOS 48 09/07/2023    LABIL2 1.8 03/07/2016     Lab Results   Component Value Date    CKTOTAL 119 09/07/2023     Lab Results   Component Value Date    WBC 10.20 09/07/2023    HGB 14.3 09/07/2023    HCT 41.4 09/07/2023     09/07/2023     No results found for: \"INR\"  No results found for: \"MG\"  Lab Results   Component Value Date    PSA <0.014 12/09/2019    TSH 2.807 09/28/2015     No results found for: \"BNP\"  Lab Results   Component Value Date    CHLPL 227 (H) 03/07/2016    CHOL 173 09/07/2023    TRIG 223 (H) 09/07/2023    HDL 44 09/07/2023    VLDL 38 09/07/2023    LDL 91 09/07/2023     Lab Results   Component Value Date    LDL 91 09/07/2023    LDL 96 03/09/2023     No results found " "for: \"PROBNP\"            Procedures       I personally viewed and interpreted the patient's LAB data         Assessment:     1. Mixed hyperlipidemia    2. Anxiety    3. DDD (degenerative disc disease), lumbar    4. Primary hypertension          Plan:     Hyperlipidemia  Lab work reviewed and discussed with the patient  Cholesterol is 173, triglyceride 223 LDL 91.  Weight loss and diet restrictions were discussed.  He will continue Crestor.  Anxiety.  Patient is under a lot of stress with a lot of LUTS symptoms with the prostate cancer and urinary incontinence.  Refills were given.  Chronic back pain Norco refill was given.  Hypertension mildly elevated instructions given.  Lab work discussed and follow-up scheduled.      No follow-ups on file.  "

## 2023-11-17 ENCOUNTER — OFFICE VISIT (OUTPATIENT)
Dept: CARDIOLOGY | Facility: CLINIC | Age: 78
End: 2023-11-17
Payer: MEDICARE

## 2023-11-17 VITALS
OXYGEN SATURATION: 97 % | HEIGHT: 71 IN | SYSTOLIC BLOOD PRESSURE: 145 MMHG | DIASTOLIC BLOOD PRESSURE: 75 MMHG | BODY MASS INDEX: 29.99 KG/M2 | WEIGHT: 214.2 LBS | HEART RATE: 53 BPM

## 2023-11-17 DIAGNOSIS — Z23 NEED FOR INFLUENZA VACCINATION: ICD-10-CM

## 2023-11-17 DIAGNOSIS — F41.9 ANXIETY: ICD-10-CM

## 2023-11-17 DIAGNOSIS — M51.36 DDD (DEGENERATIVE DISC DISEASE), LUMBAR: Primary | ICD-10-CM

## 2023-11-17 DIAGNOSIS — I10 PRIMARY HYPERTENSION: ICD-10-CM

## 2023-11-17 RX ORDER — HYDROCODONE BITARTRATE AND ACETAMINOPHEN 7.5; 325 MG/1; MG/1
1 TABLET ORAL 3 TIMES DAILY PRN
Qty: 90 TABLET | Refills: 0 | Status: SHIPPED | OUTPATIENT
Start: 2023-11-17

## 2023-11-17 RX ORDER — ALPRAZOLAM 0.25 MG/1
0.25 TABLET ORAL 2 TIMES DAILY PRN
Qty: 60 TABLET | Refills: 2 | Status: SHIPPED | OUTPATIENT
Start: 2023-11-17

## 2023-11-17 NOTE — PROGRESS NOTES
subjective     Chief Complaint   Patient presents with    Hyperlipidemia     Follow up     Med Refill     Pended        Problems Addressed This Visit  1. DDD (degenerative disc disease), lumbar    2. Anxiety    3. Need for influenza vaccination    4. Primary hypertension        History of Present Illness    Patient is 78 years old gentleman who has history of prostate cancer, chronic back pain and anxiety.  He has been taking Norco 7.5 3 times daily as needed for pain and also Xanax 0.25 twice daily as needed for anxiety.  Patient is compliant with medications and needs refill today.  Other problems include hypertension, hyperlipidemia and diabetes mellitus.  Overall he has been doing well.    Recently had EGD by Dr. Marie report is not available as yet.  He was told that he has gastritis.    Past Surgical History:   Procedure Laterality Date    COLONOSCOPY  03/2018    EYE SURGERY      FOOT SURGERY      HERNIA REPAIR      HYDROCELECTOMY  06/15/2015    PROSTATE BIOPSY  2018    PROSTATE FIDUCIAL MARKER PLACEMENT  09/14/2018    RHINOPLASTY      UPPER GASTROINTESTINAL ENDOSCOPY  03/24/2014    WITH BIOPSIES AND DILATION     Family History   Problem Relation Age of Onset    Kidney disease Other     Other Other         CHRONIC DISABLING DISEASES URINARY TRACT DISEASE    Diabetes Other     Hypertension Other     Other Mother     Heart failure Father     Stroke Sister     Arthritis Sister     Heart disease Brother     No Known Problems Brother     No Known Problems Brother      Past Medical History:   Diagnosis Date    Anxiety     Arthritis     Colitis     Depression     GERD (gastroesophageal reflux disease)     Gout     Heart murmur     History of EKG 12/22/2015    NORMAL    Hyperlipidemia     Hypertension     Obesity     Pancreatitis     history of    Prostate cancer     Renal failure     MILD one functioning kidney    Skin cancer     basal cell cancer on back     Patient Active Problem List   Diagnosis    Essential  hypertension, labile    GERD (gastroesophageal reflux disease)    Renal failure    Hyperlipidemia    Anxiety    Depression    Type 2 diabetes mellitus    Periumbilical abdominal pain    DDD (degenerative disc disease), lumbar    Prostate cancer    Hyperuricemia    BURKS (dyspnea on exertion)    Bradycardia    Lower urinary tract symptoms due to benign prostatic hyperplasia    Raised prostate specific antigen    Chest pain in adult    Generalized anxiety disorder    Primary insomnia    Multiple rib fractures    Esophageal dysphagia    Cellulitis of left foot       Social History     Tobacco Use    Smoking status: Former     Packs/day: 1     Types: Cigarettes     Quit date:      Years since quittin.8    Smokeless tobacco: Never   Vaping Use    Vaping Use: Never used   Substance Use Topics    Alcohol use: Yes     Alcohol/week: 7.0 standard drinks of alcohol     Types: 7 Shots of liquor per week     Comment:  once per day    Drug use: No       No Known Allergies    Current Outpatient Medications on File Prior to Visit   Medication Sig    allopurinol (ZYLOPRIM) 100 MG tablet Take 1 tablet by mouth Daily.    amLODIPine (NORVASC) 10 MG tablet TAKE 1 TABLET BY MOUTH  DAILY    aspirin 81 MG tablet Take 1 tablet by mouth Daily.    bicalutamide (CASODEX) 50 MG chemo tablet Casodex 50 mg tablet   Take 1 tablet every day by oral route for 30 days.    Blood Glucose Monitoring Suppl w/Device kit Check blood sugar once a day.    DX: E11.9    cholecalciferol (VITAMIN D3) 1000 UNITS tablet Take 1 tablet by mouth Daily.    cloNIDine (CATAPRES) 0.1 MG tablet Take 0.5 tablets by mouth As Needed for High Blood Pressure. TID TO QID PRN    colchicine 0.6 MG tablet Take 1 tablet by mouth Daily. PRN    Cyanocobalamin (VITAMIN B12 PO) Take  by mouth daily.    dicyclomine (BENTYL) 20 MG tablet Take 1 tablet by mouth 2 (Two) Times a Day.    esomeprazole (nexIUM) 40 MG capsule Take 1 capsule by mouth Every Morning Before Breakfast.  (Patient taking differently: Take 1 capsule by mouth 2 (Two) Times a Day.)    FLUoxetine (PROzac) 20 MG capsule TAKE 1 CAPSULE BY MOUTH  DAILY    folic acid (FOLVITE) 1 MG tablet TAKE 1 TABLET BY MOUTH  DAILY    glucose blood (Contour Next Test) test strip Check blood glucose daily    hydrALAZINE (APRESOLINE) 25 MG tablet Take 2 tablets by mouth 3 (Three) Times a Day.    hydrALAZINE (APRESOLINE) 50 MG tablet Take 1 tablet by mouth 3 (Three) Times a Day.    hydrOXYzine (ATARAX) 25 MG tablet Take 1 tablet by mouth At Night As Needed (PRN).    indomethacin (INDOCIN) 25 MG capsule Take 1 capsule by mouth 3 (Three) Times a Day As Needed for Mild Pain .    leuprolide (LUPRON) 30 MG injection Inject 30 mg into the appropriate muscle as directed by prescriber Every 4 (Four) Months.    loratadine (Claritin) 10 MG tablet Take 1 tablet by mouth Daily.    meclizine (ANTIVERT) 25 MG tablet Take 1 tablet by mouth 3 (Three) Times a Day As Needed for Dizziness.    metFORMIN (GLUCOPHAGE) 500 MG tablet TAKE 1 TABLET BY MOUTH  DAILY WITH BREAKFAST    metoprolol tartrate (LOPRESSOR) 25 MG tablet Take 0.5 tablets by mouth 2 (Two) Times a Day.    Microlet Lancets misc 1 stick Daily.    Mirabegron ER (MYRBETRIQ) 50 MG tablet sustained-release 24 hour 24 hr tablet Myrbetriq 50 mg tablet,extended release   Take 1 tablet every day by oral route.    Pyridoxine HCl (VITAMIN B-6 PO) Take  by mouth daily.    rosuvastatin (CRESTOR) 20 MG tablet Take 1 tablet by mouth Every Night.    SITagliptin (Januvia) 100 MG tablet Take 1 tablet by mouth Daily.    [DISCONTINUED] ALPRAZolam (XANAX) 0.25 MG tablet Take 1 tablet by mouth 2 (Two) Times a Day As Needed for Anxiety or Sleep. for anxiety    [DISCONTINUED] HYDROcodone-acetaminophen (NORCO) 7.5-325 MG per tablet Take 1 tablet by mouth 3 (Three) Times a Day As Needed for Moderate Pain.     No current facility-administered medications on file prior to visit.         The following portions of the  "patient's history were reviewed and updated as appropriate: allergies, current medications, past family history, past medical history, past social history, past surgical history and problem list.    Review of Systems   Constitutional: Negative.   HENT: Negative.  Negative for congestion.    Eyes: Negative.    Cardiovascular: Negative.  Negative for chest pain, cyanosis, dyspnea on exertion, irregular heartbeat, leg swelling, near-syncope, orthopnea, palpitations, paroxysmal nocturnal dyspnea and syncope.   Respiratory: Negative.  Negative for shortness of breath.    Hematologic/Lymphatic: Negative.    Musculoskeletal: Negative.    Gastrointestinal: Negative.    Neurological: Negative.  Negative for headaches.          Objective:     /75 (BP Location: Left arm, Patient Position: Sitting, Cuff Size: Adult)   Pulse 53   Ht 180.3 cm (71\")   Wt 97.2 kg (214 lb 3.2 oz)   SpO2 97%   BMI 29.87 kg/m²   Pulmonary:      Effort: Pulmonary effort is normal.      Breath sounds: Normal breath sounds. No stridor. No wheezing. No rhonchi. No rales.   Cardiovascular:      PMI at left midclavicular line. Normal rate. Regular rhythm. Normal S1. Normal S2.       Murmurs: There is no murmur.      No gallop.  No click. No rub.   Pulses:     Intact distal pulses.   Edema:     Peripheral edema absent.           Lab Review  Lab Results   Component Value Date     09/07/2023    K 4.4 09/07/2023     09/07/2023    BUN 18 09/07/2023    CREATININE 1.27 09/07/2023    GLUCOSE 121 (H) 09/07/2023    CALCIUM 9.7 09/07/2023    ALT 21 09/07/2023    ALKPHOS 48 09/07/2023    LABIL2 1.8 03/07/2016     Lab Results   Component Value Date    CKTOTAL 119 09/07/2023     Lab Results   Component Value Date    WBC 10.20 09/07/2023    HGB 14.3 09/07/2023    HCT 41.4 09/07/2023     09/07/2023     No results found for: \"INR\"  No results found for: \"MG\"  Lab Results   Component Value Date    PSA <0.014 12/09/2019    TSH 2.807 09/28/2015 " "    No results found for: \"BNP\"  Lab Results   Component Value Date    CHLPL 227 (H) 03/07/2016    CHOL 173 09/07/2023    TRIG 223 (H) 09/07/2023    HDL 44 09/07/2023    VLDL 38 09/07/2023    LDL 91 09/07/2023     Lab Results   Component Value Date    LDL 91 09/07/2023    LDL 96 03/09/2023     No results found for: \"PROBNP\"            Procedures       I personally viewed and interpreted the patient's LAB data         Assessment:     1. DDD (degenerative disc disease), lumbar    2. Anxiety    3. Need for influenza vaccination    4. Primary hypertension          Plan:     Chronic back pain degenerative disc disease lumbar spine and history of prostate CA.  Patient is taking Norco and is compliant with medications.  Refill was given.    Anxiety disorder stable  Xanax 0.25 twice daily as needed refill was given.  He will also continue Prozac.    Flu shot was given.  Hypertension is very well controlled with current medications.    Dr. Crews recently increased his Nexium.    Follow-up scheduled  Copy of EGD request        No follow-ups on file.  "

## 2023-12-22 ENCOUNTER — OFFICE VISIT (OUTPATIENT)
Dept: CARDIOLOGY | Facility: CLINIC | Age: 78
End: 2023-12-22
Payer: MEDICARE

## 2023-12-22 VITALS
HEART RATE: 54 BPM | SYSTOLIC BLOOD PRESSURE: 116 MMHG | WEIGHT: 212 LBS | HEIGHT: 71 IN | DIASTOLIC BLOOD PRESSURE: 54 MMHG | BODY MASS INDEX: 29.68 KG/M2 | OXYGEN SATURATION: 98 %

## 2023-12-22 DIAGNOSIS — E78.2 MIXED HYPERLIPIDEMIA: ICD-10-CM

## 2023-12-22 DIAGNOSIS — F41.9 ANXIETY: ICD-10-CM

## 2023-12-22 DIAGNOSIS — R00.2 PALPITATIONS: Primary | ICD-10-CM

## 2023-12-22 DIAGNOSIS — M51.36 DDD (DEGENERATIVE DISC DISEASE), LUMBAR: ICD-10-CM

## 2023-12-22 PROCEDURE — 3078F DIAST BP <80 MM HG: CPT | Performed by: INTERNAL MEDICINE

## 2023-12-22 PROCEDURE — 3074F SYST BP LT 130 MM HG: CPT | Performed by: INTERNAL MEDICINE

## 2023-12-22 PROCEDURE — 99213 OFFICE O/P EST LOW 20 MIN: CPT | Performed by: INTERNAL MEDICINE

## 2023-12-22 RX ORDER — ALPRAZOLAM 0.25 MG/1
0.25 TABLET ORAL 2 TIMES DAILY PRN
Qty: 60 TABLET | Refills: 2 | Status: SHIPPED | OUTPATIENT
Start: 2023-12-22

## 2023-12-22 RX ORDER — HYDROCODONE BITARTRATE AND ACETAMINOPHEN 7.5; 325 MG/1; MG/1
1 TABLET ORAL 3 TIMES DAILY PRN
Qty: 90 TABLET | Refills: 0 | Status: SHIPPED | OUTPATIENT
Start: 2023-12-22

## 2023-12-22 NOTE — PROGRESS NOTES
subjective     Chief Complaint   Patient presents with    DDD     Follow up    Med Refill     pending       Problems Addressed This Visit  1. Palpitations    2. Anxiety    3. DDD (degenerative disc disease), lumbar    4. Mixed hyperlipidemia        History of Present Illness    Patient is 78 years old white male who states that 2 weeks ago he had run of fast heartbeat which woke him up from sleep.  It did not last long and patient went back to sleep.  At that time he was having a lot of cough and URI symptoms.  He denies any chest discomfort, palpitations or shortness of breath since then.    Hypertension  Blood pressure is very well-controlled on current medications.    Hyperlipidemia  Patient is trying to diet and is taking Crestor.    Anxiety  Is doing very well with Xanax and Prozac.  He is compliant with medications and needs refill.    Prostate cancer and chronic back pain.  He is taking Norco and needs refill.    Past Surgical History:   Procedure Laterality Date    COLONOSCOPY  03/2018    EYE SURGERY      FOOT SURGERY      HERNIA REPAIR      HYDROCELECTOMY  06/15/2015    PROSTATE BIOPSY  2018    PROSTATE FIDUCIAL MARKER PLACEMENT  09/14/2018    RHINOPLASTY      UPPER GASTROINTESTINAL ENDOSCOPY  03/24/2014    WITH BIOPSIES AND DILATION     Family History   Problem Relation Age of Onset    Kidney disease Other     Other Other         CHRONIC DISABLING DISEASES URINARY TRACT DISEASE    Diabetes Other     Hypertension Other     Other Mother     Heart failure Father     Stroke Sister     Arthritis Sister     Heart disease Brother     No Known Problems Brother     No Known Problems Brother      Past Medical History:   Diagnosis Date    Anxiety     Arthritis     Colitis     Depression     GERD (gastroesophageal reflux disease)     Gout     Heart murmur     History of EKG 12/22/2015    NORMAL    Hyperlipidemia     Hypertension     Obesity     Pancreatitis     history of    Prostate cancer     Renal failure     MILD  one functioning kidney    Skin cancer     basal cell cancer on back     Patient Active Problem List   Diagnosis    Essential hypertension, labile    GERD (gastroesophageal reflux disease)    Renal failure    Hyperlipidemia    Anxiety    Depression    Type 2 diabetes mellitus    Periumbilical abdominal pain    DDD (degenerative disc disease), lumbar    Prostate cancer    Hyperuricemia    BURKS (dyspnea on exertion)    Bradycardia    Lower urinary tract symptoms due to benign prostatic hyperplasia    Raised prostate specific antigen    Chest pain in adult    Generalized anxiety disorder    Primary insomnia    Multiple rib fractures    Esophageal dysphagia    Cellulitis of left foot    Palpitations       Social History     Tobacco Use    Smoking status: Former     Packs/day: 1     Types: Cigarettes     Quit date:      Years since quittin.9    Smokeless tobacco: Never   Vaping Use    Vaping Use: Never used   Substance Use Topics    Alcohol use: Yes     Alcohol/week: 7.0 standard drinks of alcohol     Types: 7 Shots of liquor per week     Comment:  once per day    Drug use: No       No Known Allergies    Current Outpatient Medications on File Prior to Visit   Medication Sig    allopurinol (ZYLOPRIM) 100 MG tablet Take 1 tablet by mouth Daily.    amLODIPine (NORVASC) 10 MG tablet TAKE 1 TABLET BY MOUTH  DAILY    aspirin 81 MG tablet Take 1 tablet by mouth Daily.    bicalutamide (CASODEX) 50 MG chemo tablet Casodex 50 mg tablet   Take 1 tablet every day by oral route for 30 days.    Blood Glucose Monitoring Suppl w/Device kit Check blood sugar once a day.    DX: E11.9    cholecalciferol (VITAMIN D3) 1000 UNITS tablet Take 1 tablet by mouth Daily.    cloNIDine (CATAPRES) 0.1 MG tablet Take 0.5 tablets by mouth As Needed for High Blood Pressure. TID TO QID PRN    colchicine 0.6 MG tablet Take 1 tablet by mouth Daily. PRN    Cyanocobalamin (VITAMIN B12 PO) Take  by mouth daily.    dicyclomine (BENTYL) 20 MG tablet Take  1 tablet by mouth 2 (Two) Times a Day.    esomeprazole (nexIUM) 40 MG capsule Take 1 capsule by mouth Every Morning Before Breakfast. (Patient taking differently: Take 1 capsule by mouth 2 (Two) Times a Day.)    FLUoxetine (PROzac) 20 MG capsule TAKE 1 CAPSULE BY MOUTH  DAILY    folic acid (FOLVITE) 1 MG tablet TAKE 1 TABLET BY MOUTH  DAILY    glucose blood (Contour Next Test) test strip Check blood glucose daily    hydrALAZINE (APRESOLINE) 50 MG tablet Take 1 tablet by mouth 3 (Three) Times a Day.    indomethacin (INDOCIN) 25 MG capsule Take 1 capsule by mouth 3 (Three) Times a Day As Needed for Mild Pain .    leuprolide (LUPRON) 30 MG injection Inject 30 mg into the appropriate muscle as directed by prescriber Every 4 (Four) Months.    loratadine (Claritin) 10 MG tablet Take 1 tablet by mouth Daily.    meclizine (ANTIVERT) 25 MG tablet Take 1 tablet by mouth 3 (Three) Times a Day As Needed for Dizziness.    metFORMIN (GLUCOPHAGE) 500 MG tablet TAKE 1 TABLET BY MOUTH  DAILY WITH BREAKFAST    metoprolol tartrate (LOPRESSOR) 25 MG tablet Take 0.5 tablets by mouth 2 (Two) Times a Day.    Microlet Lancets misc 1 stick Daily.    Mirabegron ER (MYRBETRIQ) 50 MG tablet sustained-release 24 hour 24 hr tablet 50 mg Daily As Needed.    Pyridoxine HCl (VITAMIN B-6 PO) Take  by mouth daily.    rosuvastatin (CRESTOR) 20 MG tablet Take 1 tablet by mouth Every Night.    SITagliptin (Januvia) 100 MG tablet Take 1 tablet by mouth Daily.    [DISCONTINUED] ALPRAZolam (XANAX) 0.25 MG tablet Take 1 tablet by mouth 2 (Two) Times a Day As Needed for Anxiety or Sleep. for anxiety    [DISCONTINUED] HYDROcodone-acetaminophen (NORCO) 7.5-325 MG per tablet Take 1 tablet by mouth 3 (Three) Times a Day As Needed for Moderate Pain.    [DISCONTINUED] hydrALAZINE (APRESOLINE) 25 MG tablet Take 2 tablets by mouth 3 (Three) Times a Day. (Patient not taking: Reported on 12/22/2023)    [DISCONTINUED] hydrOXYzine (ATARAX) 25 MG tablet Take 1 tablet by  "mouth At Night As Needed (PRN). (Patient not taking: Reported on 12/22/2023)     No current facility-administered medications on file prior to visit.         The following portions of the patient's history were reviewed and updated as appropriate: allergies, current medications, past family history, past medical history, past social history, past surgical history and problem list.    Review of Systems   Constitutional: Negative.   HENT: Negative.  Negative for congestion.    Eyes: Negative.    Cardiovascular:  Positive for palpitations. Negative for chest pain, cyanosis, dyspnea on exertion, irregular heartbeat, leg swelling, near-syncope, orthopnea, paroxysmal nocturnal dyspnea and syncope.   Respiratory: Negative.  Negative for shortness of breath.    Hematologic/Lymphatic: Negative.    Musculoskeletal:  Positive for back pain.   Gastrointestinal: Negative.    Neurological: Negative.  Negative for headaches.   Psychiatric/Behavioral:  The patient is nervous/anxious.           Objective:     /54 (BP Location: Left arm, Patient Position: Sitting, Cuff Size: Adult)   Pulse 54   Ht 180.3 cm (71\")   Wt 96.2 kg (212 lb)   SpO2 98%   BMI 29.57 kg/m²   Pulmonary:      Effort: Pulmonary effort is normal.      Breath sounds: Normal breath sounds. No stridor. No wheezing. No rhonchi. No rales.   Cardiovascular:      PMI at left midclavicular line. Normal rate. Regular rhythm. Normal S1. Normal S2.       Murmurs: There is no murmur.      No gallop.  No click. No rub.   Pulses:     Intact distal pulses.   Edema:     Peripheral edema absent.           Lab Review  Lab Results   Component Value Date     09/07/2023    K 4.4 09/07/2023     09/07/2023    BUN 18 09/07/2023    CREATININE 1.27 09/07/2023    GLUCOSE 121 (H) 09/07/2023    CALCIUM 9.7 09/07/2023    ALT 21 09/07/2023    ALKPHOS 48 09/07/2023    LABIL2 1.8 03/07/2016     Lab Results   Component Value Date    CKTOTAL 119 09/07/2023     Lab Results " "  Component Value Date    WBC 10.20 09/07/2023    HGB 14.3 09/07/2023    HCT 41.4 09/07/2023     09/07/2023     No results found for: \"INR\"  No results found for: \"MG\"  Lab Results   Component Value Date    PSA <0.014 12/09/2019    TSH 2.807 09/28/2015     No results found for: \"BNP\"  Lab Results   Component Value Date    CHLPL 227 (H) 03/07/2016    CHOL 173 09/07/2023    TRIG 223 (H) 09/07/2023    HDL 44 09/07/2023    VLDL 38 09/07/2023    LDL 91 09/07/2023     Lab Results   Component Value Date    LDL 91 09/07/2023    LDL 96 03/09/2023     No results found for: \"PROBNP\"            Procedures       I personally viewed and interpreted the patient's LAB data         Assessment:     1. Palpitations    2. Anxiety    3. DDD (degenerative disc disease), lumbar    4. Mixed hyperlipidemia          Plan:     Palpitations  We will arrange Holter monitor for further evaluation.  He denies any syncope or presyncope.  No dizziness chest pain or shortness of breath.    Anxiety disorder  Refill of Xanax was given he will continue Prozac also.    Chronic back pain  Refill of Norco was given side effects explained.    Hyperlipidemia Crestor was continued healthy lifestyle weight loss emphasized.  Last LDL was 91 which is mildly elevated.    Hypertension  Blood pressure is very well-controlled he will continue current medication.    Follow-up scheduled        No follow-ups on file.  "

## 2024-01-19 ENCOUNTER — TELEPHONE (OUTPATIENT)
Dept: CARDIOLOGY | Facility: CLINIC | Age: 79
End: 2024-01-19

## 2024-01-19 NOTE — TELEPHONE ENCOUNTER
----- Message from Antione De Santiago MD sent at 1/19/2024 11:54 AM EST -----  Holter monitor shows short runs of atrial tachycardia.  No atrial fibrillation detected.    No change in therapy.

## 2024-01-19 NOTE — TELEPHONE ENCOUNTER
Called and given message per Dr De Santiago 1/19/2024 11:54 AM EST -----          Holter monitor shows short runs of atrial tachycardia.  No atrial fibrillation detected.    No change in therapy.

## 2024-01-26 ENCOUNTER — LAB (OUTPATIENT)
Dept: LAB | Facility: HOSPITAL | Age: 79
End: 2024-01-26
Payer: MEDICARE

## 2024-01-26 DIAGNOSIS — E78.2 MIXED HYPERLIPIDEMIA: ICD-10-CM

## 2024-01-26 PROCEDURE — 85025 COMPLETE CBC W/AUTO DIFF WBC: CPT

## 2024-01-26 PROCEDURE — 80061 LIPID PANEL: CPT

## 2024-01-26 PROCEDURE — 82550 ASSAY OF CK (CPK): CPT

## 2024-01-26 PROCEDURE — 36415 COLL VENOUS BLD VENIPUNCTURE: CPT

## 2024-01-26 PROCEDURE — 80053 COMPREHEN METABOLIC PANEL: CPT

## 2024-01-27 LAB
ALBUMIN SERPL-MCNC: 4.5 G/DL (ref 3.5–5.2)
ALBUMIN/GLOB SERPL: 2 G/DL
ALP SERPL-CCNC: 50 U/L (ref 39–117)
ALT SERPL W P-5'-P-CCNC: 18 U/L (ref 1–41)
ANION GAP SERPL CALCULATED.3IONS-SCNC: 12.7 MMOL/L (ref 5–15)
AST SERPL-CCNC: 19 U/L (ref 1–40)
BASOPHILS # BLD AUTO: 0.09 10*3/MM3 (ref 0–0.2)
BASOPHILS NFR BLD AUTO: 0.7 % (ref 0–1.5)
BILIRUB SERPL-MCNC: 0.4 MG/DL (ref 0–1.2)
BUN SERPL-MCNC: 18 MG/DL (ref 8–23)
BUN/CREAT SERPL: 14.1 (ref 7–25)
CALCIUM SPEC-SCNC: 9.1 MG/DL (ref 8.6–10.5)
CHLORIDE SERPL-SCNC: 108 MMOL/L (ref 98–107)
CHOLEST SERPL-MCNC: 189 MG/DL (ref 0–200)
CK SERPL-CCNC: 101 U/L (ref 20–200)
CO2 SERPL-SCNC: 22.3 MMOL/L (ref 22–29)
CREAT SERPL-MCNC: 1.28 MG/DL (ref 0.76–1.27)
DEPRECATED RDW RBC AUTO: 43.7 FL (ref 37–54)
EGFRCR SERPLBLD CKD-EPI 2021: 57.3 ML/MIN/1.73
EOSINOPHIL # BLD AUTO: 2.74 10*3/MM3 (ref 0–0.4)
EOSINOPHIL NFR BLD AUTO: 21.3 % (ref 0.3–6.2)
ERYTHROCYTE [DISTWIDTH] IN BLOOD BY AUTOMATED COUNT: 12.8 % (ref 12.3–15.4)
GLOBULIN UR ELPH-MCNC: 2.3 GM/DL
GLUCOSE SERPL-MCNC: 104 MG/DL (ref 65–99)
HCT VFR BLD AUTO: 39.4 % (ref 37.5–51)
HDLC SERPL-MCNC: 41 MG/DL (ref 40–60)
HGB BLD-MCNC: 13.6 G/DL (ref 13–17.7)
IMM GRANULOCYTES # BLD AUTO: 0.04 10*3/MM3 (ref 0–0.05)
IMM GRANULOCYTES NFR BLD AUTO: 0.3 % (ref 0–0.5)
LDLC SERPL CALC-MCNC: 110 MG/DL (ref 0–100)
LDLC/HDLC SERPL: 2.55 {RATIO}
LYMPHOCYTES # BLD AUTO: 2.93 10*3/MM3 (ref 0.7–3.1)
LYMPHOCYTES NFR BLD AUTO: 22.8 % (ref 19.6–45.3)
MCH RBC QN AUTO: 32.5 PG (ref 26.6–33)
MCHC RBC AUTO-ENTMCNC: 34.5 G/DL (ref 31.5–35.7)
MCV RBC AUTO: 94.3 FL (ref 79–97)
MONOCYTES # BLD AUTO: 0.78 10*3/MM3 (ref 0.1–0.9)
MONOCYTES NFR BLD AUTO: 6.1 % (ref 5–12)
NEUTROPHILS NFR BLD AUTO: 48.8 % (ref 42.7–76)
NEUTROPHILS NFR BLD AUTO: 6.26 10*3/MM3 (ref 1.7–7)
NRBC BLD AUTO-RTO: 0 /100 WBC (ref 0–0.2)
PLATELET # BLD AUTO: 172 10*3/MM3 (ref 140–450)
PMV BLD AUTO: 12.4 FL (ref 6–12)
POTASSIUM SERPL-SCNC: 4.6 MMOL/L (ref 3.5–5.2)
PROT SERPL-MCNC: 6.8 G/DL (ref 6–8.5)
RBC # BLD AUTO: 4.18 10*6/MM3 (ref 4.14–5.8)
SODIUM SERPL-SCNC: 143 MMOL/L (ref 136–145)
TRIGL SERPL-MCNC: 218 MG/DL (ref 0–150)
VLDLC SERPL-MCNC: 38 MG/DL (ref 5–40)
WBC NRBC COR # BLD AUTO: 12.84 10*3/MM3 (ref 3.4–10.8)

## 2024-01-30 ENCOUNTER — TELEPHONE (OUTPATIENT)
Dept: CARDIOLOGY | Facility: CLINIC | Age: 79
End: 2024-01-30
Payer: MEDICARE

## 2024-01-31 RX ORDER — SULFAMETHOXAZOLE AND TRIMETHOPRIM 800; 160 MG/1; MG/1
1 TABLET ORAL 2 TIMES DAILY
Qty: 14 TABLET | Refills: 0 | Status: SHIPPED | OUTPATIENT
Start: 2024-01-31

## 2024-02-02 ENCOUNTER — OFFICE VISIT (OUTPATIENT)
Dept: CARDIOLOGY | Facility: CLINIC | Age: 79
End: 2024-02-02
Payer: MEDICARE

## 2024-02-02 VITALS
HEART RATE: 66 BPM | WEIGHT: 213 LBS | DIASTOLIC BLOOD PRESSURE: 66 MMHG | BODY MASS INDEX: 29.82 KG/M2 | SYSTOLIC BLOOD PRESSURE: 120 MMHG | OXYGEN SATURATION: 97 % | HEIGHT: 71 IN

## 2024-02-02 DIAGNOSIS — R30.0 DYSURIA: Primary | ICD-10-CM

## 2024-02-02 DIAGNOSIS — F41.9 ANXIETY: ICD-10-CM

## 2024-02-02 DIAGNOSIS — E78.2 MIXED HYPERLIPIDEMIA: ICD-10-CM

## 2024-02-02 DIAGNOSIS — M51.36 DDD (DEGENERATIVE DISC DISEASE), LUMBAR: ICD-10-CM

## 2024-02-02 DIAGNOSIS — I10 PRIMARY HYPERTENSION: ICD-10-CM

## 2024-02-02 PROCEDURE — 99213 OFFICE O/P EST LOW 20 MIN: CPT | Performed by: INTERNAL MEDICINE

## 2024-02-02 PROCEDURE — 3074F SYST BP LT 130 MM HG: CPT | Performed by: INTERNAL MEDICINE

## 2024-02-02 PROCEDURE — 3078F DIAST BP <80 MM HG: CPT | Performed by: INTERNAL MEDICINE

## 2024-02-02 RX ORDER — ALPRAZOLAM 0.25 MG/1
0.25 TABLET ORAL 2 TIMES DAILY PRN
Qty: 60 TABLET | Refills: 2 | Status: SHIPPED | OUTPATIENT
Start: 2024-02-02

## 2024-02-02 RX ORDER — HYDROCODONE BITARTRATE AND ACETAMINOPHEN 7.5; 325 MG/1; MG/1
1 TABLET ORAL 3 TIMES DAILY PRN
Qty: 90 TABLET | Refills: 0 | Status: SHIPPED | OUTPATIENT
Start: 2024-02-02

## 2024-02-02 NOTE — PROGRESS NOTES
subjective     Chief Complaint   Patient presents with    Results     Labs     Fatigue     X 1 week    Palpitations     Increased since decreasing toprol    Anxiety     Follow up    Back Pain     Follow up         Problems Addressed This Visit  1. Dysuria    2. Anxiety    3. DDD (degenerative disc disease), lumbar    4. Mixed hyperlipidemia    5. Primary hypertension        History of Present Illness  Patient is 78 years old white male who has history of prostate cancer, urinary incontinence.  He has been having recurrent urinary tract infection and is taking antibiotics.  Urinalysis and urine culture was recommended patient wants to wait till antibiotics are finished.    He has chronic back pain and is taking Norco is compliant with medications and needs refill.  Anxiety is also controlled with Xanax refills will be given.  Other medical problems consist of hypertension hyperlipidemia and obesity.  He is doing very well otherwise but complains of being tired and fatigued, lack of energy and cold feet.        Past Surgical History:   Procedure Laterality Date    COLONOSCOPY  03/2018    EYE SURGERY      FOOT SURGERY      HERNIA REPAIR      HYDROCELECTOMY  06/15/2015    PROSTATE BIOPSY  2018    PROSTATE FIDUCIAL MARKER PLACEMENT  09/14/2018    RHINOPLASTY      UPPER GASTROINTESTINAL ENDOSCOPY  03/24/2014    WITH BIOPSIES AND DILATION     Family History   Problem Relation Age of Onset    Kidney disease Other     Other Other         CHRONIC DISABLING DISEASES URINARY TRACT DISEASE    Diabetes Other     Hypertension Other     Other Mother     Heart failure Father     Stroke Sister     Arthritis Sister     Heart disease Brother     No Known Problems Brother     No Known Problems Brother      Past Medical History:   Diagnosis Date    Anxiety     Arthritis     Colitis     Depression     GERD (gastroesophageal reflux disease)     Gout     Heart murmur     History of EKG 12/22/2015    NORMAL    Hyperlipidemia     Hypertension      Obesity     Pancreatitis     history of    Prostate cancer     Renal failure     MILD one functioning kidney    Skin cancer     basal cell cancer on back     Patient Active Problem List   Diagnosis    Essential hypertension, labile    GERD (gastroesophageal reflux disease)    Renal failure    Hyperlipidemia    Anxiety    Depression    Type 2 diabetes mellitus    Periumbilical abdominal pain    DDD (degenerative disc disease), lumbar    Prostate cancer    Hyperuricemia    BURKS (dyspnea on exertion)    Bradycardia    Lower urinary tract symptoms due to benign prostatic hyperplasia    Raised prostate specific antigen    Chest pain in adult    Generalized anxiety disorder    Primary insomnia    Multiple rib fractures    Esophageal dysphagia    Cellulitis of left foot    Palpitations    Dysuria       Social History     Tobacco Use    Smoking status: Former     Packs/day: 1     Types: Cigarettes     Quit date:      Years since quittin.1    Smokeless tobacco: Never   Vaping Use    Vaping Use: Never used   Substance Use Topics    Alcohol use: Yes     Alcohol/week: 7.0 standard drinks of alcohol     Types: 7 Shots of liquor per week     Comment:  once per day    Drug use: No       No Known Allergies    Current Outpatient Medications on File Prior to Visit   Medication Sig    allopurinol (ZYLOPRIM) 100 MG tablet Take 1 tablet by mouth Daily.    amLODIPine (NORVASC) 10 MG tablet TAKE 1 TABLET BY MOUTH  DAILY    aspirin 81 MG tablet Take 1 tablet by mouth Daily.    bicalutamide (CASODEX) 50 MG chemo tablet Casodex 50 mg tablet   Take 1 tablet every day by oral route for 30 days.    Blood Glucose Monitoring Suppl w/Device kit Check blood sugar once a day.    DX: E11.9    cholecalciferol (VITAMIN D3) 1000 UNITS tablet Take 1 tablet by mouth Daily.    cloNIDine (CATAPRES) 0.1 MG tablet Take 0.5 tablets by mouth As Needed for High Blood Pressure. TID TO QID PRN    colchicine 0.6 MG tablet Take 1 tablet by mouth Daily.  PRN    Cyanocobalamin (VITAMIN B12 PO) Take  by mouth daily.    dicyclomine (BENTYL) 20 MG tablet Take 1 tablet by mouth 2 (Two) Times a Day.    esomeprazole (nexIUM) 40 MG capsule Take 1 capsule by mouth Every Morning Before Breakfast. (Patient taking differently: Take 1 capsule by mouth 2 (Two) Times a Day.)    FLUoxetine (PROzac) 20 MG capsule TAKE 1 CAPSULE BY MOUTH  DAILY    folic acid (FOLVITE) 1 MG tablet TAKE 1 TABLET BY MOUTH  DAILY    glucose blood (Contour Next Test) test strip Check blood glucose daily    hydrALAZINE (APRESOLINE) 50 MG tablet Take 1 tablet by mouth 3 (Three) Times a Day.    indomethacin (INDOCIN) 25 MG capsule Take 1 capsule by mouth 3 (Three) Times a Day As Needed for Mild Pain .    leuprolide (LUPRON) 30 MG injection Inject 30 mg into the appropriate muscle as directed by prescriber Every 4 (Four) Months.    loratadine (Claritin) 10 MG tablet Take 1 tablet by mouth Daily.    meclizine (ANTIVERT) 25 MG tablet Take 1 tablet by mouth 3 (Three) Times a Day As Needed for Dizziness.    metFORMIN (GLUCOPHAGE) 500 MG tablet TAKE 1 TABLET BY MOUTH  DAILY WITH BREAKFAST    metoprolol tartrate (LOPRESSOR) 25 MG tablet Take 0.5 tablets by mouth 2 (Two) Times a Day.    Microlet Lancets misc 1 stick Daily.    Mirabegron ER (MYRBETRIQ) 50 MG tablet sustained-release 24 hour 24 hr tablet 50 mg Daily As Needed.    Pyridoxine HCl (VITAMIN B-6 PO) Take  by mouth daily.    rosuvastatin (CRESTOR) 20 MG tablet Take 1 tablet by mouth Every Night.    SITagliptin (Januvia) 100 MG tablet Take 1 tablet by mouth Daily.    sulfamethoxazole-trimethoprim (Bactrim DS) 800-160 MG per tablet Take 1 tablet by mouth 2 (Two) Times a Day.    [DISCONTINUED] ALPRAZolam (XANAX) 0.25 MG tablet Take 1 tablet by mouth 2 (Two) Times a Day As Needed for Anxiety or Sleep. for anxiety    [DISCONTINUED] HYDROcodone-acetaminophen (NORCO) 7.5-325 MG per tablet Take 1 tablet by mouth 3 (Three) Times a Day As Needed for Moderate Pain.  "    No current facility-administered medications on file prior to visit.         The following portions of the patient's history were reviewed and updated as appropriate: allergies, current medications, past family history, past medical history, past social history, past surgical history and problem list.    Review of Systems   Constitutional: Negative.   HENT: Negative.  Negative for congestion.    Eyes: Negative.    Cardiovascular: Negative.  Negative for chest pain, cyanosis, dyspnea on exertion, irregular heartbeat, leg swelling, near-syncope, orthopnea, palpitations, paroxysmal nocturnal dyspnea and syncope.   Respiratory: Negative.  Negative for shortness of breath.    Hematologic/Lymphatic: Negative.    Musculoskeletal: Negative.    Gastrointestinal: Negative.    Genitourinary:  Positive for dysuria.   Neurological: Negative.  Negative for headaches.          Objective:     /66 (BP Location: Left arm, Patient Position: Sitting, Cuff Size: Adult)   Pulse 66   Ht 180.3 cm (71\")   Wt 96.6 kg (213 lb)   SpO2 97%   BMI 29.71 kg/m²   Cardiovascular:      PMI at left midclavicular line. Normal rate. Regular rhythm. Normal S1. Normal S2.       Murmurs: There is no murmur.      No gallop.  No click. No rub.   Pulses:     Intact distal pulses.   Edema:     Peripheral edema absent.           Lab Review  Lab Results   Component Value Date     01/26/2024    K 4.6 01/26/2024     (H) 01/26/2024    BUN 18 01/26/2024    CREATININE 1.28 (H) 01/26/2024    GLUCOSE 104 (H) 01/26/2024    CALCIUM 9.1 01/26/2024    ALT 18 01/26/2024    ALKPHOS 50 01/26/2024    LABIL2 1.8 03/07/2016     Lab Results   Component Value Date    CKTOTAL 101 01/26/2024     Lab Results   Component Value Date    WBC 12.84 (H) 01/26/2024    HGB 13.6 01/26/2024    HCT 39.4 01/26/2024     01/26/2024     No results found for: \"INR\"  No results found for: \"MG\"  Lab Results   Component Value Date    PSA <0.014 12/09/2019    TSH 2.807 " "09/28/2015     No results found for: \"BNP\"  Lab Results   Component Value Date    CHLPL 227 (H) 03/07/2016    CHOL 189 01/26/2024    TRIG 218 (H) 01/26/2024    HDL 41 01/26/2024    VLDL 38 01/26/2024     (H) 01/26/2024     Lab Results   Component Value Date     (H) 01/26/2024    LDL 91 09/07/2023     No results found for: \"PROBNP\"            Procedures       I personally viewed and interpreted the patient's LAB data         Assessment:     1. Dysuria    2. Anxiety    3. DDD (degenerative disc disease), lumbar    4. Mixed hyperlipidemia    5. Primary hypertension          Plan:     Lab work reviewed  White count is elevated and patient complains of dysuria he was started on Bactrim.  Urinalysis and urine culture recommended patient wants to wait till antibiotics are finished.    Chronic back pain  Norco refill was given.  She is compliant with medication.    Anxiety disorder  Xanax refill was given.    Blood pressure is very well-controlled.    Hyperlipidemia LDL is 110 which is elevated.  Patient has no known coronary artery disease goal of LDL less than 100 was emphasized.  Patient is morbidly obese weight loss and diet restriction discussed.  He will continue Crestor 20 mg daily.  Follow-up scheduled      No follow-ups on file.  "

## 2024-03-01 ENCOUNTER — OFFICE VISIT (OUTPATIENT)
Dept: CARDIOLOGY | Facility: CLINIC | Age: 79
End: 2024-03-01
Payer: MEDICARE

## 2024-03-01 ENCOUNTER — PATIENT ROUNDING (BHMG ONLY) (OUTPATIENT)
Dept: CARDIOLOGY | Facility: CLINIC | Age: 79
End: 2024-03-01
Payer: MEDICARE

## 2024-03-01 VITALS
OXYGEN SATURATION: 95 % | HEIGHT: 71 IN | BODY MASS INDEX: 30.15 KG/M2 | SYSTOLIC BLOOD PRESSURE: 125 MMHG | HEART RATE: 50 BPM | DIASTOLIC BLOOD PRESSURE: 72 MMHG | WEIGHT: 215.4 LBS

## 2024-03-01 DIAGNOSIS — E78.2 MIXED HYPERLIPIDEMIA: ICD-10-CM

## 2024-03-01 DIAGNOSIS — I10 PRIMARY HYPERTENSION: ICD-10-CM

## 2024-03-01 DIAGNOSIS — F51.01 PRIMARY INSOMNIA: Primary | ICD-10-CM

## 2024-03-01 DIAGNOSIS — M51.36 DDD (DEGENERATIVE DISC DISEASE), LUMBAR: ICD-10-CM

## 2024-03-01 DIAGNOSIS — E11.21 TYPE 2 DIABETES MELLITUS WITH DIABETIC NEPHROPATHY, WITHOUT LONG-TERM CURRENT USE OF INSULIN: ICD-10-CM

## 2024-03-01 DIAGNOSIS — F41.9 ANXIETY: ICD-10-CM

## 2024-03-01 PROCEDURE — 99213 OFFICE O/P EST LOW 20 MIN: CPT | Performed by: INTERNAL MEDICINE

## 2024-03-01 PROCEDURE — 3078F DIAST BP <80 MM HG: CPT | Performed by: INTERNAL MEDICINE

## 2024-03-01 PROCEDURE — 3074F SYST BP LT 130 MM HG: CPT | Performed by: INTERNAL MEDICINE

## 2024-03-01 RX ORDER — LEVOFLOXACIN 500 MG/1
TABLET, FILM COATED ORAL
COMMUNITY
Start: 2024-02-16

## 2024-03-01 RX ORDER — BICALUTAMIDE 50 MG/1
TABLET, FILM COATED ORAL
Status: CANCELLED | OUTPATIENT
Start: 2024-03-01

## 2024-03-01 RX ORDER — MIRTAZAPINE 15 MG/1
15 TABLET, FILM COATED ORAL NIGHTLY
Qty: 90 TABLET | Refills: 0 | Status: SHIPPED | OUTPATIENT
Start: 2024-03-01

## 2024-03-01 RX ORDER — HYDROCODONE BITARTRATE AND ACETAMINOPHEN 7.5; 325 MG/1; MG/1
1 TABLET ORAL 3 TIMES DAILY PRN
Qty: 90 TABLET | Refills: 0 | Status: SHIPPED | OUTPATIENT
Start: 2024-03-01

## 2024-03-01 NOTE — PROGRESS NOTES
Jelena. My name is Opal. I am a CMA at [facility] [place]      I want to officially welcome you to our practice and ask about your recent visit.         What things do you feel went well with your visit?        Do you have recommendations on ways we may improve?        Overall were you satisfied with your first visit to our practice?        I appreciate you taking the time to answer a few questions today.         We're always looking for ways to make our patients' experiences even better. Please complete the [employer] Survey after your visits. We would love to receive feedback about your visits. You may also share any suggestions or concerns by replying to this message or calling our office.         Thank you for allowing us to participate in your healthcare. Please let me know if I can be of further assistance.                Kind regards,      Opal

## 2024-03-04 RX ORDER — LIDOCAINE AND PRILOCAINE 25; 25 MG/G; MG/G
CREAM TOPICAL ONCE
Qty: 1 KIT | Refills: 3 | Status: SHIPPED | OUTPATIENT
Start: 2024-03-04 | End: 2024-03-04

## 2024-04-02 ENCOUNTER — OFFICE VISIT (OUTPATIENT)
Dept: CARDIOLOGY | Facility: CLINIC | Age: 79
End: 2024-04-02
Payer: MEDICARE

## 2024-04-02 VITALS
SYSTOLIC BLOOD PRESSURE: 124 MMHG | OXYGEN SATURATION: 97 % | HEIGHT: 71 IN | DIASTOLIC BLOOD PRESSURE: 68 MMHG | WEIGHT: 214 LBS | BODY MASS INDEX: 29.96 KG/M2 | HEART RATE: 56 BPM

## 2024-04-02 DIAGNOSIS — M51.36 DDD (DEGENERATIVE DISC DISEASE), LUMBAR: ICD-10-CM

## 2024-04-02 DIAGNOSIS — F41.9 ANXIETY: ICD-10-CM

## 2024-04-02 DIAGNOSIS — I10 PRIMARY HYPERTENSION: Primary | ICD-10-CM

## 2024-04-02 DIAGNOSIS — E11.21 TYPE 2 DIABETES MELLITUS WITH DIABETIC NEPHROPATHY, WITHOUT LONG-TERM CURRENT USE OF INSULIN: ICD-10-CM

## 2024-04-02 DIAGNOSIS — E78.2 MIXED HYPERLIPIDEMIA: ICD-10-CM

## 2024-04-02 PROCEDURE — 3074F SYST BP LT 130 MM HG: CPT | Performed by: INTERNAL MEDICINE

## 2024-04-02 PROCEDURE — 99213 OFFICE O/P EST LOW 20 MIN: CPT | Performed by: INTERNAL MEDICINE

## 2024-04-02 PROCEDURE — 3078F DIAST BP <80 MM HG: CPT | Performed by: INTERNAL MEDICINE

## 2024-04-02 RX ORDER — ALPRAZOLAM 0.25 MG/1
0.25 TABLET ORAL 2 TIMES DAILY PRN
Qty: 60 TABLET | Refills: 2 | Status: SHIPPED | OUTPATIENT
Start: 2024-04-02

## 2024-04-02 RX ORDER — HYDROCODONE BITARTRATE AND ACETAMINOPHEN 7.5; 325 MG/1; MG/1
1 TABLET ORAL 3 TIMES DAILY PRN
Qty: 90 TABLET | Refills: 0 | Status: SHIPPED | OUTPATIENT
Start: 2024-04-02

## 2024-04-02 NOTE — PROGRESS NOTES
subjective     Chief Complaint   Patient presents with    Abdominal Pain     Sore from procedure      Anxiety     Follow up    Back Pain     Follow up    Med Refill     pending       Problems Addressed This Visit  1. Primary hypertension    2. Anxiety    3. DDD (degenerative disc disease), lumbar    4. Mixed hyperlipidemia    5. Type 2 diabetes mellitus with diabetic nephropathy, without long-term current use of insulin        History of Present Illness    Patient is 78 years old white male who is here for follow-up.  Patient has chronic back pain with history of prostate cancer with metastasis.  He is taking hydrocodone 7.5 mg 3 times a day on as needed basis.  He is compliant with medications and needs refill.    He also has anxiety disorder and is taking Xanax 0.25 mg twice daily as needed.    Blood pressure is very well-controlled on current medications  Denies any chest pain palpitations or shortness of breath.  Patient has not lost any weight he also has diabetes mellitus and hyperlipidemia  He is taking Crestor 20 mg daily however lipid control is suboptimal.    Urinary incontinence.  Patient went to neurologist for a special procedure to control incontinence.  He plans to have surgery done on May 1.      Past Surgical History:   Procedure Laterality Date    COLONOSCOPY  03/2018    EYE SURGERY      FOOT SURGERY      HERNIA REPAIR      HYDROCELECTOMY  06/15/2015    PROSTATE BIOPSY  2018    PROSTATE FIDUCIAL MARKER PLACEMENT  09/14/2018    RHINOPLASTY      UPPER GASTROINTESTINAL ENDOSCOPY  03/24/2014    WITH BIOPSIES AND DILATION     Family History   Problem Relation Age of Onset    Kidney disease Other     Other Other         CHRONIC DISABLING DISEASES URINARY TRACT DISEASE    Diabetes Other     Hypertension Other     Other Mother     Heart failure Father     Stroke Sister     Arthritis Sister     Heart disease Brother     No Known Problems Brother     No Known Problems Brother      Past Medical History:    Diagnosis Date    Anxiety     Arthritis     Colitis     Depression     GERD (gastroesophageal reflux disease)     Gout     Heart murmur     History of EKG 2015    NORMAL    Hyperlipidemia     Hypertension     Obesity     Pancreatitis     history of    Prostate cancer     Renal failure     MILD one functioning kidney    Skin cancer     basal cell cancer on back     Patient Active Problem List   Diagnosis    Essential hypertension, labile    GERD (gastroesophageal reflux disease)    Renal failure    Hyperlipidemia    Anxiety    Depression    Type 2 diabetes mellitus    Periumbilical abdominal pain    DDD (degenerative disc disease), lumbar    Prostate cancer    Hyperuricemia    BURKS (dyspnea on exertion)    Bradycardia    Lower urinary tract symptoms due to benign prostatic hyperplasia    Raised prostate specific antigen    Chest pain in adult    Generalized anxiety disorder    Primary insomnia    Multiple rib fractures    Esophageal dysphagia    Cellulitis of left foot    Palpitations    Dysuria       Social History     Tobacco Use    Smoking status: Former     Current packs/day: 0.00     Types: Cigarettes     Quit date:      Years since quittin.2    Smokeless tobacco: Never   Vaping Use    Vaping status: Never Used   Substance Use Topics    Alcohol use: Yes     Alcohol/week: 7.0 standard drinks of alcohol     Types: 7 Shots of liquor per week     Comment:  once per day    Drug use: No       No Known Allergies    Current Outpatient Medications on File Prior to Visit   Medication Sig    allopurinol (ZYLOPRIM) 100 MG tablet Take 1 tablet by mouth Daily.    amLODIPine (NORVASC) 10 MG tablet TAKE 1 TABLET BY MOUTH  DAILY    aspirin 81 MG tablet Take 1 tablet by mouth Daily.    bicalutamide (CASODEX) 50 MG chemo tablet Casodex 50 mg tablet   Take 1 tablet every day by oral route for 30 days.    Blood Glucose Monitoring Suppl w/Device kit Check blood sugar once a day.    DX: E11.9    cholecalciferol  (VITAMIN D3) 1000 UNITS tablet Take 1 tablet by mouth Daily.    cloNIDine (CATAPRES) 0.1 MG tablet Take 0.5 tablets by mouth As Needed for High Blood Pressure. TID TO QID PRN    colchicine 0.6 MG tablet Take 1 tablet by mouth Daily. PRN    Cyanocobalamin (VITAMIN B12 PO) Take  by mouth daily.    dicyclomine (BENTYL) 20 MG tablet Take 1 tablet by mouth 2 (Two) Times a Day.    esomeprazole (nexIUM) 40 MG capsule Take 1 capsule by mouth Every Morning Before Breakfast. (Patient taking differently: Take 1 capsule by mouth 2 (Two) Times a Day.)    folic acid (FOLVITE) 1 MG tablet TAKE 1 TABLET BY MOUTH  DAILY    glucose blood (Contour Next Test) test strip Check blood glucose daily    hydrALAZINE (APRESOLINE) 50 MG tablet Take 1 tablet by mouth 3 (Three) Times a Day.    indomethacin (INDOCIN) 25 MG capsule Take 1 capsule by mouth 3 (Three) Times a Day As Needed for Mild Pain .    leuprolide (LUPRON) 30 MG injection Inject 30 mg into the appropriate muscle as directed by prescriber Every 4 (Four) Months.    levoFLOXacin (LEVAQUIN) 500 MG tablet TAKE 1 TABLET BY MOUTH EVERY DAY For 2 Days    loratadine (Claritin) 10 MG tablet Take 1 tablet by mouth Daily.    meclizine (ANTIVERT) 25 MG tablet Take 1 tablet by mouth 3 (Three) Times a Day As Needed for Dizziness.    metFORMIN (GLUCOPHAGE) 500 MG tablet TAKE 1 TABLET BY MOUTH  DAILY WITH BREAKFAST    metoprolol tartrate (LOPRESSOR) 25 MG tablet Take 0.5 tablets by mouth 2 (Two) Times a Day.    Microlet Lancets misc 1 stick Daily.    Mirabegron ER (MYRBETRIQ) 50 MG tablet sustained-release 24 hour 24 hr tablet 50 mg Daily As Needed.    mirtazapine (Remeron) 15 MG tablet Take 1 tablet by mouth Every Night.    Pyridoxine HCl (VITAMIN B-6 PO) Take  by mouth daily.    rosuvastatin (CRESTOR) 20 MG tablet Take 1 tablet by mouth Every Night.    SITagliptin (Januvia) 100 MG tablet Take 1 tablet by mouth Daily.    [DISCONTINUED] ALPRAZolam (XANAX) 0.25 MG tablet Take 1 tablet by mouth 2  "(Two) Times a Day As Needed for Anxiety or Sleep. for anxiety    [DISCONTINUED] HYDROcodone-acetaminophen (NORCO) 7.5-325 MG per tablet Take 1 tablet by mouth 3 (Three) Times a Day As Needed for Moderate Pain.     No current facility-administered medications on file prior to visit.         The following portions of the patient's history were reviewed and updated as appropriate: allergies, current medications, past family history, past medical history, past social history, past surgical history and problem list.    Review of Systems   Constitutional: Negative.   HENT: Negative.  Negative for congestion.    Eyes: Negative.    Cardiovascular: Negative.  Negative for chest pain, cyanosis, dyspnea on exertion, irregular heartbeat, leg swelling, near-syncope, orthopnea, palpitations, paroxysmal nocturnal dyspnea and syncope.   Respiratory: Negative.  Negative for shortness of breath.    Hematologic/Lymphatic: Negative.    Musculoskeletal:  Positive for back pain.   Gastrointestinal:  Positive for bloating and abdominal pain.   Genitourinary:  Positive for bladder incontinence.   Neurological: Negative.  Negative for headaches.   Psychiatric/Behavioral:  The patient is nervous/anxious.           Objective:     /68   Pulse 56   Ht 180.3 cm (71\")   Wt 97.1 kg (214 lb)   SpO2 97%   BMI 29.85 kg/m²   Cardiovascular:      PMI at left midclavicular line. Normal rate. Regular rhythm. Normal S1. Normal S2.       Murmurs: There is no murmur.      No gallop.  No click. No rub.   Pulses:     Intact distal pulses.   Edema:     Peripheral edema absent.           Lab Review  Lab Results   Component Value Date     01/26/2024    K 4.6 01/26/2024     (H) 01/26/2024    BUN 18 01/26/2024    CREATININE 1.28 (H) 01/26/2024    GLUCOSE 104 (H) 01/26/2024    CALCIUM 9.1 01/26/2024    ALT 18 01/26/2024    ALKPHOS 50 01/26/2024    LABIL2 1.8 03/07/2016     Lab Results   Component Value Date    CKTOTAL 101 01/26/2024     Lab " "Results   Component Value Date    WBC 12.84 (H) 01/26/2024    HGB 13.6 01/26/2024    HCT 39.4 01/26/2024     01/26/2024     No results found for: \"INR\"  No results found for: \"MG\"  Lab Results   Component Value Date    PSA <0.014 12/09/2019    TSH 2.807 09/28/2015     No results found for: \"BNP\"  Lab Results   Component Value Date    CHLPL 227 (H) 03/07/2016    CHOL 189 01/26/2024    TRIG 218 (H) 01/26/2024    HDL 41 01/26/2024    VLDL 38 01/26/2024     (H) 01/26/2024     Lab Results   Component Value Date     (H) 01/26/2024    LDL 91 09/07/2023     No results found for: \"PROBNP\"            Procedures       I personally viewed and interpreted the patient's LAB data         Assessment:     1. Primary hypertension    2. Anxiety    3. DDD (degenerative disc disease), lumbar    4. Mixed hyperlipidemia    5. Type 2 diabetes mellitus with diabetic nephropathy, without long-term current use of insulin          Plan:     Hypertension is very well-controlled he will continue current medications.  No change in therapy.  Anxiety patient is planning to have new surgery for bladder and is quite anxious he will continue current dose of Xanax.  He is compliant with medications.    Chronic back pain patient is here for pain management he is taking hydrocodone and is compliant with medications refill of hydrocodone 7.5 3 times daily as needed was given.  Last LDL was 110.  Healthy lifestyle weight loss emphasized he will continue Crestor  Diabetic control is acceptable.    Follow-up scheduled        No follow-ups on file.  "

## 2024-04-23 RX ORDER — CEFDINIR 300 MG/1
300 CAPSULE ORAL 2 TIMES DAILY
Qty: 14 CAPSULE | Refills: 0 | Status: SHIPPED | OUTPATIENT
Start: 2024-04-23

## 2024-04-30 ENCOUNTER — OFFICE VISIT (OUTPATIENT)
Dept: CARDIOLOGY | Facility: CLINIC | Age: 79
End: 2024-04-30
Payer: MEDICARE

## 2024-04-30 VITALS
WEIGHT: 214.8 LBS | HEIGHT: 71 IN | DIASTOLIC BLOOD PRESSURE: 74 MMHG | SYSTOLIC BLOOD PRESSURE: 126 MMHG | BODY MASS INDEX: 30.07 KG/M2 | OXYGEN SATURATION: 95 % | HEART RATE: 48 BPM

## 2024-04-30 DIAGNOSIS — R00.1 BRADYCARDIA: ICD-10-CM

## 2024-04-30 DIAGNOSIS — M51.36 DDD (DEGENERATIVE DISC DISEASE), LUMBAR: Primary | ICD-10-CM

## 2024-04-30 DIAGNOSIS — F41.1 GENERALIZED ANXIETY DISORDER: ICD-10-CM

## 2024-04-30 PROCEDURE — 3078F DIAST BP <80 MM HG: CPT | Performed by: INTERNAL MEDICINE

## 2024-04-30 PROCEDURE — 3074F SYST BP LT 130 MM HG: CPT | Performed by: INTERNAL MEDICINE

## 2024-04-30 PROCEDURE — 99213 OFFICE O/P EST LOW 20 MIN: CPT | Performed by: INTERNAL MEDICINE

## 2024-04-30 RX ORDER — HYDROCODONE BITARTRATE AND ACETAMINOPHEN 7.5; 325 MG/1; MG/1
1 TABLET ORAL 3 TIMES DAILY PRN
Qty: 90 TABLET | Refills: 0 | Status: SHIPPED | OUTPATIENT
Start: 2024-04-30

## 2024-04-30 RX ORDER — MIRTAZAPINE 15 MG/1
15 TABLET, FILM COATED ORAL NIGHTLY
Qty: 90 TABLET | Refills: 0 | Status: SHIPPED | OUTPATIENT
Start: 2024-04-30

## 2024-04-30 RX ORDER — DICYCLOMINE HCL 20 MG
20 TABLET ORAL 2 TIMES DAILY
Qty: 60 TABLET | Refills: 0 | Status: SHIPPED | OUTPATIENT
Start: 2024-04-30

## 2024-04-30 NOTE — PROGRESS NOTES
subjective     Chief Complaint   Patient presents with    Hypertension       Problems Addressed This Visit  1. DDD (degenerative disc disease), lumbar    2. Bradycardia    3. Generalized anxiety disorder        History of Present Illness    Patient is 78 years old white male who is planning to have surgery for urinary incontinence after prostate surgery.    He is here for pain management and anxiety disorder.  He is taking Norco 7.53 times a day on as needed basis and is compliant with medication.  Anxiety is controlled with low-dose Xanax.  Heart rate is running slightly low, blood pressure is normal.      Past Surgical History:   Procedure Laterality Date    COLONOSCOPY  03/2018    EYE SURGERY      FOOT SURGERY      HERNIA REPAIR      HYDROCELECTOMY  06/15/2015    PROSTATE BIOPSY  2018    PROSTATE FIDUCIAL MARKER PLACEMENT  09/14/2018    RHINOPLASTY      UPPER GASTROINTESTINAL ENDOSCOPY  03/24/2014    WITH BIOPSIES AND DILATION     Family History   Problem Relation Age of Onset    Kidney disease Other     Other Other         CHRONIC DISABLING DISEASES URINARY TRACT DISEASE    Diabetes Other     Hypertension Other     Other Mother     Heart failure Father     Stroke Sister     Arthritis Sister     Heart disease Brother     No Known Problems Brother     No Known Problems Brother      Past Medical History:   Diagnosis Date    Anxiety     Arthritis     Colitis     Depression     GERD (gastroesophageal reflux disease)     Gout     Heart murmur     History of EKG 12/22/2015    NORMAL    Hyperlipidemia     Hypertension     Obesity     Pancreatitis     history of    Prostate cancer     Renal failure     MILD one functioning kidney    Skin cancer     basal cell cancer on back     Patient Active Problem List   Diagnosis    Essential hypertension, labile    GERD (gastroesophageal reflux disease)    Renal failure    Hyperlipidemia    Anxiety    Depression    Type 2 diabetes mellitus    Periumbilical abdominal pain    DDD  (degenerative disc disease), lumbar    Prostate cancer    Hyperuricemia    BURKS (dyspnea on exertion)    Bradycardia    Lower urinary tract symptoms due to benign prostatic hyperplasia    Raised prostate specific antigen    Chest pain in adult    Generalized anxiety disorder    Primary insomnia    Multiple rib fractures    Esophageal dysphagia    Cellulitis of left foot    Palpitations    Dysuria       Social History     Tobacco Use    Smoking status: Former     Current packs/day: 0.00     Types: Cigarettes     Quit date:      Years since quittin.3     Passive exposure: Never    Smokeless tobacco: Never   Vaping Use    Vaping status: Never Used   Substance Use Topics    Alcohol use: Yes     Alcohol/week: 7.0 standard drinks of alcohol     Types: 7 Shots of liquor per week     Comment:  once per day    Drug use: No       No Known Allergies    Current Outpatient Medications on File Prior to Visit   Medication Sig    allopurinol (ZYLOPRIM) 100 MG tablet Take 1 tablet by mouth Daily.    ALPRAZolam (XANAX) 0.25 MG tablet Take 1 tablet by mouth 2 (Two) Times a Day As Needed for Anxiety or Sleep. for anxiety    amLODIPine (NORVASC) 10 MG tablet TAKE 1 TABLET BY MOUTH  DAILY    aspirin 81 MG tablet Take 1 tablet by mouth Daily.    bicalutamide (CASODEX) 50 MG chemo tablet Casodex 50 mg tablet   Take 1 tablet every day by oral route for 30 days.    Blood Glucose Monitoring Suppl w/Device kit Check blood sugar once a day.    DX: E11.9    cefdinir (OMNICEF) 300 MG capsule Take 1 capsule by mouth 2 (Two) Times a Day.    cholecalciferol (VITAMIN D3) 1000 UNITS tablet Take 1 tablet by mouth Daily.    colchicine 0.6 MG tablet Take 1 tablet by mouth Daily. PRN    Cyanocobalamin (VITAMIN B12 PO) Take  by mouth daily.    esomeprazole (nexIUM) 40 MG capsule Take 1 capsule by mouth Every Morning Before Breakfast. (Patient taking differently: Take 1 capsule by mouth 2 (Two) Times a Day.)    folic acid (FOLVITE) 1 MG tablet TAKE  1 TABLET BY MOUTH  DAILY    glucose blood (Contour Next Test) test strip Check blood glucose daily    hydrALAZINE (APRESOLINE) 50 MG tablet Take 1 tablet by mouth 3 (Three) Times a Day.    indomethacin (INDOCIN) 25 MG capsule Take 1 capsule by mouth 3 (Three) Times a Day As Needed for Mild Pain .    leuprolide (LUPRON) 30 MG injection Inject 30 mg into the appropriate muscle as directed by prescriber Every 4 (Four) Months.    levoFLOXacin (LEVAQUIN) 500 MG tablet TAKE 1 TABLET BY MOUTH EVERY DAY For 2 Days    loratadine (Claritin) 10 MG tablet Take 1 tablet by mouth Daily.    meclizine (ANTIVERT) 25 MG tablet Take 1 tablet by mouth 3 (Three) Times a Day As Needed for Dizziness.    metFORMIN (GLUCOPHAGE) 500 MG tablet TAKE 1 TABLET BY MOUTH  DAILY WITH BREAKFAST    metoprolol tartrate (LOPRESSOR) 25 MG tablet Take 0.5 tablets by mouth 2 (Two) Times a Day.    Microlet Lancets misc 1 stick Daily.    Mirabegron ER (MYRBETRIQ) 50 MG tablet sustained-release 24 hour 24 hr tablet 50 mg Daily As Needed.    Pyridoxine HCl (VITAMIN B-6 PO) Take  by mouth daily.    rosuvastatin (CRESTOR) 20 MG tablet Take 1 tablet by mouth Every Night.    SITagliptin (Januvia) 100 MG tablet Take 1 tablet by mouth Daily.     No current facility-administered medications on file prior to visit.         The following portions of the patient's history were reviewed and updated as appropriate: allergies, current medications, past family history, past medical history, past social history, past surgical history and problem list.    Review of Systems   Constitutional: Negative.   HENT: Negative.  Negative for congestion.    Eyes: Negative.    Cardiovascular: Negative.  Negative for chest pain, cyanosis, dyspnea on exertion, irregular heartbeat, leg swelling, near-syncope, orthopnea, palpitations, paroxysmal nocturnal dyspnea and syncope.   Respiratory: Negative.  Negative for shortness of breath.    Hematologic/Lymphatic: Negative.    Musculoskeletal:   "Positive for arthritis and back pain.   Gastrointestinal: Negative.    Neurological: Negative.  Negative for headaches.   Psychiatric/Behavioral:  The patient is nervous/anxious.           Objective:     /74 (BP Location: Left arm, Patient Position: Sitting, Cuff Size: Adult)   Pulse (!) 48   Ht 180.3 cm (71\")   Wt 97.4 kg (214 lb 12.8 oz)   SpO2 95%   BMI 29.96 kg/m²   Cardiovascular:      PMI at left midclavicular line. Bradycardia present. Regular rhythm. Normal S1. Normal S2.       Murmurs: There is no murmur.      No gallop.  No click. No rub.   Pulses:     Intact distal pulses.   Edema:     Peripheral edema absent.           Lab Review  Lab Results   Component Value Date     01/26/2024    K 4.6 01/26/2024     (H) 01/26/2024    BUN 18 01/26/2024    CREATININE 1.28 (H) 01/26/2024    GLUCOSE 104 (H) 01/26/2024    CALCIUM 9.1 01/26/2024    ALT 18 01/26/2024    ALKPHOS 50 01/26/2024    LABIL2 1.8 03/07/2016     Lab Results   Component Value Date    CKTOTAL 101 01/26/2024     Lab Results   Component Value Date    WBC 12.84 (H) 01/26/2024    HGB 13.6 01/26/2024    HCT 39.4 01/26/2024     01/26/2024     No results found for: \"INR\"  No results found for: \"MG\"  Lab Results   Component Value Date    PSA <0.014 12/09/2019    TSH 2.807 09/28/2015     No results found for: \"BNP\"  Lab Results   Component Value Date    CHLPL 227 (H) 03/07/2016    CHOL 189 01/26/2024    TRIG 218 (H) 01/26/2024    HDL 41 01/26/2024    VLDL 38 01/26/2024     (H) 01/26/2024     Lab Results   Component Value Date     (H) 01/26/2024    LDL 91 09/07/2023     No results found for: \"PROBNP\"            Procedures       I personally viewed and interpreted the patient's LAB data         Assessment:     1. DDD (degenerative disc disease), lumbar    2. Bradycardia    3. Generalized anxiety disorder          Plan:     Chronic back pain.  Patient has chronic renal failure and cannot take NSAID therapy  He is taking " Norco and is compliant with medication.  Refill of Norco was given.    Anxiety is controlled with Xanax.  He is also sleeping better and eating better with Remeron which was continued.    Follow-up scheduled        No follow-ups on file.

## 2024-05-07 ENCOUNTER — TELEPHONE (OUTPATIENT)
Dept: CARDIOLOGY | Facility: CLINIC | Age: 79
End: 2024-05-07
Payer: MEDICARE

## 2024-05-31 ENCOUNTER — OFFICE VISIT (OUTPATIENT)
Dept: CARDIOLOGY | Facility: CLINIC | Age: 79
End: 2024-05-31
Payer: MEDICARE

## 2024-05-31 VITALS
WEIGHT: 213 LBS | DIASTOLIC BLOOD PRESSURE: 68 MMHG | OXYGEN SATURATION: 94 % | HEIGHT: 71 IN | BODY MASS INDEX: 29.82 KG/M2 | SYSTOLIC BLOOD PRESSURE: 128 MMHG | HEART RATE: 58 BPM

## 2024-05-31 DIAGNOSIS — F41.9 ANXIETY: ICD-10-CM

## 2024-05-31 DIAGNOSIS — E78.2 MIXED HYPERLIPIDEMIA: ICD-10-CM

## 2024-05-31 DIAGNOSIS — I10 PRIMARY HYPERTENSION: Primary | ICD-10-CM

## 2024-05-31 DIAGNOSIS — C61 PROSTATE CANCER: ICD-10-CM

## 2024-05-31 DIAGNOSIS — M51.36 DDD (DEGENERATIVE DISC DISEASE), LUMBAR: ICD-10-CM

## 2024-05-31 DIAGNOSIS — E11.21 TYPE 2 DIABETES MELLITUS WITH DIABETIC NEPHROPATHY, WITHOUT LONG-TERM CURRENT USE OF INSULIN: ICD-10-CM

## 2024-05-31 PROCEDURE — 3074F SYST BP LT 130 MM HG: CPT | Performed by: INTERNAL MEDICINE

## 2024-05-31 PROCEDURE — 99213 OFFICE O/P EST LOW 20 MIN: CPT | Performed by: INTERNAL MEDICINE

## 2024-05-31 PROCEDURE — 3078F DIAST BP <80 MM HG: CPT | Performed by: INTERNAL MEDICINE

## 2024-05-31 RX ORDER — HYDROCODONE BITARTRATE AND ACETAMINOPHEN 7.5; 325 MG/1; MG/1
1 TABLET ORAL 3 TIMES DAILY PRN
Qty: 90 TABLET | Refills: 0 | Status: SHIPPED | OUTPATIENT
Start: 2024-05-31

## 2024-05-31 RX ORDER — ALPRAZOLAM 0.25 MG/1
0.25 TABLET ORAL 2 TIMES DAILY PRN
Qty: 60 TABLET | Refills: 2 | Status: SHIPPED | OUTPATIENT
Start: 2024-05-31

## 2024-05-31 NOTE — PROGRESS NOTES
subjective     Chief Complaint   Patient presents with    Back Pain     Follow up    Med Refill     pending       Problems Addressed This Visit  1. Primary hypertension    2. Anxiety    3. DDD (degenerative disc disease), lumbar    4. Prostate cancer    5. Mixed hyperlipidemia    6. Type 2 diabetes mellitus with diabetic nephropathy, without long-term current use of insulin        History of Present Illness    Patient is 78 years old white male who has history of prostate cancer and urinary incontinence.  He recently had device placement his scrotum with the wound dehiscence.  He is doing better now there is no evidence of infection.  He has a follow-up appointment with urology.    Patient is here for pain management and anxiety.  He is taking Xanax 0.25 twice daily and Norco 7.5 3 times daily as needed.  He needs refills.  He is compliant with medications.    Blood pressure is very well-controlled.  He is trying to follow diet and taking medications regularly because of diabetes mellitus and hyperlipidemia which seems very well-controlled.      Past Surgical History:   Procedure Laterality Date    COLONOSCOPY  03/2018    EYE SURGERY      FOOT SURGERY      HERNIA REPAIR      HYDROCELECTOMY  06/15/2015    PROSTATE BIOPSY  2018    PROSTATE FIDUCIAL MARKER PLACEMENT  09/14/2018    RHINOPLASTY      UPPER GASTROINTESTINAL ENDOSCOPY  03/24/2014    WITH BIOPSIES AND DILATION     Family History   Problem Relation Age of Onset    Kidney disease Other     Other Other         CHRONIC DISABLING DISEASES URINARY TRACT DISEASE    Diabetes Other     Hypertension Other     Other Mother     Heart failure Father     Stroke Sister     Arthritis Sister     Heart disease Brother     No Known Problems Brother     No Known Problems Brother      Past Medical History:   Diagnosis Date    Anxiety     Arthritis     Colitis     Depression     GERD (gastroesophageal reflux disease)     Gout     Heart murmur     History of EKG 12/22/2015     NORMAL    Hyperlipidemia     Hypertension     Obesity     Pancreatitis     history of    Prostate cancer     Renal failure     MILD one functioning kidney    Skin cancer     basal cell cancer on back     Patient Active Problem List   Diagnosis    Essential hypertension, labile    GERD (gastroesophageal reflux disease)    Renal failure    Hyperlipidemia    Anxiety    Depression    Type 2 diabetes mellitus    Periumbilical abdominal pain    DDD (degenerative disc disease), lumbar    Prostate cancer    Hyperuricemia    BURKS (dyspnea on exertion)    Bradycardia    Lower urinary tract symptoms due to benign prostatic hyperplasia    Raised prostate specific antigen    Chest pain in adult    Generalized anxiety disorder    Primary insomnia    Multiple rib fractures    Esophageal dysphagia    Cellulitis of left foot    Palpitations    Dysuria       Social History     Tobacco Use    Smoking status: Former     Current packs/day: 0.00     Types: Cigarettes     Quit date:      Years since quittin.4     Passive exposure: Never    Smokeless tobacco: Never   Vaping Use    Vaping status: Never Used   Substance Use Topics    Alcohol use: Yes     Alcohol/week: 7.0 standard drinks of alcohol     Types: 7 Shots of liquor per week     Comment:  once per day    Drug use: No       No Known Allergies    Current Outpatient Medications on File Prior to Visit   Medication Sig    allopurinol (ZYLOPRIM) 100 MG tablet Take 1 tablet by mouth Daily.    amLODIPine (NORVASC) 10 MG tablet TAKE 1 TABLET BY MOUTH  DAILY    aspirin 81 MG tablet Take 1 tablet by mouth Daily.    bicalutamide (CASODEX) 50 MG chemo tablet Casodex 50 mg tablet   Take 1 tablet every day by oral route for 30 days.    Blood Glucose Monitoring Suppl w/Device kit Check blood sugar once a day.    DX: E11.9    cefdinir (OMNICEF) 300 MG capsule Take 1 capsule by mouth 2 (Two) Times a Day.    cholecalciferol (VITAMIN D3) 1000 UNITS tablet Take 1 tablet by mouth Daily.     colchicine 0.6 MG tablet Take 1 tablet by mouth Daily. PRN    Cyanocobalamin (VITAMIN B12 PO) Take  by mouth daily.    esomeprazole (nexIUM) 40 MG capsule Take 1 capsule by mouth Every Morning Before Breakfast. (Patient taking differently: Take 1 capsule by mouth 2 (Two) Times a Day.)    folic acid (FOLVITE) 1 MG tablet TAKE 1 TABLET BY MOUTH  DAILY    glucose blood (Contour Next Test) test strip Check blood glucose daily    hydrALAZINE (APRESOLINE) 50 MG tablet Take 1 tablet by mouth 3 (Three) Times a Day.    indomethacin (INDOCIN) 25 MG capsule Take 1 capsule by mouth 3 (Three) Times a Day As Needed for Mild Pain .    leuprolide (LUPRON) 30 MG injection Inject 30 mg into the appropriate muscle as directed by prescriber Every 4 (Four) Months.    loratadine (Claritin) 10 MG tablet Take 1 tablet by mouth Daily.    meclizine (ANTIVERT) 25 MG tablet Take 1 tablet by mouth 3 (Three) Times a Day As Needed for Dizziness.    metFORMIN (GLUCOPHAGE) 500 MG tablet TAKE 1 TABLET BY MOUTH  DAILY WITH BREAKFAST    metoprolol tartrate (LOPRESSOR) 25 MG tablet Take 0.5 tablets by mouth 2 (Two) Times a Day.    Microlet Lancets misc 1 stick Daily.    Mirabegron ER (MYRBETRIQ) 50 MG tablet sustained-release 24 hour 24 hr tablet 50 mg Daily As Needed.    mirtazapine (Remeron) 15 MG tablet Take 1 tablet by mouth Every Night.    Pyridoxine HCl (VITAMIN B-6 PO) Take  by mouth daily.    rosuvastatin (CRESTOR) 20 MG tablet Take 1 tablet by mouth Every Night.    SITagliptin (Januvia) 100 MG tablet Take 1 tablet by mouth Daily.    [DISCONTINUED] ALPRAZolam (XANAX) 0.25 MG tablet Take 1 tablet by mouth 2 (Two) Times a Day As Needed for Anxiety or Sleep. for anxiety    [DISCONTINUED] HYDROcodone-acetaminophen (NORCO) 7.5-325 MG per tablet Take 1 tablet by mouth 3 (Three) Times a Day As Needed for Moderate Pain.    [DISCONTINUED] levoFLOXacin (LEVAQUIN) 500 MG tablet TAKE 1 TABLET BY MOUTH EVERY DAY For 2 Days     No current  "facility-administered medications on file prior to visit.         The following portions of the patient's history were reviewed and updated as appropriate: allergies, current medications, past family history, past medical history, past social history, past surgical history and problem list.    Review of Systems   Constitutional: Negative.   HENT: Negative.  Negative for congestion.    Eyes: Negative.    Cardiovascular: Negative.  Negative for chest pain, cyanosis, dyspnea on exertion, irregular heartbeat, leg swelling, near-syncope, orthopnea, palpitations, paroxysmal nocturnal dyspnea and syncope.   Respiratory: Negative.  Negative for shortness of breath.    Hematologic/Lymphatic: Negative.    Musculoskeletal:  Positive for back pain.   Gastrointestinal: Negative.    Neurological: Negative.  Negative for headaches.   Psychiatric/Behavioral:  The patient is nervous/anxious.           Objective:     /68 (BP Location: Left arm, Patient Position: Sitting, Cuff Size: Adult)   Pulse 58   Ht 180.3 cm (71\")   Wt 96.6 kg (213 lb)   SpO2 94%   BMI 29.71 kg/m²   Cardiovascular:      PMI at left midclavicular line. Normal rate. Regular rhythm. Normal S1. Normal S2.       Murmurs: There is no murmur.      No gallop.  No click. No rub.   Pulses:     Intact distal pulses.   Edema:     Peripheral edema absent.           Lab Review  Lab Results   Component Value Date     01/26/2024    K 4.6 01/26/2024     (H) 01/26/2024    BUN 18 01/26/2024    CREATININE 1.28 (H) 01/26/2024    GLUCOSE 104 (H) 01/26/2024    CALCIUM 9.1 01/26/2024    ALT 18 01/26/2024    ALKPHOS 50 01/26/2024    LABIL2 1.8 03/07/2016     Lab Results   Component Value Date    CKTOTAL 101 01/26/2024     Lab Results   Component Value Date    WBC 12.84 (H) 01/26/2024    HGB 13.6 01/26/2024    HCT 39.4 01/26/2024     01/26/2024     No results found for: \"INR\"  No results found for: \"MG\"  Lab Results   Component Value Date    PSA <0.014 " "12/09/2019    TSH 2.807 09/28/2015     No results found for: \"BNP\"  Lab Results   Component Value Date    CHLPL 227 (H) 03/07/2016    CHOL 189 01/26/2024    TRIG 218 (H) 01/26/2024    HDL 41 01/26/2024    VLDL 38 01/26/2024     (H) 01/26/2024     Lab Results   Component Value Date     (H) 01/26/2024    LDL 91 09/07/2023     No results found for: \"PROBNP\"            Procedures       I personally viewed and interpreted the patient's LAB data         Assessment:     1. Primary hypertension    2. Anxiety    3. DDD (degenerative disc disease), lumbar    4. Prostate cancer    5. Mixed hyperlipidemia    6. Type 2 diabetes mellitus with diabetic nephropathy, without long-term current use of insulin          Plan:     Patient is here for pain control management and anxiety management.  Norco refill was given patient is compliant with medications.  Overall he is doing very well no change in therapy was made.  Follow-up scheduled        No follow-ups on file.  "

## 2024-06-04 RX ORDER — AMLODIPINE BESYLATE 10 MG/1
10 TABLET ORAL DAILY
Qty: 90 TABLET | Refills: 3 | Status: SHIPPED | OUTPATIENT
Start: 2024-06-04

## 2024-06-04 RX ORDER — FOLIC ACID 1 MG/1
1 TABLET ORAL DAILY
Qty: 90 TABLET | Refills: 3 | Status: SHIPPED | OUTPATIENT
Start: 2024-06-04

## 2024-06-04 RX ORDER — HYDRALAZINE HYDROCHLORIDE 50 MG/1
50 TABLET, FILM COATED ORAL 3 TIMES DAILY
Qty: 270 TABLET | Refills: 3 | Status: SHIPPED | OUTPATIENT
Start: 2024-06-04

## 2024-06-04 RX ORDER — FLUOXETINE HYDROCHLORIDE 20 MG/1
20 CAPSULE ORAL DAILY
Qty: 90 CAPSULE | Refills: 3 | Status: SHIPPED | OUTPATIENT
Start: 2024-06-04

## 2024-06-11 ENCOUNTER — TELEPHONE (OUTPATIENT)
Dept: CARDIOLOGY | Facility: CLINIC | Age: 79
End: 2024-06-11
Payer: MEDICARE

## 2024-06-11 RX ORDER — ROSUVASTATIN CALCIUM 20 MG/1
20 TABLET, COATED ORAL NIGHTLY
Qty: 90 TABLET | Refills: 2 | Status: SHIPPED | OUTPATIENT
Start: 2024-06-11

## 2024-07-02 ENCOUNTER — OFFICE VISIT (OUTPATIENT)
Dept: CARDIOLOGY | Facility: CLINIC | Age: 79
End: 2024-07-02
Payer: MEDICARE

## 2024-07-02 VITALS
SYSTOLIC BLOOD PRESSURE: 144 MMHG | WEIGHT: 211 LBS | BODY MASS INDEX: 29.54 KG/M2 | DIASTOLIC BLOOD PRESSURE: 78 MMHG | HEIGHT: 71 IN | HEART RATE: 57 BPM | OXYGEN SATURATION: 94 %

## 2024-07-02 DIAGNOSIS — F41.9 ANXIETY: ICD-10-CM

## 2024-07-02 DIAGNOSIS — M51.36 DDD (DEGENERATIVE DISC DISEASE), LUMBAR: ICD-10-CM

## 2024-07-02 DIAGNOSIS — E11.21 TYPE 2 DIABETES MELLITUS WITH DIABETIC NEPHROPATHY, WITHOUT LONG-TERM CURRENT USE OF INSULIN: ICD-10-CM

## 2024-07-02 DIAGNOSIS — E78.2 MIXED HYPERLIPIDEMIA: Primary | ICD-10-CM

## 2024-07-02 PROCEDURE — 3078F DIAST BP <80 MM HG: CPT | Performed by: INTERNAL MEDICINE

## 2024-07-02 PROCEDURE — 3077F SYST BP >= 140 MM HG: CPT | Performed by: INTERNAL MEDICINE

## 2024-07-02 PROCEDURE — 99213 OFFICE O/P EST LOW 20 MIN: CPT | Performed by: INTERNAL MEDICINE

## 2024-07-02 RX ORDER — ALPRAZOLAM 0.25 MG/1
0.25 TABLET ORAL 2 TIMES DAILY PRN
Qty: 60 TABLET | Refills: 2 | Status: SHIPPED | OUTPATIENT
Start: 2024-07-02

## 2024-07-02 RX ORDER — HYDROCODONE BITARTRATE AND ACETAMINOPHEN 7.5; 325 MG/1; MG/1
1 TABLET ORAL 3 TIMES DAILY PRN
Qty: 90 TABLET | Refills: 0 | Status: SHIPPED | OUTPATIENT
Start: 2024-07-02

## 2024-07-02 NOTE — PROGRESS NOTES
subjective     Chief Complaint   Patient presents with    Back Pain     Follow up    Hypertension       Problems Addressed This Visit  1. Mixed hyperlipidemia    2. DDD (degenerative disc disease), lumbar    3. Anxiety    4. Type 2 diabetes mellitus with diabetic nephropathy, without long-term current use of insulin        History of Present Illness    Patient is here for pain management.  Patient has history of prostate cancer and chronic back pain and anxiety disorder.  He is taking Xanax 0.25 twice daily as needed for anxiety and is doing reasonably well.    He is also taking Norco 7.5 mg 3 times daily.  He is compliant with medication and needs refills.    He also has hyperlipidemia hypertension and diabetes mellitus.  Recently has had low-grade UTI and is taking Cipro.  Blood pressure is very well-controlled.  Patient has no new complaints.      Past Surgical History:   Procedure Laterality Date    COLONOSCOPY  03/2018    EYE SURGERY      FOOT SURGERY      HERNIA REPAIR      HYDROCELECTOMY  06/15/2015    PROSTATE BIOPSY  2018    PROSTATE FIDUCIAL MARKER PLACEMENT  09/14/2018    RHINOPLASTY      UPPER GASTROINTESTINAL ENDOSCOPY  03/24/2014    WITH BIOPSIES AND DILATION     Family History   Problem Relation Age of Onset    Kidney disease Other     Other Other         CHRONIC DISABLING DISEASES URINARY TRACT DISEASE    Diabetes Other     Hypertension Other     Other Mother     Heart failure Father     Stroke Sister     Arthritis Sister     Heart disease Brother     No Known Problems Brother     No Known Problems Brother      Past Medical History:   Diagnosis Date    Anxiety     Arthritis     Colitis     Depression     GERD (gastroesophageal reflux disease)     Gout     Heart murmur     History of EKG 12/22/2015    NORMAL    Hyperlipidemia     Hypertension     Obesity     Pancreatitis     history of    Prostate cancer     Renal failure     MILD one functioning kidney    Skin cancer     basal cell cancer on back      Patient Active Problem List   Diagnosis    Essential hypertension, labile    GERD (gastroesophageal reflux disease)    Renal failure    Hyperlipidemia    Anxiety    Depression    Type 2 diabetes mellitus    Periumbilical abdominal pain    DDD (degenerative disc disease), lumbar    Prostate cancer    Hyperuricemia    BURKS (dyspnea on exertion)    Bradycardia    Lower urinary tract symptoms due to benign prostatic hyperplasia    Raised prostate specific antigen    Chest pain in adult    Generalized anxiety disorder    Primary insomnia    Multiple rib fractures    Esophageal dysphagia    Cellulitis of left foot    Palpitations    Dysuria       Social History     Tobacco Use    Smoking status: Former     Current packs/day: 0.00     Types: Cigarettes     Quit date:      Years since quittin.5     Passive exposure: Never    Smokeless tobacco: Never   Vaping Use    Vaping status: Never Used   Substance Use Topics    Alcohol use: Yes     Alcohol/week: 7.0 standard drinks of alcohol     Types: 7 Shots of liquor per week     Comment:  once per day    Drug use: No       No Known Allergies    Current Outpatient Medications on File Prior to Visit   Medication Sig    allopurinol (ZYLOPRIM) 100 MG tablet Take 1 tablet by mouth Daily.    amLODIPine (NORVASC) 10 MG tablet TAKE 1 TABLET BY MOUTH DAILY    aspirin 81 MG tablet Take 1 tablet by mouth Daily.    bicalutamide (CASODEX) 50 MG chemo tablet Casodex 50 mg tablet   Take 1 tablet every day by oral route for 30 days.    Blood Glucose Monitoring Suppl w/Device kit Check blood sugar once a day.    DX: E11.9    cefdinir (OMNICEF) 300 MG capsule Take 1 capsule by mouth 2 (Two) Times a Day.    cholecalciferol (VITAMIN D3) 1000 UNITS tablet Take 1 tablet by mouth Daily.    colchicine 0.6 MG tablet Take 1 tablet by mouth Daily. PRN    Cyanocobalamin (VITAMIN B12 PO) Take  by mouth daily.    esomeprazole (nexIUM) 40 MG capsule Take 1 capsule by mouth Every Morning Before  Breakfast. (Patient taking differently: Take 1 capsule by mouth 2 (Two) Times a Day.)    FLUoxetine (PROzac) 20 MG capsule TAKE 1 CAPSULE BY MOUTH DAILY    folic acid (FOLVITE) 1 MG tablet TAKE 1 TABLET BY MOUTH DAILY    glucose blood (Contour Next Test) test strip Check blood glucose daily    hydrALAZINE (APRESOLINE) 50 MG tablet TAKE 1 TABLET BY MOUTH 3 TIMES  DAILY    indomethacin (INDOCIN) 25 MG capsule Take 1 capsule by mouth 3 (Three) Times a Day As Needed for Mild Pain .    leuprolide (LUPRON) 30 MG injection Inject 30 mg into the appropriate muscle as directed by prescriber Every 4 (Four) Months.    loratadine (Claritin) 10 MG tablet Take 1 tablet by mouth Daily.    meclizine (ANTIVERT) 25 MG tablet Take 1 tablet by mouth 3 (Three) Times a Day As Needed for Dizziness.    metFORMIN (GLUCOPHAGE) 500 MG tablet TAKE 1 TABLET BY MOUTH DAILY  WITH BREAKFAST    metoprolol tartrate (LOPRESSOR) 25 MG tablet TAKE 1 TABLET BY MOUTH TWICE  DAILY    Microlet Lancets misc 1 stick Daily.    Mirabegron ER (MYRBETRIQ) 50 MG tablet sustained-release 24 hour 24 hr tablet 50 mg Daily As Needed.    mirtazapine (Remeron) 15 MG tablet Take 1 tablet by mouth Every Night.    Pyridoxine HCl (VITAMIN B-6 PO) Take  by mouth daily.    rosuvastatin (CRESTOR) 20 MG tablet Take 1 tablet by mouth Every Night.    SITagliptin (Januvia) 100 MG tablet Take 1 tablet by mouth Daily.    [DISCONTINUED] ALPRAZolam (XANAX) 0.25 MG tablet Take 1 tablet by mouth 2 (Two) Times a Day As Needed for Anxiety or Sleep. for anxiety    [DISCONTINUED] HYDROcodone-acetaminophen (NORCO) 7.5-325 MG per tablet Take 1 tablet by mouth 3 (Three) Times a Day As Needed for Moderate Pain.     No current facility-administered medications on file prior to visit.     (Not in a hospital admission)      Results for orders placed during the hospital encounter of 07/13/21    Adult Transthoracic Echo Complete W/ Cont if Necessary Per Protocol    Interpretation Summary  · Normal  left ventricular cavity size and wall thickness noted.  · Left ventricular ejection fraction appears to be 61 - 65%. Left ventricular systolic function is normal.  · Left ventricular diastolic function was normal.  · The aortic valve is structurally normal with no regurgitation or stenosis present. The aortic valve appears trileaflet.  · The mitral valve is structurally normal with no regurgitation or significant stenosis present.  · No significant valvular heart disease  · There is no evidence of pericardial effusion.    Results for orders placed during the hospital encounter of 07/13/21    Stress Test With Myocardial Perfusion One Day    Interpretation Summary  · A pharmacological stress test was performed using regadenoson without low-level exercise.  · Resting EKG showed sinus rhythm rate of 62 bpm incomplete right bundle branch block was noted.  · Patient tolerated lexiscan well without complications, no aminophylline was administered  · ST segments did not show any diagnostic changes, there was no arrhythmia detected.  · Left ventricular ejection fraction is hyperdynamic (Calculated EF > 70%). .  · Myocardial perfusion imaging indicates a normal myocardial perfusion study with no evidence of ischemia.  · Impressions are consistent with a low risk study.  · Compared to the prior study from 5/3/2018 the current study reveals no changes.          The following portions of the patient's history were reviewed and updated as appropriate: allergies, current medications, past family history, past medical history, past social history, past surgical history and problem list.    Review of Systems   Constitutional: Negative.   HENT: Negative.  Negative for congestion.    Eyes: Negative.    Cardiovascular: Negative.  Negative for chest pain, cyanosis, dyspnea on exertion, irregular heartbeat, leg swelling, near-syncope, orthopnea, palpitations, paroxysmal nocturnal dyspnea and syncope.   Respiratory: Negative.  Negative  "for shortness of breath.    Hematologic/Lymphatic: Negative.    Musculoskeletal:  Positive for back pain.   Gastrointestinal: Negative.    Neurological: Negative.  Negative for headaches.   Psychiatric/Behavioral:  The patient is nervous/anxious.           Objective:     /78 (BP Location: Left arm, Patient Position: Sitting, Cuff Size: Adult)   Pulse 57   Ht 180.3 cm (71\")   Wt 95.7 kg (211 lb)   SpO2 94%   BMI 29.43 kg/m²   Pulmonary:      Effort: Pulmonary effort is normal.      Breath sounds: Normal breath sounds. No stridor. No wheezing. No rhonchi. No rales.   Cardiovascular:      PMI at left midclavicular line. Normal rate. Regular rhythm. Normal S1. Normal S2.       Murmurs: There is no murmur.      No gallop.  No click. No rub.   Pulses:     Intact distal pulses.   Edema:     Peripheral edema absent.           Lab Review  Lab Results   Component Value Date     01/26/2024    K 4.6 01/26/2024     (H) 01/26/2024    BUN 18 01/26/2024    CREATININE 1.28 (H) 01/26/2024    GLUCOSE 104 (H) 01/26/2024    CALCIUM 9.1 01/26/2024    ALT 18 01/26/2024    ALKPHOS 50 01/26/2024    LABIL2 1.8 03/07/2016     Lab Results   Component Value Date    CKTOTAL 101 01/26/2024     Lab Results   Component Value Date    WBC 12.84 (H) 01/26/2024    HGB 13.6 01/26/2024    HCT 39.4 01/26/2024     01/26/2024     No results found for: \"INR\"  No results found for: \"MG\"  Lab Results   Component Value Date    PSA <0.014 12/09/2019    TSH 2.807 09/28/2015     No results found for: \"BNP\"  Lab Results   Component Value Date    CHLPL 227 (H) 03/07/2016    CHOL 189 01/26/2024    TRIG 218 (H) 01/26/2024    HDL 41 01/26/2024    VLDL 38 01/26/2024     (H) 01/26/2024     Lab Results   Component Value Date     (H) 01/26/2024    LDL 91 09/07/2023     No results found for: \"PROBNP\"            Procedures       I personally viewed and interpreted the patient's LAB data         Assessment:     1. Mixed " hyperlipidemia    2. DDD (degenerative disc disease), lumbar    3. Anxiety    4. Type 2 diabetes mellitus with diabetic nephropathy, without long-term current use of insulin          Plan:     Hyperlipidemia  Suboptimal control last LDL was 110.  Healthy lifestyle again discussed.  Lab work scheduled.    Chronic back pain Norco refill was given patient is compliant with medications.  Side effects explained.    Anxiety disorder well-controlled with Xanax.    Diabetic control is also good.  Hemoglobin A1c and urine for microalbumin scheduled.  Refill was given        No follow-ups on file.

## 2024-07-08 ENCOUNTER — LAB (OUTPATIENT)
Dept: LAB | Facility: HOSPITAL | Age: 79
End: 2024-07-08
Payer: MEDICARE

## 2024-07-08 DIAGNOSIS — E78.2 MIXED HYPERLIPIDEMIA: ICD-10-CM

## 2024-07-08 DIAGNOSIS — E11.21 TYPE 2 DIABETES MELLITUS WITH DIABETIC NEPHROPATHY, WITHOUT LONG-TERM CURRENT USE OF INSULIN: ICD-10-CM

## 2024-07-08 LAB
BASOPHILS # BLD AUTO: 0.13 10*3/MM3 (ref 0–0.2)
BASOPHILS NFR BLD AUTO: 1.4 % (ref 0–1.5)
DEPRECATED RDW RBC AUTO: 48.7 FL (ref 37–54)
EOSINOPHIL # BLD AUTO: 1.77 10*3/MM3 (ref 0–0.4)
EOSINOPHIL NFR BLD AUTO: 18.9 % (ref 0.3–6.2)
ERYTHROCYTE [DISTWIDTH] IN BLOOD BY AUTOMATED COUNT: 13.6 % (ref 12.3–15.4)
HBA1C MFR BLD: 6.5 % (ref 4.8–5.6)
HCT VFR BLD AUTO: 41.7 % (ref 37.5–51)
HGB BLD-MCNC: 14.1 G/DL (ref 13–17.7)
IMM GRANULOCYTES # BLD AUTO: 0.01 10*3/MM3 (ref 0–0.05)
IMM GRANULOCYTES NFR BLD AUTO: 0.1 % (ref 0–0.5)
LYMPHOCYTES # BLD AUTO: 2.54 10*3/MM3 (ref 0.7–3.1)
LYMPHOCYTES NFR BLD AUTO: 27.2 % (ref 19.6–45.3)
MCH RBC QN AUTO: 32.9 PG (ref 26.6–33)
MCHC RBC AUTO-ENTMCNC: 33.8 G/DL (ref 31.5–35.7)
MCV RBC AUTO: 97.2 FL (ref 79–97)
MONOCYTES # BLD AUTO: 0.53 10*3/MM3 (ref 0.1–0.9)
MONOCYTES NFR BLD AUTO: 5.7 % (ref 5–12)
NEUTROPHILS NFR BLD AUTO: 4.37 10*3/MM3 (ref 1.7–7)
NEUTROPHILS NFR BLD AUTO: 46.7 % (ref 42.7–76)
NRBC BLD AUTO-RTO: 0 /100 WBC (ref 0–0.2)
PLATELET # BLD AUTO: 167 10*3/MM3 (ref 140–450)
PMV BLD AUTO: 12.2 FL (ref 6–12)
RBC # BLD AUTO: 4.29 10*6/MM3 (ref 4.14–5.8)
WBC NRBC COR # BLD AUTO: 9.35 10*3/MM3 (ref 3.4–10.8)

## 2024-07-08 PROCEDURE — 83036 HEMOGLOBIN GLYCOSYLATED A1C: CPT

## 2024-07-08 PROCEDURE — 85025 COMPLETE CBC W/AUTO DIFF WBC: CPT

## 2024-07-08 PROCEDURE — 36415 COLL VENOUS BLD VENIPUNCTURE: CPT

## 2024-07-08 PROCEDURE — 80061 LIPID PANEL: CPT

## 2024-07-08 PROCEDURE — 80053 COMPREHEN METABOLIC PANEL: CPT

## 2024-07-08 PROCEDURE — 82550 ASSAY OF CK (CPK): CPT

## 2024-07-09 LAB
ALBUMIN SERPL-MCNC: 4.4 G/DL (ref 3.5–5.2)
ALBUMIN/GLOB SERPL: 2.1 G/DL
ALP SERPL-CCNC: 44 U/L (ref 39–117)
ALT SERPL W P-5'-P-CCNC: 20 U/L (ref 1–41)
ANION GAP SERPL CALCULATED.3IONS-SCNC: 14.2 MMOL/L (ref 5–15)
AST SERPL-CCNC: 24 U/L (ref 1–40)
BILIRUB SERPL-MCNC: 0.4 MG/DL (ref 0–1.2)
BUN SERPL-MCNC: 15 MG/DL (ref 8–23)
BUN/CREAT SERPL: 11.5 (ref 7–25)
CALCIUM SPEC-SCNC: 9.1 MG/DL (ref 8.6–10.5)
CHLORIDE SERPL-SCNC: 107 MMOL/L (ref 98–107)
CHOLEST SERPL-MCNC: 168 MG/DL (ref 0–200)
CK SERPL-CCNC: 75 U/L (ref 20–200)
CO2 SERPL-SCNC: 19.8 MMOL/L (ref 22–29)
CREAT SERPL-MCNC: 1.3 MG/DL (ref 0.76–1.27)
EGFRCR SERPLBLD CKD-EPI 2021: 56.2 ML/MIN/1.73
GLOBULIN UR ELPH-MCNC: 2.1 GM/DL
GLUCOSE SERPL-MCNC: 132 MG/DL (ref 65–99)
HDLC SERPL-MCNC: 46 MG/DL (ref 40–60)
LDLC SERPL CALC-MCNC: 91 MG/DL (ref 0–100)
LDLC/HDLC SERPL: 1.87 {RATIO}
POTASSIUM SERPL-SCNC: 4.5 MMOL/L (ref 3.5–5.2)
PROT SERPL-MCNC: 6.5 G/DL (ref 6–8.5)
SODIUM SERPL-SCNC: 141 MMOL/L (ref 136–145)
TRIGL SERPL-MCNC: 179 MG/DL (ref 0–150)
VLDLC SERPL-MCNC: 31 MG/DL (ref 5–40)

## 2024-08-02 ENCOUNTER — OFFICE VISIT (OUTPATIENT)
Dept: CARDIOLOGY | Facility: CLINIC | Age: 79
End: 2024-08-02
Payer: MEDICARE

## 2024-08-02 VITALS
SYSTOLIC BLOOD PRESSURE: 154 MMHG | HEIGHT: 71 IN | DIASTOLIC BLOOD PRESSURE: 82 MMHG | WEIGHT: 215 LBS | OXYGEN SATURATION: 97 % | HEART RATE: 59 BPM | BODY MASS INDEX: 30.1 KG/M2

## 2024-08-02 DIAGNOSIS — I10 PRIMARY HYPERTENSION: Primary | ICD-10-CM

## 2024-08-02 DIAGNOSIS — F41.9 ANXIETY: ICD-10-CM

## 2024-08-02 DIAGNOSIS — M51.36 DDD (DEGENERATIVE DISC DISEASE), LUMBAR: ICD-10-CM

## 2024-08-02 PROCEDURE — 99213 OFFICE O/P EST LOW 20 MIN: CPT | Performed by: INTERNAL MEDICINE

## 2024-08-02 PROCEDURE — 3077F SYST BP >= 140 MM HG: CPT | Performed by: INTERNAL MEDICINE

## 2024-08-02 PROCEDURE — 3079F DIAST BP 80-89 MM HG: CPT | Performed by: INTERNAL MEDICINE

## 2024-08-02 RX ORDER — HYDROCODONE BITARTRATE AND ACETAMINOPHEN 7.5; 325 MG/1; MG/1
1 TABLET ORAL 3 TIMES DAILY PRN
Qty: 90 TABLET | Refills: 0 | Status: SHIPPED | OUTPATIENT
Start: 2024-08-02

## 2024-08-02 RX ORDER — ALPRAZOLAM 0.25 MG/1
0.25 TABLET ORAL 2 TIMES DAILY PRN
Qty: 60 TABLET | Refills: 2 | Status: SHIPPED | OUTPATIENT
Start: 2024-08-02

## 2024-08-02 NOTE — PROGRESS NOTES
subjective     Chief Complaint   Patient presents with    Back Pain     Follow up    Med Refill     pending       Problems Addressed This Visit  1. Primary hypertension    2. Anxiety    3. DDD (degenerative disc disease), lumbar        History of Present Illness    Sae is 78 years old white male who is here for follow-up of anxiety and chronic back pain.  Patient has chronic renal disease and is unable to take NSAIDs.  He is taking Norco 7.5 every 8 hours as needed and is compliant with medication.    Anxiety is controlled with Xanax.  Blood pressure is mildly elevated today however his blood pressure at home has been around 1 20-1 30 systolic.  He will check his blood pressure again and he has clonidine that he uses as needed along with other blood pressure medications.      Past Surgical History:   Procedure Laterality Date    COLONOSCOPY  03/2018    EYE SURGERY      FOOT SURGERY      HERNIA REPAIR      HYDROCELECTOMY  06/15/2015    PROSTATE BIOPSY  2018    PROSTATE FIDUCIAL MARKER PLACEMENT  09/14/2018    RHINOPLASTY      UPPER GASTROINTESTINAL ENDOSCOPY  03/24/2014    WITH BIOPSIES AND DILATION     Family History   Problem Relation Age of Onset    Kidney disease Other     Other Other         CHRONIC DISABLING DISEASES URINARY TRACT DISEASE    Diabetes Other     Hypertension Other     Other Mother     Heart failure Father     Stroke Sister     Arthritis Sister     Heart disease Brother     No Known Problems Brother     No Known Problems Brother      Past Medical History:   Diagnosis Date    Anxiety     Arthritis     Colitis     Depression     GERD (gastroesophageal reflux disease)     Gout     Heart murmur     History of EKG 12/22/2015    NORMAL    Hyperlipidemia     Hypertension     Obesity     Pancreatitis     history of    Prostate cancer     Renal failure     MILD one functioning kidney    Skin cancer     basal cell cancer on back     Patient Active Problem List   Diagnosis    Essential hypertension, labile     GERD (gastroesophageal reflux disease)    Renal failure    Hyperlipidemia    Anxiety    Depression    Type 2 diabetes mellitus    Periumbilical abdominal pain    DDD (degenerative disc disease), lumbar    Prostate cancer    Hyperuricemia    BURKS (dyspnea on exertion)    Bradycardia    Lower urinary tract symptoms due to benign prostatic hyperplasia    Raised prostate specific antigen    Chest pain in adult    Generalized anxiety disorder    Primary insomnia    Multiple rib fractures    Esophageal dysphagia    Cellulitis of left foot    Palpitations    Dysuria       Social History     Tobacco Use    Smoking status: Former     Current packs/day: 0.00     Types: Cigarettes     Quit date:      Years since quittin.6     Passive exposure: Never    Smokeless tobacco: Never   Vaping Use    Vaping status: Never Used   Substance Use Topics    Alcohol use: Yes     Alcohol/week: 7.0 standard drinks of alcohol     Types: 7 Shots of liquor per week     Comment:  once per day    Drug use: No       No Known Allergies    Current Outpatient Medications on File Prior to Visit   Medication Sig    allopurinol (ZYLOPRIM) 100 MG tablet Take 1 tablet by mouth Daily.    amLODIPine (NORVASC) 10 MG tablet TAKE 1 TABLET BY MOUTH DAILY    aspirin 81 MG tablet Take 1 tablet by mouth Daily.    bicalutamide (CASODEX) 50 MG chemo tablet Casodex 50 mg tablet   Take 1 tablet every day by oral route for 30 days.    Blood Glucose Monitoring Suppl w/Device kit Check blood sugar once a day.    DX: E11.9    cefdinir (OMNICEF) 300 MG capsule Take 1 capsule by mouth 2 (Two) Times a Day.    cholecalciferol (VITAMIN D3) 1000 UNITS tablet Take 1 tablet by mouth Daily.    colchicine 0.6 MG tablet Take 1 tablet by mouth Daily. PRN    Cyanocobalamin (VITAMIN B12 PO) Take  by mouth daily.    esomeprazole (nexIUM) 40 MG capsule Take 1 capsule by mouth Every Morning Before Breakfast. (Patient taking differently: Take 1 capsule by mouth 2 (Two) Times a  Day.)    FLUoxetine (PROzac) 20 MG capsule TAKE 1 CAPSULE BY MOUTH DAILY    folic acid (FOLVITE) 1 MG tablet TAKE 1 TABLET BY MOUTH DAILY    glucose blood (Contour Next Test) test strip Check blood glucose daily    hydrALAZINE (APRESOLINE) 50 MG tablet TAKE 1 TABLET BY MOUTH 3 TIMES  DAILY    indomethacin (INDOCIN) 25 MG capsule Take 1 capsule by mouth 3 (Three) Times a Day As Needed for Mild Pain .    leuprolide (LUPRON) 30 MG injection Inject 30 mg into the appropriate muscle as directed by prescriber Every 4 (Four) Months.    loratadine (Claritin) 10 MG tablet Take 1 tablet by mouth Daily.    meclizine (ANTIVERT) 25 MG tablet Take 1 tablet by mouth 3 (Three) Times a Day As Needed for Dizziness.    metFORMIN (GLUCOPHAGE) 500 MG tablet TAKE 1 TABLET BY MOUTH DAILY  WITH BREAKFAST    metoprolol tartrate (LOPRESSOR) 25 MG tablet TAKE 1 TABLET BY MOUTH TWICE  DAILY    Microlet Lancets misc 1 stick Daily.    Mirabegron ER (MYRBETRIQ) 50 MG tablet sustained-release 24 hour 24 hr tablet 50 mg Daily As Needed.    mirtazapine (Remeron) 15 MG tablet Take 1 tablet by mouth Every Night.    Pyridoxine HCl (VITAMIN B-6 PO) Take  by mouth daily.    rosuvastatin (CRESTOR) 20 MG tablet Take 1 tablet by mouth Every Night.    SITagliptin (Januvia) 100 MG tablet Take 1 tablet by mouth Daily.    [DISCONTINUED] ALPRAZolam (XANAX) 0.25 MG tablet Take 1 tablet by mouth 2 (Two) Times a Day As Needed for Anxiety or Sleep. for anxiety    [DISCONTINUED] HYDROcodone-acetaminophen (NORCO) 7.5-325 MG per tablet Take 1 tablet by mouth 3 (Three) Times a Day As Needed for Moderate Pain.     No current facility-administered medications on file prior to visit.     (Not in a hospital admission)      Results for orders placed during the hospital encounter of 07/13/21    Adult Transthoracic Echo Complete W/ Cont if Necessary Per Protocol    Interpretation Summary  · Normal left ventricular cavity size and wall thickness noted.  · Left ventricular  ejection fraction appears to be 61 - 65%. Left ventricular systolic function is normal.  · Left ventricular diastolic function was normal.  · The aortic valve is structurally normal with no regurgitation or stenosis present. The aortic valve appears trileaflet.  · The mitral valve is structurally normal with no regurgitation or significant stenosis present.  · No significant valvular heart disease  · There is no evidence of pericardial effusion.    Results for orders placed during the hospital encounter of 07/13/21    Stress Test With Myocardial Perfusion One Day    Interpretation Summary  · A pharmacological stress test was performed using regadenoson without low-level exercise.  · Resting EKG showed sinus rhythm rate of 62 bpm incomplete right bundle branch block was noted.  · Patient tolerated lexiscan well without complications, no aminophylline was administered  · ST segments did not show any diagnostic changes, there was no arrhythmia detected.  · Left ventricular ejection fraction is hyperdynamic (Calculated EF > 70%). .  · Myocardial perfusion imaging indicates a normal myocardial perfusion study with no evidence of ischemia.  · Impressions are consistent with a low risk study.  · Compared to the prior study from 5/3/2018 the current study reveals no changes.          The following portions of the patient's history were reviewed and updated as appropriate: allergies, current medications, past family history, past medical history, past social history, past surgical history and problem list.    Review of Systems   Constitutional: Negative.   HENT: Negative.  Negative for congestion.    Eyes: Negative.    Cardiovascular: Negative.  Negative for chest pain, cyanosis, dyspnea on exertion, irregular heartbeat, leg swelling, near-syncope, orthopnea, palpitations, paroxysmal nocturnal dyspnea and syncope.   Respiratory: Negative.  Negative for shortness of breath.    Hematologic/Lymphatic: Negative.   "  Musculoskeletal:  Positive for arthritis.   Gastrointestinal: Negative.    Neurological: Negative.  Negative for headaches.   Psychiatric/Behavioral:  The patient is nervous/anxious.           Objective:     /82 (BP Location: Left arm, Patient Position: Sitting, Cuff Size: Adult)   Pulse 59   Ht 180.3 cm (71\")   Wt 97.5 kg (215 lb)   SpO2 97%   BMI 29.99 kg/m²   Pulmonary:      Effort: Pulmonary effort is normal.      Breath sounds: Normal breath sounds. No stridor. No wheezing. No rhonchi. No rales.   Cardiovascular:      PMI at left midclavicular line. Normal rate. Regular rhythm. Normal S1. Normal S2.       Murmurs: There is no murmur.      No gallop.  No click. No rub.   Pulses:     Intact distal pulses.   Edema:     Peripheral edema absent.           Lab Review  Lab Results   Component Value Date     07/08/2024    K 4.5 07/08/2024     07/08/2024    BUN 15 07/08/2024    CREATININE 1.30 (H) 07/08/2024    GLUCOSE 132 (H) 07/08/2024    CALCIUM 9.1 07/08/2024    ALT 20 07/08/2024    ALKPHOS 44 07/08/2024    LABIL2 1.8 03/07/2016     Lab Results   Component Value Date    CKTOTAL 75 07/08/2024     Lab Results   Component Value Date    WBC 9.35 07/08/2024    HGB 14.1 07/08/2024    HCT 41.7 07/08/2024     07/08/2024     No results found for: \"INR\"  No results found for: \"MG\"  Lab Results   Component Value Date    PSA <0.014 12/09/2019    TSH 2.807 09/28/2015     No results found for: \"BNP\"  Lab Results   Component Value Date    CHLPL 227 (H) 03/07/2016    CHOL 168 07/08/2024    TRIG 179 (H) 07/08/2024    HDL 46 07/08/2024    VLDL 31 07/08/2024    LDL 91 07/08/2024     Lab Results   Component Value Date    LDL 91 07/08/2024     (H) 01/26/2024     No results found for: \"PROBNP\"            Procedures       I personally viewed and interpreted the patient's LAB data         Assessment:     1. Primary hypertension    2. Anxiety    3. DDD (degenerative disc disease), lumbar          Plan: "     Hypertension is mildly elevated.  Patient was advised to continue Norvasc, hydralazine, Lopressor.  He has clonidine that he can use on as needed basis.  He will keep a record of his blood pressure.    Anxiety is well-controlled with Xanax which was renewed.    Chronic back pain and prostate cancer.  Norco refill was given.  Follow-up scheduled        No follow-ups on file.

## 2024-08-30 ENCOUNTER — OFFICE VISIT (OUTPATIENT)
Dept: CARDIOLOGY | Facility: CLINIC | Age: 79
End: 2024-08-30
Payer: MEDICARE

## 2024-08-30 VITALS
HEIGHT: 71 IN | BODY MASS INDEX: 30.07 KG/M2 | SYSTOLIC BLOOD PRESSURE: 124 MMHG | HEART RATE: 76 BPM | DIASTOLIC BLOOD PRESSURE: 70 MMHG | WEIGHT: 214.8 LBS | OXYGEN SATURATION: 95 %

## 2024-08-30 DIAGNOSIS — M51.36 DDD (DEGENERATIVE DISC DISEASE), LUMBAR: ICD-10-CM

## 2024-08-30 DIAGNOSIS — F41.9 ANXIETY: Primary | ICD-10-CM

## 2024-08-30 PROCEDURE — 3078F DIAST BP <80 MM HG: CPT | Performed by: INTERNAL MEDICINE

## 2024-08-30 PROCEDURE — 3074F SYST BP LT 130 MM HG: CPT | Performed by: INTERNAL MEDICINE

## 2024-08-30 PROCEDURE — 99213 OFFICE O/P EST LOW 20 MIN: CPT | Performed by: INTERNAL MEDICINE

## 2024-08-30 RX ORDER — HYDROCODONE BITARTRATE AND ACETAMINOPHEN 7.5; 325 MG/1; MG/1
1 TABLET ORAL 3 TIMES DAILY PRN
Qty: 90 TABLET | Refills: 0 | Status: SHIPPED | OUTPATIENT
Start: 2024-08-30

## 2024-08-30 NOTE — PROGRESS NOTES
subjective     Chief Complaint   Patient presents with    Hypertension    Hyperlipidemia       Problems Addressed This Visit  1. Anxiety    2. DDD (degenerative disc disease), lumbar        History of Present Illness  Mr. Stapleton is 78 years old white male who is here for pain management and anxiety control.  Patient has history of prostate cancer and chronic back pain.  He is taking Norco 7.5 every 8 hours as needed.  He is compliant with medications and needs refill.    Anxiety is controlled with Xanax every 12 hours as needed.  He is taking Prozac 20 mg daily also.    Patient also has hypertension which is very well-controlled with Norvasc, hydralazine, Lopressor.    Hyperlipidemia on Crestor therapy no drug side effects.  Patient also is diabetic on metformin and Januvia  He also has urinary incontinence and states the device is not working.      Past Surgical History:   Procedure Laterality Date    COLONOSCOPY  03/2018    EYE SURGERY      FOOT SURGERY      HERNIA REPAIR      HYDROCELECTOMY  06/15/2015    PROSTATE BIOPSY  2018    PROSTATE FIDUCIAL MARKER PLACEMENT  09/14/2018    RHINOPLASTY      UPPER GASTROINTESTINAL ENDOSCOPY  03/24/2014    WITH BIOPSIES AND DILATION     Family History   Problem Relation Age of Onset    Kidney disease Other     Other Other         CHRONIC DISABLING DISEASES URINARY TRACT DISEASE    Diabetes Other     Hypertension Other     Other Mother     Heart failure Father     Stroke Sister     Arthritis Sister     Heart disease Brother     No Known Problems Brother     No Known Problems Brother      Past Medical History:   Diagnosis Date    Anxiety     Arthritis     Colitis     Depression     GERD (gastroesophageal reflux disease)     Gout     Heart murmur     History of EKG 12/22/2015    NORMAL    Hyperlipidemia     Hypertension     Obesity     Pancreatitis     history of    Prostate cancer     Renal failure     MILD one functioning kidney    Skin cancer     basal cell cancer on back      Patient Active Problem List   Diagnosis    Essential hypertension, labile    GERD (gastroesophageal reflux disease)    Renal failure    Hyperlipidemia    Anxiety    Depression    Type 2 diabetes mellitus    Periumbilical abdominal pain    DDD (degenerative disc disease), lumbar    Prostate cancer    Hyperuricemia    BURKS (dyspnea on exertion)    Bradycardia    Lower urinary tract symptoms due to benign prostatic hyperplasia    Raised prostate specific antigen    Chest pain in adult    Generalized anxiety disorder    Primary insomnia    Multiple rib fractures    Esophageal dysphagia    Cellulitis of left foot    Palpitations    Dysuria       Social History     Tobacco Use    Smoking status: Former     Current packs/day: 0.00     Types: Cigarettes     Quit date:      Years since quittin.6     Passive exposure: Never    Smokeless tobacco: Never   Vaping Use    Vaping status: Never Used   Substance Use Topics    Alcohol use: Yes     Alcohol/week: 7.0 standard drinks of alcohol     Types: 7 Shots of liquor per week     Comment:  once per day    Drug use: No       No Known Allergies    Current Outpatient Medications on File Prior to Visit   Medication Sig    allopurinol (ZYLOPRIM) 100 MG tablet Take 1 tablet by mouth Daily. (Patient taking differently: Take 1 tablet by mouth As Needed.)    ALPRAZolam (XANAX) 0.25 MG tablet Take 1 tablet by mouth 2 (Two) Times a Day As Needed for Anxiety or Sleep. for anxiety    amLODIPine (NORVASC) 10 MG tablet TAKE 1 TABLET BY MOUTH DAILY    aspirin 81 MG tablet Take 1 tablet by mouth Daily.    bicalutamide (CASODEX) 50 MG chemo tablet Casodex 50 mg tablet   Take 1 tablet every day by oral route for 30 days.    Blood Glucose Monitoring Suppl w/Device kit Check blood sugar once a day.    DX: E11.9    cholecalciferol (VITAMIN D3) 1000 UNITS tablet Take 1 tablet by mouth Daily.    colchicine 0.6 MG tablet Take 1 tablet by mouth Daily. PRN    Cyanocobalamin (VITAMIN B12 PO) Take   by mouth daily.    esomeprazole (nexIUM) 40 MG capsule Take 1 capsule by mouth Every Morning Before Breakfast. (Patient taking differently: Take 1 capsule by mouth 2 (Two) Times a Day.)    FLUoxetine (PROzac) 20 MG capsule TAKE 1 CAPSULE BY MOUTH DAILY    folic acid (FOLVITE) 1 MG tablet TAKE 1 TABLET BY MOUTH DAILY    glucose blood (Contour Next Test) test strip Check blood glucose daily    hydrALAZINE (APRESOLINE) 50 MG tablet TAKE 1 TABLET BY MOUTH 3 TIMES  DAILY    indomethacin (INDOCIN) 25 MG capsule Take 1 capsule by mouth 3 (Three) Times a Day As Needed for Mild Pain .    leuprolide (LUPRON) 30 MG injection Inject 30 mg into the appropriate muscle as directed by prescriber Every 4 (Four) Months.    loratadine (Claritin) 10 MG tablet Take 1 tablet by mouth Daily.    meclizine (ANTIVERT) 25 MG tablet Take 1 tablet by mouth 3 (Three) Times a Day As Needed for Dizziness.    metFORMIN (GLUCOPHAGE) 500 MG tablet TAKE 1 TABLET BY MOUTH DAILY  WITH BREAKFAST    metoprolol tartrate (LOPRESSOR) 25 MG tablet TAKE 1 TABLET BY MOUTH TWICE  DAILY    Microlet Lancets misc 1 stick Daily.    Mirabegron ER (MYRBETRIQ) 50 MG tablet sustained-release 24 hour 24 hr tablet 50 mg Daily As Needed.    Pyridoxine HCl (VITAMIN B-6 PO) Take  by mouth daily.    rosuvastatin (CRESTOR) 20 MG tablet Take 1 tablet by mouth Every Night.    SITagliptin (Januvia) 100 MG tablet Take 1 tablet by mouth Daily.    [DISCONTINUED] HYDROcodone-acetaminophen (NORCO) 7.5-325 MG per tablet Take 1 tablet by mouth 3 (Three) Times a Day As Needed for Moderate Pain.    cefdinir (OMNICEF) 300 MG capsule Take 1 capsule by mouth 2 (Two) Times a Day. (Patient not taking: Reported on 8/30/2024)    mirtazapine (Remeron) 15 MG tablet Take 1 tablet by mouth Every Night. (Patient not taking: Reported on 8/30/2024)     No current facility-administered medications on file prior to visit.     (Not in a hospital admission)      Results for orders placed during the  hospital encounter of 07/13/21    Adult Transthoracic Echo Complete W/ Cont if Necessary Per Protocol    Interpretation Summary  · Normal left ventricular cavity size and wall thickness noted.  · Left ventricular ejection fraction appears to be 61 - 65%. Left ventricular systolic function is normal.  · Left ventricular diastolic function was normal.  · The aortic valve is structurally normal with no regurgitation or stenosis present. The aortic valve appears trileaflet.  · The mitral valve is structurally normal with no regurgitation or significant stenosis present.  · No significant valvular heart disease  · There is no evidence of pericardial effusion.    Results for orders placed during the hospital encounter of 07/13/21    Stress Test With Myocardial Perfusion One Day    Interpretation Summary  · A pharmacological stress test was performed using regadenoson without low-level exercise.  · Resting EKG showed sinus rhythm rate of 62 bpm incomplete right bundle branch block was noted.  · Patient tolerated lexiscan well without complications, no aminophylline was administered  · ST segments did not show any diagnostic changes, there was no arrhythmia detected.  · Left ventricular ejection fraction is hyperdynamic (Calculated EF > 70%). .  · Myocardial perfusion imaging indicates a normal myocardial perfusion study with no evidence of ischemia.  · Impressions are consistent with a low risk study.  · Compared to the prior study from 5/3/2018 the current study reveals no changes.          The following portions of the patient's history were reviewed and updated as appropriate: allergies, current medications, past family history, past medical history, past social history, past surgical history and problem list.    Review of Systems   Constitutional: Negative.   HENT: Negative.  Negative for congestion.    Eyes: Negative.    Cardiovascular: Negative.  Negative for chest pain, cyanosis, dyspnea on exertion, irregular  "heartbeat, leg swelling, near-syncope, orthopnea, palpitations, paroxysmal nocturnal dyspnea and syncope.   Respiratory: Negative.  Negative for shortness of breath.    Hematologic/Lymphatic: Negative.    Musculoskeletal:  Positive for arthritis and back pain.   Gastrointestinal: Negative.    Neurological: Negative.  Negative for headaches.   Psychiatric/Behavioral:  The patient is nervous/anxious.           Objective:     /70 (BP Location: Left arm, Patient Position: Sitting, Cuff Size: Adult)   Pulse 76   Ht 180.3 cm (71\")   Wt 97.4 kg (214 lb 12.8 oz)   SpO2 95%   BMI 29.96 kg/m²   Pulmonary:      Effort: Pulmonary effort is normal.      Breath sounds: Normal breath sounds. No stridor. No wheezing. No rhonchi. No rales.   Cardiovascular:      PMI at left midclavicular line. Normal rate. Regular rhythm. Normal S1. Normal S2.       Murmurs: There is no murmur.      No gallop.  No click. No rub.   Pulses:     Intact distal pulses.   Edema:     Peripheral edema absent.           Lab Review  Lab Results   Component Value Date     07/08/2024    K 4.5 07/08/2024     07/08/2024    BUN 15 07/08/2024    CREATININE 1.30 (H) 07/08/2024    GLUCOSE 132 (H) 07/08/2024    CALCIUM 9.1 07/08/2024    ALT 20 07/08/2024    ALKPHOS 44 07/08/2024    LABIL2 1.8 03/07/2016     Lab Results   Component Value Date    CKTOTAL 75 07/08/2024     Lab Results   Component Value Date    WBC 9.35 07/08/2024    HGB 14.1 07/08/2024    HCT 41.7 07/08/2024     07/08/2024     No results found for: \"INR\"  No results found for: \"MG\"  Lab Results   Component Value Date    PSA <0.014 12/09/2019    TSH 2.807 09/28/2015     No results found for: \"BNP\"  Lab Results   Component Value Date    CHLPL 227 (H) 03/07/2016    CHOL 168 07/08/2024    TRIG 179 (H) 07/08/2024    HDL 46 07/08/2024    VLDL 31 07/08/2024    LDL 91 07/08/2024     Lab Results   Component Value Date    LDL 91 07/08/2024     (H) 01/26/2024     No results " "found for: \"PROBNP\"            Procedures       I personally viewed and interpreted the patient's LAB data         Assessment:     1. Anxiety    2. DDD (degenerative disc disease), lumbar          Plan:     Physiatry  Patient has prostate cancer and chronic back pain  He is taking Xanax along with Zoloft.  Refill was given.    Chronic back pain  Patient is taking Indocin on a as needed basis along with Norco refill was given side effects explained.  Patient has chronic kidney disease and cannot take higher dose of NSAIDs.    Blood pressure is very well-controlled.  Lipid control is good with LDL of 91.  Refill was given follow-up scheduled        No follow-ups on file.  "

## 2024-09-09 NOTE — PROGRESS NOTES
subjective     Chief Complaint   Patient presents with   • Hyperlipidemia   • Follow-up     History of Present Illness  Patient is 73 years old white male here for follow-up.  Blood pressure seems to be very well controlled with current medications.  Hyperlipidemia also is controlled.  Patient will continue Crestor.  Patient has a lot of anxiety.  He is taking Xanax and Prozac.  Because of degenerative disc disease he has a lot of chronic back pain and takes hydrocodone.  Patient has renal stones status post lithotripsy and still passing stone.  Is causing a lot of pain.    Past Surgical History:   Procedure Laterality Date   • COLONOSCOPY  03/2018   • EYE SURGERY     • FOOT SURGERY     • HERNIA REPAIR     • HYDROCELECTOMY  06/15/2015   • PROSTATE BIOPSY  2018   • PROSTATE FIDUCIAL MARKER PLACEMENT  09/14/2018   • RHINOPLASTY     • UPPER GASTROINTESTINAL ENDOSCOPY  03/24/2014    WITH BIOPSIES AND DILATION     Family History   Problem Relation Age of Onset   • Kidney disease Other    • Other Other         CHRONIC DISABLING DISEASES URINARY TRACT DISEASE   • Diabetes Other    • Hypertension Other    • Other Mother    • Heart failure Father    • Stroke Sister    • Arthritis Sister    • Heart disease Brother    • No Known Problems Brother    • No Known Problems Brother      Past Medical History:   Diagnosis Date   • Anxiety    • Arthritis    • Colitis    • Depression    • GERD (gastroesophageal reflux disease)    • Gout    • Heart murmur    • History of EKG 12/22/2015    NORMAL   • Hyperlipidemia    • Hypertension    • Obesity    • Pancreatitis     history of   • Prostate cancer (CMS/HCC)    • Renal failure     MILD one functioning kidney   • Skin cancer      Patient Active Problem List   Diagnosis   • Essential hypertension, labile   • GERD (gastroesophageal reflux disease)   • Renal failure   • Hyperlipidemia   • Anxiety   • Depression   • Type 2 diabetes mellitus (CMS/HCC)   • Periumbilical abdominal pain   • DDD  (degenerative disc disease), lumbar   • Prostate cancer (CMS/HCC)   • Hyperuricemia       Social History     Tobacco Use   • Smoking status: Former Smoker     Packs/day: 1.00     Types: Cigarettes     Last attempt to quit:      Years since quittin.5   • Smokeless tobacco: Never Used   Substance Use Topics   • Alcohol use: Yes     Alcohol/week: 4.2 oz     Types: 7 Shots of liquor per week     Comment:  once per day   • Drug use: No       No Known Allergies    Current Outpatient Medications on File Prior to Visit   Medication Sig   • allopurinol (ZYLOPRIM) 300 MG tablet Take 1 tablet by mouth Daily.   • amLODIPine (NORVASC) 10 MG tablet Take 1 tablet by mouth Daily.   • aspirin 81 MG tablet Take 81 mg by mouth daily.   • bicalutamide (CASODEX) 50 MG chemo tablet Casodex 50 mg tablet   Take 1 tablet every day by oral route for 30 days.   • Blood Glucose Monitoring Suppl w/Device kit Check blood sugar once a day.    DX: E11.9   • cholecalciferol (VITAMIN D3) 1000 UNITS tablet Take 1,000 Units by mouth daily.   • cloNIDine (CATAPRES) 0.1 MG tablet Take 0.05 mg by mouth As Needed. TID TO QID PRN    • Cyanocobalamin (VITAMIN B12 PO) Take  by mouth daily.   • FLUoxetine (PROzac) 20 MG capsule Take 1 capsule by mouth Daily.   • folic acid (FOLVITE) 1 MG tablet Take 1 tablet by mouth Daily.   • hydrALAZINE (APRESOLINE) 50 MG tablet Take 1 tablet by mouth 3 (Three) Times a Day.   • hydrOXYzine (ATARAX) 25 MG tablet Take 1 tablet by mouth At Night As Needed (PRN).   • Leuprolide Acetate (ELIGARD SC) Inject  under the skin into the appropriate area as directed. 2018 first one month shot   • loratadine (CLARITIN) 10 MG tablet Take 1 tablet by mouth Daily.   • meclizine (ANTIVERT) 25 MG tablet Take 1 tablet by mouth 3 (three) times a day as needed for dizziness.   • metoprolol tartrate (LOPRESSOR) 25 MG tablet Take 1 tablet by mouth 2 (Two) Times a Day.   • pantoprazole (PROTONIX) 40 MG EC tablet Take 1 tablet by  "mouth Daily.   • Pyridoxine HCl (VITAMIN B-6 PO) Take  by mouth daily.   • rosuvastatin (CRESTOR) 10 MG tablet Take 1 tablet by mouth Every Night.   • tamsulosin (FLOMAX) 0.4 MG capsule 24 hr capsule Take 1 capsule by mouth Daily.   • [DISCONTINUED] ALPRAZolam (XANAX) 0.25 MG tablet Take 1 tablet by mouth 2 (Two) Times a Day As Needed (as needed for anxiety).   • [DISCONTINUED] HYDROcodone-acetaminophen (NORCO) 7.5-325 MG per tablet Take 1 tablet by mouth 3 (Three) Times a Day As Needed for Moderate Pain .   • [DISCONTINUED] sulfamethoxazole-trimethoprim (BACTRIM DS) 800-160 MG per tablet Take 1 tablet by mouth 2 (Two) Times a Day.     No current facility-administered medications on file prior to visit.          The following portions of the patient's history were reviewed and updated as appropriate: allergies, current medications, past family history, past medical history, past social history, past surgical history and problem list.    Review of Systems   Constitution: Negative.   HENT: Negative.  Negative for congestion.    Eyes: Negative.    Cardiovascular: Negative.  Negative for chest pain, cyanosis, dyspnea on exertion, irregular heartbeat, leg swelling, near-syncope, orthopnea, palpitations, paroxysmal nocturnal dyspnea and syncope.   Respiratory: Negative.  Negative for shortness of breath.    Hematologic/Lymphatic: Negative.    Musculoskeletal: Positive for arthritis, back pain and stiffness.   Gastrointestinal: Negative.    Neurological: Negative.  Negative for headaches.          Objective:     /66 (BP Location: Left arm, Patient Position: Sitting)   Pulse 59   Ht 180.3 cm (71\")   Wt 97.5 kg (215 lb)   SpO2 96%   BMI 29.99 kg/m²   Physical Exam   Constitutional: He appears well-developed and well-nourished. No distress.   HENT:   Head: Normocephalic and atraumatic.   Mouth/Throat: Oropharynx is clear and moist. No oropharyngeal exudate.   Eyes: Conjunctivae and EOM are normal. Pupils are equal, " round, and reactive to light. No scleral icterus.   Neck: Normal range of motion. Neck supple. No JVD present. No tracheal deviation present. No thyromegaly present.   Cardiovascular: Normal rate, regular rhythm, normal heart sounds and intact distal pulses. PMI is not displaced. Exam reveals no gallop, no friction rub and no decreased pulses.   No murmur heard.  Pulses:       Carotid pulses are 3+ on the right side, and 3+ on the left side.       Radial pulses are 3+ on the right side, and 3+ on the left side.   Pulmonary/Chest: Effort normal and breath sounds normal. No respiratory distress. He has no wheezes. He has no rales. He exhibits no tenderness.   Abdominal: Soft. Bowel sounds are normal. He exhibits no distension, no abdominal bruit and no mass. There is no splenomegaly or hepatomegaly. There is no tenderness. There is no rebound and no guarding.   Musculoskeletal: Normal range of motion. He exhibits no edema, tenderness or deformity.   Lymphadenopathy:     He has no cervical adenopathy.   Neurological: He is alert. He has normal reflexes. No cranial nerve deficit. He exhibits normal muscle tone. Coordination normal.   Skin: Skin is warm and dry. No rash noted. He is not diaphoretic. No erythema.   Psychiatric: He has a normal mood and affect. His behavior is normal. Judgment and thought content normal.         Lab Review  Lab Results   Component Value Date     04/05/2019    K 4.2 04/05/2019     04/05/2019    BUN 22 04/05/2019    CREATININE 1.39 (H) 04/05/2019    GLUCOSE 139 (H) 04/05/2019    CALCIUM 10.1 04/05/2019    ALT 19 04/05/2019    ALKPHOS 51 04/05/2019    LABIL2 1.8 03/07/2016     Lab Results   Component Value Date    CKTOTAL 86 04/05/2019     Lab Results   Component Value Date    WBC 10.03 04/05/2019    HGB 14.7 04/05/2019    HCT 45.3 04/05/2019     04/05/2019     No results found for: INR  No results found for: MG  Lab Results   Component Value Date    PSA 3.00 05/26/2016     TSH 2.807 09/28/2015     No results found for: BNP  Lab Results   Component Value Date    CHLPL 227 (H) 03/07/2016    CHOL 158 04/05/2019    TRIG 176 (H) 04/05/2019    HDL 50 04/05/2019    VLDL 35.2 04/05/2019    LDLHDL 1.46 04/05/2019     Lab Results   Component Value Date    LDL 73 04/05/2019     (H) 11/06/2018       Procedures       I personally viewed and interpreted the patient's LAB data         Assessment:     1. Essential hypertension    2. Mixed hyperlipidemia    3. Gastroesophageal reflux disease without esophagitis    4. Anxiety    5. DDD (degenerative disc disease), lumbar    6. Stage 2 chronic kidney disease    7. Hyperuricemia          Plan:     Hyperlipidemia seems to be much better controlled Crestor was continued.  Anxiety is also controlled Zoloft and Xanax was continued.  Blood pressure is controlled no change in therapy was made.  Refills of pain medication were given.  Follow-up scheduled        No Follow-up on file.   74 Y/O M  WITH PMHX HLD, SCHEDULED FOR PAST FOR ROBOTIC RIGHT INGUINAL HERNIA REPAIR WITH [facility], POSSIBLE LEFT INGUINAL HERNIA REPAIR, POSSIBLE OPEN REPAIR UNDER GA WITH DR SCHULZ ON 9/18/24. PT REPORTS RIGHT GROIN HERNIA FOR ABOUT 15 YRS. HE IS ASYMPTOMATIC BUT THE HERNIA HAS GROWN IN SIZE OVER THE YEARS.

## 2024-09-30 ENCOUNTER — OFFICE VISIT (OUTPATIENT)
Dept: CARDIOLOGY | Facility: CLINIC | Age: 79
End: 2024-09-30
Payer: MEDICARE

## 2024-09-30 VITALS
HEIGHT: 71 IN | WEIGHT: 217 LBS | OXYGEN SATURATION: 96 % | HEART RATE: 62 BPM | DIASTOLIC BLOOD PRESSURE: 78 MMHG | BODY MASS INDEX: 30.38 KG/M2 | SYSTOLIC BLOOD PRESSURE: 150 MMHG

## 2024-09-30 DIAGNOSIS — M51.369 DDD (DEGENERATIVE DISC DISEASE), LUMBAR: ICD-10-CM

## 2024-09-30 DIAGNOSIS — E78.2 MIXED HYPERLIPIDEMIA: Primary | ICD-10-CM

## 2024-09-30 DIAGNOSIS — F41.9 ANXIETY: ICD-10-CM

## 2024-09-30 DIAGNOSIS — I10 PRIMARY HYPERTENSION: ICD-10-CM

## 2024-09-30 PROCEDURE — 3078F DIAST BP <80 MM HG: CPT | Performed by: INTERNAL MEDICINE

## 2024-09-30 PROCEDURE — 99213 OFFICE O/P EST LOW 20 MIN: CPT | Performed by: INTERNAL MEDICINE

## 2024-09-30 PROCEDURE — 3077F SYST BP >= 140 MM HG: CPT | Performed by: INTERNAL MEDICINE

## 2024-09-30 RX ORDER — HYDROCODONE BITARTRATE AND ACETAMINOPHEN 7.5; 325 MG/1; MG/1
1 TABLET ORAL 3 TIMES DAILY PRN
Qty: 90 TABLET | Refills: 0 | Status: SHIPPED | OUTPATIENT
Start: 2024-09-30

## 2024-09-30 RX ORDER — ALPRAZOLAM 0.25 MG
0.25 TABLET ORAL 2 TIMES DAILY PRN
Qty: 60 TABLET | Refills: 2 | Status: SHIPPED | OUTPATIENT
Start: 2024-09-30

## 2024-09-30 NOTE — PROGRESS NOTES
subjective     Chief Complaint   Patient presents with    Anxiety     Follow up    Back Pain     Follow up    Med Refill     pending       Problems Addressed This Visit  1. Anxiety    2. DDD (degenerative disc disease), lumbar        History of Present Illness    Yumiko is 79 years old white male who is here for pain management.  Patient has history of prostate CA, chronic back pain and anxiety.  He is taking Norco 7.5 every 8 hours as needed.  Is compliant with medications and needs refill.    Anxiety disorder , controlled with Xanax 0.25 twice daily as needed.    Patient has multiple other medical problems including hypertension, hyperuricemia, urinary incontinence,, hyperlipidemia diabetes mellitus.  He is overall doing fairly well, blood pressure is mildly elevated today.      Past Surgical History:   Procedure Laterality Date    COLONOSCOPY  03/2018    EYE SURGERY      FOOT SURGERY      HERNIA REPAIR      HYDROCELECTOMY  06/15/2015    PROSTATE BIOPSY  2018    PROSTATE FIDUCIAL MARKER PLACEMENT  09/14/2018    RHINOPLASTY      UPPER GASTROINTESTINAL ENDOSCOPY  03/24/2014    WITH BIOPSIES AND DILATION     Family History   Problem Relation Age of Onset    Kidney disease Other     Other Other         CHRONIC DISABLING DISEASES URINARY TRACT DISEASE    Diabetes Other     Hypertension Other     Other Mother     Heart failure Father     Stroke Sister     Arthritis Sister     Heart disease Brother     No Known Problems Brother     No Known Problems Brother      Past Medical History:   Diagnosis Date    Anxiety     Arthritis     Colitis     Depression     GERD (gastroesophageal reflux disease)     Gout     Heart murmur     History of EKG 12/22/2015    NORMAL    Hyperlipidemia     Hypertension     Obesity     Pancreatitis     history of    Prostate cancer     Renal failure     MILD one functioning kidney    Skin cancer     basal cell cancer on back     Patient Active Problem List   Diagnosis    Essential hypertension,  labile    GERD (gastroesophageal reflux disease)    Renal failure    Hyperlipidemia    Anxiety    Depression    Type 2 diabetes mellitus    Periumbilical abdominal pain    DDD (degenerative disc disease), lumbar    Prostate cancer    Hyperuricemia    BURKS (dyspnea on exertion)    Bradycardia    Lower urinary tract symptoms due to benign prostatic hyperplasia    Raised prostate specific antigen    Chest pain in adult    Generalized anxiety disorder    Primary insomnia    Multiple rib fractures    Esophageal dysphagia    Cellulitis of left foot    Palpitations    Dysuria       Social History     Tobacco Use    Smoking status: Former     Current packs/day: 0.00     Types: Cigarettes     Quit date:      Years since quittin.7     Passive exposure: Never    Smokeless tobacco: Never   Vaping Use    Vaping status: Never Used   Substance Use Topics    Alcohol use: Yes     Alcohol/week: 7.0 standard drinks of alcohol     Types: 7 Shots of liquor per week     Comment:  once per day    Drug use: No       No Known Allergies    Current Outpatient Medications on File Prior to Visit   Medication Sig    allopurinol (ZYLOPRIM) 100 MG tablet Take 1 tablet by mouth Daily. (Patient taking differently: Take 1 tablet by mouth As Needed.)    amLODIPine (NORVASC) 10 MG tablet TAKE 1 TABLET BY MOUTH DAILY    aspirin 81 MG tablet Take 1 tablet by mouth Daily.    bicalutamide (CASODEX) 50 MG chemo tablet Casodex 50 mg tablet   Take 1 tablet every day by oral route for 30 days.    Blood Glucose Monitoring Suppl w/Device kit Check blood sugar once a day.    DX: E11.9    cholecalciferol (VITAMIN D3) 1000 UNITS tablet Take 1 tablet by mouth Daily.    colchicine 0.6 MG tablet Take 1 tablet by mouth Daily. PRN    Cyanocobalamin (VITAMIN B12 PO) Take  by mouth daily.    FLUoxetine (PROzac) 20 MG capsule TAKE 1 CAPSULE BY MOUTH DAILY    folic acid (FOLVITE) 1 MG tablet TAKE 1 TABLET BY MOUTH DAILY    glucose blood (Contour Next Test) test  strip Check blood glucose daily    hydrALAZINE (APRESOLINE) 50 MG tablet TAKE 1 TABLET BY MOUTH 3 TIMES  DAILY    indomethacin (INDOCIN) 25 MG capsule Take 1 capsule by mouth 3 (Three) Times a Day As Needed for Mild Pain .    leuprolide (LUPRON) 30 MG injection Inject 30 mg into the appropriate muscle as directed by prescriber Every 4 (Four) Months.    loratadine (Claritin) 10 MG tablet Take 1 tablet by mouth Daily.    meclizine (ANTIVERT) 25 MG tablet Take 1 tablet by mouth 3 (Three) Times a Day As Needed for Dizziness.    metFORMIN (GLUCOPHAGE) 500 MG tablet TAKE 1 TABLET BY MOUTH DAILY  WITH BREAKFAST    metoprolol tartrate (LOPRESSOR) 25 MG tablet TAKE 1 TABLET BY MOUTH TWICE  DAILY    Microlet Lancets misc 1 stick Daily.    Pyridoxine HCl (VITAMIN B-6 PO) Take  by mouth daily.    rosuvastatin (CRESTOR) 20 MG tablet Take 1 tablet by mouth Every Night.    SITagliptin (Januvia) 100 MG tablet Take 1 tablet by mouth Daily.    [DISCONTINUED] ALPRAZolam (XANAX) 0.25 MG tablet Take 1 tablet by mouth 2 (Two) Times a Day As Needed for Anxiety or Sleep. for anxiety    [DISCONTINUED] esomeprazole (nexIUM) 40 MG capsule Take 1 capsule by mouth Every Morning Before Breakfast. (Patient taking differently: Take 1 capsule by mouth 2 (Two) Times a Day.)    [DISCONTINUED] HYDROcodone-acetaminophen (NORCO) 7.5-325 MG per tablet Take 1 tablet by mouth 3 (Three) Times a Day As Needed for Moderate Pain.    [DISCONTINUED] Mirabegron ER (MYRBETRIQ) 50 MG tablet sustained-release 24 hour 24 hr tablet 50 mg Daily As Needed. (Patient not taking: Reported on 9/30/2024)     No current facility-administered medications on file prior to visit.     (Not in a hospital admission)      Results for orders placed during the hospital encounter of 07/13/21    Adult Transthoracic Echo Complete W/ Cont if Necessary Per Protocol    Interpretation Summary  · Normal left ventricular cavity size and wall thickness noted.  · Left ventricular ejection  fraction appears to be 61 - 65%. Left ventricular systolic function is normal.  · Left ventricular diastolic function was normal.  · The aortic valve is structurally normal with no regurgitation or stenosis present. The aortic valve appears trileaflet.  · The mitral valve is structurally normal with no regurgitation or significant stenosis present.  · No significant valvular heart disease  · There is no evidence of pericardial effusion.    Results for orders placed during the hospital encounter of 07/13/21    Stress Test With Myocardial Perfusion One Day    Interpretation Summary  · A pharmacological stress test was performed using regadenoson without low-level exercise.  · Resting EKG showed sinus rhythm rate of 62 bpm incomplete right bundle branch block was noted.  · Patient tolerated lexiscan well without complications, no aminophylline was administered  · ST segments did not show any diagnostic changes, there was no arrhythmia detected.  · Left ventricular ejection fraction is hyperdynamic (Calculated EF > 70%). .  · Myocardial perfusion imaging indicates a normal myocardial perfusion study with no evidence of ischemia.  · Impressions are consistent with a low risk study.  · Compared to the prior study from 5/3/2018 the current study reveals no changes.          The following portions of the patient's history were reviewed and updated as appropriate: allergies, current medications, past family history, past medical history, past social history, past surgical history and problem list.    Review of Systems   Constitutional: Negative.   HENT: Negative.  Negative for congestion.    Eyes: Negative.    Cardiovascular: Negative.  Negative for chest pain, cyanosis, dyspnea on exertion, irregular heartbeat, leg swelling, near-syncope, orthopnea, palpitations, paroxysmal nocturnal dyspnea and syncope.   Respiratory: Negative.  Negative for shortness of breath.    Hematologic/Lymphatic: Negative.    Musculoskeletal:   "Positive for arthritis and back pain.   Gastrointestinal: Negative.    Neurological: Negative.  Negative for headaches.   Psychiatric/Behavioral:  The patient is nervous/anxious.           Objective:     /78 (BP Location: Left arm, Patient Position: Sitting, Cuff Size: Adult)   Pulse 62   Ht 180.3 cm (71\")   Wt 98.4 kg (217 lb)   SpO2 96%   BMI 30.27 kg/m²   Physical Exam      Lab Review  Lab Results   Component Value Date     07/08/2024    K 4.5 07/08/2024     07/08/2024    BUN 15 07/08/2024    CREATININE 1.30 (H) 07/08/2024    GLUCOSE 132 (H) 07/08/2024    CALCIUM 9.1 07/08/2024    ALT 20 07/08/2024    ALKPHOS 44 07/08/2024    LABIL2 1.8 03/07/2016     Lab Results   Component Value Date    CKTOTAL 75 07/08/2024     Lab Results   Component Value Date    WBC 9.35 07/08/2024    HGB 14.1 07/08/2024    HCT 41.7 07/08/2024     07/08/2024     No results found for: \"INR\"  No results found for: \"MG\"  Lab Results   Component Value Date    PSA <0.014 12/09/2019    TSH 2.807 09/28/2015     No results found for: \"BNP\"  Lab Results   Component Value Date    CHLPL 227 (H) 03/07/2016    CHOL 168 07/08/2024    TRIG 179 (H) 07/08/2024    HDL 46 07/08/2024    VLDL 31 07/08/2024    LDL 91 07/08/2024     Lab Results   Component Value Date    LDL 91 07/08/2024     (H) 01/26/2024     No results found for: \"PROBNP\"            Procedures       I personally viewed and interpreted the patient's LAB data         Assessment:     1. Anxiety    2. DDD (degenerative disc disease), lumbar          Plan:     Chronic anxiety disorder  Patient will continue Xanax 0.25 twice daily as needed and Zoloft.    Chronic back pain  Patient is compliant with medications Norco refill was given.    He recently had eye surgery and is overall doing very well.  Blood pressure is mildly elevated however his blood pressure at home is around 130 systolic.  He will continue hydralazine 50 mg 3 times daily and Lopressor.        No " follow-ups on file.

## 2024-11-01 ENCOUNTER — OFFICE VISIT (OUTPATIENT)
Dept: CARDIOLOGY | Facility: CLINIC | Age: 79
End: 2024-11-01
Payer: MEDICARE

## 2024-11-01 VITALS
HEIGHT: 71 IN | WEIGHT: 214 LBS | OXYGEN SATURATION: 95 % | DIASTOLIC BLOOD PRESSURE: 76 MMHG | SYSTOLIC BLOOD PRESSURE: 140 MMHG | BODY MASS INDEX: 29.96 KG/M2 | HEART RATE: 60 BPM

## 2024-11-01 DIAGNOSIS — F41.9 ANXIETY: ICD-10-CM

## 2024-11-01 DIAGNOSIS — E11.21 TYPE 2 DIABETES MELLITUS WITH DIABETIC NEPHROPATHY, WITHOUT LONG-TERM CURRENT USE OF INSULIN: ICD-10-CM

## 2024-11-01 DIAGNOSIS — I10 PRIMARY HYPERTENSION: ICD-10-CM

## 2024-11-01 DIAGNOSIS — M51.369 DDD (DEGENERATIVE DISC DISEASE), LUMBAR: Primary | ICD-10-CM

## 2024-11-01 DIAGNOSIS — E78.2 MIXED HYPERLIPIDEMIA: ICD-10-CM

## 2024-11-01 PROCEDURE — 99213 OFFICE O/P EST LOW 20 MIN: CPT | Performed by: INTERNAL MEDICINE

## 2024-11-01 PROCEDURE — 3077F SYST BP >= 140 MM HG: CPT | Performed by: INTERNAL MEDICINE

## 2024-11-01 PROCEDURE — 3078F DIAST BP <80 MM HG: CPT | Performed by: INTERNAL MEDICINE

## 2024-11-01 RX ORDER — ALPRAZOLAM 0.25 MG/1
0.25 TABLET ORAL 2 TIMES DAILY PRN
Qty: 60 TABLET | Refills: 2 | Status: SHIPPED | OUTPATIENT
Start: 2024-11-01

## 2024-11-01 RX ORDER — HYDROCODONE BITARTRATE AND ACETAMINOPHEN 7.5; 325 MG/1; MG/1
1 TABLET ORAL 3 TIMES DAILY PRN
Qty: 90 TABLET | Refills: 0 | Status: SHIPPED | OUTPATIENT
Start: 2024-11-01

## 2024-11-01 NOTE — PROGRESS NOTES
subjective     Chief Complaint   Patient presents with    Hypertension    Med Refill    Anxiety     Follow up       Problems Addressed This Visit  1. DDD (degenerative disc disease), lumbar    2. Anxiety    3. Mixed hyperlipidemia    4. Primary hypertension    5. Type 2 diabetes mellitus with diabetic nephropathy, without long-term current use of insulin        History of Present Illness    Patient is 79 years old white male who is here for pain management.  Patient has history of prostate cancer and chronic back pain.  He has been taking hydrocodone 7.5 mg every 8 hours as needed and Xanax 0.25 twice daily because of anxiety disorder.  He is compliant with medications and needs refill.    Other medical problems include hyperlipidemia, hypertension which is labile, diabetes mellitus.  Overall he is doing fairly well.  Complains of fatigue tiredness lack of energy and has had urinary incontinence he is taking his medications regularly.      Past Surgical History:   Procedure Laterality Date    COLONOSCOPY  03/2018    EYE SURGERY      FOOT SURGERY      HERNIA REPAIR      HYDROCELECTOMY  06/15/2015    PROSTATE BIOPSY  2018    PROSTATE FIDUCIAL MARKER PLACEMENT  09/14/2018    RHINOPLASTY      UPPER GASTROINTESTINAL ENDOSCOPY  03/24/2014    WITH BIOPSIES AND DILATION     Family History   Problem Relation Age of Onset    Kidney disease Other     Other Other         CHRONIC DISABLING DISEASES URINARY TRACT DISEASE    Diabetes Other     Hypertension Other     Other Mother     Heart failure Father     Stroke Sister     Arthritis Sister     Heart disease Brother     No Known Problems Brother     No Known Problems Brother      Past Medical History:   Diagnosis Date    Anxiety     Arthritis     Colitis     Depression     GERD (gastroesophageal reflux disease)     Gout     Heart murmur     History of EKG 12/22/2015    NORMAL    Hyperlipidemia     Hypertension     Obesity     Pancreatitis     history of    Prostate cancer      Renal failure     MILD one functioning kidney    Skin cancer     basal cell cancer on back     Patient Active Problem List   Diagnosis    Essential hypertension, labile    GERD (gastroesophageal reflux disease)    Renal failure    Hyperlipidemia    Anxiety    Depression    Type 2 diabetes mellitus    Periumbilical abdominal pain    DDD (degenerative disc disease), lumbar    Prostate cancer    Hyperuricemia    BURKS (dyspnea on exertion)    Bradycardia    Lower urinary tract symptoms due to benign prostatic hyperplasia    Raised prostate specific antigen    Chest pain in adult    Generalized anxiety disorder    Primary insomnia    Multiple rib fractures    Esophageal dysphagia    Cellulitis of left foot    Palpitations    Dysuria       Social History     Tobacco Use    Smoking status: Former     Current packs/day: 0.00     Types: Cigarettes     Quit date:      Years since quittin.8     Passive exposure: Never    Smokeless tobacco: Never   Vaping Use    Vaping status: Never Used   Substance Use Topics    Alcohol use: Yes     Alcohol/week: 7.0 standard drinks of alcohol     Types: 7 Shots of liquor per week     Comment:  once per day    Drug use: No       No Known Allergies    Current Outpatient Medications on File Prior to Visit   Medication Sig    allopurinol (ZYLOPRIM) 100 MG tablet Take 1 tablet by mouth Daily. (Patient taking differently: Take 1 tablet by mouth As Needed.)    amLODIPine (NORVASC) 10 MG tablet TAKE 1 TABLET BY MOUTH DAILY    aspirin 81 MG tablet Take 1 tablet by mouth Daily.    bicalutamide (CASODEX) 50 MG chemo tablet Casodex 50 mg tablet   Take 1 tablet every day by oral route for 30 days.    Blood Glucose Monitoring Suppl w/Device kit Check blood sugar once a day.    DX: E11.9    cholecalciferol (VITAMIN D3) 1000 UNITS tablet Take 1 tablet by mouth Daily.    colchicine 0.6 MG tablet Take 1 tablet by mouth Daily. PRN    Cyanocobalamin (VITAMIN B12 PO) Take  by mouth daily.    FLUoxetine  (PROzac) 20 MG capsule TAKE 1 CAPSULE BY MOUTH DAILY    folic acid (FOLVITE) 1 MG tablet TAKE 1 TABLET BY MOUTH DAILY    glucose blood (Contour Next Test) test strip Check blood glucose daily    hydrALAZINE (APRESOLINE) 50 MG tablet TAKE 1 TABLET BY MOUTH 3 TIMES  DAILY    indomethacin (INDOCIN) 25 MG capsule Take 1 capsule by mouth 3 (Three) Times a Day As Needed for Mild Pain .    leuprolide (LUPRON) 30 MG injection Inject 30 mg into the appropriate muscle as directed by prescriber Every 4 (Four) Months.    loratadine (Claritin) 10 MG tablet Take 1 tablet by mouth Daily.    meclizine (ANTIVERT) 25 MG tablet Take 1 tablet by mouth 3 (Three) Times a Day As Needed for Dizziness.    metFORMIN (GLUCOPHAGE) 500 MG tablet TAKE 1 TABLET BY MOUTH DAILY  WITH BREAKFAST    metoprolol tartrate (LOPRESSOR) 25 MG tablet TAKE 1 TABLET BY MOUTH TWICE  DAILY    Microlet Lancets misc 1 stick Daily.    Pyridoxine HCl (VITAMIN B-6 PO) Take  by mouth daily.    rosuvastatin (CRESTOR) 20 MG tablet Take 1 tablet by mouth Every Night.    SITagliptin (Januvia) 100 MG tablet Take 1 tablet by mouth Daily.    [DISCONTINUED] ALPRAZolam (XANAX) 0.25 MG tablet Take 1 tablet by mouth 2 (Two) Times a Day As Needed for Anxiety or Sleep. for anxiety    [DISCONTINUED] HYDROcodone-acetaminophen (NORCO) 7.5-325 MG per tablet Take 1 tablet by mouth 3 (Three) Times a Day As Needed for Moderate Pain.     No current facility-administered medications on file prior to visit.     (Not in a hospital admission)      Results for orders placed during the hospital encounter of 07/13/21    Adult Transthoracic Echo Complete W/ Cont if Necessary Per Protocol    Interpretation Summary  · Normal left ventricular cavity size and wall thickness noted.  · Left ventricular ejection fraction appears to be 61 - 65%. Left ventricular systolic function is normal.  · Left ventricular diastolic function was normal.  · The aortic valve is structurally normal with no regurgitation  "or stenosis present. The aortic valve appears trileaflet.  · The mitral valve is structurally normal with no regurgitation or significant stenosis present.  · No significant valvular heart disease  · There is no evidence of pericardial effusion.    Results for orders placed during the hospital encounter of 07/13/21    Stress Test With Myocardial Perfusion One Day    Interpretation Summary  · A pharmacological stress test was performed using regadenoson without low-level exercise.  · Resting EKG showed sinus rhythm rate of 62 bpm incomplete right bundle branch block was noted.  · Patient tolerated lexiscan well without complications, no aminophylline was administered  · ST segments did not show any diagnostic changes, there was no arrhythmia detected.  · Left ventricular ejection fraction is hyperdynamic (Calculated EF > 70%). .  · Myocardial perfusion imaging indicates a normal myocardial perfusion study with no evidence of ischemia.  · Impressions are consistent with a low risk study.  · Compared to the prior study from 5/3/2018 the current study reveals no changes.          The following portions of the patient's history were reviewed and updated as appropriate: allergies, current medications, past family history, past medical history, past social history, past surgical history and problem list.    Review of Systems   Constitutional: Positive for malaise/fatigue.   Cardiovascular:  Positive for palpitations.   Musculoskeletal:  Positive for back pain.   Genitourinary:  Positive for bladder incontinence.   Psychiatric/Behavioral:  The patient is nervous/anxious.           Objective:     /76 (BP Location: Left arm, Patient Position: Sitting, Cuff Size: Adult)   Pulse 60   Ht 180.3 cm (71\")   Wt 97.1 kg (214 lb)   SpO2 95%   BMI 29.85 kg/m²   Pulmonary:      Effort: Pulmonary effort is normal.      Breath sounds: Normal breath sounds. No stridor. No wheezing. No rhonchi. No rales.   Cardiovascular:      " "PMI at left midclavicular line. Normal rate. Regular rhythm. Normal S1. Normal S2.       Murmurs: There is no murmur.      No gallop.  No click. No rub.   Pulses:     Intact distal pulses.   Edema:     Peripheral edema absent.           Lab Review  Lab Results   Component Value Date     07/08/2024    K 4.5 07/08/2024     07/08/2024    BUN 15 07/08/2024    CREATININE 1.30 (H) 07/08/2024    GLUCOSE 132 (H) 07/08/2024    CALCIUM 9.1 07/08/2024    ALT 20 07/08/2024    ALKPHOS 44 07/08/2024    LABIL2 1.8 03/07/2016     Lab Results   Component Value Date    CKTOTAL 75 07/08/2024     Lab Results   Component Value Date    WBC 9.35 07/08/2024    HGB 14.1 07/08/2024    HCT 41.7 07/08/2024     07/08/2024     No results found for: \"INR\"  No results found for: \"MG\"  Lab Results   Component Value Date    PSA <0.014 12/09/2019    TSH 2.807 09/28/2015     No results found for: \"BNP\"  Lab Results   Component Value Date    CHLPL 227 (H) 03/07/2016    CHOL 168 07/08/2024    TRIG 179 (H) 07/08/2024    HDL 46 07/08/2024    VLDL 31 07/08/2024    LDL 91 07/08/2024     Lab Results   Component Value Date    LDL 91 07/08/2024     (H) 01/26/2024     No results found for: \"PROBNP\"            Procedures       I personally viewed and interpreted the patient's LAB data         Assessment:          Anxiety     DDD (degenerative disc disease), lumbar     Primary hypertension     T         Plan:     Patient is 79 years old white male with multiple chronic medical problems including chronic kidney disease, hyperlipidemia, hypertension, diabetes mellitus and palpitations.  Overall he is doing fairly well he is here for pain management and anxiety disorder.  He is compliant with medications.  Refill was given  Follow-up scheduled healthy lifestyle and weight loss emphasized.        No follow-ups on file.  "

## 2024-12-06 ENCOUNTER — OFFICE VISIT (OUTPATIENT)
Dept: CARDIOLOGY | Facility: CLINIC | Age: 79
End: 2024-12-06
Payer: MEDICARE

## 2024-12-06 VITALS
HEIGHT: 71 IN | OXYGEN SATURATION: 96 % | HEART RATE: 66 BPM | BODY MASS INDEX: 29.82 KG/M2 | DIASTOLIC BLOOD PRESSURE: 66 MMHG | SYSTOLIC BLOOD PRESSURE: 118 MMHG | WEIGHT: 213 LBS

## 2024-12-06 DIAGNOSIS — F41.1 GENERALIZED ANXIETY DISORDER: ICD-10-CM

## 2024-12-06 DIAGNOSIS — F41.9 ANXIETY: ICD-10-CM

## 2024-12-06 DIAGNOSIS — E79.0 HYPERURICEMIA: ICD-10-CM

## 2024-12-06 DIAGNOSIS — I10 PRIMARY HYPERTENSION: ICD-10-CM

## 2024-12-06 DIAGNOSIS — E11.21 TYPE 2 DIABETES MELLITUS WITH DIABETIC NEPHROPATHY, WITHOUT LONG-TERM CURRENT USE OF INSULIN: ICD-10-CM

## 2024-12-06 DIAGNOSIS — E78.2 MIXED HYPERLIPIDEMIA: Primary | ICD-10-CM

## 2024-12-06 DIAGNOSIS — M51.369 DDD (DEGENERATIVE DISC DISEASE), LUMBAR: ICD-10-CM

## 2024-12-06 DIAGNOSIS — C61 PROSTATE CANCER: ICD-10-CM

## 2024-12-06 DIAGNOSIS — N18.31 STAGE 3A CHRONIC KIDNEY DISEASE: ICD-10-CM

## 2024-12-06 RX ORDER — METOPROLOL TARTRATE 25 MG/1
25 TABLET, FILM COATED ORAL 2 TIMES DAILY
Qty: 180 TABLET | Refills: 3 | Status: SHIPPED | OUTPATIENT
Start: 2024-12-06

## 2024-12-06 RX ORDER — HYDROCODONE BITARTRATE AND ACETAMINOPHEN 7.5; 325 MG/1; MG/1
1 TABLET ORAL 3 TIMES DAILY PRN
Qty: 90 TABLET | Refills: 0 | Status: SHIPPED | OUTPATIENT
Start: 2024-12-06

## 2024-12-06 RX ORDER — ALPRAZOLAM 0.25 MG/1
0.25 TABLET ORAL 2 TIMES DAILY PRN
Qty: 60 TABLET | Refills: 2 | Status: SHIPPED | OUTPATIENT
Start: 2024-12-06

## 2024-12-06 RX ORDER — AMLODIPINE BESYLATE 10 MG/1
10 TABLET ORAL DAILY
Qty: 90 TABLET | Refills: 3 | Status: SHIPPED | OUTPATIENT
Start: 2024-12-06

## 2024-12-06 RX ORDER — ALLOPURINOL 100 MG/1
100 TABLET ORAL DAILY
Qty: 90 TABLET | Refills: 3 | Status: SHIPPED | OUTPATIENT
Start: 2024-12-06

## 2024-12-06 RX ORDER — HYDRALAZINE HYDROCHLORIDE 50 MG/1
50 TABLET, FILM COATED ORAL 3 TIMES DAILY
Qty: 270 TABLET | Refills: 3 | Status: SHIPPED | OUTPATIENT
Start: 2024-12-06

## 2024-12-06 RX ORDER — ROSUVASTATIN CALCIUM 20 MG/1
20 TABLET, COATED ORAL NIGHTLY
Qty: 90 TABLET | Refills: 2 | Status: SHIPPED | OUTPATIENT
Start: 2024-12-06

## 2024-12-06 NOTE — PROGRESS NOTES
subjective     Chief Complaint   Patient presents with    degenerative disc disease     Follow up    Med Refill     pending    Diarrhea     cramping       Problems Addressed This Visit  1. Mixed hyperlipidemia    2. Anxiety    3. DDD (degenerative disc disease), lumbar    4. Primary hypertension    5. Generalized anxiety disorder    6. Prostate cancer    7. Type 2 diabetes mellitus with diabetic nephropathy, without long-term current use of insulin        History of Present Illness    Patient is 79 years old white male who is here for follow-up for multiple chronic medical problems.    Hypertension  Blood pressure is controlled with Norvasc 10 mg daily, hydralazine 50 mg 3 times daily, Lopressor 25 twice daily    Patient has history of hyperlipidemia  He is trying to follow diet and is taking Crestor 20 mg daily no drug side effects.    Diabetes mellitus type 2  He is taking metformin 500 mg daily, Januvia 100 mg daily however he complains of severe diarrhea and urgency and occasions will decrease metformin to 250 and may need to completely switch  to Farxiga or Jardiance.      Anxiety disorder  He is taking Xanax 0.25 twice daily along with Prozac 20 mg daily.  He seems to be doing very well      Chronic back pain  Patient has history of prostate cancer back pain is controlled with Norco 7.5 3 times daily.  Patient is compliant with medications and needs refill.  He also has history of gouty arthritis and hyperuricemia on Zyloprim.    Patient also had prostate cancer requiring radical prostatectomy since then patient has urinary incontinence he and his having a lot of trouble with that he is following with urology and has a device implanted for incontinence but is not seem to be helping      Past Surgical History:   Procedure Laterality Date    COLONOSCOPY  03/2018    EYE SURGERY      FOOT SURGERY      HERNIA REPAIR      HYDROCELECTOMY  06/15/2015    PROSTATE BIOPSY  2018    PROSTATE FIDUCIAL MARKER PLACEMENT   2018    RHINOPLASTY      UPPER GASTROINTESTINAL ENDOSCOPY  2014    WITH BIOPSIES AND DILATION     Family History   Problem Relation Age of Onset    Kidney disease Other     Other Other         CHRONIC DISABLING DISEASES URINARY TRACT DISEASE    Diabetes Other     Hypertension Other     Other Mother     Heart failure Father     Stroke Sister     Arthritis Sister     Heart disease Brother     No Known Problems Brother     No Known Problems Brother      Past Medical History:   Diagnosis Date    Anxiety     Arthritis     Colitis     Depression     GERD (gastroesophageal reflux disease)     Gout     Heart murmur     History of EKG 2015    NORMAL    Hyperlipidemia     Hypertension     Obesity     Pancreatitis     history of    Prostate cancer     Renal failure     MILD one functioning kidney    Skin cancer     basal cell cancer on back     Patient Active Problem List   Diagnosis    Essential hypertension, labile    GERD (gastroesophageal reflux disease)    Renal failure    Hyperlipidemia    Anxiety    Depression    Type 2 diabetes mellitus    Periumbilical abdominal pain    DDD (degenerative disc disease), lumbar    Prostate cancer    Hyperuricemia    BUKRS (dyspnea on exertion)    Bradycardia    Lower urinary tract symptoms due to benign prostatic hyperplasia    Raised prostate specific antigen    Chest pain in adult    Generalized anxiety disorder    Primary insomnia    Multiple rib fractures    Esophageal dysphagia    Cellulitis of left foot    Palpitations    Dysuria       Social History     Tobacco Use    Smoking status: Former     Current packs/day: 0.00     Types: Cigarettes     Quit date:      Years since quittin.9     Passive exposure: Never    Smokeless tobacco: Never   Vaping Use    Vaping status: Never Used   Substance Use Topics    Alcohol use: Yes     Alcohol/week: 7.0 standard drinks of alcohol     Types: 7 Shots of liquor per week     Comment:  once per day    Drug use: No       No  Known Allergies    Current Outpatient Medications on File Prior to Visit   Medication Sig    allopurinol (ZYLOPRIM) 100 MG tablet Take 1 tablet by mouth Daily. (Patient taking differently: Take 1 tablet by mouth As Needed.)    ALPRAZolam (XANAX) 0.25 MG tablet Take 1 tablet by mouth 2 (Two) Times a Day As Needed for Anxiety or Sleep. for anxiety    amLODIPine (NORVASC) 10 MG tablet TAKE 1 TABLET BY MOUTH DAILY    aspirin 81 MG tablet Take 1 tablet by mouth Daily.    bicalutamide (CASODEX) 50 MG chemo tablet Casodex 50 mg tablet   Take 1 tablet every day by oral route for 30 days.    Blood Glucose Monitoring Suppl w/Device kit Check blood sugar once a day.    DX: E11.9    cholecalciferol (VITAMIN D3) 1000 UNITS tablet Take 1 tablet by mouth Daily.    colchicine 0.6 MG tablet Take 1 tablet by mouth Daily. PRN    Cyanocobalamin (VITAMIN B12 PO) Take  by mouth daily.    FLUoxetine (PROzac) 20 MG capsule TAKE 1 CAPSULE BY MOUTH DAILY    folic acid (FOLVITE) 1 MG tablet TAKE 1 TABLET BY MOUTH DAILY    glucose blood (Contour Next Test) test strip Check blood glucose daily    hydrALAZINE (APRESOLINE) 50 MG tablet TAKE 1 TABLET BY MOUTH 3 TIMES  DAILY    HYDROcodone-acetaminophen (NORCO) 7.5-325 MG per tablet Take 1 tablet by mouth 3 (Three) Times a Day As Needed for Moderate Pain.    indomethacin (INDOCIN) 25 MG capsule Take 1 capsule by mouth 3 (Three) Times a Day As Needed for Mild Pain .    leuprolide (LUPRON) 30 MG injection Inject 30 mg into the appropriate muscle as directed by prescriber Every 4 (Four) Months.    loratadine (Claritin) 10 MG tablet Take 1 tablet by mouth Daily.    meclizine (ANTIVERT) 25 MG tablet Take 1 tablet by mouth 3 (Three) Times a Day As Needed for Dizziness.    metFORMIN (GLUCOPHAGE) 500 MG tablet TAKE 1 TABLET BY MOUTH DAILY  WITH BREAKFAST    metoprolol tartrate (LOPRESSOR) 25 MG tablet TAKE 1 TABLET BY MOUTH TWICE  DAILY    Microlet Lancets misc 1 stick Daily.    Pyridoxine HCl (VITAMIN B-6  PO) Take  by mouth daily.    rosuvastatin (CRESTOR) 20 MG tablet Take 1 tablet by mouth Every Night.    SITagliptin (Januvia) 100 MG tablet Take 1 tablet by mouth Daily.     No current facility-administered medications on file prior to visit.     (Not in a hospital admission)      Results for orders placed during the hospital encounter of 07/13/21    Adult Transthoracic Echo Complete W/ Cont if Necessary Per Protocol    Interpretation Summary  · Normal left ventricular cavity size and wall thickness noted.  · Left ventricular ejection fraction appears to be 61 - 65%. Left ventricular systolic function is normal.  · Left ventricular diastolic function was normal.  · The aortic valve is structurally normal with no regurgitation or stenosis present. The aortic valve appears trileaflet.  · The mitral valve is structurally normal with no regurgitation or significant stenosis present.  · No significant valvular heart disease  · There is no evidence of pericardial effusion.    Results for orders placed during the hospital encounter of 07/13/21    Stress Test With Myocardial Perfusion One Day    Interpretation Summary  · A pharmacological stress test was performed using regadenoson without low-level exercise.  · Resting EKG showed sinus rhythm rate of 62 bpm incomplete right bundle branch block was noted.  · Patient tolerated lexiscan well without complications, no aminophylline was administered  · ST segments did not show any diagnostic changes, there was no arrhythmia detected.  · Left ventricular ejection fraction is hyperdynamic (Calculated EF > 70%). .  · Myocardial perfusion imaging indicates a normal myocardial perfusion study with no evidence of ischemia.  · Impressions are consistent with a low risk study.  · Compared to the prior study from 5/3/2018 the current study reveals no changes.          The following portions of the patient's history were reviewed and updated as appropriate: allergies, current  "medications, past family history, past medical history, past social history, past surgical history and problem list.    Review of Systems   Constitutional: Negative.   HENT: Negative.  Negative for congestion.    Eyes: Negative.    Cardiovascular: Negative.  Negative for chest pain, cyanosis, dyspnea on exertion, irregular heartbeat, leg swelling, near-syncope, orthopnea, palpitations, paroxysmal nocturnal dyspnea and syncope.   Respiratory: Negative.  Negative for shortness of breath.    Hematologic/Lymphatic: Negative.    Musculoskeletal: Negative.    Gastrointestinal:  Positive for diarrhea.   Genitourinary:  Positive for bladder incontinence.   Neurological: Negative.  Negative for headaches.          Objective:     /66 (BP Location: Left arm, Patient Position: Sitting, Cuff Size: Adult)   Pulse 66   Ht 180.3 cm (71\")   Wt 96.6 kg (213 lb)   SpO2 96%   BMI 29.71 kg/m²   Pulmonary:      Effort: Pulmonary effort is normal.      Breath sounds: Normal breath sounds. No stridor. No wheezing. No rhonchi. No rales.   Cardiovascular:      PMI at left midclavicular line. Normal rate. Regular rhythm. Normal S1. Normal S2.       Murmurs: There is no murmur.      No gallop.  No click. No rub.   Pulses:     Intact distal pulses.   Edema:     Peripheral edema absent.           Lab Review  Lab Results   Component Value Date     07/08/2024    K 4.5 07/08/2024     07/08/2024    BUN 15 07/08/2024    CREATININE 1.30 (H) 07/08/2024    GLUCOSE 132 (H) 07/08/2024    CALCIUM 9.1 07/08/2024    ALT 20 07/08/2024    ALKPHOS 44 07/08/2024    LABIL2 1.8 03/07/2016     Lab Results   Component Value Date    CKTOTAL 75 07/08/2024     Lab Results   Component Value Date    WBC 9.35 07/08/2024    HGB 14.1 07/08/2024    HCT 41.7 07/08/2024     07/08/2024     No results found for: \"INR\"  No results found for: \"MG\"  Lab Results   Component Value Date    PSA <0.014 12/09/2019    TSH 2.807 09/28/2015     No results found " "for: \"BNP\"  Lab Results   Component Value Date    CHLPL 227 (H) 03/07/2016    CHOL 168 07/08/2024    TRIG 179 (H) 07/08/2024    HDL 46 07/08/2024    VLDL 31 07/08/2024    LDL 91 07/08/2024     Lab Results   Component Value Date    LDL 91 07/08/2024     (H) 01/26/2024     No results found for: \"PROBNP\"            Procedures       I personally viewed and interpreted the patient's LAB data         Assessment:     1. Mixed hyperlipidemia    2. Anxiety    3. DDD (degenerative disc disease), lumbar    4. Primary hypertension    5. Generalized anxiety disorder    6. Prostate cancer    7. Type 2 diabetes mellitus with diabetic nephropathy, without long-term current use of insulin          Plan:     Hyperlipidemia  Patient was advised to continue Crestor diet restrictions discussed.  Last LDL was 91 we will check lab work again.  Goal of less than 70 was discussed.    Anxiety disorder.  Xanax along with Prozac was continued.    Degenerative disc disease  Patient also has history of prostate cancer with stage III chronic kidney disease Norco refill was given.    Hypertension  Blood pressure is very well-controlled he will continue Norvasc losartan hydralazine and metoprolol.    Anxiety disorder  He will continue Xanax and Prozac.    Diabetes mellitus type 2 patient is complaining of GI symptoms with diarrhea, metformin dose was decreased and will see if Farxiga or Jardiance is covered.    Follow-up scheduled  No follow-ups on file.  "

## 2024-12-11 RX ORDER — ACYCLOVIR 400 MG/1
1 TABLET ORAL
Qty: 3 EACH | Refills: 2 | Status: SHIPPED | OUTPATIENT
Start: 2024-12-11

## 2024-12-23 ENCOUNTER — TELEPHONE (OUTPATIENT)
Dept: CARDIOLOGY | Facility: CLINIC | Age: 79
End: 2024-12-23
Payer: MEDICARE

## 2024-12-23 RX ORDER — CEFDINIR 300 MG/1
300 CAPSULE ORAL 2 TIMES DAILY
Qty: 14 CAPSULE | Refills: 0 | Status: SHIPPED | OUTPATIENT
Start: 2024-12-23

## 2025-01-03 ENCOUNTER — OFFICE VISIT (OUTPATIENT)
Dept: CARDIOLOGY | Facility: CLINIC | Age: 80
End: 2025-01-03
Payer: MEDICARE

## 2025-01-03 VITALS
WEIGHT: 217 LBS | SYSTOLIC BLOOD PRESSURE: 136 MMHG | DIASTOLIC BLOOD PRESSURE: 62 MMHG | OXYGEN SATURATION: 96 % | HEIGHT: 71 IN | BODY MASS INDEX: 30.38 KG/M2 | HEART RATE: 61 BPM

## 2025-01-03 DIAGNOSIS — E78.2 MIXED HYPERLIPIDEMIA: ICD-10-CM

## 2025-01-03 DIAGNOSIS — J06.9 UPPER RESPIRATORY TRACT INFECTION, UNSPECIFIED TYPE: ICD-10-CM

## 2025-01-03 DIAGNOSIS — F41.9 ANXIETY: Primary | ICD-10-CM

## 2025-01-03 DIAGNOSIS — I10 PRIMARY HYPERTENSION: ICD-10-CM

## 2025-01-03 DIAGNOSIS — M51.369 DDD (DEGENERATIVE DISC DISEASE), LUMBAR: ICD-10-CM

## 2025-01-03 DIAGNOSIS — E11.21 TYPE 2 DIABETES MELLITUS WITH DIABETIC NEPHROPATHY, WITHOUT LONG-TERM CURRENT USE OF INSULIN: ICD-10-CM

## 2025-01-03 PROCEDURE — 3075F SYST BP GE 130 - 139MM HG: CPT | Performed by: NURSE PRACTITIONER

## 2025-01-03 PROCEDURE — 3078F DIAST BP <80 MM HG: CPT | Performed by: NURSE PRACTITIONER

## 2025-01-03 PROCEDURE — 99214 OFFICE O/P EST MOD 30 MIN: CPT | Performed by: NURSE PRACTITIONER

## 2025-01-03 RX ORDER — HYDROCODONE BITARTRATE AND ACETAMINOPHEN 7.5; 325 MG/1; MG/1
1 TABLET ORAL 3 TIMES DAILY PRN
Qty: 90 TABLET | Refills: 0 | Status: SHIPPED | OUTPATIENT
Start: 2025-01-03

## 2025-01-03 RX ORDER — ALPRAZOLAM 0.25 MG/1
0.25 TABLET ORAL 2 TIMES DAILY PRN
Qty: 60 TABLET | Refills: 0 | Status: SHIPPED | OUTPATIENT
Start: 2025-01-03

## 2025-01-03 RX ORDER — CEFDINIR 300 MG/1
300 CAPSULE ORAL 2 TIMES DAILY
Qty: 14 CAPSULE | Refills: 0 | Status: SHIPPED | OUTPATIENT
Start: 2025-01-03

## 2025-01-03 NOTE — PROGRESS NOTES
Subjective     Lei Stapleton is a 79 y.o. male.   Chief Complaint   Patient presents with    DDD     Follow up    Med Refill     pending    Anxiety     Follow up      History of Present Illness   Lei Stapleton is a 79-year-old male who presents to clinic today for routine follow-up.    Hypertension well controlled on Norvasc 10 mg daily, hydralazine 50 mg 3 times daily, Lopressor 25 twice daily.  Compliance with medicine.  He denies chest pain, dyspnea or palpitations.      Hyperlipidemia currently on Crestor 20 mg daily.  He reports tolerating it well and denies side effects.  He is due for routine labs.      Diabetes mellitus type 2 currently on Metformin 250 mg daily (dosing recently reduced due to GI side effects which he feels is improved), Januvia 100 mg daily.  He is up-to-date with eye exam.  He had cataract surgery recently and done well.  He continues to follow with ophthalmology.  He is due for blood work.  Unfortunately Dexcom is not covered by his insurance as he is not currently on insulin therapy.      Anxiety disorder currently on Xanax 0.25 twice daily along with Prozac 20 mg daily.  He seems to be doing very well. He is requesting refill.       Chronic back pain well controlled with Norco 7.5 3 times daily.  Patient is compliant with medications and needs refill.  He also has history of gouty arthritis and hyperuricemia on Zyloprim.     Patient also had prostate cancer requiring radical prostatectomy since then patient has urinary incontinence he and his having a lot of trouble with that he is following with urology and has a device implanted for incontinence but is not seem to be helping. Urology checks PSA routinely per pt report.     He reports several weeks of cough, congestion, watery eyes and sore throat.  He has not had a fever.  He took a 7-day course of antibiotics and he feels it helped his symptoms but symptoms did not completely resolve.     Patient Active Problem List   Diagnosis     Essential hypertension, labile    GERD (gastroesophageal reflux disease)    Renal failure    Hyperlipidemia    Anxiety    Depression    Type 2 diabetes mellitus    Periumbilical abdominal pain    DDD (degenerative disc disease), lumbar    Prostate cancer    Hyperuricemia    BURKS (dyspnea on exertion)    Bradycardia    Lower urinary tract symptoms due to benign prostatic hyperplasia    Raised prostate specific antigen    Chest pain in adult    Generalized anxiety disorder    Primary insomnia    Multiple rib fractures    Esophageal dysphagia    Cellulitis of left foot    Palpitations    Dysuria     Past Medical History:   Diagnosis Date    Anxiety     Arthritis     Colitis     Depression     GERD (gastroesophageal reflux disease)     Gout     Heart murmur     History of EKG 2015    NORMAL    Hyperlipidemia     Hypertension     Obesity     Pancreatitis     history of    Prostate cancer     Renal failure     MILD one functioning kidney    Skin cancer     basal cell cancer on back     Past Surgical History:   Procedure Laterality Date    COLONOSCOPY  2018    EYE SURGERY      FOOT SURGERY      HERNIA REPAIR      HYDROCELECTOMY  06/15/2015    PROSTATE BIOPSY      PROSTATE FIDUCIAL MARKER PLACEMENT  2018    RHINOPLASTY      UPPER GASTROINTESTINAL ENDOSCOPY  2014    WITH BIOPSIES AND DILATION     Family History   Problem Relation Age of Onset    Kidney disease Other     Other Other         CHRONIC DISABLING DISEASES URINARY TRACT DISEASE    Diabetes Other     Hypertension Other     Other Mother     Heart failure Father     Stroke Sister     Arthritis Sister     Heart disease Brother     No Known Problems Brother     No Known Problems Brother      Social History     Tobacco Use    Smoking status: Former     Current packs/day: 0.00     Types: Cigarettes     Quit date:      Years since quittin.0     Passive exposure: Never    Smokeless tobacco: Never   Vaping Use    Vaping status: Never Used    Substance Use Topics    Alcohol use: Yes     Alcohol/week: 7.0 standard drinks of alcohol     Types: 7 Shots of liquor per week     Comment:  once per day    Drug use: No     The following portions of the patient's history were reviewed and updated as appropriate: allergies, current medications, past family history, past medical history, past social history, past surgical history and problem list.    No Known Allergies      Current Outpatient Medications:     allopurinol (ZYLOPRIM) 100 MG tablet, Take 1 tablet by mouth Daily., Disp: 90 tablet, Rfl: 3    ALPRAZolam (XANAX) 0.25 MG tablet, Take 1 tablet by mouth 2 (Two) Times a Day As Needed for Anxiety or Sleep. for anxiety, Disp: 60 tablet, Rfl: 0    amLODIPine (NORVASC) 10 MG tablet, Take 1 tablet by mouth Daily., Disp: 90 tablet, Rfl: 3    aspirin 81 MG tablet, Take 1 tablet by mouth Daily., Disp: , Rfl:     bicalutamide (CASODEX) 50 MG chemo tablet, Casodex 50 mg tablet  Take 1 tablet every day by oral route for 30 days., Disp: , Rfl:     Blood Glucose Monitoring Suppl w/Device kit, Check blood sugar once a day.  DX: E11.9, Disp: 1 each, Rfl: 0    cholecalciferol (VITAMIN D3) 1000 UNITS tablet, Take 1 tablet by mouth Daily., Disp: , Rfl:     colchicine 0.6 MG tablet, Take 1 tablet by mouth Daily. PRN, Disp: , Rfl:     Continuous Glucose Sensor (Dexcom G7 Sensor) misc, Use 1 Application Every 10 (Ten) Days., Disp: 3 each, Rfl: 2    Cyanocobalamin (VITAMIN B12 PO), Take  by mouth daily., Disp: , Rfl:     FLUoxetine (PROzac) 20 MG capsule, Take 1 capsule by mouth Daily., Disp: 90 capsule, Rfl: 3    folic acid (FOLVITE) 1 MG tablet, TAKE 1 TABLET BY MOUTH DAILY, Disp: 90 tablet, Rfl: 3    glucose blood (Contour Next Test) test strip, Check blood glucose daily, Disp: 100 each, Rfl: 11    hydrALAZINE (APRESOLINE) 50 MG tablet, Take 1 tablet by mouth 3 (Three) Times a Day., Disp: 270 tablet, Rfl: 3    HYDROcodone-acetaminophen (NORCO) 7.5-325 MG per tablet, Take 1  tablet by mouth 3 (Three) Times a Day As Needed for Moderate Pain., Disp: 90 tablet, Rfl: 0    indomethacin (INDOCIN) 25 MG capsule, Take 1 capsule by mouth 3 (Three) Times a Day As Needed for Mild Pain ., Disp: 90 capsule, Rfl: 0    leuprolide (LUPRON) 30 MG injection, Inject 30 mg into the appropriate muscle as directed by prescriber Every 4 (Four) Months., Disp: , Rfl:     loratadine (Claritin) 10 MG tablet, Take 1 tablet by mouth Daily., Disp: 90 tablet, Rfl: 0    meclizine (ANTIVERT) 25 MG tablet, Take 1 tablet by mouth 3 (Three) Times a Day As Needed for Dizziness., Disp: 90 tablet, Rfl: 1    metFORMIN (GLUCOPHAGE) 500 MG tablet, Take 0.5 tablets by mouth Daily With Breakfast., Disp: 90 tablet, Rfl: 3    metoprolol tartrate (LOPRESSOR) 25 MG tablet, Take 1 tablet by mouth 2 (Two) Times a Day., Disp: 180 tablet, Rfl: 3    Microlet Lancets misc, 1 stick Daily., Disp: 100 each, Rfl: 11    Pyridoxine HCl (VITAMIN B-6 PO), Take  by mouth daily., Disp: , Rfl:     rosuvastatin (CRESTOR) 20 MG tablet, Take 1 tablet by mouth Every Night., Disp: 90 tablet, Rfl: 2    SITagliptin (Januvia) 100 MG tablet, Take 1 tablet by mouth Daily., Disp: 90 tablet, Rfl: 2    cefdinir (OMNICEF) 300 MG capsule, Take 1 capsule by mouth 2 (Two) Times a Day., Disp: 14 capsule, Rfl: 0    Review of Systems   Constitutional:  Negative for activity change, appetite change, chills, diaphoresis, fatigue and fever.   HENT:  Positive for rhinorrhea and sore throat. Negative for congestion, drooling, ear discharge, ear pain, mouth sores, nosebleeds, postnasal drip, sinus pressure and sneezing.    Eyes:  Negative for pain, discharge and visual disturbance.   Respiratory:  Positive for cough. Negative for chest tightness, shortness of breath and wheezing.    Cardiovascular:  Negative for chest pain, palpitations and leg swelling.   Gastrointestinal:  Negative for abdominal pain, constipation, diarrhea, nausea and vomiting.   Endocrine: Negative for  "cold intolerance, heat intolerance, polydipsia, polyphagia and polyuria.   Musculoskeletal:  Negative for arthralgias, myalgias and neck pain.   Skin:  Negative for rash and wound.   Neurological:  Negative for dizziness, syncope, speech difficulty, weakness, light-headedness and headaches.   Hematological:  Negative for adenopathy. Does not bruise/bleed easily.   Psychiatric/Behavioral:  Negative for confusion, dysphoric mood and sleep disturbance. The patient is not nervous/anxious.    All other systems reviewed and are negative.    /62 (BP Location: Left arm, Patient Position: Sitting, Cuff Size: Adult)   Pulse 61   Ht 180.3 cm (71\")   Wt 98.4 kg (217 lb)   SpO2 96%   BMI 30.27 kg/m²     Objective   No Known Allergies    Physical Exam  Vitals reviewed.   Constitutional:       Appearance: Normal appearance. He is well-developed and overweight.   HENT:      Head: Normocephalic.   Eyes:      Conjunctiva/sclera: Conjunctivae normal.   Neck:      Thyroid: No thyromegaly.      Vascular: No carotid bruit or JVD.   Cardiovascular:      Rate and Rhythm: Normal rate and regular rhythm.   Pulmonary:      Effort: Pulmonary effort is normal.      Breath sounds: Normal breath sounds.   Abdominal:      General: Bowel sounds are normal.      Palpations: Abdomen is soft. There is no hepatomegaly, splenomegaly or mass.      Tenderness: There is no abdominal tenderness.   Musculoskeletal:      Cervical back: Neck supple.      Right lower leg: No edema.      Left lower leg: No edema.   Skin:     General: Skin is warm and dry.   Neurological:      Mental Status: He is alert and oriented to person, place, and time.   Psychiatric:         Attention and Perception: Attention normal.         Mood and Affect: Mood normal.         Speech: Speech normal.         Behavior: Behavior normal. Behavior is cooperative.         Cognition and Memory: Cognition normal.         LABS  WBC   Date Value Ref Range Status   07/08/2024 9.35 3.40 " - 10.80 10*3/mm3 Final     RBC   Date Value Ref Range Status   07/08/2024 4.29 4.14 - 5.80 10*6/mm3 Final     Hemoglobin   Date Value Ref Range Status   07/08/2024 14.1 13.0 - 17.7 g/dL Final     Hematocrit   Date Value Ref Range Status   07/08/2024 41.7 37.5 - 51.0 % Final     MCV   Date Value Ref Range Status   07/08/2024 97.2 (H) 79.0 - 97.0 fL Final     MCH   Date Value Ref Range Status   07/08/2024 32.9 26.6 - 33.0 pg Final     MCHC   Date Value Ref Range Status   07/08/2024 33.8 31.5 - 35.7 g/dL Final     RDW   Date Value Ref Range Status   07/08/2024 13.6 12.3 - 15.4 % Final     RDW-SD   Date Value Ref Range Status   07/08/2024 48.7 37.0 - 54.0 fl Final     MPV   Date Value Ref Range Status   07/08/2024 12.2 (H) 6.0 - 12.0 fL Final     Platelets   Date Value Ref Range Status   07/08/2024 167 140 - 450 10*3/mm3 Final     Neutrophil %   Date Value Ref Range Status   07/08/2024 46.7 42.7 - 76.0 % Final     Lymphocyte %   Date Value Ref Range Status   07/08/2024 27.2 19.6 - 45.3 % Final     Monocyte %   Date Value Ref Range Status   07/08/2024 5.7 5.0 - 12.0 % Final     Eosinophil %   Date Value Ref Range Status   07/08/2024 18.9 (H) 0.3 - 6.2 % Final     Basophil %   Date Value Ref Range Status   07/08/2024 1.4 0.0 - 1.5 % Final     Immature Grans %   Date Value Ref Range Status   07/08/2024 0.1 0.0 - 0.5 % Final     Neutrophils, Absolute   Date Value Ref Range Status   07/08/2024 4.37 1.70 - 7.00 10*3/mm3 Final     Lymphocytes, Absolute   Date Value Ref Range Status   07/08/2024 2.54 0.70 - 3.10 10*3/mm3 Final     Monocytes, Absolute   Date Value Ref Range Status   07/08/2024 0.53 0.10 - 0.90 10*3/mm3 Final     Eosinophils, Absolute   Date Value Ref Range Status   07/08/2024 1.77 (H) 0.00 - 0.40 10*3/mm3 Final     Basophils, Absolute   Date Value Ref Range Status   07/08/2024 0.13 0.00 - 0.20 10*3/mm3 Final     Immature Grans, Absolute   Date Value Ref Range Status   07/08/2024 0.01 0.00 - 0.05 10*3/mm3 Final      nRBC   Date Value Ref Range Status   07/08/2024 0.0 0.0 - 0.2 /100 WBC Final       Total Cholesterol   Date Value Ref Range Status   07/08/2024 168 0 - 200 mg/dL Final     Triglycerides   Date Value Ref Range Status   07/08/2024 179 (H) 0 - 150 mg/dL Final     HDL Cholesterol   Date Value Ref Range Status   07/08/2024 46 40 - 60 mg/dL Final     LDL Cholesterol    Date Value Ref Range Status   07/08/2024 91 0 - 100 mg/dL Final         Assessment & Plan   Diagnoses and all orders for this visit:    1. Anxiety (Primary)  -     ALPRAZolam (XANAX) 0.25 MG tablet; Take 1 tablet by mouth 2 (Two) Times a Day As Needed for Anxiety or Sleep. for anxiety  Dispense: 60 tablet; Refill: 0  Refills authorized  Symptoms are well-controlled, continue current therapy    2. DDD (degenerative disc disease), lumbar  -     HYDROcodone-acetaminophen (NORCO) 7.5-325 MG per tablet; Take 1 tablet by mouth 3 (Three) Times a Day As Needed for Moderate Pain.  Dispense: 90 tablet; Refill: 0  Refills authorized  Clinically asymptomatic    3. Upper respiratory tract infection, unspecified type  Rx with conservative therapy and follow-up if persistent symptoms    4. Mixed hyperlipidemia  Continue on statin, heart healthy diet  Labs pending    5. Primary hypertension  Well-controlled, continue current therapy    6. Type 2 diabetes mellitus with diabetic nephropathy, without long-term current use of insulin  Recommend tight glycemic control, continue current therapy, up-to-date with eye exam  Labs recommended    Other orders  -     cefdinir (OMNICEF) 300 MG capsule; Take 1 capsule by mouth 2 (Two) Times a Day.  Dispense: 14 capsule; Refill: 0    Review of medical record    Lifestyle modifications including heart healthy diet, regular exercise, maintenance of desirable body weight and avoidance of tobacco product    Follow-up in 1 month, sooner if needed.    He will return at his convenience when he is well for influenza vaccination.

## 2025-01-07 RX ORDER — FLUTICASONE PROPIONATE 50 MCG
2 SPRAY, SUSPENSION (ML) NASAL DAILY
Qty: 16 G | Refills: 0 | Status: SHIPPED | OUTPATIENT
Start: 2025-01-07 | End: 2025-02-06

## 2025-01-07 NOTE — PROGRESS NOTES
Pt doesn't feel omnicef is helping and would like to try something different. Stop omnicef and start Augmentin, flonase and nasal rinse.

## 2025-01-09 RX ORDER — MIRABEGRON 25 MG/1
25 TABLET, FILM COATED, EXTENDED RELEASE ORAL DAILY
Qty: 28 TABLET | Refills: 0 | COMMUNITY
Start: 2025-01-09

## 2025-01-20 ENCOUNTER — LAB (OUTPATIENT)
Dept: LAB | Facility: HOSPITAL | Age: 80
End: 2025-01-20
Payer: MEDICARE

## 2025-01-20 DIAGNOSIS — E78.2 MIXED HYPERLIPIDEMIA: ICD-10-CM

## 2025-01-20 DIAGNOSIS — E11.21 TYPE 2 DIABETES MELLITUS WITH DIABETIC NEPHROPATHY, WITHOUT LONG-TERM CURRENT USE OF INSULIN: ICD-10-CM

## 2025-01-20 DIAGNOSIS — E79.0 HYPERURICEMIA: ICD-10-CM

## 2025-01-20 LAB
ALBUMIN SERPL-MCNC: 4.4 G/DL (ref 3.5–5.2)
ALBUMIN/GLOB SERPL: 1.8 G/DL
ALP SERPL-CCNC: 58 U/L (ref 39–117)
ALT SERPL W P-5'-P-CCNC: 20 U/L (ref 1–41)
ANION GAP SERPL CALCULATED.3IONS-SCNC: 14.4 MMOL/L (ref 5–15)
AST SERPL-CCNC: 21 U/L (ref 1–40)
BASOPHILS # BLD AUTO: 0.14 10*3/MM3 (ref 0–0.2)
BASOPHILS NFR BLD AUTO: 1.2 % (ref 0–1.5)
BILIRUB SERPL-MCNC: 0.6 MG/DL (ref 0–1.2)
BUN SERPL-MCNC: 16 MG/DL (ref 8–23)
BUN/CREAT SERPL: 12.1 (ref 7–25)
CALCIUM SPEC-SCNC: 9.1 MG/DL (ref 8.6–10.5)
CHLORIDE SERPL-SCNC: 105 MMOL/L (ref 98–107)
CHOLEST SERPL-MCNC: 175 MG/DL (ref 0–200)
CK SERPL-CCNC: 121 U/L (ref 20–200)
CO2 SERPL-SCNC: 22.6 MMOL/L (ref 22–29)
CREAT SERPL-MCNC: 1.32 MG/DL (ref 0.76–1.27)
DEPRECATED RDW RBC AUTO: 49.1 FL (ref 37–54)
EGFRCR SERPLBLD CKD-EPI 2021: 54.9 ML/MIN/1.73
EOSINOPHIL # BLD AUTO: 2.22 10*3/MM3 (ref 0–0.4)
EOSINOPHIL NFR BLD AUTO: 18.6 % (ref 0.3–6.2)
ERYTHROCYTE [DISTWIDTH] IN BLOOD BY AUTOMATED COUNT: 13.8 % (ref 12.3–15.4)
GLOBULIN UR ELPH-MCNC: 2.5 GM/DL
GLUCOSE SERPL-MCNC: 315 MG/DL (ref 65–99)
HBA1C MFR BLD: 7.4 % (ref 4.8–5.6)
HCT VFR BLD AUTO: 40.8 % (ref 37.5–51)
HDLC SERPL-MCNC: 46 MG/DL (ref 40–60)
HGB BLD-MCNC: 13.9 G/DL (ref 13–17.7)
IMM GRANULOCYTES # BLD AUTO: 0.03 10*3/MM3 (ref 0–0.05)
IMM GRANULOCYTES NFR BLD AUTO: 0.3 % (ref 0–0.5)
LDLC SERPL CALC-MCNC: 85 MG/DL (ref 0–100)
LDLC/HDLC SERPL: 1.65 {RATIO}
LYMPHOCYTES # BLD AUTO: 2.76 10*3/MM3 (ref 0.7–3.1)
LYMPHOCYTES NFR BLD AUTO: 23.1 % (ref 19.6–45.3)
MCH RBC QN AUTO: 32.9 PG (ref 26.6–33)
MCHC RBC AUTO-ENTMCNC: 34.1 G/DL (ref 31.5–35.7)
MCV RBC AUTO: 96.5 FL (ref 79–97)
MONOCYTES # BLD AUTO: 0.52 10*3/MM3 (ref 0.1–0.9)
MONOCYTES NFR BLD AUTO: 4.4 % (ref 5–12)
NEUTROPHILS NFR BLD AUTO: 52.4 % (ref 42.7–76)
NEUTROPHILS NFR BLD AUTO: 6.27 10*3/MM3 (ref 1.7–7)
NRBC BLD AUTO-RTO: 0 /100 WBC (ref 0–0.2)
PLATELET # BLD AUTO: 171 10*3/MM3 (ref 140–450)
PMV BLD AUTO: 12.3 FL (ref 6–12)
POTASSIUM SERPL-SCNC: 4.2 MMOL/L (ref 3.5–5.2)
PROT SERPL-MCNC: 6.9 G/DL (ref 6–8.5)
RBC # BLD AUTO: 4.23 10*6/MM3 (ref 4.14–5.8)
SODIUM SERPL-SCNC: 142 MMOL/L (ref 136–145)
TRIGL SERPL-MCNC: 266 MG/DL (ref 0–150)
URATE SERPL-MCNC: 7 MG/DL (ref 3.4–7)
VLDLC SERPL-MCNC: 44 MG/DL (ref 5–40)
WBC NRBC COR # BLD AUTO: 11.94 10*3/MM3 (ref 3.4–10.8)

## 2025-01-20 PROCEDURE — 80053 COMPREHEN METABOLIC PANEL: CPT

## 2025-01-20 PROCEDURE — 84550 ASSAY OF BLOOD/URIC ACID: CPT

## 2025-01-20 PROCEDURE — 85025 COMPLETE CBC W/AUTO DIFF WBC: CPT

## 2025-01-20 PROCEDURE — 36415 COLL VENOUS BLD VENIPUNCTURE: CPT

## 2025-01-20 PROCEDURE — 82550 ASSAY OF CK (CPK): CPT

## 2025-01-20 PROCEDURE — 83036 HEMOGLOBIN GLYCOSYLATED A1C: CPT

## 2025-01-20 PROCEDURE — 80061 LIPID PANEL: CPT

## 2025-01-29 ENCOUNTER — TELEPHONE (OUTPATIENT)
Dept: CARDIOLOGY | Facility: CLINIC | Age: 80
End: 2025-01-29
Payer: MEDICARE

## 2025-01-29 ENCOUNTER — LAB (OUTPATIENT)
Dept: LAB | Facility: HOSPITAL | Age: 80
End: 2025-01-29
Payer: MEDICARE

## 2025-01-29 DIAGNOSIS — R30.0 DYSURIA: Primary | ICD-10-CM

## 2025-01-29 DIAGNOSIS — J06.9 UPPER RESPIRATORY TRACT INFECTION, UNSPECIFIED TYPE: Primary | ICD-10-CM

## 2025-01-29 DIAGNOSIS — R30.0 DYSURIA: ICD-10-CM

## 2025-01-29 LAB
BACTERIA UR QL AUTO: ABNORMAL /HPF
BILIRUB UR QL STRIP: NEGATIVE
CLARITY UR: ABNORMAL
COLOR UR: ABNORMAL
GLUCOSE UR STRIP-MCNC: NEGATIVE MG/DL
HGB UR QL STRIP.AUTO: ABNORMAL
HYALINE CASTS UR QL AUTO: ABNORMAL /LPF
KETONES UR QL STRIP: NEGATIVE
LEUKOCYTE ESTERASE UR QL STRIP.AUTO: ABNORMAL
NITRITE UR QL STRIP: NEGATIVE
PH UR STRIP.AUTO: 8.5 [PH] (ref 5–8)
PROT UR QL STRIP: ABNORMAL
RBC # UR STRIP: ABNORMAL /HPF
REF LAB TEST METHOD: ABNORMAL
SP GR UR STRIP: 1.02 (ref 1–1.03)
SQUAMOUS #/AREA URNS HPF: ABNORMAL /HPF
UROBILINOGEN UR QL STRIP: ABNORMAL
WBC # UR STRIP: ABNORMAL /HPF

## 2025-01-29 PROCEDURE — 82043 UR ALBUMIN QUANTITATIVE: CPT

## 2025-01-29 PROCEDURE — 87186 SC STD MICRODIL/AGAR DIL: CPT

## 2025-01-29 PROCEDURE — 81001 URINALYSIS AUTO W/SCOPE: CPT

## 2025-01-29 PROCEDURE — 87077 CULTURE AEROBIC IDENTIFY: CPT

## 2025-01-29 PROCEDURE — 87086 URINE CULTURE/COLONY COUNT: CPT

## 2025-01-29 NOTE — TELEPHONE ENCOUNTER
Patient c/o thinks he has a UTI, said he went to the bathroom and it looked like puss coming out.

## 2025-01-30 ENCOUNTER — HOSPITAL ENCOUNTER (OUTPATIENT)
Dept: GENERAL RADIOLOGY | Facility: HOSPITAL | Age: 80
Discharge: HOME | End: 2025-01-30
Admitting: INTERNAL MEDICINE
Payer: MEDICARE

## 2025-01-30 DIAGNOSIS — J06.9 UPPER RESPIRATORY TRACT INFECTION, UNSPECIFIED TYPE: ICD-10-CM

## 2025-01-30 LAB — ALBUMIN UR-MCNC: 78.8 MG/DL

## 2025-01-30 PROCEDURE — 71046 X-RAY EXAM CHEST 2 VIEWS: CPT | Performed by: RADIOLOGY

## 2025-01-30 PROCEDURE — 71046 X-RAY EXAM CHEST 2 VIEWS: CPT

## 2025-01-30 RX ORDER — PHENAZOPYRIDINE HYDROCHLORIDE 100 MG/1
100 TABLET, FILM COATED ORAL 2 TIMES DAILY
Qty: 4 TABLET | Refills: 0 | Status: SHIPPED | OUTPATIENT
Start: 2025-01-30

## 2025-01-31 LAB — BACTERIA SPEC AEROBE CULT: ABNORMAL

## 2025-02-07 ENCOUNTER — OFFICE VISIT (OUTPATIENT)
Dept: CARDIOLOGY | Facility: CLINIC | Age: 80
End: 2025-02-07
Payer: MEDICARE

## 2025-02-07 VITALS
OXYGEN SATURATION: 97 % | HEIGHT: 71 IN | SYSTOLIC BLOOD PRESSURE: 142 MMHG | DIASTOLIC BLOOD PRESSURE: 68 MMHG | BODY MASS INDEX: 29.68 KG/M2 | WEIGHT: 212 LBS | HEART RATE: 64 BPM

## 2025-02-07 DIAGNOSIS — G89.29 CHRONIC CHEST PAIN WITH LOW TO MODERATE RISK FOR CAD: ICD-10-CM

## 2025-02-07 DIAGNOSIS — E78.2 MIXED HYPERLIPIDEMIA: ICD-10-CM

## 2025-02-07 DIAGNOSIS — I10 PRIMARY HYPERTENSION: ICD-10-CM

## 2025-02-07 DIAGNOSIS — M51.369 DDD (DEGENERATIVE DISC DISEASE), LUMBAR: ICD-10-CM

## 2025-02-07 DIAGNOSIS — Z91.89 CHRONIC CHEST PAIN WITH LOW TO MODERATE RISK FOR CAD: ICD-10-CM

## 2025-02-07 DIAGNOSIS — F41.1 GENERALIZED ANXIETY DISORDER: Primary | ICD-10-CM

## 2025-02-07 DIAGNOSIS — K21.9 CHEST PAIN DUE TO GERD: ICD-10-CM

## 2025-02-07 DIAGNOSIS — R07.9 CHEST PAIN DUE TO GERD: ICD-10-CM

## 2025-02-07 DIAGNOSIS — R07.9 CHRONIC CHEST PAIN WITH LOW TO MODERATE RISK FOR CAD: ICD-10-CM

## 2025-02-07 PROCEDURE — 99214 OFFICE O/P EST MOD 30 MIN: CPT | Performed by: INTERNAL MEDICINE

## 2025-02-07 PROCEDURE — 3077F SYST BP >= 140 MM HG: CPT | Performed by: INTERNAL MEDICINE

## 2025-02-07 PROCEDURE — 3078F DIAST BP <80 MM HG: CPT | Performed by: INTERNAL MEDICINE

## 2025-02-07 PROCEDURE — 93000 ELECTROCARDIOGRAM COMPLETE: CPT | Performed by: INTERNAL MEDICINE

## 2025-02-07 RX ORDER — PANTOPRAZOLE SODIUM 40 MG/1
40 TABLET, DELAYED RELEASE ORAL DAILY
Qty: 30 TABLET | Refills: 0 | Status: SHIPPED | OUTPATIENT
Start: 2025-02-07

## 2025-02-07 RX ORDER — ALPRAZOLAM 0.25 MG/1
0.25 TABLET ORAL 2 TIMES DAILY PRN
Qty: 60 TABLET | Refills: 0 | Status: SHIPPED | OUTPATIENT
Start: 2025-02-07

## 2025-02-07 RX ORDER — HYDROCODONE BITARTRATE AND ACETAMINOPHEN 7.5; 325 MG/1; MG/1
1 TABLET ORAL 3 TIMES DAILY PRN
Qty: 90 TABLET | Refills: 0 | Status: SHIPPED | OUTPATIENT
Start: 2025-02-07

## 2025-02-07 NOTE — PROGRESS NOTES
subjective   No chief complaint on file.      Problems Addressed This Visit  No diagnosis found.    History of Present Illness          Past Surgical History:   Procedure Laterality Date    COLONOSCOPY  03/2018    EYE SURGERY      FOOT SURGERY      HERNIA REPAIR      HYDROCELECTOMY  06/15/2015    PROSTATE BIOPSY  2018    PROSTATE FIDUCIAL MARKER PLACEMENT  09/14/2018    RHINOPLASTY      UPPER GASTROINTESTINAL ENDOSCOPY  03/24/2014    WITH BIOPSIES AND DILATION     Family History   Problem Relation Age of Onset    Kidney disease Other     Other Other         CHRONIC DISABLING DISEASES URINARY TRACT DISEASE    Diabetes Other     Hypertension Other     Other Mother     Heart failure Father     Stroke Sister     Arthritis Sister     Heart disease Brother     No Known Problems Brother     No Known Problems Brother      Past Medical History:   Diagnosis Date    Anxiety     Arthritis     Colitis     Depression     GERD (gastroesophageal reflux disease)     Gout     Heart murmur     History of EKG 12/22/2015    NORMAL    Hyperlipidemia     Hypertension     Obesity     Pancreatitis     history of    Prostate cancer     Renal failure     MILD one functioning kidney    Skin cancer     basal cell cancer on back     Patient Active Problem List   Diagnosis    Essential hypertension, labile    GERD (gastroesophageal reflux disease)    Renal failure    Hyperlipidemia    Anxiety    Depression    Type 2 diabetes mellitus    Periumbilical abdominal pain    DDD (degenerative disc disease), lumbar    Prostate cancer    Hyperuricemia    BURKS (dyspnea on exertion)    Bradycardia    Lower urinary tract symptoms due to benign prostatic hyperplasia    Raised prostate specific antigen    Chest pain in adult    Generalized anxiety disorder    Primary insomnia    Multiple rib fractures    Esophageal dysphagia    Cellulitis of left foot    Palpitations    Dysuria       Social History     Tobacco Use    Smoking status: Former     Current  packs/day: 0.00     Types: Cigarettes     Quit date:      Years since quittin.1     Passive exposure: Never    Smokeless tobacco: Never   Vaping Use    Vaping status: Never Used   Substance Use Topics    Alcohol use: Yes     Alcohol/week: 7.0 standard drinks of alcohol     Types: 7 Shots of liquor per week     Comment:  once per day    Drug use: No       No Known Allergies    Current Outpatient Medications on File Prior to Visit   Medication Sig    allopurinol (ZYLOPRIM) 100 MG tablet Take 1 tablet by mouth Daily.    ALPRAZolam (XANAX) 0.25 MG tablet Take 1 tablet by mouth 2 (Two) Times a Day As Needed for Anxiety or Sleep. for anxiety    amLODIPine (NORVASC) 10 MG tablet Take 1 tablet by mouth Daily.    amoxicillin-clavulanate (AUGMENTIN) 875-125 MG per tablet Take 1 tablet by mouth 2 (Two) Times a Day.    aspirin 81 MG tablet Take 1 tablet by mouth Daily.    bicalutamide (CASODEX) 50 MG chemo tablet Casodex 50 mg tablet   Take 1 tablet every day by oral route for 30 days.    Blood Glucose Monitoring Suppl w/Device kit Check blood sugar once a day.    DX: E11.9    cholecalciferol (VITAMIN D3) 1000 UNITS tablet Take 1 tablet by mouth Daily.    colchicine 0.6 MG tablet Take 1 tablet by mouth Daily. PRN    Continuous Glucose Sensor (Dexcom G7 Sensor) misc Use 1 Application Every 10 (Ten) Days.    Cyanocobalamin (VITAMIN B12 PO) Take  by mouth daily.    FLUoxetine (PROzac) 20 MG capsule Take 1 capsule by mouth Daily.    fluticasone (FLONASE) 50 MCG/ACT nasal spray Administer 2 sprays into the nostril(s) as directed by provider Daily for 30 days.    folic acid (FOLVITE) 1 MG tablet TAKE 1 TABLET BY MOUTH DAILY    glucose blood (Contour Next Test) test strip Check blood glucose daily    hydrALAZINE (APRESOLINE) 50 MG tablet Take 1 tablet by mouth 3 (Three) Times a Day.    HYDROcodone-acetaminophen (NORCO) 7.5-325 MG per tablet Take 1 tablet by mouth 3 (Three) Times a Day As Needed for Moderate Pain.     indomethacin (INDOCIN) 25 MG capsule Take 1 capsule by mouth 3 (Three) Times a Day As Needed for Mild Pain .    leuprolide (LUPRON) 30 MG injection Inject 30 mg into the appropriate muscle as directed by prescriber Every 4 (Four) Months.    loratadine (Claritin) 10 MG tablet Take 1 tablet by mouth Daily.    meclizine (ANTIVERT) 25 MG tablet Take 1 tablet by mouth 3 (Three) Times a Day As Needed for Dizziness.    metFORMIN (GLUCOPHAGE) 500 MG tablet Take 0.5 tablets by mouth Daily With Breakfast.    metoprolol tartrate (LOPRESSOR) 25 MG tablet Take 1 tablet by mouth 2 (Two) Times a Day.    Microlet Lancets misc 1 stick Daily.    Mirabegron ER (Myrbetriq) 25 MG tablet sustained-release 24 hour 24 hr tablet Take 1 tablet by mouth Daily.    phenazopyridine (Pyridium) 100 MG tablet Take 1 tablet by mouth 2 (Two) Times a Day.    Pyridoxine HCl (VITAMIN B-6 PO) Take  by mouth daily.    rosuvastatin (CRESTOR) 20 MG tablet Take 1 tablet by mouth Every Night.    SITagliptin (Januvia) 100 MG tablet Take 1 tablet by mouth Daily.     No current facility-administered medications on file prior to visit.     (Not in a hospital admission)      Results for orders placed during the hospital encounter of 07/13/21    Adult Transthoracic Echo Complete W/ Cont if Necessary Per Protocol    Interpretation Summary  · Normal left ventricular cavity size and wall thickness noted.  · Left ventricular ejection fraction appears to be 61 - 65%. Left ventricular systolic function is normal.  · Left ventricular diastolic function was normal.  · The aortic valve is structurally normal with no regurgitation or stenosis present. The aortic valve appears trileaflet.  · The mitral valve is structurally normal with no regurgitation or significant stenosis present.  · No significant valvular heart disease  · There is no evidence of pericardial effusion.    Results for orders placed during the hospital encounter of 07/13/21    Stress Test With Myocardial  "Perfusion One Day    Interpretation Summary  · A pharmacological stress test was performed using regadenoson without low-level exercise.  · Resting EKG showed sinus rhythm rate of 62 bpm incomplete right bundle branch block was noted.  · Patient tolerated lexiscan well without complications, no aminophylline was administered  · ST segments did not show any diagnostic changes, there was no arrhythmia detected.  · Left ventricular ejection fraction is hyperdynamic (Calculated EF > 70%). .  · Myocardial perfusion imaging indicates a normal myocardial perfusion study with no evidence of ischemia.  · Impressions are consistent with a low risk study.  · Compared to the prior study from 5/3/2018 the current study reveals no changes.          The following portions of the patient's history were reviewed and updated as appropriate: allergies, current medications, past family history, past medical history, past social history, past surgical history and problem list.    ROS       Objective:     There were no vitals taken for this visit.  Physical Exam      Lab Review  Lab Results   Component Value Date     01/20/2025    K 4.2 01/20/2025     01/20/2025    BUN 16 01/20/2025    CREATININE 1.32 (H) 01/20/2025    GLUCOSE 315 (H) 01/20/2025    CALCIUM 9.1 01/20/2025    ALT 20 01/20/2025    ALKPHOS 58 01/20/2025    LABIL2 1.8 03/07/2016     Lab Results   Component Value Date    CKTOTAL 121 01/20/2025     Lab Results   Component Value Date    WBC 11.94 (H) 01/20/2025    HGB 13.9 01/20/2025    HCT 40.8 01/20/2025     01/20/2025     No results found for: \"INR\"  No results found for: \"MG\"  Lab Results   Component Value Date    PSA <0.014 12/09/2019    TSH 2.807 09/28/2015     No results found for: \"BNP\"  Lab Results   Component Value Date    CHLPL 227 (H) 03/07/2016    CHOL 175 01/20/2025    TRIG 266 (H) 01/20/2025    HDL 46 01/20/2025    VLDL 44 (H) 01/20/2025    LDL 85 01/20/2025     Lab Results   Component Value " "Date    LDL 85 01/20/2025    LDL 91 07/08/2024     No results found for: \"PROBNP\"            Procedures       I personally viewed and interpreted the patient's LAB data         Assessment:   No diagnosis found.      Plan:              No follow-ups on file.    "

## 2025-02-07 NOTE — PROGRESS NOTES
subjective     Chief Complaint   Patient presents with    Chest Pain     X 1 week    Dizziness    DDD Degenerative disc disease lumbar     Follow up    generalized anxiety     Follow up    Diabetes Mellitus    Shortness of Breath     Soa Painful with bending over       Problems Addressed This Visit  1. Generalized anxiety disorder    2. DDD (degenerative disc disease), lumbar    3. Primary hypertension    4. Mixed hyperlipidemia    5. Chronic chest pain with low to moderate risk for CAD    6. Chest pain due to GERD        History of Present Illness  History of Present Illness  The patient presents for evaluation of chest pain, urinary tract infection, and gastrointestinal issues.    He reports experiencing substernal chest pain, described as a pressure sensation that intensifies during respiration. This discomfort is also present during periods of rest and mild physical activity such as walking. The onset of these symptoms was noted last week. He does not experience any radiating pain to the arms or neck but admits to shortness of breath when lying supine. He has been managing this by sleeping in  a chair. The pain exacerbates upon bending over.    His blood pressure remains within normal limits. His last stress test was conducted in 2021. He is currently on aspirin therapy.     Multiple risk factors for coronary artery disease including age, gender, hypertension, hyperlipidemia, diabetes mellitus  He is on Crestor for cholesterol management. He takes Norvasc, hydralazine, and Lopressor for blood pressure management.     He is on metformin and Januvia for diabetes management.  He has a history of urinary tract infection (UTI), which has shown improvement with the prescribed medication. He is currently on Augmentin and has some doses remaining.      He is on Prozac and Xanax for anxiety. He takes hydrocodone for pain management.       MEDICATIONS  Crestor, amlodipine, metoprolol, aspirin, Prozac, Xanax, hydrocodone,  Norvasc, hydralazine, Lopressor, metformin, Januvia, Augmentin.      Past Surgical History:   Procedure Laterality Date    COLONOSCOPY  03/2018    EYE SURGERY      FOOT SURGERY      HERNIA REPAIR      HYDROCELECTOMY  06/15/2015    PROSTATE BIOPSY  2018    PROSTATE FIDUCIAL MARKER PLACEMENT  09/14/2018    RHINOPLASTY      UPPER GASTROINTESTINAL ENDOSCOPY  03/24/2014    WITH BIOPSIES AND DILATION     Family History   Problem Relation Age of Onset    Kidney disease Other     Other Other         CHRONIC DISABLING DISEASES URINARY TRACT DISEASE    Diabetes Other     Hypertension Other     Other Mother     Heart failure Father     Stroke Sister     Arthritis Sister     Heart disease Brother     No Known Problems Brother     No Known Problems Brother      Past Medical History:   Diagnosis Date    Anxiety     Arthritis     Colitis     Depression     GERD (gastroesophageal reflux disease)     Gout     Heart murmur     History of EKG 12/22/2015    NORMAL    Hyperlipidemia     Hypertension     Obesity     Pancreatitis     history of    Prostate cancer     Renal failure     MILD one functioning kidney    Skin cancer     basal cell cancer on back     Patient Active Problem List   Diagnosis    Essential hypertension, labile    GERD (gastroesophageal reflux disease)    Renal failure    Hyperlipidemia    Anxiety    Depression    Type 2 diabetes mellitus    Periumbilical abdominal pain    DDD (degenerative disc disease), lumbar    Prostate cancer    Hyperuricemia    BURKS (dyspnea on exertion)    Bradycardia    Lower urinary tract symptoms due to benign prostatic hyperplasia    Raised prostate specific antigen    Chronic chest pain with low to moderate risk for CAD    Generalized anxiety disorder    Primary insomnia    Multiple rib fractures    Esophageal dysphagia    Cellulitis of left foot    Palpitations    Dysuria       Social History     Tobacco Use    Smoking status: Former     Current packs/day: 0.00     Types: Cigarettes      Quit date:      Years since quittin.1     Passive exposure: Never    Smokeless tobacco: Never   Vaping Use    Vaping status: Never Used   Substance Use Topics    Alcohol use: Yes     Alcohol/week: 7.0 standard drinks of alcohol     Types: 7 Shots of liquor per week     Comment:  once per day    Drug use: No       No Known Allergies    Current Outpatient Medications on File Prior to Visit   Medication Sig    allopurinol (ZYLOPRIM) 100 MG tablet Take 1 tablet by mouth Daily.    amLODIPine (NORVASC) 10 MG tablet Take 1 tablet by mouth Daily.    aspirin 81 MG tablet Take 1 tablet by mouth Daily.    bicalutamide (CASODEX) 50 MG chemo tablet Casodex 50 mg tablet   Take 1 tablet every day by oral route for 30 days.    Blood Glucose Monitoring Suppl w/Device kit Check blood sugar once a day.    DX: E11.9    cholecalciferol (VITAMIN D3) 1000 UNITS tablet Take 1 tablet by mouth Daily.    colchicine 0.6 MG tablet Take 1 tablet by mouth Daily. PRN    Continuous Glucose Sensor (Dexcom G7 Sensor) misc Use 1 Application Every 10 (Ten) Days.    Cyanocobalamin (VITAMIN B12 PO) Take  by mouth daily.    FLUoxetine (PROzac) 20 MG capsule Take 1 capsule by mouth Daily.    folic acid (FOLVITE) 1 MG tablet TAKE 1 TABLET BY MOUTH DAILY    glucose blood (Contour Next Test) test strip Check blood glucose daily    hydrALAZINE (APRESOLINE) 50 MG tablet Take 1 tablet by mouth 3 (Three) Times a Day.    leuprolide (LUPRON) 30 MG injection Inject 30 mg into the appropriate muscle as directed by prescriber Every 4 (Four) Months.    loratadine (Claritin) 10 MG tablet Take 1 tablet by mouth Daily.    meclizine (ANTIVERT) 25 MG tablet Take 1 tablet by mouth 3 (Three) Times a Day As Needed for Dizziness.    metFORMIN (GLUCOPHAGE) 500 MG tablet Take 0.5 tablets by mouth Daily With Breakfast.    metoprolol tartrate (LOPRESSOR) 25 MG tablet Take 1 tablet by mouth 2 (Two) Times a Day.    Microlet Lancets misc 1 stick Daily.    Mirabegron ER  (Myrbetriq) 25 MG tablet sustained-release 24 hour 24 hr tablet Take 1 tablet by mouth Daily.    Pyridoxine HCl (VITAMIN B-6 PO) Take  by mouth daily.    rosuvastatin (CRESTOR) 20 MG tablet Take 1 tablet by mouth Every Night.    SITagliptin (Januvia) 100 MG tablet Take 1 tablet by mouth Daily.    [DISCONTINUED] ALPRAZolam (XANAX) 0.25 MG tablet Take 1 tablet by mouth 2 (Two) Times a Day As Needed for Anxiety or Sleep. for anxiety    [DISCONTINUED] amoxicillin-clavulanate (AUGMENTIN) 875-125 MG per tablet Take 1 tablet by mouth 2 (Two) Times a Day.    [DISCONTINUED] HYDROcodone-acetaminophen (NORCO) 7.5-325 MG per tablet Take 1 tablet by mouth 3 (Three) Times a Day As Needed for Moderate Pain.    [DISCONTINUED] indomethacin (INDOCIN) 25 MG capsule Take 1 capsule by mouth 3 (Three) Times a Day As Needed for Mild Pain .    [DISCONTINUED] phenazopyridine (Pyridium) 100 MG tablet Take 1 tablet by mouth 2 (Two) Times a Day.    fluticasone (FLONASE) 50 MCG/ACT nasal spray Administer 2 sprays into the nostril(s) as directed by provider Daily for 30 days.     No current facility-administered medications on file prior to visit.     (Not in a hospital admission)      Results for orders placed during the hospital encounter of 07/13/21    Adult Transthoracic Echo Complete W/ Cont if Necessary Per Protocol    Interpretation Summary  · Normal left ventricular cavity size and wall thickness noted.  · Left ventricular ejection fraction appears to be 61 - 65%. Left ventricular systolic function is normal.  · Left ventricular diastolic function was normal.  · The aortic valve is structurally normal with no regurgitation or stenosis present. The aortic valve appears trileaflet.  · The mitral valve is structurally normal with no regurgitation or significant stenosis present.  · No significant valvular heart disease  · There is no evidence of pericardial effusion.    Results for orders placed during the hospital encounter of  "07/13/21    Stress Test With Myocardial Perfusion One Day    Interpretation Summary  · A pharmacological stress test was performed using regadenoson without low-level exercise.  · Resting EKG showed sinus rhythm rate of 62 bpm incomplete right bundle branch block was noted.  · Patient tolerated lexiscan well without complications, no aminophylline was administered  · ST segments did not show any diagnostic changes, there was no arrhythmia detected.  · Left ventricular ejection fraction is hyperdynamic (Calculated EF > 70%). .  · Myocardial perfusion imaging indicates a normal myocardial perfusion study with no evidence of ischemia.  · Impressions are consistent with a low risk study.  · Compared to the prior study from 5/3/2018 the current study reveals no changes.          The following portions of the patient's history were reviewed and updated as appropriate: allergies, current medications, past family history, past medical history, past social history, past surgical history and problem list.    Review of Systems   Constitutional: Negative.   HENT: Negative.     Cardiovascular:  Positive for chest pain and dyspnea on exertion.   Skin: Negative.    Musculoskeletal:  Positive for arthritis and back pain.   Gastrointestinal:  Positive for abdominal pain and heartburn.   Genitourinary:  Positive for bladder incontinence and dysuria.   Psychiatric/Behavioral:  The patient has insomnia and is nervous/anxious.           Objective:     /68 (BP Location: Left arm, Patient Position: Sitting, Cuff Size: Adult)   Pulse 64   Ht 180.3 cm (71\")   Wt 96.2 kg (212 lb)   SpO2 97%   BMI 29.57 kg/m²   Pulmonary:      Effort: Pulmonary effort is normal.      Breath sounds: Normal breath sounds. No stridor. No wheezing. No rhonchi. No rales.   Cardiovascular:      PMI at left midclavicular line. Normal rate. Regular rhythm. Normal S1. Normal S2.       Murmurs: There is no murmur.      No gallop.  No click. No rub. " "  Pulses:     Intact distal pulses.   Edema:     Peripheral edema absent.           Lab Review  Lab Results   Component Value Date     01/20/2025    K 4.2 01/20/2025     01/20/2025    BUN 16 01/20/2025    CREATININE 1.32 (H) 01/20/2025    GLUCOSE 315 (H) 01/20/2025    CALCIUM 9.1 01/20/2025    ALT 20 01/20/2025    ALKPHOS 58 01/20/2025    LABIL2 1.8 03/07/2016     Lab Results   Component Value Date    CKTOTAL 121 01/20/2025     Lab Results   Component Value Date    WBC 11.94 (H) 01/20/2025    HGB 13.9 01/20/2025    HCT 40.8 01/20/2025     01/20/2025     No results found for: \"INR\"  No results found for: \"MG\"  Lab Results   Component Value Date    PSA <0.014 12/09/2019    TSH 2.807 09/28/2015     No results found for: \"BNP\"  Lab Results   Component Value Date    CHLPL 227 (H) 03/07/2016    CHOL 175 01/20/2025    TRIG 266 (H) 01/20/2025    HDL 46 01/20/2025    VLDL 44 (H) 01/20/2025    LDL 85 01/20/2025     Lab Results   Component Value Date    LDL 85 01/20/2025    LDL 91 07/08/2024     No results found for: \"PROBNP\"              ECG 12 Lead    Date/Time: 2/7/2025 11:26 AM  Performed by: Antione De Santiago MD    Authorized by: Antione De Santiago MD  Comparison: compared with previous ECG from 6/9/2023  Comparison to previous ECG: ST depression is slightly worse.  Rhythm: sinus rhythm  Rate: normal  BPM: 64  Conduction: incomplete right bundle branch block  QRS axis: normal  Other findings: non-specific ST-T wave changes    Clinical impression: abnormal EKG  Comments: Moderate ST depression          Results  Laboratory Studies  Cholesterol levels are good but not perfect. Blood test was good for infection.    Imaging  Chest x-ray was normal.    Testing  EKG shows some minimal changes.     I personally viewed and interpreted the patient's LAB data         Assessment:     1. Generalized anxiety disorder    2. DDD (degenerative disc disease), lumbar    3. Primary hypertension    4. Mixed " hyperlipidemia    5. Chronic chest pain with low to moderate risk for CAD    6. Chest pain due to GERD        Assessment & Plan  1. Chest pain.  The patient reports substernal chest pain that worsens with walking and bending over, accompanied by shortness of breath when lying down. His EKG shows minimal changes, and his cholesterol and blood pressure are currently well-managed, but he remains at high risk due to his medical history. A recent chest x-ray was normal. A stress test will be scheduled. He will continue his current medications, including Crestor, amlodipine, metoprolol, aspirin, Prozac, Xanax, hydrocodone, Norvasc, hydralazine, Lopressor, metformin, and Januvia. Nitroglycerin was added to his regimen.  EKG shows nonspecific ST and T wave changes.  Slight ST depression is worse.  Further ischemic workup is needed.    2. Urinary tract infection (UTI).  The UTI has improved with the use of Augmentin. Augmentin will be discontinued as it may be contributing to his gastrointestinal symptoms.    3. Gastrointestinal issues.  The patient reports ongoing diarrhea, which may be related to his current medications and recent antibiotic use. Protonix will be prescribed to manage his gastrointestinal symptoms. He is advised to avoid taking Indocin as it can cause stomach issues.    Hyperlipidemia  LDL is 85 patient will continue current statin therapy.  Healthy lifestyle and weight loss along with diet restriction again emphasized.    Blood pressure is very well-controlled.  No change in therapy was made.    Diabetes mellitus type 2 is also well-controlled.    Will arrange echocardiogram and stress test for further evaluation.    Epigastric discomfort probably is related to Augmentin.  Augmentin was discontinued.  Patient was given Protonix.    Follow-up after cardiac workup.             No follow-ups on file.    Patient or patient representative verbalized consent for the use of Ambient Listening during the visit with   Antione De Santiago MD for chart documentation. 2/7/2025  10:46 EST

## 2025-02-18 ENCOUNTER — HOSPITAL ENCOUNTER (OUTPATIENT)
Dept: CARDIOLOGY | Facility: HOSPITAL | Age: 80
Discharge: HOME | End: 2025-02-18
Payer: MEDICARE

## 2025-02-18 ENCOUNTER — HOSPITAL ENCOUNTER (OUTPATIENT)
Dept: NUCLEAR MEDICINE | Facility: HOSPITAL | Age: 80
Discharge: HOME | End: 2025-02-18
Payer: MEDICARE

## 2025-02-18 DIAGNOSIS — Z91.89 CHRONIC CHEST PAIN WITH LOW TO MODERATE RISK FOR CAD: ICD-10-CM

## 2025-02-18 DIAGNOSIS — R07.9 CHRONIC CHEST PAIN WITH LOW TO MODERATE RISK FOR CAD: ICD-10-CM

## 2025-02-18 DIAGNOSIS — G89.29 CHRONIC CHEST PAIN WITH LOW TO MODERATE RISK FOR CAD: ICD-10-CM

## 2025-02-18 LAB
AV MEAN PRESS GRAD SYS DOP V1V2: 5 MMHG
AV VMAX SYS DOP: 147 CM/SEC
BH CV ECHO MEAS - ACS: 1.7 CM
BH CV ECHO MEAS - AO MAX PG: 8.6 MMHG
BH CV ECHO MEAS - AO ROOT DIAM: 3.2 CM
BH CV ECHO MEAS - AO V2 VTI: 30.6 CM
BH CV ECHO MEAS - EDV(CUBED): 107.9 ML
BH CV ECHO MEAS - EDV(MOD-SP4): 93.4 ML
BH CV ECHO MEAS - EF(MOD-SP4): 59.6 %
BH CV ECHO MEAS - ESV(CUBED): 23.9 ML
BH CV ECHO MEAS - ESV(MOD-SP4): 37.7 ML
BH CV ECHO MEAS - FS: 39.5 %
BH CV ECHO MEAS - IVS/LVPW: 1.11 CM
BH CV ECHO MEAS - IVSD: 1.09 CM
BH CV ECHO MEAS - LA DIMENSION: 3.5 CM
BH CV ECHO MEAS - LAT PEAK E' VEL: 5.7 CM/SEC
BH CV ECHO MEAS - LV DIASTOLIC VOL/BSA (35-75): 43.2 CM2
BH CV ECHO MEAS - LV MASS(C)D: 176.3 GRAMS
BH CV ECHO MEAS - LV SYSTOLIC VOL/BSA (12-30): 17.4 CM2
BH CV ECHO MEAS - LVIDD: 4.8 CM
BH CV ECHO MEAS - LVIDS: 2.9 CM
BH CV ECHO MEAS - LVOT AREA: 3.1 CM2
BH CV ECHO MEAS - LVOT DIAM: 2 CM
BH CV ECHO MEAS - LVPWD: 0.98 CM
BH CV ECHO MEAS - MED PEAK E' VEL: 4.6 CM/SEC
BH CV ECHO MEAS - MV A MAX VEL: 49.3 CM/SEC
BH CV ECHO MEAS - MV E MAX VEL: 71.1 CM/SEC
BH CV ECHO MEAS - MV E/A: 1.44
BH CV ECHO MEAS - PA ACC TIME: 0.12 SEC
BH CV ECHO MEAS - RAP SYSTOLE: 10 MMHG
BH CV ECHO MEAS - RVSP: 38.3 MMHG
BH CV ECHO MEAS - SV(MOD-SP4): 55.7 ML
BH CV ECHO MEAS - SVI(MOD-SP4): 25.8 ML/M2
BH CV ECHO MEAS - TAPSE (>1.6): 2.7 CM
BH CV ECHO MEAS - TR MAX PG: 28.3 MMHG
BH CV ECHO MEAS - TR MAX VEL: 266 CM/SEC
BH CV ECHO MEASUREMENTS AVERAGE E/E' RATIO: 13.81
BH CV NUCLEAR PRIOR STUDY: 3
BH CV REST NUCLEAR ISOTOPE DOSE: 10.8 MCI
BH CV STRESS BP STAGE 1: NORMAL
BH CV STRESS COMMENTS STAGE 1: NORMAL
BH CV STRESS DOSE REGADENOSON STAGE 1: 0.4
BH CV STRESS DURATION MIN STAGE 1: 0
BH CV STRESS DURATION SEC STAGE 1: 10
BH CV STRESS HR STAGE 1: 66
BH CV STRESS NUCLEAR ISOTOPE DOSE: 31.5 MCI
BH CV STRESS PROTOCOL 1: NORMAL
BH CV STRESS RECOVERY BP: NORMAL MMHG
BH CV STRESS RECOVERY HR: 63 BPM
BH CV STRESS STAGE 1: 1
LEFT ATRIUM VOLUME INDEX: 18.5 ML/M2
MAXIMAL PREDICTED HEART RATE: 141 BPM
PERCENT MAX PREDICTED HR: 46.81 %
SPECT HRT GATED+EF W RNC IV: 70 %
STRESS BASELINE BP: NORMAL MMHG
STRESS BASELINE HR: 58 BPM
STRESS PERCENT HR: 55 %
STRESS POST PEAK BP: NORMAL MMHG
STRESS POST PEAK HR: 66 BPM
STRESS TARGET HR: 120 BPM

## 2025-02-18 PROCEDURE — 34310000005 TECHNETIUM SESTAMIBI: Performed by: INTERNAL MEDICINE

## 2025-02-18 PROCEDURE — 25010000002 REGADENOSON 0.4 MG/5ML SOLUTION: Performed by: INTERNAL MEDICINE

## 2025-02-18 PROCEDURE — 93017 CV STRESS TEST TRACING ONLY: CPT

## 2025-02-18 PROCEDURE — A9500 TC99M SESTAMIBI: HCPCS | Performed by: INTERNAL MEDICINE

## 2025-02-18 PROCEDURE — 78452 HT MUSCLE IMAGE SPECT MULT: CPT

## 2025-02-18 PROCEDURE — 93306 TTE W/DOPPLER COMPLETE: CPT | Performed by: INTERNAL MEDICINE

## 2025-02-18 PROCEDURE — 93018 CV STRESS TEST I&R ONLY: CPT | Performed by: INTERNAL MEDICINE

## 2025-02-18 PROCEDURE — 78452 HT MUSCLE IMAGE SPECT MULT: CPT | Performed by: INTERNAL MEDICINE

## 2025-02-18 PROCEDURE — 93306 TTE W/DOPPLER COMPLETE: CPT

## 2025-02-18 RX ORDER — REGADENOSON 0.08 MG/ML
0.4 INJECTION, SOLUTION INTRAVENOUS
Status: COMPLETED | OUTPATIENT
Start: 2025-02-18 | End: 2025-02-18

## 2025-02-18 RX ADMIN — REGADENOSON 0.4 MG: 0.08 INJECTION, SOLUTION INTRAVENOUS at 11:40

## 2025-02-18 RX ADMIN — TECHNETIUM TC 99M SESTAMIBI 1 DOSE: 1 INJECTION INTRAVENOUS at 10:20

## 2025-02-18 RX ADMIN — TECHNETIUM TC 99M SESTAMIBI 1 DOSE: 1 INJECTION INTRAVENOUS at 11:40

## 2025-02-19 ENCOUNTER — LAB (OUTPATIENT)
Dept: LAB | Facility: HOSPITAL | Age: 80
End: 2025-02-19
Payer: MEDICARE

## 2025-02-19 ENCOUNTER — TELEPHONE (OUTPATIENT)
Dept: CARDIOLOGY | Facility: CLINIC | Age: 80
End: 2025-02-19
Payer: MEDICARE

## 2025-02-19 DIAGNOSIS — R06.09 DOE (DYSPNEA ON EXERTION): ICD-10-CM

## 2025-02-19 DIAGNOSIS — E11.21 TYPE 2 DIABETES MELLITUS WITH DIABETIC NEPHROPATHY, WITHOUT LONG-TERM CURRENT USE OF INSULIN: ICD-10-CM

## 2025-02-19 DIAGNOSIS — R06.09 DOE (DYSPNEA ON EXERTION): Primary | ICD-10-CM

## 2025-02-19 DIAGNOSIS — E78.2 MIXED HYPERLIPIDEMIA: ICD-10-CM

## 2025-02-19 LAB
ALBUMIN SERPL-MCNC: 4.4 G/DL (ref 3.5–5.2)
ALBUMIN/GLOB SERPL: 1.7 G/DL
ALP SERPL-CCNC: 74 U/L (ref 39–117)
ALT SERPL W P-5'-P-CCNC: 24 U/L (ref 1–41)
ANION GAP SERPL CALCULATED.3IONS-SCNC: 13.2 MMOL/L (ref 5–15)
AST SERPL-CCNC: 23 U/L (ref 1–40)
BASOPHILS # BLD AUTO: 0.11 10*3/MM3 (ref 0–0.2)
BASOPHILS NFR BLD AUTO: 1.2 % (ref 0–1.5)
BILIRUB SERPL-MCNC: 0.3 MG/DL (ref 0–1.2)
BUN SERPL-MCNC: 16 MG/DL (ref 8–23)
BUN/CREAT SERPL: 13.3 (ref 7–25)
CALCIUM SPEC-SCNC: 9.2 MG/DL (ref 8.6–10.5)
CHLORIDE SERPL-SCNC: 107 MMOL/L (ref 98–107)
CHOLEST SERPL-MCNC: 232 MG/DL (ref 0–200)
CK SERPL-CCNC: 77 U/L (ref 20–200)
CO2 SERPL-SCNC: 22.8 MMOL/L (ref 22–29)
CREAT SERPL-MCNC: 1.2 MG/DL (ref 0.76–1.27)
DEPRECATED RDW RBC AUTO: 48.1 FL (ref 37–54)
EGFRCR SERPLBLD CKD-EPI 2021: 61.5 ML/MIN/1.73
EOSINOPHIL # BLD AUTO: 1.56 10*3/MM3 (ref 0–0.4)
EOSINOPHIL NFR BLD AUTO: 16.4 % (ref 0.3–6.2)
ERYTHROCYTE [DISTWIDTH] IN BLOOD BY AUTOMATED COUNT: 13.4 % (ref 12.3–15.4)
GLOBULIN UR ELPH-MCNC: 2.6 GM/DL
GLUCOSE SERPL-MCNC: 153 MG/DL (ref 65–99)
HBA1C MFR BLD: 6.9 % (ref 4.8–5.6)
HCT VFR BLD AUTO: 42 % (ref 37.5–51)
HDLC SERPL-MCNC: 41 MG/DL (ref 40–60)
HGB BLD-MCNC: 14.2 G/DL (ref 13–17.7)
IMM GRANULOCYTES # BLD AUTO: 0.02 10*3/MM3 (ref 0–0.05)
IMM GRANULOCYTES NFR BLD AUTO: 0.2 % (ref 0–0.5)
LDLC SERPL CALC-MCNC: 107 MG/DL (ref 0–100)
LDLC/HDLC SERPL: 2.27 {RATIO}
LYMPHOCYTES # BLD AUTO: 3.1 10*3/MM3 (ref 0.7–3.1)
LYMPHOCYTES NFR BLD AUTO: 32.6 % (ref 19.6–45.3)
MCH RBC QN AUTO: 32.6 PG (ref 26.6–33)
MCHC RBC AUTO-ENTMCNC: 33.8 G/DL (ref 31.5–35.7)
MCV RBC AUTO: 96.6 FL (ref 79–97)
MONOCYTES # BLD AUTO: 0.53 10*3/MM3 (ref 0.1–0.9)
MONOCYTES NFR BLD AUTO: 5.6 % (ref 5–12)
NEUTROPHILS NFR BLD AUTO: 4.19 10*3/MM3 (ref 1.7–7)
NEUTROPHILS NFR BLD AUTO: 44 % (ref 42.7–76)
NRBC BLD AUTO-RTO: 0 /100 WBC (ref 0–0.2)
NT-PROBNP SERPL-MCNC: 149.1 PG/ML (ref 0–1800)
PLATELET # BLD AUTO: 197 10*3/MM3 (ref 140–450)
PMV BLD AUTO: 12.1 FL (ref 6–12)
POTASSIUM SERPL-SCNC: 4.2 MMOL/L (ref 3.5–5.2)
PROT SERPL-MCNC: 7 G/DL (ref 6–8.5)
RBC # BLD AUTO: 4.35 10*6/MM3 (ref 4.14–5.8)
SODIUM SERPL-SCNC: 143 MMOL/L (ref 136–145)
TRIGL SERPL-MCNC: 489 MG/DL (ref 0–150)
VLDLC SERPL-MCNC: 84 MG/DL (ref 5–40)
WBC NRBC COR # BLD AUTO: 9.51 10*3/MM3 (ref 3.4–10.8)

## 2025-02-19 PROCEDURE — 83036 HEMOGLOBIN GLYCOSYLATED A1C: CPT

## 2025-02-19 PROCEDURE — 82550 ASSAY OF CK (CPK): CPT

## 2025-02-19 PROCEDURE — 36415 COLL VENOUS BLD VENIPUNCTURE: CPT

## 2025-02-19 PROCEDURE — 80053 COMPREHEN METABOLIC PANEL: CPT

## 2025-02-19 PROCEDURE — 80061 LIPID PANEL: CPT

## 2025-02-19 PROCEDURE — 85025 COMPLETE CBC W/AUTO DIFF WBC: CPT

## 2025-02-19 PROCEDURE — 83880 ASSAY OF NATRIURETIC PEPTIDE: CPT

## 2025-02-19 NOTE — TELEPHONE ENCOUNTER
Antione De Santiago MD Collins, Sheila D, MA  Stress test is good, echocardiogram does not show any significant valvular heart disease, ejection fraction is normal.  We should get proBNP      Called and given message per Dr De Santiago.  He agreed and v/u.

## 2025-02-19 NOTE — TELEPHONE ENCOUNTER
----- Message from Antione De Santiago sent at 2/19/2025  1:43 PM EST -----  Stress test is good, echocardiogram does not show any significant valvular heart disease, ejection fraction is normal.  We should get proBNP

## 2025-02-20 ENCOUNTER — TELEPHONE (OUTPATIENT)
Dept: CARDIOLOGY | Facility: CLINIC | Age: 80
End: 2025-02-20
Payer: MEDICARE

## 2025-02-20 RX ORDER — FENOFIBRATE 145 MG/1
145 TABLET, COATED ORAL DAILY
Qty: 90 TABLET | Refills: 3 | Status: SHIPPED | OUTPATIENT
Start: 2025-02-20

## 2025-02-20 NOTE — TELEPHONE ENCOUNTER
----- Message from Antione D eSantiago sent at 2/20/2025 11:31 AM EST -----  proBNP is normal.  There is no evidence of heart failure.  Add Tricor 145 daily  for high triglycerides.

## 2025-02-20 NOTE — TELEPHONE ENCOUNTER
Spoke with pt. Given message per Dr De Santiago.  Rx pending. .    Antione De Santiago MD Collins, Sheila D, MA  proBNP is normal.  There is no evidence of heart failure.  Add Tricor 145 daily  for high triglycerides.

## 2025-03-04 ENCOUNTER — TELEPHONE (OUTPATIENT)
Dept: CARDIOLOGY | Facility: CLINIC | Age: 80
End: 2025-03-04
Payer: MEDICARE

## 2025-03-04 RX ORDER — AZITHROMYCIN 250 MG/1
TABLET, FILM COATED ORAL
Qty: 6 TABLET | Refills: 0 | Status: SHIPPED | OUTPATIENT
Start: 2025-03-04 | End: 2025-03-07

## 2025-03-04 NOTE — TELEPHONE ENCOUNTER
PT STAES SHE IS HAVING SINUS CONGESTION, RUNNY NOSE.   NO OTHER SYMPTOMS.    PT WANTS Z-PACK CALLED IN TO [facility]

## 2025-03-07 ENCOUNTER — OFFICE VISIT (OUTPATIENT)
Dept: CARDIOLOGY | Facility: CLINIC | Age: 80
End: 2025-03-07
Payer: MEDICARE

## 2025-03-07 VITALS
BODY MASS INDEX: 29.82 KG/M2 | HEIGHT: 71 IN | WEIGHT: 213 LBS | HEART RATE: 54 BPM | DIASTOLIC BLOOD PRESSURE: 74 MMHG | OXYGEN SATURATION: 95 % | SYSTOLIC BLOOD PRESSURE: 132 MMHG

## 2025-03-07 DIAGNOSIS — F41.1 GENERALIZED ANXIETY DISORDER: ICD-10-CM

## 2025-03-07 DIAGNOSIS — M51.369 DDD (DEGENERATIVE DISC DISEASE), LUMBAR: ICD-10-CM

## 2025-03-07 DIAGNOSIS — R05.1 ACUTE COUGH: Primary | ICD-10-CM

## 2025-03-07 PROCEDURE — 3078F DIAST BP <80 MM HG: CPT | Performed by: INTERNAL MEDICINE

## 2025-03-07 PROCEDURE — 99213 OFFICE O/P EST LOW 20 MIN: CPT | Performed by: INTERNAL MEDICINE

## 2025-03-07 PROCEDURE — 3075F SYST BP GE 130 - 139MM HG: CPT | Performed by: INTERNAL MEDICINE

## 2025-03-07 RX ORDER — LEVOFLOXACIN 500 MG/1
500 TABLET, FILM COATED ORAL DAILY
Qty: 5 TABLET | Refills: 0 | Status: SHIPPED | OUTPATIENT
Start: 2025-03-07

## 2025-03-07 RX ORDER — ALPRAZOLAM 0.25 MG
0.25 TABLET ORAL 2 TIMES DAILY PRN
Qty: 60 TABLET | Refills: 0 | Status: SHIPPED | OUTPATIENT
Start: 2025-03-07

## 2025-03-07 RX ORDER — HYDROCODONE POLISTIREX AND CHLORPHENIRAMINE POLISTIREX 10; 8 MG/5ML; MG/5ML
5 SUSPENSION, EXTENDED RELEASE ORAL EVERY 12 HOURS PRN
Qty: 100 ML | Refills: 0 | Status: SHIPPED | OUTPATIENT
Start: 2025-03-07

## 2025-03-07 RX ORDER — HYDROCODONE BITARTRATE AND ACETAMINOPHEN 7.5; 325 MG/1; MG/1
1 TABLET ORAL 3 TIMES DAILY PRN
Qty: 90 TABLET | Refills: 0 | Status: SHIPPED | OUTPATIENT
Start: 2025-03-07

## 2025-03-07 NOTE — PROGRESS NOTES
subjective     Chief Complaint   Patient presents with    Anxiety     Follow up    DDD     Follow up    Results     labs    Med Refill     pending       Problems Addressed This Visit  1. Acute cough    2. DDD (degenerative disc disease), lumbar    3. Generalized anxiety disorder        History of Present Illness  History of Present Illness  The patient is a 79-year-old gentleman who presents for follow-up of an upper respiratory tract infection, anxiety disorder, chronic back pain,     He had been taking Zithromax for an upper respiratory tract infection but reports no improvement and continues to experience a severe cough. He has almost finished his Zithromax.   He was advised to take Levaquin 500 mg daily and will be given Tussionex cough syrup 5 cc twice daily as needed.    He also has a history of anxiety disorder and is taking Prozac and Xanax. He needs a refill on Xanax, which will be provided.    He has chronic back pain and a history of prostate cancer. He is on hydrocodone, is compliant with medications, and needs a refill, which will be given. Follow-up scheduled.    MEDICATIONS  Current: Zithromax, Prozac, Xanax, hydrocodone      Past Surgical History:   Procedure Laterality Date    COLONOSCOPY  03/2018    EYE SURGERY      FOOT SURGERY      HERNIA REPAIR      HYDROCELECTOMY  06/15/2015    PROSTATE BIOPSY  2018    PROSTATE FIDUCIAL MARKER PLACEMENT  09/14/2018    RHINOPLASTY      UPPER GASTROINTESTINAL ENDOSCOPY  03/24/2014    WITH BIOPSIES AND DILATION     Family History   Problem Relation Age of Onset    Kidney disease Other     Other Other         CHRONIC DISABLING DISEASES URINARY TRACT DISEASE    Diabetes Other     Hypertension Other     Other Mother     Heart failure Father     Stroke Sister     Arthritis Sister     Heart disease Brother     No Known Problems Brother     No Known Problems Brother      Past Medical History:   Diagnosis Date    Anxiety     Arthritis     Colitis     Depression     GERD  (gastroesophageal reflux disease)     Gout     Heart murmur     History of EKG 2015    NORMAL    Hyperlipidemia     Hypertension     Obesity     Pancreatitis     history of    Prostate cancer     Renal failure     MILD one functioning kidney    Skin cancer     basal cell cancer on back     Patient Active Problem List   Diagnosis    Essential hypertension, labile    GERD (gastroesophageal reflux disease)    Renal failure    Hyperlipidemia    Anxiety    Depression    Type 2 diabetes mellitus    Periumbilical abdominal pain    DDD (degenerative disc disease), lumbar    Prostate cancer    Hyperuricemia    BURKS (dyspnea on exertion)    Bradycardia    Lower urinary tract symptoms due to benign prostatic hyperplasia    Raised prostate specific antigen    Chronic chest pain with low to moderate risk for CAD    Generalized anxiety disorder    Primary insomnia    Multiple rib fractures    Esophageal dysphagia    Cellulitis of left foot    Palpitations    Dysuria       Social History     Tobacco Use    Smoking status: Former     Current packs/day: 0.00     Types: Cigarettes     Quit date:      Years since quittin.2     Passive exposure: Never    Smokeless tobacco: Never   Vaping Use    Vaping status: Never Used   Substance Use Topics    Alcohol use: Yes     Alcohol/week: 7.0 standard drinks of alcohol     Types: 7 Shots of liquor per week     Comment:  once per day    Drug use: No       No Known Allergies    Current Outpatient Medications on File Prior to Visit   Medication Sig    allopurinol (ZYLOPRIM) 100 MG tablet Take 1 tablet by mouth Daily.    amLODIPine (NORVASC) 10 MG tablet Take 1 tablet by mouth Daily.    aspirin 81 MG tablet Take 1 tablet by mouth Daily.    bicalutamide (CASODEX) 50 MG chemo tablet Casodex 50 mg tablet   Take 1 tablet every day by oral route for 30 days.    Blood Glucose Monitoring Suppl w/Device kit Check blood sugar once a day.    DX: E11.9    cholecalciferol (VITAMIN D3) 1000 UNITS  tablet Take 1 tablet by mouth Daily.    colchicine 0.6 MG tablet Take 1 tablet by mouth Daily. PRN    Continuous Glucose Sensor (Dexcom G7 Sensor) misc Use 1 Application Every 10 (Ten) Days.    Cyanocobalamin (VITAMIN B12 PO) Take  by mouth daily.    fenofibrate (TRICOR) 145 MG tablet Take 1 tablet by mouth Daily.    FLUoxetine (PROzac) 20 MG capsule Take 1 capsule by mouth Daily.    folic acid (FOLVITE) 1 MG tablet TAKE 1 TABLET BY MOUTH DAILY    glucose blood (Contour Next Test) test strip Check blood glucose daily    hydrALAZINE (APRESOLINE) 50 MG tablet Take 1 tablet by mouth 3 (Three) Times a Day.    leuprolide (LUPRON) 30 MG injection Inject 30 mg into the appropriate muscle as directed by prescriber Every 4 (Four) Months.    loratadine (Claritin) 10 MG tablet Take 1 tablet by mouth Daily.    meclizine (ANTIVERT) 25 MG tablet Take 1 tablet by mouth 3 (Three) Times a Day As Needed for Dizziness.    metFORMIN (GLUCOPHAGE) 500 MG tablet Take 0.5 tablets by mouth Daily With Breakfast.    metoprolol tartrate (LOPRESSOR) 25 MG tablet Take 1 tablet by mouth 2 (Two) Times a Day.    Microlet Lancets misc 1 stick Daily.    Mirabegron ER (Myrbetriq) 25 MG tablet sustained-release 24 hour 24 hr tablet Take 1 tablet by mouth Daily.    pantoprazole (Protonix) 40 MG EC tablet Take 1 tablet by mouth Daily.    Pyridoxine HCl (VITAMIN B-6 PO) Take  by mouth daily.    rosuvastatin (CRESTOR) 20 MG tablet Take 1 tablet by mouth Every Night.    SITagliptin (Januvia) 100 MG tablet Take 1 tablet by mouth Daily.    [DISCONTINUED] ALPRAZolam (XANAX) 0.25 MG tablet Take 1 tablet by mouth 2 (Two) Times a Day As Needed for Anxiety or Sleep. for anxiety    [DISCONTINUED] azithromycin (Zithromax Z-Dusty) 250 MG tablet Take 2 tablets by mouth on day 1, then 1 tablet daily on days 2-5    [DISCONTINUED] HYDROcodone-acetaminophen (NORCO) 7.5-325 MG per tablet Take 1 tablet by mouth 3 (Three) Times a Day As Needed for Moderate Pain.    fluticasone  (FLONASE) 50 MCG/ACT nasal spray Administer 2 sprays into the nostril(s) as directed by provider Daily for 30 days.     No current facility-administered medications on file prior to visit.     (Not in a hospital admission)      Results for orders placed during the hospital encounter of 02/18/25    Adult Transthoracic Echo Complete W/ Cont if Necessary Per Protocol    Interpretation Summary    Normal left ventricular cavity size, wall thickness and wall motion noted.    Left ventricular systolic function is normal. Left ventricular ejection fraction appears to be 61 - 65%.    Left ventricular diastolic function is consistent with (grade II w/high LAP) pseudonormalization.    No significant valvular heart disease detected    No pericardial effusion noted.    Results for orders placed during the hospital encounter of 02/18/25    Stress Test With Myocardial Perfusion One Day    Interpretation Summary  Images from the original result were not included.      A pharmacological stress test was performed using regadenoson without low-level exercise.    Resting EKG showed sinus bradycardia at a rate of 58 bpm.  Incomplete right bundle block and nonspecific T wave changes were noted.    ST segments did not show any diagnostic changes.  No significant arrhythmia detected.    Myocardial perfusion imaging indicates a normal myocardial perfusion study with no evidence of ischemia. Impressions are consistent with a low risk study.TID1.11    Left ventricular ejection fraction is normal (Calculated EF = 70%).    Compared to the prior study from 7/14/2021 the current study reveals no changes.          The following portions of the patient's history were reviewed and updated as appropriate: allergies, current medications, past family history, past medical history, past social history, past surgical history and problem list.    Review of Systems   Constitutional: Negative.   HENT: Negative.  Negative for congestion.    Eyes: Negative.   "  Cardiovascular: Negative.  Negative for chest pain, cyanosis, dyspnea on exertion, irregular heartbeat, leg swelling, near-syncope, orthopnea, palpitations, paroxysmal nocturnal dyspnea and syncope.   Respiratory:  Positive for cough and sputum production. Negative for shortness of breath.    Hematologic/Lymphatic: Negative.    Musculoskeletal:  Positive for arthritis and back pain.   Gastrointestinal: Negative.    Neurological: Negative.  Negative for headaches.   Psychiatric/Behavioral:  The patient is nervous/anxious.           Objective:     /74 (BP Location: Left arm, Patient Position: Sitting, Cuff Size: Adult)   Pulse 54   Ht 180.3 cm (71\")   Wt 96.6 kg (213 lb)   SpO2 95%   BMI 29.71 kg/m²   Pulmonary:      Effort: Pulmonary effort is normal.      Breath sounds: Wheezing present. Rhonchi present.   Cardiovascular:      PMI at left midclavicular line. Normal rate. Regular rhythm. Normal S1. Normal S2.       Murmurs: There is no murmur.      No gallop.  No click. No rub.   Pulses:     Intact distal pulses.   Edema:     Peripheral edema absent.       Physical Exam        Lab Review  Lab Results   Component Value Date     02/19/2025    K 4.2 02/19/2025     02/19/2025    BUN 16 02/19/2025    CREATININE 1.20 02/19/2025    GLUCOSE 153 (H) 02/19/2025    CALCIUM 9.2 02/19/2025    ALT 24 02/19/2025    ALKPHOS 74 02/19/2025     Lab Results   Component Value Date    CKTOTAL 77 02/19/2025     Lab Results   Component Value Date    WBC 9.51 02/19/2025    HGB 14.2 02/19/2025    HCT 42.0 02/19/2025     02/19/2025     No results found for: \"INR\"  No results found for: \"MG\"  Lab Results   Component Value Date    PSA <0.014 12/09/2019    TSH 2.807 09/28/2015     No results found for: \"BNP\"  Lab Results   Component Value Date    CHLPL 227 (H) 03/07/2016    CHOL 232 (H) 02/19/2025    TRIG 489 (H) 02/19/2025    HDL 41 02/19/2025    VLDL 84 (H) 02/19/2025     (H) 02/19/2025     Lab Results "   Component Value Date     (H) 02/19/2025    LDL 85 01/20/2025     Lab Results   Component Value Date    PROBNP 149.1 02/19/2025               Procedures    Results       I personally viewed and interpreted the patient's LAB data         Assessment:     1. Acute cough    2. DDD (degenerative disc disease), lumbar    3. Generalized anxiety disorder        Assessment & Plan  1. Upper respiratory tract infection.  He reported not getting better and has a severe cough despite completing his course of Zithromax. Levaquin 500 mg daily has been prescribed. Additionally, Tussionex cough syrup 5 cc twice daily as needed will be provided.    2. Anxiety disorder.  He is currently taking Prozac and Xanax. A refill for Xanax will be provided.    3. Chronic back pain.  He is on hydrocodone and is compliant with his medication regimen. A refill for hydrocodone will be given.                No follow-ups on file.

## 2025-03-25 RX ORDER — MIRABEGRON 50 MG/1
50 TABLET, FILM COATED, EXTENDED RELEASE ORAL DAILY
Qty: 28 TABLET | Refills: 0 | COMMUNITY
Start: 2025-03-25

## 2025-03-31 ENCOUNTER — TELEPHONE (OUTPATIENT)
Dept: CARDIOLOGY | Facility: CLINIC | Age: 80
End: 2025-03-31
Payer: MEDICARE

## 2025-03-31 DIAGNOSIS — R06.02 SOB (SHORTNESS OF BREATH): Primary | ICD-10-CM

## 2025-03-31 NOTE — TELEPHONE ENCOUNTER
Lei called stating he was short of breath sitting and standing.  Lying down he could breath better.  Requesting a CXR?  Pt fell Friday and may have pulled a muscle.

## 2025-04-02 ENCOUNTER — RESULTS FOLLOW-UP (OUTPATIENT)
Dept: CARDIOLOGY | Facility: CLINIC | Age: 80
End: 2025-04-02
Payer: MEDICARE

## 2025-04-02 ENCOUNTER — HOSPITAL ENCOUNTER (OUTPATIENT)
Dept: GENERAL RADIOLOGY | Facility: HOSPITAL | Age: 80
Discharge: HOME | End: 2025-04-02
Admitting: INTERNAL MEDICINE
Payer: MEDICARE

## 2025-04-02 DIAGNOSIS — R06.02 SOB (SHORTNESS OF BREATH): ICD-10-CM

## 2025-04-02 DIAGNOSIS — R06.02 SOB (SHORTNESS OF BREATH): Primary | ICD-10-CM

## 2025-04-02 PROCEDURE — 71046 X-RAY EXAM CHEST 2 VIEWS: CPT

## 2025-04-02 NOTE — TELEPHONE ENCOUNTER
Antione De Santiago MD to Me (Selected Message)      4/2/25  3:52 PM  Result Note  Chest x-ray is normal  XR Chest 2 View      Called and given message per Dr De Santiago.     Still can't breath sitting up.

## 2025-04-03 DIAGNOSIS — R06.02 SHORTNESS OF BREATH: Primary | ICD-10-CM

## 2025-04-04 ENCOUNTER — HOSPITAL ENCOUNTER (OUTPATIENT)
Dept: GENERAL RADIOLOGY | Facility: HOSPITAL | Age: 80
Discharge: HOME | End: 2025-04-04
Payer: MEDICARE

## 2025-04-04 ENCOUNTER — TELEPHONE (OUTPATIENT)
Dept: CARDIOLOGY | Facility: CLINIC | Age: 80
End: 2025-04-04
Payer: MEDICARE

## 2025-04-04 DIAGNOSIS — R07.81 RIB PAIN ON LEFT SIDE: Primary | ICD-10-CM

## 2025-04-04 DIAGNOSIS — R07.81 RIB PAIN ON LEFT SIDE: ICD-10-CM

## 2025-04-04 DIAGNOSIS — R06.02 SOB (SHORTNESS OF BREATH): ICD-10-CM

## 2025-04-04 PROCEDURE — 71100 X-RAY EXAM RIBS UNI 2 VIEWS: CPT

## 2025-04-04 NOTE — TELEPHONE ENCOUNTER
Lei called and states he is miserable and thinks he may have broken a rib when he fell last Friday.  Is requesting an xray of ribs to see.  Will still do the CT.

## 2025-04-09 DIAGNOSIS — G89.29 CHRONIC MIDLINE THORACIC BACK PAIN: Primary | ICD-10-CM

## 2025-04-09 DIAGNOSIS — M54.6 CHRONIC MIDLINE THORACIC BACK PAIN: Primary | ICD-10-CM

## 2025-04-10 ENCOUNTER — HOSPITAL ENCOUNTER (OUTPATIENT)
Dept: GENERAL RADIOLOGY | Facility: HOSPITAL | Age: 80
Discharge: HOME | End: 2025-04-10
Admitting: INTERNAL MEDICINE
Payer: MEDICARE

## 2025-04-10 DIAGNOSIS — S22.000A COMPRESSION FRACTURE OF THORACIC VERTEBRA, UNSPECIFIED THORACIC VERTEBRAL LEVEL, INITIAL ENCOUNTER: Primary | ICD-10-CM

## 2025-04-10 DIAGNOSIS — C61 PROSTATE CANCER: ICD-10-CM

## 2025-04-10 DIAGNOSIS — M54.6 CHRONIC MIDLINE THORACIC BACK PAIN: ICD-10-CM

## 2025-04-10 DIAGNOSIS — G89.29 CHRONIC MIDLINE THORACIC BACK PAIN: ICD-10-CM

## 2025-04-10 PROCEDURE — 72072 X-RAY EXAM THORAC SPINE 3VWS: CPT

## 2025-04-11 ENCOUNTER — OFFICE VISIT (OUTPATIENT)
Dept: CARDIOLOGY | Facility: CLINIC | Age: 80
End: 2025-04-11
Payer: MEDICARE

## 2025-04-11 ENCOUNTER — HOSPITAL ENCOUNTER (OUTPATIENT)
Dept: MRI IMAGING | Facility: HOSPITAL | Age: 80
Discharge: HOME | End: 2025-04-11
Payer: MEDICARE

## 2025-04-11 VITALS
WEIGHT: 206 LBS | HEIGHT: 71 IN | SYSTOLIC BLOOD PRESSURE: 140 MMHG | DIASTOLIC BLOOD PRESSURE: 76 MMHG | OXYGEN SATURATION: 97 % | BODY MASS INDEX: 28.84 KG/M2 | HEART RATE: 62 BPM

## 2025-04-11 DIAGNOSIS — C61 PROSTATE CANCER: ICD-10-CM

## 2025-04-11 DIAGNOSIS — F41.1 GENERALIZED ANXIETY DISORDER: ICD-10-CM

## 2025-04-11 DIAGNOSIS — I10 PRIMARY HYPERTENSION: Primary | ICD-10-CM

## 2025-04-11 DIAGNOSIS — E11.21 TYPE 2 DIABETES MELLITUS WITH DIABETIC NEPHROPATHY, WITHOUT LONG-TERM CURRENT USE OF INSULIN: ICD-10-CM

## 2025-04-11 DIAGNOSIS — M48.54XA: ICD-10-CM

## 2025-04-11 DIAGNOSIS — S22.000A COMPRESSION FRACTURE OF THORACIC VERTEBRA, UNSPECIFIED THORACIC VERTEBRAL LEVEL, INITIAL ENCOUNTER: ICD-10-CM

## 2025-04-11 DIAGNOSIS — E78.2 MIXED HYPERLIPIDEMIA: ICD-10-CM

## 2025-04-11 DIAGNOSIS — M51.369 DDD (DEGENERATIVE DISC DISEASE), LUMBAR: ICD-10-CM

## 2025-04-11 LAB — CREAT BLDA-MCNC: 1.6 MG/DL (ref 0.6–1.3)

## 2025-04-11 PROCEDURE — 25510000002 GADOBENATE DIMEGLUMINE 529 MG/ML SOLUTION: Performed by: INTERNAL MEDICINE

## 2025-04-11 PROCEDURE — 82565 ASSAY OF CREATININE: CPT

## 2025-04-11 PROCEDURE — 3077F SYST BP >= 140 MM HG: CPT | Performed by: INTERNAL MEDICINE

## 2025-04-11 PROCEDURE — A9577 INJ MULTIHANCE: HCPCS | Performed by: INTERNAL MEDICINE

## 2025-04-11 PROCEDURE — 72157 MRI CHEST SPINE W/O & W/DYE: CPT

## 2025-04-11 PROCEDURE — 99214 OFFICE O/P EST MOD 30 MIN: CPT | Performed by: INTERNAL MEDICINE

## 2025-04-11 PROCEDURE — 3078F DIAST BP <80 MM HG: CPT | Performed by: INTERNAL MEDICINE

## 2025-04-11 RX ORDER — ALPRAZOLAM 0.25 MG
0.25 TABLET ORAL 2 TIMES DAILY PRN
Qty: 60 TABLET | Refills: 0 | Status: SHIPPED | OUTPATIENT
Start: 2025-04-11

## 2025-04-11 RX ORDER — HYDROCODONE BITARTRATE AND ACETAMINOPHEN 7.5; 325 MG/1; MG/1
1 TABLET ORAL 3 TIMES DAILY PRN
Qty: 90 TABLET | Refills: 0 | Status: SHIPPED | OUTPATIENT
Start: 2025-04-11

## 2025-04-11 RX ADMIN — GADOBENATE DIMEGLUMINE 19 ML: 529 INJECTION, SOLUTION INTRAVENOUS at 14:56

## 2025-04-11 NOTE — PROGRESS NOTES
subjective     Chief Complaint   Patient presents with    Back Pain    Results     Back      Anxiety     Follow up    Med Refill     pending       Problems Addressed This Visit  1. Primary hypertension    2. DDD (degenerative disc disease), lumbar    3. Generalized anxiety disorder    4. Mixed hyperlipidemia    5. Type 2 diabetes mellitus with diabetic nephropathy, without long-term current use of insulin    6. Prostate cancer        History of Present Illness  History of Present Illness  The patient is a 79-year-old white male who is here for evaluation of severe back pain. The patient has prior history of degenerative disk disease and has been having back pain, however, he fell recently and after a fall, he has been having pain in the rib cage bilaterally. X-rays for the rib cage were negative. Pain has been quite severe x-ray of thoracic spine showed mid thoracic compression fracture of spine, age undetermined. We'll arrange to get MRI of thoracic spine. The patient has prostate cancer so I had to worry about metastatic disease also.    He has a known history of degenerative disc disease and chronic back pain. Recently, he experienced a fall which resulted in bilateral rib cage pain. Although rib cage x-rays were negative, the pain has been quite severe. An x-ray of the thoracic spine revealed a mid-thoracic compression fracture of undetermined age. Given his history of prostate cancer, there is a concern for potential metastatic disease. An MRI of the thoracic spine will be arranged for further evaluation.    His anxiety is well-managed with Prozac and Xanax.    He has hyperlipidemia, managed with Crestor, and reports no side effects from this medication.    He also has diabetes, managed with metformin and Januvia.    MEDICATIONS  Prozac, Xanax, Norvasc, hydralazine, Lopressor, Crestor, metformin, Januvia      Past Surgical History:   Procedure Laterality Date    COLONOSCOPY  03/2018    EYE SURGERY      FOOT  SURGERY      HERNIA REPAIR      HYDROCELECTOMY  06/15/2015    PROSTATE BIOPSY  2018    PROSTATE FIDUCIAL MARKER PLACEMENT  2018    RHINOPLASTY      UPPER GASTROINTESTINAL ENDOSCOPY  2014    WITH BIOPSIES AND DILATION     Family History   Problem Relation Age of Onset    Kidney disease Other     Other Other         CHRONIC DISABLING DISEASES URINARY TRACT DISEASE    Diabetes Other     Hypertension Other     Other Mother     Heart failure Father     Stroke Sister     Arthritis Sister     Heart disease Brother     No Known Problems Brother     No Known Problems Brother      Past Medical History:   Diagnosis Date    Anxiety     Arthritis     Colitis     Depression     GERD (gastroesophageal reflux disease)     Gout     Heart murmur     History of EKG 2015    NORMAL    Hyperlipidemia     Hypertension     Obesity     Pancreatitis     history of    Prostate cancer     Renal failure     MILD one functioning kidney    Skin cancer     basal cell cancer on back     Patient Active Problem List   Diagnosis    Essential hypertension, labile    GERD (gastroesophageal reflux disease)    Renal failure    Hyperlipidemia    Anxiety    Depression    Type 2 diabetes mellitus    Periumbilical abdominal pain    DDD (degenerative disc disease), lumbar    Prostate cancer    Hyperuricemia    BURKS (dyspnea on exertion)    Bradycardia    Lower urinary tract symptoms due to benign prostatic hyperplasia    Raised prostate specific antigen    Chronic chest pain with low to moderate risk for CAD    Generalized anxiety disorder    Primary insomnia    Multiple rib fractures    Esophageal dysphagia    Cellulitis of left foot    Palpitations    Dysuria    Compression fracture of thoracic spine, non-traumatic       Social History     Tobacco Use    Smoking status: Former     Current packs/day: 0.00     Types: Cigarettes     Quit date:      Years since quittin.2     Passive exposure: Never    Smokeless tobacco: Never   Vaping  Use    Vaping status: Never Used   Substance Use Topics    Alcohol use: Yes     Alcohol/week: 7.0 standard drinks of alcohol     Types: 7 Shots of liquor per week     Comment:  once per day    Drug use: No       No Known Allergies    Current Outpatient Medications on File Prior to Visit   Medication Sig    allopurinol (ZYLOPRIM) 100 MG tablet Take 1 tablet by mouth Daily.    amLODIPine (NORVASC) 10 MG tablet Take 1 tablet by mouth Daily.    aspirin 81 MG tablet Take 1 tablet by mouth Daily.    bicalutamide (CASODEX) 50 MG chemo tablet Casodex 50 mg tablet   Take 1 tablet every day by oral route for 30 days.    Blood Glucose Monitoring Suppl w/Device kit Check blood sugar once a day.    DX: E11.9    cholecalciferol (VITAMIN D3) 1000 UNITS tablet Take 1 tablet by mouth Daily.    Continuous Glucose Sensor (Dexcom G7 Sensor) misc Use 1 Application Every 10 (Ten) Days.    Cyanocobalamin (VITAMIN B12 PO) Take  by mouth daily.    fenofibrate (TRICOR) 145 MG tablet Take 1 tablet by mouth Daily.    FLUoxetine (PROzac) 20 MG capsule Take 1 capsule by mouth Daily.    folic acid (FOLVITE) 1 MG tablet TAKE 1 TABLET BY MOUTH DAILY    glucose blood (Contour Next Test) test strip Check blood glucose daily    hydrALAZINE (APRESOLINE) 50 MG tablet Take 1 tablet by mouth 3 (Three) Times a Day.    leuprolide (LUPRON) 30 MG injection Inject 30 mg into the appropriate muscle as directed by prescriber Every 4 (Four) Months.    loratadine (Claritin) 10 MG tablet Take 1 tablet by mouth Daily.    meclizine (ANTIVERT) 25 MG tablet Take 1 tablet by mouth 3 (Three) Times a Day As Needed for Dizziness.    metFORMIN (GLUCOPHAGE) 500 MG tablet Take 0.5 tablets by mouth Daily With Breakfast.    metoprolol tartrate (LOPRESSOR) 25 MG tablet Take 1 tablet by mouth 2 (Two) Times a Day.    Microlet Lancets misc 1 stick Daily.    Mirabegron ER (Myrbetriq) 50 MG tablet sustained-release 24 hour 24 hr tablet Take 50 mg by mouth Daily.    pantoprazole  (Protonix) 40 MG EC tablet Take 1 tablet by mouth Daily.    Pyridoxine HCl (VITAMIN B-6 PO) Take  by mouth daily.    rosuvastatin (CRESTOR) 20 MG tablet Take 1 tablet by mouth Every Night.    SITagliptin (Januvia) 100 MG tablet Take 1 tablet by mouth Daily.    [DISCONTINUED] ALPRAZolam (XANAX) 0.25 MG tablet Take 1 tablet by mouth 2 (Two) Times a Day As Needed for Anxiety or Sleep. for anxiety    [DISCONTINUED] colchicine 0.6 MG tablet Take 1 tablet by mouth Daily. PRN    [DISCONTINUED] Hydrocod Carlton-Chlorphe Carlton ER (TUSSIONEX PENNKINETIC) 10-8 MG/5ML ER suspension Take 5 mL by mouth Every 12 (Twelve) Hours As Needed for Cough.    [DISCONTINUED] HYDROcodone-acetaminophen (NORCO) 7.5-325 MG per tablet Take 1 tablet by mouth 3 (Three) Times a Day As Needed for Moderate Pain.    [DISCONTINUED] levoFLOXacin (LEVAQUIN) 500 MG tablet Take 1 tablet by mouth Daily.    [DISCONTINUED] fluticasone (FLONASE) 50 MCG/ACT nasal spray Administer 2 sprays into the nostril(s) as directed by provider Daily for 30 days.     No current facility-administered medications on file prior to visit.     (Not in a hospital admission)      Results for orders placed during the hospital encounter of 02/18/25    Adult Transthoracic Echo Complete W/ Cont if Necessary Per Protocol    Interpretation Summary    Normal left ventricular cavity size, wall thickness and wall motion noted.    Left ventricular systolic function is normal. Left ventricular ejection fraction appears to be 61 - 65%.    Left ventricular diastolic function is consistent with (grade II w/high LAP) pseudonormalization.    No significant valvular heart disease detected    No pericardial effusion noted.    Results for orders placed during the hospital encounter of 02/18/25    Stress Test With Myocardial Perfusion One Day    Interpretation Summary  Images from the original result were not included.      A pharmacological stress test was performed using regadenoson without low-level  "exercise.    Resting EKG showed sinus bradycardia at a rate of 58 bpm.  Incomplete right bundle block and nonspecific T wave changes were noted.    ST segments did not show any diagnostic changes.  No significant arrhythmia detected.    Myocardial perfusion imaging indicates a normal myocardial perfusion study with no evidence of ischemia. Impressions are consistent with a low risk study.TID1.11    Left ventricular ejection fraction is normal (Calculated EF = 70%).    Compared to the prior study from 7/14/2021 the current study reveals no changes.          The following portions of the patient's history were reviewed and updated as appropriate: allergies, current medications, past family history, past medical history, past social history, past surgical history and problem list.    Review of Systems   Cardiovascular: Negative.    Musculoskeletal:  Positive for back pain.   Genitourinary:  Positive for bladder incontinence.   Psychiatric/Behavioral:  The patient has insomnia and is nervous/anxious.    Allergic/Immunologic: Positive for environmental allergies.          Objective:     /76 (BP Location: Left arm, Patient Position: Sitting, Cuff Size: Adult)   Pulse 62   Ht 180.3 cm (71\")   Wt 93.4 kg (206 lb)   SpO2 97%   BMI 28.73 kg/m²   Pulmonary:      Effort: Pulmonary effort is normal.      Breath sounds: Normal breath sounds. No stridor. No wheezing. No rhonchi. No rales.   Cardiovascular:      PMI at left midclavicular line. Normal rate. Regular rhythm. Normal S1. Normal S2.       Murmurs: There is no murmur.      No gallop.  No click. No rub.   Pulses:     Intact distal pulses.   Edema:     Peripheral edema absent.       Physical Exam        Lab Review  Lab Results   Component Value Date     02/19/2025    K 4.2 02/19/2025     02/19/2025    BUN 16 02/19/2025    CREATININE 1.20 02/19/2025    GLUCOSE 153 (H) 02/19/2025    CALCIUM 9.2 02/19/2025    ALT 24 02/19/2025    ALKPHOS 74 02/19/2025 " "    Lab Results   Component Value Date    CKTOTAL 77 02/19/2025     Lab Results   Component Value Date    WBC 9.51 02/19/2025    HGB 14.2 02/19/2025    HCT 42.0 02/19/2025     02/19/2025     No results found for: \"INR\"  No results found for: \"MG\"  Lab Results   Component Value Date    PSA <0.014 12/09/2019    TSH 2.807 09/28/2015     No results found for: \"BNP\"  Lab Results   Component Value Date    CHLPL 227 (H) 03/07/2016    CHOL 232 (H) 02/19/2025    TRIG 489 (H) 02/19/2025    HDL 41 02/19/2025    VLDL 84 (H) 02/19/2025     (H) 02/19/2025     Lab Results   Component Value Date     (H) 02/19/2025    LDL 85 01/20/2025     Lab Results   Component Value Date    PROBNP 149.1 02/19/2025               Procedures    Results  Imaging  X-ray of thoracic spine showed mid thoracic compression fracture of spine, age undetermined.     I personally viewed and interpreted the patient's LAB data         Assessment:     1. Primary hypertension    2. DDD (degenerative disc disease), lumbar    3. Generalized anxiety disorder    4. Mixed hyperlipidemia    5. Type 2 diabetes mellitus with diabetic nephropathy, without long-term current use of insulin    6. Prostate cancer        Assessment & Plan  1. Severe back pain and rib cage pain.  He has a history of degenerative disk disease and recently fell, resulting in severe back pain and rib cage pain. An x-ray of the thoracic spine showed a mid-thoracic compression fracture of undetermined age. An MRI of the thoracic spine will be arranged for better evaluation. He will continue hydrocodone for pain management, and a refill was provided.    2. Prostate cancer.  Given his history of prostate cancer, metastatic disease can not be excluded as a cause of his current symptoms. The MRI of the thoracic spine will help in further evaluation.    3. Anxiety disorder.  His anxiety is well controlled with Prozac and Xanax. He was advised to decrease Xanax due to the risk of " falling and fractures, but he states he can not do without it.    4. Hyperlipidemia.  He is currently on Crestor therapy and reports no drug side effects. Weight loss was also stressed. He will continue with his current medication regimen.    5. Diabetes mellitus.  His diabetes is fairly well controlled with metformin and Januvia. No changes in therapy were made.               No follow-ups on file.

## 2025-04-14 DIAGNOSIS — C79.51 PROSTATE CANCER METASTATIC TO BONE: Primary | ICD-10-CM

## 2025-04-14 DIAGNOSIS — C61 PROSTATE CANCER METASTATIC TO BONE: Primary | ICD-10-CM

## 2025-04-15 DIAGNOSIS — C79.51 PROSTATE CANCER METASTATIC TO BONE: Primary | ICD-10-CM

## 2025-04-15 DIAGNOSIS — C61 PROSTATE CANCER METASTATIC TO BONE: Primary | ICD-10-CM

## 2025-04-17 ENCOUNTER — CONSULT (OUTPATIENT)
Dept: RADIATION ONCOLOGY | Facility: HOSPITAL | Age: 80
End: 2025-04-17
Payer: MEDICARE

## 2025-04-17 VITALS
DIASTOLIC BLOOD PRESSURE: 79 MMHG | TEMPERATURE: 97.5 F | SYSTOLIC BLOOD PRESSURE: 154 MMHG | BODY MASS INDEX: 30.4 KG/M2 | RESPIRATION RATE: 16 BRPM | OXYGEN SATURATION: 95 % | HEART RATE: 55 BPM | WEIGHT: 218 LBS

## 2025-04-17 DIAGNOSIS — C61 PROSTATE CANCER: Primary | ICD-10-CM

## 2025-04-17 PROBLEM — M54.6 CHRONIC BILATERAL THORACIC BACK PAIN: Status: ACTIVE | Noted: 2025-04-17

## 2025-04-17 PROBLEM — G89.29 CHRONIC BILATERAL THORACIC BACK PAIN: Status: ACTIVE | Noted: 2025-04-17

## 2025-04-17 LAB — PSA SERPL-MCNC: <0.014 NG/ML (ref 0–4)

## 2025-04-17 PROCEDURE — 36415 COLL VENOUS BLD VENIPUNCTURE: CPT | Performed by: RADIOLOGY

## 2025-04-17 PROCEDURE — G0463 HOSPITAL OUTPT CLINIC VISIT: HCPCS | Performed by: RADIOLOGY

## 2025-04-17 PROCEDURE — 84153 ASSAY OF PSA TOTAL: CPT | Performed by: RADIOLOGY

## 2025-04-17 RX ORDER — OXYCODONE AND ACETAMINOPHEN 10; 325 MG/1; MG/1
1 TABLET ORAL 3 TIMES DAILY
Qty: 90 TABLET | Refills: 0 | Status: SHIPPED | OUTPATIENT
Start: 2025-04-17 | End: 2025-04-17

## 2025-04-17 RX ORDER — OXYCODONE AND ACETAMINOPHEN 10; 325 MG/1; MG/1
1 TABLET ORAL EVERY 6 HOURS PRN
Qty: 90 TABLET | Refills: 0 | Status: SHIPPED | OUTPATIENT
Start: 2025-04-17
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
## 2025-04-17 NOTE — ASSESSMENT & PLAN NOTE
On Lupron would like to verify a rise in the PSA prior to ordering a PSMA PET, will refer for consideration of kyphoplasty

## 2025-04-18 ENCOUNTER — DOCUMENTATION (OUTPATIENT)
Dept: RADIATION ONCOLOGY | Facility: HOSPITAL | Age: 80
End: 2025-04-18
Payer: MEDICARE

## 2025-04-18 NOTE — PROGRESS NOTES
Subjective     HISTORY OF PRESENT ILLNESS:     History of Present Illness  Patient with fractures of T4 and T8 with radiographic suggestion of pathologic in nature.    Past Medical History, Past Surgical History, Social History, Family History have been reviewed and are without significant changes except as mentioned.    Review of Systems   A comprehensive 14 point review of systems was performed and was negative except as mentioned.    Medications:  The current medication list was reviewed in the EMR    ALLERGIES:  No Known Allergies    Objective      There were no vitals filed for this visit.       No data to display                Physical Exam  Tender at T4 and T8.    RECENT LABS:  Hematology WBC   Date Value Ref Range Status   02/19/2025 9.51 3.40 - 10.80 10*3/mm3 Final     RBC   Date Value Ref Range Status   02/19/2025 4.35 4.14 - 5.80 10*6/mm3 Final     Hemoglobin   Date Value Ref Range Status   02/19/2025 14.2 13.0 - 17.7 g/dL Final     Hematocrit   Date Value Ref Range Status   02/19/2025 42.0 37.5 - 51.0 % Final     Platelets   Date Value Ref Range Status   02/19/2025 197 140 - 450 10*3/mm3 Final              Assessment & Plan   Undetectable PSA suggests that it is unlikely to be prostate cancer related fractures. See spine surgeons for kyphoplasty.                  4/18/2025      CC:

## 2025-04-18 NOTE — PROGRESS NOTES
Lei Stapleton  [id number]  1945 4/24/2025      Referring Provider:   Antione De Santiago MD     Reason for Consultation:   Stage IV prostate cancer     Chief Complaint:  Stage IV prostate cancer      History of Present Illness   Lei Stapleton is a very pleasant 79 y.o.  male who presents in new consultation at the request of Dr. Antione De Santiago for further management and evaluation of metastatic cancer to the bone.    Patient was diagnosed with prostate cancer in 2018 (T2,N0,M0) after he was found to have an elevated PSA of 6.1 ng/mL and a palpable prostate nodule. He had a prostate biopsy performed on 7/13/18 and pathology significant for adenocarcinoma of the prostate, [place] score 4+4 = 8 on the left side with perineural invasion and benign right side. The prostate nodule showed adenocarcinoma, [place] score 4+3 = 7.  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 Various treatment options were discussed with his Urologist - Dr. Lee Mendez, and due to his very high-grade disease and large prostrate it was felt that the best course would be primary IMRT in conjunction with LHRH therapy. He underwent gold seed fiducial markers on 9/14/18 and was treated with IMRT with Dr. Zimmer and LHRH. Has been receiving Lupron 45 mg every six months which he started on 8/14/18 as well as Casodex 50 mg daily. He has also undergone previous cystolitholapaxy on three separate occasions as well as TURP (August 31, 2020) of a very large obstructing prostate. Pathology from TURP procedure was negative for malignancy and significant for hypertrophic prostatic glands and stroma. Has also had placement of an artificial urinary sphincter placement 2024 for urinary incontinence.     He fell in March 2025 while trying to manipulate his gutter and began experiencing acute mid back pain. MRI obtained  significant for fracture at T4 and T8 that was felt to be likely pathologic with enhancement at C7 and may represent metastatic disease. PSA has been undetectable at <0.014. Patient reports that his pain has been under better control with Percocet. He has an appointment with orthopedics - Dr. Barcenas next week.           The following portions of the patient's history were reviewed and updated as appropriate: allergies, current medications, past family history, past medical history, past social history, past surgical history and problem list.      No Known Allergies      Past Medical History:   Diagnosis Date    Anxiety     Arthritis     Colitis     Depression     GERD (gastroesophageal reflux disease)     Gout     Heart murmur     History of EKG 2015    NORMAL    Hyperlipidemia     Hypertension     Obesity     Pancreatitis     history of    Prostate cancer     Renal failure     MILD one functioning kidney    Skin cancer     basal cell cancer on back   Actinic & Seborrheic keratoses - follows with dermatology - Dr. Barrett        Past Surgical History:   Procedure Laterality Date    COLONOSCOPY  2018    EYE SURGERY      FOOT SURGERY      HERNIA REPAIR      HYDROCELECTOMY  06/15/2015    PROSTATE BIOPSY  2018    PROSTATE FIDUCIAL MARKER PLACEMENT  2018    RHINOPLASTY      UPPER GASTROINTESTINAL ENDOSCOPY  2014    WITH BIOPSIES AND DILATION         Social History     Socioeconomic History    Marital status:    Tobacco Use    Smoking status: Former     Current packs/day: 0.00     Types: Cigarettes     Quit date:      Years since quittin.3     Passive exposure: Never    Smokeless tobacco: Never   Vaping Use    Vaping status: Never Used   Substance and Sexual Activity    Alcohol use: Yes     Alcohol/week: 7.0 standard drinks of alcohol     Types: 7 Shots of liquor per week     Comment:  once per day    Drug use: No    Sexual activity: Defer         Family History   Problem Relation Age of  Onset    Kidney disease Other     Other Other         CHRONIC DISABLING DISEASES URINARY TRACT DISEASE    Diabetes Other     Hypertension Other     Other Mother     Heart failure Father     Stroke Sister     Arthritis Sister     Heart disease Brother     No Known Problems Brother     No Known Problems Brother            Current Outpatient Medications:     allopurinol (ZYLOPRIM) 100 MG tablet, Take 1 tablet by mouth Daily., Disp: 90 tablet, Rfl: 3    ALPRAZolam (XANAX) 0.25 MG tablet, Take 1 tablet by mouth 2 (Two) Times a Day As Needed for Anxiety or Sleep. for anxiety, Disp: 60 tablet, Rfl: 0    amLODIPine (NORVASC) 10 MG tablet, Take 1 tablet by mouth Daily., Disp: 90 tablet, Rfl: 3    aspirin 81 MG tablet, Take 1 tablet by mouth Daily., Disp: , Rfl:     bicalutamide (CASODEX) 50 MG chemo tablet, Casodex 50 mg tablet  Take 1 tablet every day by oral route for 30 days., Disp: , Rfl:     Blood Glucose Monitoring Suppl w/Device kit, Check blood sugar once a day.  DX: E11.9, Disp: 1 each, Rfl: 0    cholecalciferol (VITAMIN D3) 1000 UNITS tablet, Take 1 tablet by mouth Daily., Disp: , Rfl:     Continuous Glucose Sensor (Dexcom G7 Sensor) misc, Use 1 Application Every 10 (Ten) Days., Disp: 3 each, Rfl: 2    Cyanocobalamin (VITAMIN B12 PO), Take  by mouth daily., Disp: , Rfl:     fenofibrate (TRICOR) 145 MG tablet, Take 1 tablet by mouth Daily., Disp: 90 tablet, Rfl: 3    FLUoxetine (PROzac) 20 MG capsule, Take 1 capsule by mouth Daily., Disp: 90 capsule, Rfl: 3    folic acid (FOLVITE) 1 MG tablet, TAKE 1 TABLET BY MOUTH DAILY, Disp: 90 tablet, Rfl: 3    glucose blood (Contour Next Test) test strip, Check blood glucose daily, Disp: 100 each, Rfl: 11    hydrALAZINE (APRESOLINE) 50 MG tablet, Take 1 tablet by mouth 3 (Three) Times a Day., Disp: 270 tablet, Rfl: 3    HYDROcodone-acetaminophen (NORCO) 7.5-325 MG per tablet, Take 1 tablet by mouth 3 (Three) Times a Day As Needed for Moderate Pain., Disp: 90 tablet, Rfl: 0     leuprolide (LUPRON) 30 MG injection, Inject 30 mg into the appropriate muscle as directed by prescriber Every 4 (Four) Months., Disp: , Rfl:     loratadine (Claritin) 10 MG tablet, Take 1 tablet by mouth Daily., Disp: 90 tablet, Rfl: 0    meclizine (ANTIVERT) 25 MG tablet, Take 1 tablet by mouth 3 (Three) Times a Day As Needed for Dizziness., Disp: 90 tablet, Rfl: 1    metFORMIN (GLUCOPHAGE) 500 MG tablet, Take 0.5 tablets by mouth Daily With Breakfast., Disp: 90 tablet, Rfl: 3    metoprolol tartrate (LOPRESSOR) 25 MG tablet, Take 1 tablet by mouth 2 (Two) Times a Day., Disp: 180 tablet, Rfl: 3    Microlet Lancets misc, 1 stick Daily., Disp: 100 each, Rfl: 11    Mirabegron ER (Myrbetriq) 50 MG tablet sustained-release 24 hour 24 hr tablet, Take 50 mg by mouth Daily., Disp: 28 tablet, Rfl: 0    naloxone (NARCAN) 4 MG/0.1ML nasal spray, Call 911. Don't prime. [place] in 1 nostril for overdose. Repeat in 2-3 minutes in other nostril if no or minimal breathing/responsiveness., Disp: 2 each, Rfl: 0    oxyCODONE-acetaminophen (Percocet)  MG per tablet, Take 1 tablet by mouth Every 6 (Six) Hours As Needed for Moderate Pain., Disp: 90 tablet, Rfl: 0    pantoprazole (Protonix) 40 MG EC tablet, Take 1 tablet by mouth Daily., Disp: 30 tablet, Rfl: 0    Pyridoxine HCl (VITAMIN B-6 PO), Take  by mouth daily., Disp: , Rfl:     rosuvastatin (CRESTOR) 20 MG tablet, Take 1 tablet by mouth Every Night., Disp: 90 tablet, Rfl: 2    SITagliptin (Januvia) 100 MG tablet, Take 1 tablet by mouth Daily., Disp: 90 tablet, Rfl: 2        Review of Systems  Constitutional: No fever, chills, night sweats or weight loss.   HEENT:  No headaches, vision changes or hearing changes, no sinus drainage, sore throat.   Cardiovascular:  No palpitations, chest pain, syncopal episodes or edema.   Pulmonary:  No shortness of breath, hemoptysis, or cough.   Gastrointestinal:  No nausea or vomiting.  No constipation or diarrhea. Positive abdominal pain. No  melena or hematochezia.   Genitourinary:  No hematuria, positive urinary incontinence urination.   Musculoskeletal:  Positive back pain, or joint problems.   Skin: No rashes or pruritus.   Endocrine:  No hot flashes or chills   Hematologic:  No history of free bleeding, spontaneous bleeding or clotting problems. No lymphadenopathy.    Immunologic:  No allergies or frequent infections.   Neurologic: No numbness, tingling, or weakness.   Psychiatric:  No anxiety or depression.         Physical Exam  Vital Signs: These were reviewed and listed as per patient’s electronic medical chart  Vitals:    04/24/25 1427   BP: 147/75   Pulse: 85   Resp: 18   Temp: 97.6 °F (36.4 °C)   SpO2: 92%     General: Awake, alert and oriented, in no distress, elevated BMI  HEENT: Head is atraumatic, normocephalic, extraocular movements full, no scleral icterus  Neck: supple, no jvd, lymphadenopathy or masses  Cardiovascular: regular rate and rhythm without murmurs, rubs or gallops  Pulmonary: non-labored, clear to auscultation bilaterally, no wheezing  Abdomen: soft, non-tender, non-distended, normal active bowel sounds present, no organomegaly  Extremities: No clubbing, cyanosis or edema  Lymph: No cervical, supraclavicular adenopathy  Neurologic: Mental status as above, alert, awake and oriented, grossly non-focal exam  Skin: warm, dry, intact  MSK: T spinal tenderness        Pain Score:  Pain Score    04/24/25 1427   PainSc: 8            PHQ-Score Total:  PHQ-9 Total Score:          IMAGING:   MRI Thoracic Spine With & Without Contrast (04/11/2025 15:09)   FINDINGS:Vertebrae:  T4 vertebral body mildly depressed likely pathologic compression fracture.  T8 vertebral body mid level compression fracture, that appears to also be pathologic.  Enhancement of the T C7 vertebral body inferiorly may represent metastatic disease.  Scoliotic curvature of the thoracic spine is noted.  Discs/spinal canal/neural foramina:  C4-C5, C5-C6 and C6/C7 shows  posterior disc osteophyte protrusions causing moderate to severe central canal stenosis.  Spinal cord:  Unremarkable as visualized.  Normal signal.  No abnormal enhancement.  Soft tissues:  Unremarkable as visualized.  IMPRESSION:1.  C4-C5, C5-C6 and C6/C7 shows posterior disc osteophyte protrusions causing moderate to severe central canal stenosis.2.  T4 vertebral body mildly depressed likely pathologic compression fracture.3.  T8 vertebral body mid level compression fracture, that appears to also be pathologic.4.  Enhancement of the T C7 vertebral body inferiorly may represent metastatic disease.        LABS/STUDIES:  Consult on 04/17/2025   Component Date Value    PSA 04/17/2025 <0.014    Hospital Outpatient Visit on 04/11/2025   Component Date Value    Creatinine 04/11/2025 1.60 (H)    Lab on 02/19/2025   Component Date Value    proBNP 02/19/2025 149.1     Creatine Kinase 02/19/2025 77     Glucose 02/19/2025 153 (H)     BUN 02/19/2025 16     Creatinine 02/19/2025 1.20     Sodium 02/19/2025 143     Potassium 02/19/2025 4.2     Chloride 02/19/2025 107     CO2 02/19/2025 22.8     Calcium 02/19/2025 9.2     Total Protein 02/19/2025 7.0     Albumin 02/19/2025 4.4     ALT (SGPT) 02/19/2025 24     AST (SGOT) 02/19/2025 23     Alkaline Phosphatase 02/19/2025 74     Total Bilirubin 02/19/2025 0.3     Globulin 02/19/2025 2.6     A/G Ratio 02/19/2025 1.7     BUN/Creatinine Ratio 02/19/2025 13.3     Anion Gap 02/19/2025 13.2     eGFR 02/19/2025 61.5     Total Cholesterol 02/19/2025 232 (H)     Triglycerides 02/19/2025 489 (H)     HDL Cholesterol 02/19/2025 41     LDL Cholesterol  02/19/2025 107 (H)     VLDL Cholesterol 02/19/2025 84 (H)     LDL/HDL Ratio 02/19/2025 2.27     Hemoglobin A1C 02/19/2025 6.90 (H)     WBC 02/19/2025 9.51     RBC 02/19/2025 4.35     Hemoglobin 02/19/2025 14.2     Hematocrit 02/19/2025 42.0     MCV 02/19/2025 96.6     MCH 02/19/2025 32.6     MCHC 02/19/2025 33.8     RDW 02/19/2025 13.4     RDW-SD  02/19/2025 48.1     MPV 02/19/2025 12.1 (H)     Platelets 02/19/2025 197     Neutrophil % 02/19/2025 44.0     Lymphocyte % 02/19/2025 32.6     Monocyte % 02/19/2025 5.6     Eosinophil % 02/19/2025 16.4 (H)     Basophil % 02/19/2025 1.2     Immature Grans % 02/19/2025 0.2     Neutrophils, Absolute 02/19/2025 4.19     Lymphocytes, Absolute 02/19/2025 3.10     Monocytes, Absolute 02/19/2025 0.53     Eosinophils, Absolute 02/19/2025 1.56 (H)     Basophils, Absolute 02/19/2025 0.11     Immature Grans, Absolute 02/19/2025 0.02     nRBC 02/19/2025 0.0    Hospital Outpatient Visit on 02/18/2025   Component Date Value    Nuclear Prior Study 02/18/2025 3.0     BH CV REST NUCLEAR ISOTO* 02/18/2025 10.8     BH CV STRESS NUCLEAR ISO* 02/18/2025 31.5     BH CV STRESS PROTOCOL 1 02/18/2025 Pharmacologic     Stage 1 02/18/2025 1.0     HR Stage 1 02/18/2025 66     BP Stage 1 02/18/2025 181/72     Duration Min Stage 1 02/18/2025 0     Duration Sec Stage 1 02/18/2025 10     Stress Dose Regadenoson * 02/18/2025 0.40     Stress Comments Stage 1 02/18/2025 10 sec bolus injection     Baseline HR 02/18/2025 58     Baseline BP 02/18/2025 166/69     Peak HR 02/18/2025 66     Peak BP 02/18/2025 181/72     Recovery HR 02/18/2025 63     Recovery BP 02/18/2025 166/70     Target HR (85%) 02/18/2025 120     Max. Pred. HR (100%) 02/18/2025 141     Percent Max Pred HR 02/18/2025 46.81     Percent Target HR 02/18/2025 55     Nuc Stress EF 02/18/2025 70    Hospital Outpatient Visit on 02/18/2025   Component Date Value    LVIDd 02/18/2025 4.8     LVIDs 02/18/2025 2.9     IVSd 02/18/2025 1.09     LVPWd 02/18/2025 0.98     FS 02/18/2025 39.5     IVS/LVPW 02/18/2025 1.11     ESV(cubed) 02/18/2025 23.9     LV Sys Vol (BSA correcte* 02/18/2025 17.4     EDV(cubed) 02/18/2025 107.9     LV Hoff Vol (BSA correct* 02/18/2025 43.2     LV mass(C)d 02/18/2025 176.3     LVOT area 02/18/2025 3.1     LVOT diam 02/18/2025 2.00     EDV(MOD-sp4) 02/18/2025 93.4      ESV(MOD-sp4) 02/18/2025 37.7     SV(MOD-sp4) 02/18/2025 55.7     SVi(MOD-SP4) 02/18/2025 25.8     EF(MOD-sp4) 02/18/2025 59.6     MV E max aurelio 02/18/2025 71.1     MV A max aurelio 02/18/2025 49.3     MV E/A 02/18/2025 1.44     LA ESV Index (BP) 02/18/2025 18.5     Med Peak E' Aurelio 02/18/2025 4.6     Lat Peak E' Aurelio 02/18/2025 5.7     TR max aurelio 02/18/2025 266.0     Avg E/e' ratio 02/18/2025 13.81     TAPSE (>1.6) 02/18/2025 2.7     LA dimension (2D)  02/18/2025 3.5     Ao pk aurelio 02/18/2025 147.0     Ao max PG 02/18/2025 8.6     Ao mean PG 02/18/2025 5.0     Ao V2 VTI 02/18/2025 30.6     TR max PG 02/18/2025 28.3     RVSP(TR) 02/18/2025 38.3     RAP systole 02/18/2025 10.0     PA acc time 02/18/2025 0.12     Ao root diam 02/18/2025 3.2     ACS 02/18/2025 1.70    Lab on 01/29/2025   Component Date Value    Color, UA 01/29/2025 Dark Yellow (A)     Appearance, UA 01/29/2025 Turbid (A)     pH, UA 01/29/2025 8.5 (H)     Specific [place], UA 01/29/2025 1.024     Glucose, UA 01/29/2025 Negative     Ketones, UA 01/29/2025 Negative     Bilirubin, UA 01/29/2025 Negative     Blood, UA 01/29/2025 Small (1+) (A)     Protein, UA 01/29/2025 >=300 mg/dL (3+) (A)     Leuk Esterase, UA 01/29/2025 Large (3+) (A)     Nitrite, UA 01/29/2025 Negative     Urobilinogen, UA 01/29/2025 0.2 E.U./dL     RBC, UA 01/29/2025 Unable to determine due to loaded field (A)     WBC, UA 01/29/2025 Too Numerous to Count (A)     Bacteria, UA 01/29/2025 Unable to determine due to loaded field (A)     Squamous Epithelial Cell* 01/29/2025 Unable to determine due to loaded field (A)     Hyaline Casts, UA 01/29/2025 Unable to determine due to loaded field     Methodology 01/29/2025 Manual Light Microscopy     Urine Culture 01/29/2025 >100,000 CFU/mL Proteus mirabilis (A)    Lab on 01/20/2025   Component Date Value    Creatine Kinase 01/20/2025 121     Total Cholesterol 01/20/2025 175     Triglycerides 01/20/2025 266 (H)     HDL Cholesterol 01/20/2025 46     LDL  Cholesterol  01/20/2025 85     VLDL Cholesterol 01/20/2025 44 (H)     LDL/HDL Ratio 01/20/2025 1.65     Glucose 01/20/2025 315 (H)     BUN 01/20/2025 16     Creatinine 01/20/2025 1.32 (H)     Sodium 01/20/2025 142     Potassium 01/20/2025 4.2     Chloride 01/20/2025 105     CO2 01/20/2025 22.6     Calcium 01/20/2025 9.1     Total Protein 01/20/2025 6.9     Albumin 01/20/2025 4.4     ALT (SGPT) 01/20/2025 20     AST (SGOT) 01/20/2025 21     Alkaline Phosphatase 01/20/2025 58     Total Bilirubin 01/20/2025 0.6     Globulin 01/20/2025 2.5     A/G Ratio 01/20/2025 1.8     BUN/Creatinine Ratio 01/20/2025 12.1     Anion Gap 01/20/2025 14.4     eGFR 01/20/2025 54.9 (L)     Hemoglobin A1C 01/20/2025 7.40 (H)     Microalbumin, Urine 01/29/2025 78.8     Uric Acid 01/20/2025 7.0     WBC 01/20/2025 11.94 (H)     RBC 01/20/2025 4.23     Hemoglobin 01/20/2025 13.9     Hematocrit 01/20/2025 40.8     MCV 01/20/2025 96.5     MCH 01/20/2025 32.9     MCHC 01/20/2025 34.1     RDW 01/20/2025 13.8     RDW-SD 01/20/2025 49.1     MPV 01/20/2025 12.3 (H)     Platelets 01/20/2025 171     Neutrophil % 01/20/2025 52.4     Lymphocyte % 01/20/2025 23.1     Monocyte % 01/20/2025 4.4 (L)     Eosinophil % 01/20/2025 18.6 (H)     Basophil % 01/20/2025 1.2     Immature Grans % 01/20/2025 0.3     Neutrophils, Absolute 01/20/2025 6.27     Lymphocytes, Absolute 01/20/2025 2.76     Monocytes, Absolute 01/20/2025 0.52     Eosinophils, Absolute 01/20/2025 2.22 (H)     Basophils, Absolute 01/20/2025 0.14     Immature Grans, Absolute 01/20/2025 0.03     nRBC 01/20/2025 0.0          PATHOLOGY:   10/14/16      11/29/16      8/31/20                 Assessment & Plan   Lei Stapleton is a very pleasant 79 y.o.  male who presents in new consultation at the request of Dr. Antione De Santiago for further management and evaluation of metastatic cancer to the bone.    Metastatic prostate cancer to the bone  - Patient was diagnosed with localized prostate cancer  in 2018 which was treated with IMRT and remains on Lupron and Casodex. His PSAs since that time have been undetectable at <0.014.  - He recently sustained a fall and began experiencing acute back pain in which imaging was performed to further evaluate. MRI spine was significant for compression fracture of T4 and T8 that was felt to be likely pathologic with enhancement at C7 that may represent metastatic disease. Patient has not had a biopsy to confirm if this is metastatic prostate cancer.  - PSA is <0.014 therefore I do not believe that PET PSMA would be helpful in this case. He has an appointment with Dr. Barcenas (orthopedics) next week to assess. If they are considering surgical intervention would recommended biopsy of these potential lesions to further evaluate.  - In the interim will also obtain CT chest, abdomen, pelvis to assess to further assess.     ACO / ANDREW/Other  Quality measures  -  Lei Stapleton  reports that he quit smoking about 34 years ago. His smoking use included cigarettes. He has never been exposed to tobacco smoke. He has never used smokeless tobacco.   -  Lei Stapleton received 2024 flu vaccine.  -  Lei Stapleton reports a pain score of 8.  Given his pain assessment as noted, treatment options were discussed and the following options were decided upon as a follow-up plan to address the patient's pain: referral to Primary Care for assistance in pain treatment guidance.  -  Current outpatient and discharge medications have been reconciled for the patient.  Reviewed by: Kassandra Wolf MD      I will have the patient return in follow up appointment to review test results in two weeks with imaging (CT C/A/P and bone scan). He understands that should he have any questions or concerns prior to his appointment he should give us a call at any time and I would be happy to see him sooner. It was a pleasure to see this patient in clinic today, thank you for allowing me to participate in the care  of this patient.    I spent 60 minutes caring for patient on this date of service. This time includes time spent by me in the following activities: preparing for the visit, reviewing tests, obtaining and/or reviewing a separately obtained history, performing a medically appropriate examination and/or evaluation, counseling and educating the patient/family/caregiver, ordering medications, tests, or procedures, documenting information in the medical record, independently interpreting results and communicating that information with the patient/family/caregiver, and care coordination as well as answering any questions.      Kassandra Wolf MD  04/24/25   15:02 EDT

## 2025-04-21 ENCOUNTER — PATIENT ROUNDING (BHMG ONLY) (OUTPATIENT)
Dept: RADIATION ONCOLOGY | Facility: HOSPITAL | Age: 80
End: 2025-04-21
Payer: MEDICARE

## 2025-04-21 NOTE — PROGRESS NOTES
Rounding was completed through My Chart    
[de-identified] : RIGHT HAND healing wound to volar distal forearm. good EPL, limited FPL. fingers good extension, flex almost to mid-palm. no scissoring. good finger abduction, adduction.  SILT to median, ulnar, radial distribution.  brisk cap refill all digits. no triggering.   XRAYS OF RIGHT THUMB (3 views - PA, OBLIQUE, AND LATERAL VIEWS): stable position/alignment with interval healing of proximal phalanx shaft/base fx. XRAYS OF RIGHT WRIST (3 views - PA, OBLIQUE, AND LATERAL VIEWS): stable position/alignment with interval healing of distal radius intra-articular fx, thumb proximal phalanx fx. deformity of 5th metacarpal base.

## 2025-04-24 ENCOUNTER — LAB (OUTPATIENT)
Dept: ONCOLOGY | Facility: CLINIC | Age: 80
End: 2025-04-24
Payer: MEDICARE

## 2025-04-24 ENCOUNTER — CONSULT (OUTPATIENT)
Dept: ONCOLOGY | Facility: CLINIC | Age: 80
End: 2025-04-24
Payer: MEDICARE

## 2025-04-24 VITALS
TEMPERATURE: 97.6 F | DIASTOLIC BLOOD PRESSURE: 75 MMHG | RESPIRATION RATE: 18 BRPM | SYSTOLIC BLOOD PRESSURE: 147 MMHG | HEIGHT: 71 IN | OXYGEN SATURATION: 92 % | BODY MASS INDEX: 29.79 KG/M2 | HEART RATE: 85 BPM | WEIGHT: 212.8 LBS

## 2025-04-24 DIAGNOSIS — S24.109A: ICD-10-CM

## 2025-04-24 DIAGNOSIS — S22.009A: ICD-10-CM

## 2025-04-24 DIAGNOSIS — C61 PROSTATE CANCER: Primary | ICD-10-CM

## 2025-04-24 LAB
ALBUMIN SERPL-MCNC: 4.5 G/DL (ref 3.5–5.2)
ALBUMIN/GLOB SERPL: 1.8 G/DL
ALP SERPL-CCNC: 80 U/L (ref 39–117)
ALT SERPL W P-5'-P-CCNC: 19 U/L (ref 1–41)
ANION GAP SERPL CALCULATED.3IONS-SCNC: 12.9 MMOL/L (ref 5–15)
ANISOCYTOSIS BLD QL: NORMAL
AST SERPL-CCNC: 20 U/L (ref 1–40)
BASOPHILS # BLD AUTO: 0.14 10*3/MM3 (ref 0–0.2)
BASOPHILS NFR BLD AUTO: 1.1 % (ref 0–1.5)
BILIRUB SERPL-MCNC: 0.4 MG/DL (ref 0–1.2)
BUN SERPL-MCNC: 15 MG/DL (ref 8–23)
BUN/CREAT SERPL: 9.5 (ref 7–25)
CALCIUM SPEC-SCNC: 9.4 MG/DL (ref 8.6–10.5)
CHLORIDE SERPL-SCNC: 108 MMOL/L (ref 98–107)
CO2 SERPL-SCNC: 23.1 MMOL/L (ref 22–29)
CREAT SERPL-MCNC: 1.58 MG/DL (ref 0.76–1.27)
CRP SERPL-MCNC: 0.43 MG/DL (ref 0–0.5)
DEPRECATED RDW RBC AUTO: 48.4 FL (ref 37–54)
EGFRCR SERPLBLD CKD-EPI 2021: 44.2 ML/MIN/1.73
EOSINOPHIL # BLD AUTO: 3.41 10*3/MM3 (ref 0–0.4)
EOSINOPHIL NFR BLD AUTO: 27.9 % (ref 0.3–6.2)
ERYTHROCYTE [DISTWIDTH] IN BLOOD BY AUTOMATED COUNT: 13.7 % (ref 12.3–15.4)
GLOBULIN UR ELPH-MCNC: 2.5 GM/DL
GLUCOSE SERPL-MCNC: 136 MG/DL (ref 65–99)
HCT VFR BLD AUTO: 42.2 % (ref 37.5–51)
HGB BLD-MCNC: 13.8 G/DL (ref 13–17.7)
IMM GRANULOCYTES # BLD AUTO: 0.04 10*3/MM3 (ref 0–0.05)
IMM GRANULOCYTES NFR BLD AUTO: 0.3 % (ref 0–0.5)
LDH SERPL-CCNC: 203 U/L (ref 135–225)
LYMPHOCYTES # BLD AUTO: 2.88 10*3/MM3 (ref 0.7–3.1)
LYMPHOCYTES NFR BLD AUTO: 23.6 % (ref 19.6–45.3)
MCH RBC QN AUTO: 31.4 PG (ref 26.6–33)
MCHC RBC AUTO-ENTMCNC: 32.7 G/DL (ref 31.5–35.7)
MCV RBC AUTO: 96.1 FL (ref 79–97)
MONOCYTES # BLD AUTO: 0.76 10*3/MM3 (ref 0.1–0.9)
MONOCYTES NFR BLD AUTO: 6.2 % (ref 5–12)
NEUTROPHILS NFR BLD AUTO: 4.98 10*3/MM3 (ref 1.7–7)
NEUTROPHILS NFR BLD AUTO: 40.9 % (ref 42.7–76)
NRBC BLD AUTO-RTO: 0 /100 WBC (ref 0–0.2)
PLAT MORPH BLD: NORMAL
PLATELET # BLD AUTO: 198 10*3/MM3 (ref 140–450)
PMV BLD AUTO: 11.8 FL (ref 6–12)
POTASSIUM SERPL-SCNC: 4.2 MMOL/L (ref 3.5–5.2)
PROT SERPL-MCNC: 7 G/DL (ref 6–8.5)
RBC # BLD AUTO: 4.39 10*6/MM3 (ref 4.14–5.8)
SODIUM SERPL-SCNC: 144 MMOL/L (ref 136–145)
WBC NRBC COR # BLD AUTO: 12.21 10*3/MM3 (ref 3.4–10.8)

## 2025-04-24 PROCEDURE — 84153 ASSAY OF PSA TOTAL: CPT | Performed by: INTERNAL MEDICINE

## 2025-04-24 PROCEDURE — 80053 COMPREHEN METABOLIC PANEL: CPT | Performed by: INTERNAL MEDICINE

## 2025-04-24 PROCEDURE — 83615 LACTATE (LD) (LDH) ENZYME: CPT | Performed by: INTERNAL MEDICINE

## 2025-04-24 PROCEDURE — 85025 COMPLETE CBC W/AUTO DIFF WBC: CPT | Performed by: INTERNAL MEDICINE

## 2025-04-24 PROCEDURE — 85007 BL SMEAR W/DIFF WBC COUNT: CPT | Performed by: INTERNAL MEDICINE

## 2025-04-24 PROCEDURE — 86140 C-REACTIVE PROTEIN: CPT | Performed by: INTERNAL MEDICINE

## 2025-04-24 NOTE — LETTER
April 25, 2025     Antione De Santiago MD  [address] [place] [address]    Patient: Lei Stapleton   YOB: 1945   Date of Visit: 4/24/2025     Dear Antione De Santiago MD:       Thank you for referring Lei Stapleton to me for evaluation. Below are the relevant portions of my assessment and plan of care.    If you have questions, please do not hesitate to call me. I look forward to following Lei along with you.         Sincerely,        Kassandra Wolf MD        CC: No Recipients    Kassandra Wolf MD  04/25/25 1122  Sign when Signing Visit  Lei Stapleton  [id number]  1945 4/24/2025      Referring Provider:   Antione De Santiago MD     Reason for Consultation:   Stage IV prostate cancer     Chief Complaint:  Stage IV prostate cancer      History of Present Illness   Lei Stapleton is a very pleasant 79 y.o.  male who presents in new consultation at the request of Dr. Antione De Santiago for further management and evaluation of metastatic cancer to the bone.    Patient was diagnosed with prostate cancer in 2018 (T2,N0,M0) after he was found to have an elevated PSA of 6.1 ng/mL and a palpable prostate nodule. He had a prostate biopsy performed on 7/13/18 and pathology significant for adenocarcinoma of the prostate, Casa score 4+4 = 8 on the left side with perineural invasion and benign right side. The prostate nodule showed adenocarcinoma, Casa score 4+3 = 7.  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 Various treatment options were discussed with his Urologist - Dr. Lee Mendez, and due to his very high-grade disease and large prostrate it was felt that the best course would be primary IMRT in conjunction with LHRH therapy. He underwent gold seed fiducial markers on 9/14/18 and was treated with IMRT with Dr. Zimmer and LHRH. Has been receiving  Lupron 45 mg every six months which he started on 8/14/18 as well as Casodex 50 mg daily. He has also undergone previous cystolitholapaxy on three separate occasions as well as TURP (August 31, 2020) of a very large obstructing prostate. Pathology from TURP procedure was negative for malignancy and significant for hypertrophic prostatic glands and stroma. Has also had placement of an artificial urinary sphincter placement 2024 for urinary incontinence.     He fell in March 2025 while trying to manipulate his gutter and began experiencing acute mid back pain. MRI obtained significant for fracture at T4 and T8 that was felt to be likely pathologic with enhancement at C7 and may represent metastatic disease. PSA has been undetectable at <0.014. Patient reports that his pain has been under better control with Percocet. He has an appointment with orthopedics - Dr. Barcenas next week.           The following portions of the patient's history were reviewed and updated as appropriate: allergies, current medications, past family history, past medical history, past social history, past surgical history and problem list.      No Known Allergies      Past Medical History:   Diagnosis Date   • Anxiety    • Arthritis    • Colitis    • Depression    • GERD (gastroesophageal reflux disease)    • Gout    • Heart murmur    • History of EKG 12/22/2015    NORMAL   • Hyperlipidemia    • Hypertension    • Obesity    • Pancreatitis     history of   • Prostate cancer    • Renal failure     MILD one functioning kidney   • Skin cancer     basal cell cancer on back   Actinic & Seborrheic keratoses - follows with dermatology - Dr. Barrett        Past Surgical History:   Procedure Laterality Date   • COLONOSCOPY  03/2018   • EYE SURGERY     • FOOT SURGERY     • HERNIA REPAIR     • HYDROCELECTOMY  06/15/2015   • PROSTATE BIOPSY  2018   • PROSTATE FIDUCIAL MARKER PLACEMENT  09/14/2018   • RHINOPLASTY     • UPPER GASTROINTESTINAL ENDOSCOPY  03/24/2014     WITH BIOPSIES AND DILATION         Social History     Socioeconomic History   • Marital status:    Tobacco Use   • Smoking status: Former     Current packs/day: 0.00     Types: Cigarettes     Quit date:      Years since quittin.3     Passive exposure: Never   • Smokeless tobacco: Never   Vaping Use   • Vaping status: Never Used   Substance and Sexual Activity   • Alcohol use: Yes     Alcohol/week: 7.0 standard drinks of alcohol     Types: 7 Shots of liquor per week     Comment:  once per day   • Drug use: No   • Sexual activity: Defer         Family History   Problem Relation Age of Onset   • Kidney disease Other    • Other Other         CHRONIC DISABLING DISEASES URINARY TRACT DISEASE   • Diabetes Other    • Hypertension Other    • Other Mother    • Heart failure Father    • Stroke Sister    • Arthritis Sister    • Heart disease Brother    • No Known Problems Brother    • No Known Problems Brother            Current Outpatient Medications:   •  allopurinol (ZYLOPRIM) 100 MG tablet, Take 1 tablet by mouth Daily., Disp: 90 tablet, Rfl: 3  •  ALPRAZolam (XANAX) 0.25 MG tablet, Take 1 tablet by mouth 2 (Two) Times a Day As Needed for Anxiety or Sleep. for anxiety, Disp: 60 tablet, Rfl: 0  •  amLODIPine (NORVASC) 10 MG tablet, Take 1 tablet by mouth Daily., Disp: 90 tablet, Rfl: 3  •  aspirin 81 MG tablet, Take 1 tablet by mouth Daily., Disp: , Rfl:   •  bicalutamide (CASODEX) 50 MG chemo tablet, Casodex 50 mg tablet  Take 1 tablet every day by oral route for 30 days., Disp: , Rfl:   •  Blood Glucose Monitoring Suppl w/Device kit, Check blood sugar once a day.  DX: E11.9, Disp: 1 each, Rfl: 0  •  cholecalciferol (VITAMIN D3) 1000 UNITS tablet, Take 1 tablet by mouth Daily., Disp: , Rfl:   •  Continuous Glucose Sensor (Dexcom G7 Sensor) misc, Use 1 Application Every 10 (Ten) Days., Disp: 3 each, Rfl: 2  •  Cyanocobalamin (VITAMIN B12 PO), Take  by mouth daily., Disp: , Rfl:   •  fenofibrate (TRICOR) 145  MG tablet, Take 1 tablet by mouth Daily., Disp: 90 tablet, Rfl: 3  •  FLUoxetine (PROzac) 20 MG capsule, Take 1 capsule by mouth Daily., Disp: 90 capsule, Rfl: 3  •  folic acid (FOLVITE) 1 MG tablet, TAKE 1 TABLET BY MOUTH DAILY, Disp: 90 tablet, Rfl: 3  •  glucose blood (Contour Next Test) test strip, Check blood glucose daily, Disp: 100 each, Rfl: 11  •  hydrALAZINE (APRESOLINE) 50 MG tablet, Take 1 tablet by mouth 3 (Three) Times a Day., Disp: 270 tablet, Rfl: 3  •  HYDROcodone-acetaminophen (NORCO) 7.5-325 MG per tablet, Take 1 tablet by mouth 3 (Three) Times a Day As Needed for Moderate Pain., Disp: 90 tablet, Rfl: 0  •  leuprolide (LUPRON) 30 MG injection, Inject 30 mg into the appropriate muscle as directed by prescriber Every 4 (Four) Months., Disp: , Rfl:   •  loratadine (Claritin) 10 MG tablet, Take 1 tablet by mouth Daily., Disp: 90 tablet, Rfl: 0  •  meclizine (ANTIVERT) 25 MG tablet, Take 1 tablet by mouth 3 (Three) Times a Day As Needed for Dizziness., Disp: 90 tablet, Rfl: 1  •  metFORMIN (GLUCOPHAGE) 500 MG tablet, Take 0.5 tablets by mouth Daily With Breakfast., Disp: 90 tablet, Rfl: 3  •  metoprolol tartrate (LOPRESSOR) 25 MG tablet, Take 1 tablet by mouth 2 (Two) Times a Day., Disp: 180 tablet, Rfl: 3  •  Microlet Lancets misc, 1 stick Daily., Disp: 100 each, Rfl: 11  •  Mirabegron ER (Myrbetriq) 50 MG tablet sustained-release 24 hour 24 hr tablet, Take 50 mg by mouth Daily., Disp: 28 tablet, Rfl: 0  •  naloxone (NARCAN) 4 MG/0.1ML nasal spray, Call 911. Don't prime. [place] in 1 nostril for overdose. Repeat in 2-3 minutes in other nostril if no or minimal breathing/responsiveness., Disp: 2 each, Rfl: 0  •  oxyCODONE-acetaminophen (Percocet)  MG per tablet, Take 1 tablet by mouth Every 6 (Six) Hours As Needed for Moderate Pain., Disp: 90 tablet, Rfl: 0  •  pantoprazole (Protonix) 40 MG EC tablet, Take 1 tablet by mouth Daily., Disp: 30 tablet, Rfl: 0  •  Pyridoxine HCl (VITAMIN B-6 PO), Take   by mouth daily., Disp: , Rfl:   •  rosuvastatin (CRESTOR) 20 MG tablet, Take 1 tablet by mouth Every Night., Disp: 90 tablet, Rfl: 2  •  SITagliptin (Januvia) 100 MG tablet, Take 1 tablet by mouth Daily., Disp: 90 tablet, Rfl: 2        Review of Systems  Constitutional: No fever, chills, night sweats or weight loss.   HEENT:  No headaches, vision changes or hearing changes, no sinus drainage, sore throat.   Cardiovascular:  No palpitations, chest pain, syncopal episodes or edema.   Pulmonary:  No shortness of breath, hemoptysis, or cough.   Gastrointestinal:  No nausea or vomiting.  No constipation or diarrhea. Positive abdominal pain. No melena or hematochezia.   Genitourinary:  No hematuria, positive urinary incontinence urination.   Musculoskeletal:  Positive back pain, or joint problems.   Skin: No rashes or pruritus.   Endocrine:  No hot flashes or chills   Hematologic:  No history of free bleeding, spontaneous bleeding or clotting problems. No lymphadenopathy.    Immunologic:  No allergies or frequent infections.   Neurologic: No numbness, tingling, or weakness.   Psychiatric:  No anxiety or depression.         Physical Exam  Vital Signs: These were reviewed and listed as per patient’s electronic medical chart  Vitals:    04/24/25 1427   BP: 147/75   Pulse: 85   Resp: 18   Temp: 97.6 °F (36.4 °C)   SpO2: 92%     General: Awake, alert and oriented, in no distress, elevated BMI  HEENT: Head is atraumatic, normocephalic, extraocular movements full, no scleral icterus  Neck: supple, no jvd, lymphadenopathy or masses  Cardiovascular: regular rate and rhythm without murmurs, rubs or gallops  Pulmonary: non-labored, clear to auscultation bilaterally, no wheezing  Abdomen: soft, non-tender, non-distended, normal active bowel sounds present, no organomegaly  Extremities: No clubbing, cyanosis or edema  Lymph: No cervical, supraclavicular adenopathy  Neurologic: Mental status as above, alert, awake and oriented, grossly  non-focal exam  Skin: warm, dry, intact  MSK: T spinal tenderness        Pain Score:  Pain Score    04/24/25 1427   PainSc: 8            PHQ-Score Total:  PHQ-9 Total Score:          IMAGING:   MRI Thoracic Spine With & Without Contrast (04/11/2025 15:09)   FINDINGS:Vertebrae:  T4 vertebral body mildly depressed likely pathologic compression fracture.  T8 vertebral body mid level compression fracture, that appears to also be pathologic.  Enhancement of the T C7 vertebral body inferiorly may represent metastatic disease.  Scoliotic curvature of the thoracic spine is noted.  Discs/spinal canal/neural foramina:  C4-C5, C5-C6 and C6/C7 shows posterior disc osteophyte protrusions causing moderate to severe central canal stenosis.  Spinal cord:  Unremarkable as visualized.  Normal signal.  No abnormal enhancement.  Soft tissues:  Unremarkable as visualized.  IMPRESSION:1.  C4-C5, C5-C6 and C6/C7 shows posterior disc osteophyte protrusions causing moderate to severe central canal stenosis.2.  T4 vertebral body mildly depressed likely pathologic compression fracture.3.  T8 vertebral body mid level compression fracture, that appears to also be pathologic.4.  Enhancement of the T C7 vertebral body inferiorly may represent metastatic disease.        LABS/STUDIES:  Consult on 04/17/2025   Component Date Value   • PSA 04/17/2025 <0.014    Hospital Outpatient Visit on 04/11/2025   Component Date Value   • Creatinine 04/11/2025 1.60 (H)    Lab on 02/19/2025   Component Date Value   • proBNP 02/19/2025 149.1    • Creatine Kinase 02/19/2025 77    • Glucose 02/19/2025 153 (H)    • BUN 02/19/2025 16    • Creatinine 02/19/2025 1.20    • Sodium 02/19/2025 143    • Potassium 02/19/2025 4.2    • Chloride 02/19/2025 107    • CO2 02/19/2025 22.8    • Calcium 02/19/2025 9.2    • Total Protein 02/19/2025 7.0    • Albumin 02/19/2025 4.4    • ALT (SGPT) 02/19/2025 24    • AST (SGOT) 02/19/2025 23    • Alkaline Phosphatase 02/19/2025 74    •  Total Bilirubin 02/19/2025 0.3    • Globulin 02/19/2025 2.6    • A/G Ratio 02/19/2025 1.7    • BUN/Creatinine Ratio 02/19/2025 13.3    • Anion Gap 02/19/2025 13.2    • eGFR 02/19/2025 61.5    • Total Cholesterol 02/19/2025 232 (H)    • Triglycerides 02/19/2025 489 (H)    • HDL Cholesterol 02/19/2025 41    • LDL Cholesterol  02/19/2025 107 (H)    • VLDL Cholesterol 02/19/2025 84 (H)    • LDL/HDL Ratio 02/19/2025 2.27    • Hemoglobin A1C 02/19/2025 6.90 (H)    • WBC 02/19/2025 9.51    • RBC 02/19/2025 4.35    • Hemoglobin 02/19/2025 14.2    • Hematocrit 02/19/2025 42.0    • MCV 02/19/2025 96.6    • MCH 02/19/2025 32.6    • MCHC 02/19/2025 33.8    • RDW 02/19/2025 13.4    • RDW-SD 02/19/2025 48.1    • MPV 02/19/2025 12.1 (H)    • Platelets 02/19/2025 197    • Neutrophil % 02/19/2025 44.0    • Lymphocyte % 02/19/2025 32.6    • Monocyte % 02/19/2025 5.6    • Eosinophil % 02/19/2025 16.4 (H)    • Basophil % 02/19/2025 1.2    • Immature Grans % 02/19/2025 0.2    • Neutrophils, Absolute 02/19/2025 4.19    • Lymphocytes, Absolute 02/19/2025 3.10    • Monocytes, Absolute 02/19/2025 0.53    • Eosinophils, Absolute 02/19/2025 1.56 (H)    • Basophils, Absolute 02/19/2025 0.11    • Immature Grans, Absolute 02/19/2025 0.02    • nRBC 02/19/2025 0.0    Hospital Outpatient Visit on 02/18/2025   Component Date Value   • Nuclear Prior Study 02/18/2025 3.0    • BH CV REST NUCLEAR ISOTO* 02/18/2025 10.8    • BH CV STRESS NUCLEAR ISO* 02/18/2025 31.5    • BH CV STRESS PROTOCOL 1 02/18/2025 Pharmacologic    • Stage 1 02/18/2025 1.0    • HR Stage 1 02/18/2025 66    • BP Stage 1 02/18/2025 181/72    • Duration Min Stage 1 02/18/2025 0    • Duration Sec Stage 1 02/18/2025 10    • Stress Dose Regadenoson * 02/18/2025 0.40    • Stress Comments Stage 1 02/18/2025 10 sec bolus injection    • Baseline HR 02/18/2025 58    • Baseline BP 02/18/2025 166/69    • Peak HR 02/18/2025 66    • Peak BP 02/18/2025 181/72    • Recovery HR 02/18/2025 63    •  Recovery BP 02/18/2025 166/70    • Target HR (85%) 02/18/2025 120    • Max. Pred. HR (100%) 02/18/2025 141    • Percent Max Pred HR 02/18/2025 46.81    • Percent Target HR 02/18/2025 55    • Nuc Stress EF 02/18/2025 70    Hospital Outpatient Visit on 02/18/2025   Component Date Value   • LVIDd 02/18/2025 4.8    • LVIDs 02/18/2025 2.9    • IVSd 02/18/2025 1.09    • LVPWd 02/18/2025 0.98    • FS 02/18/2025 39.5    • IVS/LVPW 02/18/2025 1.11    • ESV(cubed) 02/18/2025 23.9    • LV Sys Vol (BSA correcte* 02/18/2025 17.4    • EDV(cubed) 02/18/2025 107.9    • LV Hoff Vol (BSA correct* 02/18/2025 43.2    • LV mass(C)d 02/18/2025 176.3    • LVOT area 02/18/2025 3.1    • LVOT diam 02/18/2025 2.00    • EDV(MOD-sp4) 02/18/2025 93.4    • ESV(MOD-sp4) 02/18/2025 37.7    • SV(MOD-sp4) 02/18/2025 55.7    • SVi(MOD-SP4) 02/18/2025 25.8    • EF(MOD-sp4) 02/18/2025 59.6    • MV E max aurelio 02/18/2025 71.1    • MV A max aurelio 02/18/2025 49.3    • MV E/A 02/18/2025 1.44    • LA ESV Index (BP) 02/18/2025 18.5    • Med Peak E' Aurelio 02/18/2025 4.6    • Lat Peak E' Aurelio 02/18/2025 5.7    • TR max aurelio 02/18/2025 266.0    • Avg E/e' ratio 02/18/2025 13.81    • TAPSE (>1.6) 02/18/2025 2.7    • LA dimension (2D)  02/18/2025 3.5    • Ao pk aurelio 02/18/2025 147.0    • Ao max PG 02/18/2025 8.6    • Ao mean PG 02/18/2025 5.0    • Ao V2 VTI 02/18/2025 30.6    • TR max PG 02/18/2025 28.3    • RVSP(TR) 02/18/2025 38.3    • RAP systole 02/18/2025 10.0    • PA acc time 02/18/2025 0.12    • Ao root diam 02/18/2025 3.2    • ACS 02/18/2025 1.70    Lab on 01/29/2025   Component Date Value   • Color, UA 01/29/2025 Dark Yellow (A)    • Appearance, UA 01/29/2025 Turbid (A)    • pH, UA 01/29/2025 8.5 (H)    • Specific Gravity, UA 01/29/2025 1.024    • Glucose, UA 01/29/2025 Negative    • Ketones, UA 01/29/2025 Negative    • Bilirubin, UA 01/29/2025 Negative    • Blood, UA 01/29/2025 Small (1+) (A)    • Protein, UA 01/29/2025 >=300 mg/dL (3+) (A)    • Leuk Esterase, UA  01/29/2025 Large (3+) (A)    • Nitrite, UA 01/29/2025 Negative    • Urobilinogen, UA 01/29/2025 0.2 E.U./dL    • RBC, UA 01/29/2025 Unable to determine due to loaded field (A)    • WBC, UA 01/29/2025 Too Numerous to Count (A)    • Bacteria, UA 01/29/2025 Unable to determine due to loaded field (A)    • Squamous Epithelial Cell* 01/29/2025 Unable to determine due to loaded field (A)    • Hyaline Casts, UA 01/29/2025 Unable to determine due to loaded field    • Methodology 01/29/2025 Manual Light Microscopy    • Urine Culture 01/29/2025 >100,000 CFU/mL Proteus mirabilis (A)    Lab on 01/20/2025   Component Date Value   • Creatine Kinase 01/20/2025 121    • Total Cholesterol 01/20/2025 175    • Triglycerides 01/20/2025 266 (H)    • HDL Cholesterol 01/20/2025 46    • LDL Cholesterol  01/20/2025 85    • VLDL Cholesterol 01/20/2025 44 (H)    • LDL/HDL Ratio 01/20/2025 1.65    • Glucose 01/20/2025 315 (H)    • BUN 01/20/2025 16    • Creatinine 01/20/2025 1.32 (H)    • Sodium 01/20/2025 142    • Potassium 01/20/2025 4.2    • Chloride 01/20/2025 105    • CO2 01/20/2025 22.6    • Calcium 01/20/2025 9.1    • Total Protein 01/20/2025 6.9    • Albumin 01/20/2025 4.4    • ALT (SGPT) 01/20/2025 20    • AST (SGOT) 01/20/2025 21    • Alkaline Phosphatase 01/20/2025 58    • Total Bilirubin 01/20/2025 0.6    • Globulin 01/20/2025 2.5    • A/G Ratio 01/20/2025 1.8    • BUN/Creatinine Ratio 01/20/2025 12.1    • Anion Gap 01/20/2025 14.4    • eGFR 01/20/2025 54.9 (L)    • Hemoglobin A1C 01/20/2025 7.40 (H)    • Microalbumin, Urine 01/29/2025 78.8    • Uric Acid 01/20/2025 7.0    • WBC 01/20/2025 11.94 (H)    • RBC 01/20/2025 4.23    • Hemoglobin 01/20/2025 13.9    • Hematocrit 01/20/2025 40.8    • MCV 01/20/2025 96.5    • MCH 01/20/2025 32.9    • MCHC 01/20/2025 34.1    • RDW 01/20/2025 13.8    • RDW-SD 01/20/2025 49.1    • MPV 01/20/2025 12.3 (H)    • Platelets 01/20/2025 171    • Neutrophil % 01/20/2025 52.4    • Lymphocyte %  01/20/2025 23.1    • Monocyte % 01/20/2025 4.4 (L)    • Eosinophil % 01/20/2025 18.6 (H)    • Basophil % 01/20/2025 1.2    • Immature Grans % 01/20/2025 0.3    • Neutrophils, Absolute 01/20/2025 6.27    • Lymphocytes, Absolute 01/20/2025 2.76    • Monocytes, Absolute 01/20/2025 0.52    • Eosinophils, Absolute 01/20/2025 2.22 (H)    • Basophils, Absolute 01/20/2025 0.14    • Immature Grans, Absolute 01/20/2025 0.03    • nRBC 01/20/2025 0.0          PATHOLOGY:   10/14/16      11/29/16      8/31/20                 Assessment & Plan   Lei Stapleton is a very pleasant 79 y.o.  male who presents in new consultation at the request of Dr. Antione De Santiago for further management and evaluation of metastatic cancer to the bone.    Metastatic prostate cancer to the bone  - Patient was diagnosed with localized prostate cancer in 2018 which was treated with IMRT and remains on Lupron and Casodex. His PSAs since that time have been undetectable at <0.014.  - He recently sustained a fall and began experiencing acute back pain in which imaging was performed to further evaluate. MRI spine was significant for compression fracture of T4 and T8 that was felt to be likely pathologic with enhancement at C7 that may represent metastatic disease. Patient has not had a biopsy to confirm if this is metastatic prostate cancer.  - PSA is <0.014 therefore I do not believe that PET PSMA would be helpful in this case. He has an appointment with Dr. Barcenas (orthopedics) next week to assess. If they are considering surgical intervention would recommended biopsy of these potential lesions to further evaluate.  - In the interim will also obtain CT chest, abdomen, pelvis to assess to further assess.     ACO / ANDREW/Other  Quality measures  -  Lei Stapleton  reports that he quit smoking about 34 years ago. His smoking use included cigarettes. He has never been exposed to tobacco smoke. He has never used smokeless tobacco.   -  Lei Stapleton  received 2024 flu vaccine.  -  Lei Stapleton reports a pain score of 8.  Given his pain assessment as noted, treatment options were discussed and the following options were decided upon as a follow-up plan to address the patient's pain: referral to Primary Care for assistance in pain treatment guidance.  -  Current outpatient and discharge medications have been reconciled for the patient.  Reviewed by: Kassandra Wolf MD      I will have the patient return in follow up appointment to review test results in two weeks with imaging (CT C/A/P). He understands that should he have any questions or concerns prior to his appointment he should give us a call at any time and I would be happy to see him sooner. It was a pleasure to see this patient in clinic today, thank you for allowing me to participate in the care of this patient.    I spent 60 minutes caring for patient on this date of service. This time includes time spent by me in the following activities: preparing for the visit, reviewing tests, obtaining and/or reviewing a separately obtained history, performing a medically appropriate examination and/or evaluation, counseling and educating the patient/family/caregiver, ordering medications, tests, or procedures, documenting information in the medical record, independently interpreting results and communicating that information with the patient/family/caregiver, and care coordination as well as answering any questions.      Kassandra Wolf MD  04/24/25   15:02 EDT

## 2025-04-24 NOTE — PROGRESS NOTES
Venipuncture Blood Specimen Collection  Venipuncture performed in left arm by Yana Kelly MA with good hemostasis. Patient tolerated the procedure well without complications.   04/24/25   Yana Kelly MA

## 2025-04-25 LAB — PSA SERPL-MCNC: <0.014 NG/ML (ref 0–4)

## 2025-04-28 ENCOUNTER — PATIENT ROUNDING (BHMG ONLY) (OUTPATIENT)
Dept: ONCOLOGY | Facility: CLINIC | Age: 80
End: 2025-04-28
Payer: MEDICARE

## 2025-05-02 ENCOUNTER — OFFICE VISIT (OUTPATIENT)
Dept: CARDIOLOGY | Facility: CLINIC | Age: 80
End: 2025-05-02
Payer: MEDICARE

## 2025-05-02 ENCOUNTER — TELEPHONE (OUTPATIENT)
Dept: ONCOLOGY | Facility: CLINIC | Age: 80
End: 2025-05-02
Payer: MEDICARE

## 2025-05-02 VITALS
OXYGEN SATURATION: 94 % | SYSTOLIC BLOOD PRESSURE: 144 MMHG | HEART RATE: 60 BPM | DIASTOLIC BLOOD PRESSURE: 68 MMHG | HEIGHT: 71 IN | BODY MASS INDEX: 29.68 KG/M2 | WEIGHT: 212 LBS

## 2025-05-02 DIAGNOSIS — I10 PRIMARY HYPERTENSION: ICD-10-CM

## 2025-05-02 DIAGNOSIS — E78.2 MIXED HYPERLIPIDEMIA: ICD-10-CM

## 2025-05-02 DIAGNOSIS — J20.8 ACUTE BRONCHITIS DUE TO OTHER SPECIFIED ORGANISMS: Primary | ICD-10-CM

## 2025-05-02 PROCEDURE — 3078F DIAST BP <80 MM HG: CPT | Performed by: INTERNAL MEDICINE

## 2025-05-02 PROCEDURE — 99214 OFFICE O/P EST MOD 30 MIN: CPT | Performed by: INTERNAL MEDICINE

## 2025-05-02 PROCEDURE — 3077F SYST BP >= 140 MM HG: CPT | Performed by: INTERNAL MEDICINE

## 2025-05-02 RX ORDER — MONTELUKAST SODIUM 10 MG/1
10 TABLET ORAL NIGHTLY
Qty: 30 TABLET | Refills: 0 | Status: SHIPPED | OUTPATIENT
Start: 2025-05-02

## 2025-05-02 RX ORDER — CEFDINIR 300 MG/1
300 CAPSULE ORAL 2 TIMES DAILY
Qty: 14 CAPSULE | Refills: 0 | Status: SHIPPED | OUTPATIENT
Start: 2025-05-02

## 2025-05-02 RX ORDER — ALBUTEROL SULFATE 90 UG/1
2 INHALANT RESPIRATORY (INHALATION) EVERY 4 HOURS PRN
Qty: 6.7 G | Refills: 1 | Status: SHIPPED | OUTPATIENT
Start: 2025-05-02

## 2025-05-02 NOTE — TELEPHONE ENCOUNTER
SS received consult for positive screening. SS attempted contact with pt and spoke to significant other, Shauna. Pt is currently resting. Pt had an appointment with Dr. Coates this am and he doesn't plan to do any interventions. Sign other voices pt continues to have a lot of pain. SS notified sign other that above information will be provided to RN.     SS to attempt contact with pt on Monday, 5/5. Sign other voices pt has an appointment on Monday, 5/5 in the am.     SS provided above information to RN.

## 2025-05-03 NOTE — PROGRESS NOTES
subjective     Chief Complaint   Patient presents with    Wheezing    Cough     Coughing up phlegm       Problems Addressed This Visit  1. Acute bronchitis due to other specified organisms    2. Primary hypertension    3. Mixed hyperlipidemia        History of Present Illness  History of Present Illness  The patient is a 79-year-old white male who presents with complaints of wheezing and coughing, which is productive of yellowish sputum. This has been going on for the last 3 to 4 days. He reports no fever or chills and does not endorse any chest pain.    He reports wheezing and a productive cough with yellowish sputum for the past 3 to 4 days. He has not experienced any associated fever, chills, or chest pain.    He also complains of back pain and has a collapsed lumbar vertebra and a compression fracture of the thoracic spine, nontraumatic. For chronic back pain, he is taking oxycodone from a different provider.    He has hyperlipidemia, managed with Crestor therapy.    Hypertension is very well controlled with Norvasc, hydralazine, and Lopressor.    He has an anxiety disorder, which is well-managed with Prozac and Xanax.    MEDICATIONS  Current: Crestor, Norvasc, hydralazine, Lopressor, oxycodone, Januvia, metformin, Prozac, Xanax      Past Surgical History:   Procedure Laterality Date    COLONOSCOPY  03/2018    EYE SURGERY      FOOT SURGERY      HERNIA REPAIR      HYDROCELECTOMY  06/15/2015    PROSTATE BIOPSY  2018    PROSTATE FIDUCIAL MARKER PLACEMENT  09/14/2018    RHINOPLASTY      UPPER GASTROINTESTINAL ENDOSCOPY  03/24/2014    WITH BIOPSIES AND DILATION     Family History   Problem Relation Age of Onset    Kidney disease Other     Other Other         CHRONIC DISABLING DISEASES URINARY TRACT DISEASE    Diabetes Other     Hypertension Other     Other Mother     Heart failure Father     Stroke Sister     Arthritis Sister     Heart disease Brother     No Known Problems Brother     No Known Problems Brother       Past Medical History:   Diagnosis Date    Anxiety     Arthritis     Colitis     Depression     GERD (gastroesophageal reflux disease)     Gout     Heart murmur     History of EKG 2015    NORMAL    Hyperlipidemia     Hypertension     Obesity     Pancreatitis     history of    Prostate cancer     Renal failure     MILD one functioning kidney    Skin cancer     basal cell cancer on back     Patient Active Problem List   Diagnosis    Essential hypertension, labile    GERD (gastroesophageal reflux disease)    Renal failure    Hyperlipidemia    Anxiety    Depression    Type 2 diabetes mellitus    Periumbilical abdominal pain    DDD (degenerative disc disease), lumbar    Prostate cancer    Hyperuricemia    BURKS (dyspnea on exertion)    Bradycardia    Lower urinary tract symptoms due to benign prostatic hyperplasia    Raised prostate specific antigen    Chronic chest pain with low to moderate risk for CAD    Generalized anxiety disorder    Primary insomnia    Multiple rib fractures    Esophageal dysphagia    Cellulitis of left foot    Palpitations    Dysuria    Compression fracture of thoracic spine, non-traumatic    Chronic bilateral thoracic back pain    Acute bronchitis due to other specified organisms       Social History     Tobacco Use    Smoking status: Former     Current packs/day: 0.00     Types: Cigarettes     Quit date:      Years since quittin.3     Passive exposure: Never    Smokeless tobacco: Never   Vaping Use    Vaping status: Never Used   Substance Use Topics    Alcohol use: Yes     Alcohol/week: 7.0 standard drinks of alcohol     Types: 7 Shots of liquor per week     Comment:  once per day    Drug use: No       No Known Allergies    Current Outpatient Medications on File Prior to Visit   Medication Sig    allopurinol (ZYLOPRIM) 100 MG tablet Take 1 tablet by mouth Daily.    ALPRAZolam (XANAX) 0.25 MG tablet Take 1 tablet by mouth 2 (Two) Times a Day As Needed for Anxiety or Sleep. for  anxiety    amLODIPine (NORVASC) 10 MG tablet Take 1 tablet by mouth Daily.    aspirin 81 MG tablet Take 1 tablet by mouth Daily.    bicalutamide (CASODEX) 50 MG chemo tablet Casodex 50 mg tablet   Take 1 tablet every day by oral route for 30 days.    Blood Glucose Monitoring Suppl w/Device kit Check blood sugar once a day.    DX: E11.9    cholecalciferol (VITAMIN D3) 1000 UNITS tablet Take 1 tablet by mouth Daily.    Continuous Glucose Sensor (Dexcom G7 Sensor) misc Use 1 Application Every 10 (Ten) Days.    Cyanocobalamin (VITAMIN B12 PO) Take  by mouth daily.    fenofibrate (TRICOR) 145 MG tablet Take 1 tablet by mouth Daily.    FLUoxetine (PROzac) 20 MG capsule Take 1 capsule by mouth Daily.    folic acid (FOLVITE) 1 MG tablet TAKE 1 TABLET BY MOUTH DAILY    glucose blood (Contour Next Test) test strip Check blood glucose daily    hydrALAZINE (APRESOLINE) 50 MG tablet Take 1 tablet by mouth 3 (Three) Times a Day.    HYDROcodone-acetaminophen (NORCO) 7.5-325 MG per tablet Take 1 tablet by mouth 3 (Three) Times a Day As Needed for Moderate Pain.    leuprolide (LUPRON) 30 MG injection Inject 30 mg into the appropriate muscle as directed by prescriber Every 4 (Four) Months.    loratadine (Claritin) 10 MG tablet Take 1 tablet by mouth Daily.    meclizine (ANTIVERT) 25 MG tablet Take 1 tablet by mouth 3 (Three) Times a Day As Needed for Dizziness.    metFORMIN (GLUCOPHAGE) 500 MG tablet Take 0.5 tablets by mouth Daily With Breakfast.    metoprolol tartrate (LOPRESSOR) 25 MG tablet Take 1 tablet by mouth 2 (Two) Times a Day.    Microlet Lancets misc 1 stick Daily.    Mirabegron ER (Myrbetriq) 50 MG tablet sustained-release 24 hour 24 hr tablet Take 50 mg by mouth Daily.    naloxone (NARCAN) 4 MG/0.1ML nasal spray Call 911. Don't prime. Porter in 1 nostril for overdose. Repeat in 2-3 minutes in other nostril if no or minimal breathing/responsiveness.    oxyCODONE-acetaminophen (Percocet)  MG per tablet Take 1 tablet  by mouth Every 6 (Six) Hours As Needed for Moderate Pain.    pantoprazole (Protonix) 40 MG EC tablet Take 1 tablet by mouth Daily.    Pyridoxine HCl (VITAMIN B-6 PO) Take  by mouth daily.    rosuvastatin (CRESTOR) 20 MG tablet Take 1 tablet by mouth Every Night.    SITagliptin (Januvia) 100 MG tablet Take 1 tablet by mouth Daily.     No current facility-administered medications on file prior to visit.     (Not in a hospital admission)      Results for orders placed during the hospital encounter of 02/18/25    Adult Transthoracic Echo Complete W/ Cont if Necessary Per Protocol    Interpretation Summary    Normal left ventricular cavity size, wall thickness and wall motion noted.    Left ventricular systolic function is normal. Left ventricular ejection fraction appears to be 61 - 65%.    Left ventricular diastolic function is consistent with (grade II w/high LAP) pseudonormalization.    No significant valvular heart disease detected    No pericardial effusion noted.    Results for orders placed during the hospital encounter of 02/18/25    Stress Test With Myocardial Perfusion One Day    Interpretation Summary  Images from the original result were not included.      A pharmacological stress test was performed using regadenoson without low-level exercise.    Resting EKG showed sinus bradycardia at a rate of 58 bpm.  Incomplete right bundle block and nonspecific T wave changes were noted.    ST segments did not show any diagnostic changes.  No significant arrhythmia detected.    Myocardial perfusion imaging indicates a normal myocardial perfusion study with no evidence of ischemia. Impressions are consistent with a low risk study.TID1.11    Left ventricular ejection fraction is normal (Calculated EF = 70%).    Compared to the prior study from 7/14/2021 the current study reveals no changes.          The following portions of the patient's history were reviewed and updated as appropriate: allergies, current medications,  "past family history, past medical history, past social history, past surgical history and problem list.    ROS       Objective:     /68 (BP Location: Left arm, Patient Position: Sitting, Cuff Size: Adult)   Pulse 60   Ht 180.3 cm (71\")   Wt 96.2 kg (212 lb)   SpO2 94%   BMI 29.57 kg/m²   Physical Exam  Physical Exam        Lab Review  Lab Results   Component Value Date     04/24/2025    K 4.2 04/24/2025     (H) 04/24/2025    BUN 15 04/24/2025    CREATININE 1.58 (H) 04/24/2025    GLUCOSE 136 (H) 04/24/2025    CALCIUM 9.4 04/24/2025    ALT 19 04/24/2025    ALKPHOS 80 04/24/2025     Lab Results   Component Value Date    CKTOTAL 77 02/19/2025     Lab Results   Component Value Date    WBC 12.21 (H) 04/24/2025    HGB 13.8 04/24/2025    HCT 42.2 04/24/2025     04/24/2025     No results found for: \"INR\"  No results found for: \"MG\"  Lab Results   Component Value Date    PSA <0.014 04/24/2025    TSH 2.807 09/28/2015     No results found for: \"BNP\"  Lab Results   Component Value Date    CHLPL 227 (H) 03/07/2016    CHOL 232 (H) 02/19/2025    TRIG 489 (H) 02/19/2025    HDL 41 02/19/2025    VLDL 84 (H) 02/19/2025     (H) 02/19/2025     Lab Results   Component Value Date     (H) 02/19/2025    LDL 85 01/20/2025     Lab Results   Component Value Date    PROBNP 149.1 02/19/2025               Procedures    Results       I personally viewed and interpreted the patient's LAB data         Assessment:     1. Acute bronchitis due to other specified organisms    2. Primary hypertension    3. Mixed hyperlipidemia        Assessment & Plan  1. Acute bronchitis.  He presents with loud wheezing. A recent chest x-ray was negative for pneumonia, and there is no clinical evidence of heart failure. He was initiated on albuterol inhaler and Singulair. Additionally, Omnicef 300 mg twice daily was prescribed. He will provide an update on his condition in 2 days. If there is no improvement, a chest x-ray will " be necessary.    2. Anxiety disorder.  He will maintain his current regimen of Xanax and Zoloft for anxiety management.    3. Hyperlipidemia.  He is currently on Crestor. No modifications to his current therapy were made.    4. Hypertension.  His blood pressure is well-controlled. He will continue his current medications: Norvasc, hydralazine, and Lopressor.               No follow-ups on file.

## 2025-05-05 ENCOUNTER — TELEPHONE (OUTPATIENT)
Dept: RADIATION ONCOLOGY | Facility: HOSPITAL | Age: 80
End: 2025-05-05
Payer: MEDICARE

## 2025-05-05 DIAGNOSIS — C61 PROSTATE CANCER: Primary | ICD-10-CM

## 2025-05-05 NOTE — PROGRESS NOTES
Lei Stapleton  [id number]  1945 5/9/2025      Referring Provider:   Antione De Santiago MD     Reason for Follow Up:   Stage IV prostate cancer     Chief Complaint:  Stage IV prostate cancer      History of Present Illness   Lei Stapleton is a very pleasant 79 y.o.  male who presents in follow up appointment at the request of Dr. Antione De Santiago for further management and evaluation of potential metastatic cancer to the bone.    Patient was diagnosed with prostate cancer in 2018 (T2,N0,M0) after he was found to have an elevated PSA of 6.1 ng/mL and a palpable prostate nodule. He had a prostate biopsy performed on 7/13/18 and pathology significant for adenocarcinoma of the prostate, Casa score 4+4 = 8 on the left side with perineural invasion and benign right side. The prostate nodule showed adenocarcinoma, Casa score 4+3 = 7.  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 Various treatment options were discussed with his Urologist - Dr. Lee Mendez, and due to his very high-grade disease and large prostrate it was felt that the best course would be primary IMRT in conjunction with LHRH therapy. He underwent gold seed fiducial markers on 9/14/18 and was treated with IMRT with Dr. Zimmer and LHRH. Has been receiving Lupron 45 mg every six months which he started on 8/14/18 as well as Casodex 50 mg daily. He has also undergone previous cystolitholapaxy on three separate occasions as well as TURP (August 31, 2020) of a very large obstructing prostate. Pathology from TURP procedure was negative for malignancy and significant for hypertrophic prostatic glands and stroma. Has also had placement of an artificial urinary sphincter placement 2024 for urinary incontinence.     He fell in March 2025 while trying to manipulate his gutter and began experiencing acute mid back pain. MRI  obtained significant for fracture at T4 and T8 that was felt to be likely pathologic with enhancement at C7 and may represent metastatic disease. PSA has been undetectable at <0.014. Patient reports that his pain has been under better control with Percocet.       Interim History:  Mr. Stapleton presents today in follow up. Since his last visit he was evaluated by Dr. Barcenas (orthopedics) and is planning to get a second opinion.           The following portions of the patient's history were reviewed and updated as appropriate: allergies, current medications, past family history, past medical history, past social history, past surgical history and problem list.      No Known Allergies      Past Medical History:   Diagnosis Date    Anxiety     Arthritis     Colitis     Depression     GERD (gastroesophageal reflux disease)     Gout     Heart murmur     History of EKG 2015    NORMAL    Hyperlipidemia     Hypertension     Obesity     Pancreatitis     history of    Prostate cancer     Renal failure     MILD one functioning kidney    Skin cancer     basal cell cancer on back   Actinic & Seborrheic keratoses - follows with dermatology - Dr. Barrett        Past Surgical History:   Procedure Laterality Date    COLONOSCOPY  2018    EYE SURGERY      FOOT SURGERY      HERNIA REPAIR      HYDROCELECTOMY  06/15/2015    PROSTATE BIOPSY  2018    PROSTATE FIDUCIAL MARKER PLACEMENT  2018    RHINOPLASTY      UPPER GASTROINTESTINAL ENDOSCOPY  2014    WITH BIOPSIES AND DILATION         Social History     Socioeconomic History    Marital status:    Tobacco Use    Smoking status: Former     Current packs/day: 0.00     Types: Cigarettes     Quit date:      Years since quittin.3     Passive exposure: Never    Smokeless tobacco: Never   Vaping Use    Vaping status: Never Used   Substance and Sexual Activity    Alcohol use: Yes     Alcohol/week: 7.0 standard drinks of alcohol     Types: 7 Shots of liquor per week      Comment:  once per day    Drug use: No    Sexual activity: Defer         Family History   Problem Relation Age of Onset    Kidney disease Other     Other Other         CHRONIC DISABLING DISEASES URINARY TRACT DISEASE    Diabetes Other     Hypertension Other     Other Mother     Heart failure Father     Stroke Sister     Arthritis Sister     Heart disease Brother     No Known Problems Brother     No Known Problems Brother            Current Outpatient Medications:     albuterol sulfate  (90 Base) MCG/ACT inhaler, Inhale 2 puffs Every 4 (Four) Hours As Needed for Wheezing., Disp: 6.7 g, Rfl: 1    allopurinol (ZYLOPRIM) 100 MG tablet, Take 1 tablet by mouth Daily., Disp: 90 tablet, Rfl: 3    ALPRAZolam (XANAX) 0.25 MG tablet, Take 1 tablet by mouth 2 (Two) Times a Day As Needed for Anxiety or Sleep. for anxiety, Disp: 60 tablet, Rfl: 0    amLODIPine (NORVASC) 10 MG tablet, Take 1 tablet by mouth Daily., Disp: 90 tablet, Rfl: 3    aspirin 81 MG tablet, Take 1 tablet by mouth Daily., Disp: , Rfl:     bicalutamide (CASODEX) 50 MG chemo tablet, Casodex 50 mg tablet  Take 1 tablet every day by oral route for 30 days., Disp: , Rfl:     Blood Glucose Monitoring Suppl w/Device kit, Check blood sugar once a day.  DX: E11.9, Disp: 1 each, Rfl: 0    cefdinir (OMNICEF) 300 MG capsule, Take 1 capsule by mouth 2 (Two) Times a Day., Disp: 14 capsule, Rfl: 0    cholecalciferol (VITAMIN D3) 1000 UNITS tablet, Take 1 tablet by mouth Daily., Disp: , Rfl:     Continuous Glucose Sensor (Dexcom G7 Sensor) misc, Use 1 Application Every 10 (Ten) Days., Disp: 3 each, Rfl: 2    Cyanocobalamin (VITAMIN B12 PO), Take  by mouth daily., Disp: , Rfl:     fenofibrate (TRICOR) 145 MG tablet, Take 1 tablet by mouth Daily., Disp: 90 tablet, Rfl: 3    FLUoxetine (PROzac) 20 MG capsule, Take 1 capsule by mouth Daily., Disp: 90 capsule, Rfl: 3    folic acid (FOLVITE) 1 MG tablet, TAKE 1 TABLET BY MOUTH DAILY, Disp: 90 tablet, Rfl: 3    glucose  blood (Contour Next Test) test strip, Check blood glucose daily, Disp: 100 each, Rfl: 11    hydrALAZINE (APRESOLINE) 50 MG tablet, Take 1 tablet by mouth 3 (Three) Times a Day., Disp: 270 tablet, Rfl: 3    HYDROcodone-acetaminophen (NORCO) 7.5-325 MG per tablet, Take 1 tablet by mouth 3 (Three) Times a Day As Needed for Moderate Pain., Disp: 90 tablet, Rfl: 0    leuprolide (LUPRON) 30 MG injection, Inject 30 mg into the appropriate muscle as directed by prescriber Every 4 (Four) Months., Disp: , Rfl:     loratadine (Claritin) 10 MG tablet, Take 1 tablet by mouth Daily., Disp: 90 tablet, Rfl: 0    meclizine (ANTIVERT) 25 MG tablet, Take 1 tablet by mouth 3 (Three) Times a Day As Needed for Dizziness., Disp: 90 tablet, Rfl: 1    metFORMIN (GLUCOPHAGE) 500 MG tablet, Take 0.5 tablets by mouth Daily With Breakfast., Disp: 90 tablet, Rfl: 3    metoprolol tartrate (LOPRESSOR) 25 MG tablet, Take 1 tablet by mouth 2 (Two) Times a Day., Disp: 180 tablet, Rfl: 3    Microlet Lancets misc, 1 stick Daily., Disp: 100 each, Rfl: 11    Mirabegron ER (Myrbetriq) 50 MG tablet sustained-release 24 hour 24 hr tablet, Take 50 mg by mouth Daily., Disp: 28 tablet, Rfl: 0    montelukast (Singulair) 10 MG tablet, Take 1 tablet by mouth Every Night., Disp: 30 tablet, Rfl: 0    naloxone (NARCAN) 4 MG/0.1ML nasal spray, Call 911. Don't prime. [place] in 1 nostril for overdose. Repeat in 2-3 minutes in other nostril if no or minimal breathing/responsiveness., Disp: 2 each, Rfl: 0    oxyCODONE-acetaminophen (Percocet)  MG per tablet, Take 1 tablet by mouth Every 6 (Six) Hours As Needed for Moderate Pain., Disp: 90 tablet, Rfl: 0    pantoprazole (Protonix) 40 MG EC tablet, Take 1 tablet by mouth Daily., Disp: 30 tablet, Rfl: 0    Pyridoxine HCl (VITAMIN B-6 PO), Take  by mouth daily., Disp: , Rfl:     rosuvastatin (CRESTOR) 20 MG tablet, Take 1 tablet by mouth Every Night., Disp: 90 tablet, Rfl: 2    SITagliptin (Januvia) 100 MG tablet, Take  1 tablet by mouth Daily., Disp: 90 tablet, Rfl: 2          Review of Systems  Pertinent positives are listed as per history of present of illness, all other systems reviewed and are negative.          Physical Exam  Vital Signs: These were reviewed and listed as per patient’s electronic medical chart  Vitals:    05/09/25 1318   BP: 150/73   Pulse: 62   Resp: 18   Temp: 97.5 °F (36.4 °C)   SpO2: 95%   General: Patient is awake, alert, and in no acute distress.  Head: Normocephalic, atraumatic  Eyes: EOMI. Conjunctivae and sclerae normal.  Ears: Ears appear intact with no abnormalities noted.   Neck: Trachea midline. No obvious JVD.  Lungs: Respirations appear to be regular, even and unlabored with no signs of respiratory distress. No audible wheezing.  Abdomen: No obvious abdominal distension.  MS: Muscle tone appears normal. No gross deformities.  Extremities: No clubbing, cyanosis or edema noted.  Skin: No visible bleeding, bruising, or rash.  Neurologic: Alert and oriented x3. No gross focal deficits.          Pain Score:  Pain Score    05/09/25 1318   PainSc: 3    PainLoc: Back             PHQ-Score Total:  PHQ-9 Total Score:          IMAGING:   MRI Thoracic Spine With & Without Contrast (04/11/2025 15:09)   FINDINGS:Vertebrae:  T4 vertebral body mildly depressed likely pathologic compression fracture.  T8 vertebral body mid level compression fracture, that appears to also be pathologic.  Enhancement of the T C7 vertebral body inferiorly may represent metastatic disease.  Scoliotic curvature of the thoracic spine is noted.  Discs/spinal canal/neural foramina:  C4-C5, C5-C6 and C6/C7 shows posterior disc osteophyte protrusions causing moderate to severe central canal stenosis.  Spinal cord:  Unremarkable as visualized.  Normal signal.  No abnormal enhancement.  Soft tissues:  Unremarkable as visualized.  IMPRESSION:1.  C4-C5, C5-C6 and C6/C7 shows posterior disc osteophyte protrusions causing moderate to severe  central canal stenosis.2.  T4 vertebral body mildly depressed likely pathologic compression fracture.3.  T8 vertebral body mid level compression fracture, that appears to also be pathologic.4.  Enhancement of the T C7 vertebral body inferiorly may represent metastatic disease.        LABS/STUDIES:  Consult on 04/24/2025   Component Date Value    Glucose 04/24/2025 136 (H)     BUN 04/24/2025 15     Creatinine 04/24/2025 1.58 (H)     Sodium 04/24/2025 144     Potassium 04/24/2025 4.2     Chloride 04/24/2025 108 (H)     CO2 04/24/2025 23.1     Calcium 04/24/2025 9.4     Total Protein 04/24/2025 7.0     Albumin 04/24/2025 4.5     ALT (SGPT) 04/24/2025 19     AST (SGOT) 04/24/2025 20     Alkaline Phosphatase 04/24/2025 80     Total Bilirubin 04/24/2025 0.4     Globulin 04/24/2025 2.5     A/G Ratio 04/24/2025 1.8     BUN/Creatinine Ratio 04/24/2025 9.5     Anion Gap 04/24/2025 12.9     eGFR 04/24/2025 44.2 (L)     C-Reactive Protein 04/24/2025 0.43     LDH 04/24/2025 203     PSA 04/24/2025 <0.014     WBC 04/24/2025 12.21 (H)     RBC 04/24/2025 4.39     Hemoglobin 04/24/2025 13.8     Hematocrit 04/24/2025 42.2     MCV 04/24/2025 96.1     MCH 04/24/2025 31.4     MCHC 04/24/2025 32.7     RDW 04/24/2025 13.7     RDW-SD 04/24/2025 48.4     MPV 04/24/2025 11.8     Platelets 04/24/2025 198     Neutrophil % 04/24/2025 40.9 (L)     Lymphocyte % 04/24/2025 23.6     Monocyte % 04/24/2025 6.2     Eosinophil % 04/24/2025 27.9 (H)     Basophil % 04/24/2025 1.1     Immature Grans % 04/24/2025 0.3     Neutrophils, Absolute 04/24/2025 4.98     Lymphocytes, Absolute 04/24/2025 2.88     Monocytes, Absolute 04/24/2025 0.76     Eosinophils, Absolute 04/24/2025 3.41 (H)     Basophils, Absolute 04/24/2025 0.14     Immature Grans, Absolute 04/24/2025 0.04     nRBC 04/24/2025 0.0     Anisocytosis 04/24/2025 Slight/1+     Platelet Morphology 04/24/2025 Normal    Consult on 04/17/2025   Component Date Value    PSA 04/17/2025 <0.014    Hospital  Outpatient Visit on 04/11/2025   Component Date Value    Creatinine 04/11/2025 1.60 (H)    Lab on 02/19/2025   Component Date Value    proBNP 02/19/2025 149.1     Creatine Kinase 02/19/2025 77     Glucose 02/19/2025 153 (H)     BUN 02/19/2025 16     Creatinine 02/19/2025 1.20     Sodium 02/19/2025 143     Potassium 02/19/2025 4.2     Chloride 02/19/2025 107     CO2 02/19/2025 22.8     Calcium 02/19/2025 9.2     Total Protein 02/19/2025 7.0     Albumin 02/19/2025 4.4     ALT (SGPT) 02/19/2025 24     AST (SGOT) 02/19/2025 23     Alkaline Phosphatase 02/19/2025 74     Total Bilirubin 02/19/2025 0.3     Globulin 02/19/2025 2.6     A/G Ratio 02/19/2025 1.7     BUN/Creatinine Ratio 02/19/2025 13.3     Anion Gap 02/19/2025 13.2     eGFR 02/19/2025 61.5     Total Cholesterol 02/19/2025 232 (H)     Triglycerides 02/19/2025 489 (H)     HDL Cholesterol 02/19/2025 41     LDL Cholesterol  02/19/2025 107 (H)     VLDL Cholesterol 02/19/2025 84 (H)     LDL/HDL Ratio 02/19/2025 2.27     Hemoglobin A1C 02/19/2025 6.90 (H)     WBC 02/19/2025 9.51     RBC 02/19/2025 4.35     Hemoglobin 02/19/2025 14.2     Hematocrit 02/19/2025 42.0     MCV 02/19/2025 96.6     MCH 02/19/2025 32.6     MCHC 02/19/2025 33.8     RDW 02/19/2025 13.4     RDW-SD 02/19/2025 48.1     MPV 02/19/2025 12.1 (H)     Platelets 02/19/2025 197     Neutrophil % 02/19/2025 44.0     Lymphocyte % 02/19/2025 32.6     Monocyte % 02/19/2025 5.6     Eosinophil % 02/19/2025 16.4 (H)     Basophil % 02/19/2025 1.2     Immature Grans % 02/19/2025 0.2     Neutrophils, Absolute 02/19/2025 4.19     Lymphocytes, Absolute 02/19/2025 3.10     Monocytes, Absolute 02/19/2025 0.53     Eosinophils, Absolute 02/19/2025 1.56 (H)     Basophils, Absolute 02/19/2025 0.11     Immature Grans, Absolute 02/19/2025 0.02     nRBC 02/19/2025 0.0    Hospital Outpatient Visit on 02/18/2025   Component Date Value    Nuclear Prior Study 02/18/2025 3.0     BH CV REST NUCLEAR ISOTO* 02/18/2025 10.8     BH CV  STRESS NUCLEAR ISO* 02/18/2025 31.5     BH CV STRESS PROTOCOL 1 02/18/2025 Pharmacologic     Stage 1 02/18/2025 1.0     HR Stage 1 02/18/2025 66     BP Stage 1 02/18/2025 181/72     Duration Min Stage 1 02/18/2025 0     Duration Sec Stage 1 02/18/2025 10     Stress Dose Regadenoson * 02/18/2025 0.40     Stress Comments Stage 1 02/18/2025 10 sec bolus injection     Baseline HR 02/18/2025 58     Baseline BP 02/18/2025 166/69     Peak HR 02/18/2025 66     Peak BP 02/18/2025 181/72     Recovery HR 02/18/2025 63     Recovery BP 02/18/2025 166/70     Target HR (85%) 02/18/2025 120     Max. Pred. HR (100%) 02/18/2025 141     Percent Max Pred HR 02/18/2025 46.81     Percent Target HR 02/18/2025 55     Nuc Stress EF 02/18/2025 70    Hospital Outpatient Visit on 02/18/2025   Component Date Value    LVIDd 02/18/2025 4.8     LVIDs 02/18/2025 2.9     IVSd 02/18/2025 1.09     LVPWd 02/18/2025 0.98     FS 02/18/2025 39.5     IVS/LVPW 02/18/2025 1.11     ESV(cubed) 02/18/2025 23.9     LV Sys Vol (BSA correcte* 02/18/2025 17.4     EDV(cubed) 02/18/2025 107.9     LV Hoff Vol (BSA correct* 02/18/2025 43.2     LV mass(C)d 02/18/2025 176.3     LVOT area 02/18/2025 3.1     LVOT diam 02/18/2025 2.00     EDV(MOD-sp4) 02/18/2025 93.4     ESV(MOD-sp4) 02/18/2025 37.7     SV(MOD-sp4) 02/18/2025 55.7     SVi(MOD-SP4) 02/18/2025 25.8     EF(MOD-sp4) 02/18/2025 59.6     MV E max aurelio 02/18/2025 71.1     MV A max aurelio 02/18/2025 49.3     MV E/A 02/18/2025 1.44     LA ESV Index (BP) 02/18/2025 18.5     Med Peak E' Aurelio 02/18/2025 4.6     Lat Peak E' Aurelio 02/18/2025 5.7     TR max aurelio 02/18/2025 266.0     Avg E/e' ratio 02/18/2025 13.81     TAPSE (>1.6) 02/18/2025 2.7     LA dimension (2D)  02/18/2025 3.5     Ao pk aurelio 02/18/2025 147.0     Ao max PG 02/18/2025 8.6     Ao mean PG 02/18/2025 5.0     Ao V2 VTI 02/18/2025 30.6     TR max PG 02/18/2025 28.3     RVSP(TR) 02/18/2025 38.3     RAP systole 02/18/2025 10.0     PA acc time 02/18/2025 0.12     Ao  root diam 02/18/2025 3.2     ACS 02/18/2025 1.70    Lab on 01/29/2025   Component Date Value    Color, UA 01/29/2025 Dark Yellow (A)     Appearance, UA 01/29/2025 Turbid (A)     pH, UA 01/29/2025 8.5 (H)     Specific [place], UA 01/29/2025 1.024     Glucose, UA 01/29/2025 Negative     Ketones, UA 01/29/2025 Negative     Bilirubin, UA 01/29/2025 Negative     Blood, UA 01/29/2025 Small (1+) (A)     Protein, UA 01/29/2025 >=300 mg/dL (3+) (A)     Leuk Esterase, UA 01/29/2025 Large (3+) (A)     Nitrite, UA 01/29/2025 Negative     Urobilinogen, UA 01/29/2025 0.2 E.U./dL     RBC, UA 01/29/2025 Unable to determine due to loaded field (A)     WBC, UA 01/29/2025 Too Numerous to Count (A)     Bacteria, UA 01/29/2025 Unable to determine due to loaded field (A)     Squamous Epithelial Cell* 01/29/2025 Unable to determine due to loaded field (A)     Hyaline Casts, UA 01/29/2025 Unable to determine due to loaded field     Methodology 01/29/2025 Manual Light Microscopy     Urine Culture 01/29/2025 >100,000 CFU/mL Proteus mirabilis (A)    Lab on 01/20/2025   Component Date Value    Creatine Kinase 01/20/2025 121     Total Cholesterol 01/20/2025 175     Triglycerides 01/20/2025 266 (H)     HDL Cholesterol 01/20/2025 46     LDL Cholesterol  01/20/2025 85     VLDL Cholesterol 01/20/2025 44 (H)     LDL/HDL Ratio 01/20/2025 1.65     Glucose 01/20/2025 315 (H)     BUN 01/20/2025 16     Creatinine 01/20/2025 1.32 (H)     Sodium 01/20/2025 142     Potassium 01/20/2025 4.2     Chloride 01/20/2025 105     CO2 01/20/2025 22.6     Calcium 01/20/2025 9.1     Total Protein 01/20/2025 6.9     Albumin 01/20/2025 4.4     ALT (SGPT) 01/20/2025 20     AST (SGOT) 01/20/2025 21     Alkaline Phosphatase 01/20/2025 58     Total Bilirubin 01/20/2025 0.6     Globulin 01/20/2025 2.5     A/G Ratio 01/20/2025 1.8     BUN/Creatinine Ratio 01/20/2025 12.1     Anion Gap 01/20/2025 14.4     eGFR 01/20/2025 54.9 (L)     Hemoglobin A1C 01/20/2025 7.40 (H)      Microalbumin, Urine 01/29/2025 78.8     Uric Acid 01/20/2025 7.0     WBC 01/20/2025 11.94 (H)     RBC 01/20/2025 4.23     Hemoglobin 01/20/2025 13.9     Hematocrit 01/20/2025 40.8     MCV 01/20/2025 96.5     MCH 01/20/2025 32.9     MCHC 01/20/2025 34.1     RDW 01/20/2025 13.8     RDW-SD 01/20/2025 49.1     MPV 01/20/2025 12.3 (H)     Platelets 01/20/2025 171     Neutrophil % 01/20/2025 52.4     Lymphocyte % 01/20/2025 23.1     Monocyte % 01/20/2025 4.4 (L)     Eosinophil % 01/20/2025 18.6 (H)     Basophil % 01/20/2025 1.2     Immature Grans % 01/20/2025 0.3     Neutrophils, Absolute 01/20/2025 6.27     Lymphocytes, Absolute 01/20/2025 2.76     Monocytes, Absolute 01/20/2025 0.52     Eosinophils, Absolute 01/20/2025 2.22 (H)     Basophils, Absolute 01/20/2025 0.14     Immature Grans, Absolute 01/20/2025 0.03     nRBC 01/20/2025 0.0          PATHOLOGY:   10/14/16      11/29/16      8/31/20                 Assessment & Plan   Lei Stapleton is a very pleasant 79 y.o.  male who presents in follow up appointment at the request of Dr. Antione De Santiago for further management and evaluation of potential metastatic cancer to the bone.    Prostate cancer   Possible bone metastases  Liver lesion  - Patient was diagnosed with localized prostate cancer in 2018 which was treated with IMRT and remains on Lupron and Casodex. His PSAs since that time have been undetectable at <0.014. Next Lupron injection is scheduled for August 2025 with his Urologist.  - He recently sustained a fall and began experiencing acute back pain in which imaging was performed to further evaluate. MRI spine was significant for compression fracture of T4 and T8 that was felt to be likely pathologic with enhancement at C7 that may represent metastatic disease. Patient has not had a biopsy to confirm if this is metastatic prostate cancer.  - PSA is <0.014 therefore I do not believe that PET PSMA would be helpful in this case. He was evaluated by   [facility] (orthopedics) however did not feel that intervention would be beneficial and seeking a second opinion. If they are considering surgical intervention would recommend biopsy of these potential lesions to further evaluate.  - Obtained CT chest, abdomen, pelvis to assess for metastatic disease which was performed on 5/7/25 and significant for pathologic fracture of lytic T4 vertebral body and more sclerotic appearing T8 vertebral body abnormality noted as well as a peripherally enhancing 4.8 cm lesion of the right lobe of liver posteriorly.  After discussing with radiology this could potentially be benign such as a hemangioma and recommended MRI of the abdomen with liver protocol.  This was discussed with the patient today and we will order imaging to further evaluate.  Bone scan is currently scheduled for next week.  If this is concerning for metastasis will obtain CT-guided biopsy.      ACO / ANDREW/Other  Quality measures  -  Lei Stapleton  reports that he quit smoking about 34 years ago. His smoking use included cigarettes. He has never been exposed to tobacco smoke. He has never used smokeless tobacco.   -  Lei Stapleton received 2024 flu vaccine.  -  Lei Stapleton reports a pain score of 3.  Given his pain assessment as noted, treatment options were discussed and the following options were decided upon as a follow-up plan to address the patient's pain: referral to Primary Care for assistance in pain treatment guidance.  -  Current outpatient and discharge medications have been reconciled for the patient.  Reviewed by: Kassandra Wolf MD      I will have the patient return in follow up appointment to review test results in two weeks with imaging (MRI abdomen and bone scan). He understands that should he have any questions or concerns prior to his appointment he should give us a call at any time and I would be happy to see him sooner. It was a pleasure to see this patient in clinic today, thank you for  allowing me to participate in the care of this patient.    I spent 40 minutes caring for patient on this date of service. This time includes time spent by me in the following activities: preparing for the visit, reviewing tests, obtaining and/or reviewing a separately obtained history, performing a medically appropriate examination and/or evaluation, counseling and educating the patient/family/caregiver, ordering medications, tests, or procedures, documenting information in the medical record, independently interpreting results and communicating that information with the patient/family/caregiver, and care coordination as well as answering any questions.      Kassandra Wolf MD  05/09/25   17:28 EDT

## 2025-05-05 NOTE — TELEPHONE ENCOUNTER
GUILHERME spoke to pt's significant other Karli Guzman [phone] to follow-up with referral and she says pt does not have any sign of cancer and does not feel he will need any resources from SW at this time.  S.O. says pt has a compression fracture and saw an orthopedic surgeon, however, wants to see someone else.  S.O. will inform pt about SW contact today and he can call with any needs.  Phone number provided for GUILHERME.

## 2025-05-05 NOTE — PROGRESS NOTES
Spoke with Shauna, significant other, reports that Lei continues to have back pain, taking the pain medicine. States that he seen Dr. Giron a couple weeks ago and is requesting a second opinion with Dr. Robbins at [facility]. Referral was made per MD order. Advised that once an appointment has been made, she will be notified. Verbalized understanding and is aware to call with any concerns.

## 2025-05-07 ENCOUNTER — HOSPITAL ENCOUNTER (OUTPATIENT)
Dept: CT IMAGING | Facility: HOSPITAL | Age: 80
Discharge: HOME | End: 2025-05-07
Payer: MEDICARE

## 2025-05-07 DIAGNOSIS — C61 PROSTATE CANCER: ICD-10-CM

## 2025-05-07 PROCEDURE — 71260 CT THORAX DX C+: CPT | Performed by: RADIOLOGY

## 2025-05-07 PROCEDURE — 71260 CT THORAX DX C+: CPT

## 2025-05-07 PROCEDURE — 25510000001 IOPAMIDOL 61 % SOLUTION: Performed by: INTERNAL MEDICINE

## 2025-05-07 PROCEDURE — 74177 CT ABD & PELVIS W/CONTRAST: CPT

## 2025-05-07 PROCEDURE — 74177 CT ABD & PELVIS W/CONTRAST: CPT | Performed by: RADIOLOGY

## 2025-05-07 RX ORDER — IOPAMIDOL 612 MG/ML
100 INJECTION, SOLUTION INTRAVASCULAR
Status: COMPLETED | OUTPATIENT
Start: 2025-05-07 | End: 2025-05-07

## 2025-05-07 RX ADMIN — IOPAMIDOL 60 ML: 612 INJECTION, SOLUTION INTRAVENOUS at 10:18

## 2025-05-08 ENCOUNTER — TELEPHONE (OUTPATIENT)
Dept: RADIATION ONCOLOGY | Facility: HOSPITAL | Age: 80
End: 2025-05-08
Payer: MEDICARE

## 2025-05-08 NOTE — TELEPHONE ENCOUNTER
Spoke with Shauna, significant other, regarding referral appointment for Dr. Robbins-[facility] for May 19th @1:30. Verbalized and aware to call with any concerns.

## 2025-05-09 ENCOUNTER — LAB (OUTPATIENT)
Dept: ONCOLOGY | Facility: CLINIC | Age: 80
End: 2025-05-09
Payer: MEDICARE

## 2025-05-09 ENCOUNTER — OFFICE VISIT (OUTPATIENT)
Dept: ONCOLOGY | Facility: CLINIC | Age: 80
End: 2025-05-09
Payer: MEDICARE

## 2025-05-09 VITALS
BODY MASS INDEX: 30.63 KG/M2 | SYSTOLIC BLOOD PRESSURE: 150 MMHG | WEIGHT: 218.8 LBS | HEIGHT: 71 IN | TEMPERATURE: 97.5 F | DIASTOLIC BLOOD PRESSURE: 73 MMHG | HEART RATE: 62 BPM | OXYGEN SATURATION: 95 % | RESPIRATION RATE: 18 BRPM

## 2025-05-09 DIAGNOSIS — K76.9 LIVER LESION: ICD-10-CM

## 2025-05-09 DIAGNOSIS — C61 PROSTATE CANCER: Primary | ICD-10-CM

## 2025-05-12 ENCOUNTER — HOSPITAL ENCOUNTER (OUTPATIENT)
Dept: NUCLEAR MEDICINE | Facility: HOSPITAL | Age: 80
Discharge: HOME | End: 2025-05-12
Payer: MEDICARE

## 2025-05-12 PROCEDURE — A9561 TC99M OXIDRONATE: HCPCS | Performed by: INTERNAL MEDICINE

## 2025-05-12 PROCEDURE — 78306 BONE IMAGING WHOLE BODY: CPT

## 2025-05-12 PROCEDURE — 78306 BONE IMAGING WHOLE BODY: CPT | Performed by: RADIOLOGY

## 2025-05-12 PROCEDURE — 34310000005 TECHNETIUM OXIDRONATE KIT: Performed by: INTERNAL MEDICINE

## 2025-05-12 RX ADMIN — TECHNETIUM TC 99M OXIDRONATE 1 DOSE: 3.15 INJECTION, POWDER, LYOPHILIZED, FOR SOLUTION INTRAVENOUS at 09:15

## 2025-05-25 ENCOUNTER — HOSPITAL ENCOUNTER (OUTPATIENT)
Dept: MRI IMAGING | Facility: HOSPITAL | Age: 80
Discharge: HOME | End: 2025-05-25
Admitting: INTERNAL MEDICINE
Payer: MEDICARE

## 2025-05-25 DIAGNOSIS — K76.9 LIVER LESION: ICD-10-CM

## 2025-05-25 DIAGNOSIS — C61 PROSTATE CANCER: ICD-10-CM

## 2025-05-25 LAB — CREAT BLDA-MCNC: 1.3 MG/DL (ref 0.6–1.3)

## 2025-05-25 PROCEDURE — 82565 ASSAY OF CREATININE: CPT

## 2025-05-25 PROCEDURE — 74183 MRI ABD W/O CNTR FLWD CNTR: CPT

## 2025-05-25 PROCEDURE — A9577 INJ MULTIHANCE: HCPCS | Performed by: INTERNAL MEDICINE

## 2025-05-25 PROCEDURE — 25510000002 GADOBENATE DIMEGLUMINE 529 MG/ML SOLUTION: Performed by: INTERNAL MEDICINE

## 2025-05-25 RX ADMIN — GADOBENATE DIMEGLUMINE 19 ML: 529 INJECTION, SOLUTION INTRAVENOUS at 08:49

## 2025-05-27 PROCEDURE — 74183 MRI ABD W/O CNTR FLWD CNTR: CPT | Performed by: RADIOLOGY

## 2025-05-27 NOTE — PROGRESS NOTES
Lei Stapleton  [id number]  1945 5/30/2025      Referring Provider:   Antione De Santiago MD     Reason for Follow Up:   Stage IV prostate cancer     Chief Complaint:  Stage IV prostate cancer      History of Present Illness   Lei Stapleton is a very pleasant 79 y.o.  male who presents in follow up appointment at the request of Dr. Antione De Santiago for further management and evaluation of potential metastatic cancer to the bone.    Patient was diagnosed with prostate cancer in 2018 (T2,N0,M0) after he was found to have an elevated PSA of 6.1 ng/mL and a palpable prostate nodule. He had a prostate biopsy performed on 7/13/18 and pathology significant for adenocarcinoma of the prostate, Casa score 4+4 = 8 on the left side with perineural invasion and benign right side. The prostate nodule showed adenocarcinoma, [place] score 4+3 = 7.  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 Various treatment options were discussed with his Urologist - Dr. Lee Mendez, and due to his very high-grade disease and large prostrate it was felt that the best course would be primary IMRT in conjunction with LHRH therapy. He underwent gold seed fiducial markers on 9/14/18 and was treated with IMRT with Dr. Zimmer and LHRH. Has been receiving Lupron 45 mg every six months which he started on 8/14/18 as well as Casodex 50 mg daily. He has also undergone previous cystolitholapaxy on three separate occasions as well as TURP (August 31, 2020) of a very large obstructing prostate. Pathology from TURP procedure was negative for malignancy and significant for hypertrophic prostatic glands and stroma. Has also had placement of an artificial urinary sphincter placement 2024 for urinary incontinence.     He fell in March 2025 while trying to manipulate his gutter and began experiencing acute mid back pain. MRI  obtained significant for fracture at T4 and T8 that was felt to be likely pathologic with enhancement at C7 and may represent metastatic disease. PSA has been undetectable at <0.014. Patient reports that his pain has been under better control with Percocet but has not tolerated it well.       Interim History:  Mr. Stapleton presents today in follow up. Since his last visit he was evaluated by Dr. Barcenas (orthopedics) and is planning to get a second opinion possibly with orthopedic team in [facility] [place].  He reports increased back pain that is currently not being controlled with Clio.  He reports that he did not tolerate Percocet well.  Sometimes he has needed to take 2 Norco at once to better control his pain.          The following portions of the patient's history were reviewed and updated as appropriate: allergies, current medications, past family history, past medical history, past social history, past surgical history and problem list.      No Known Allergies      Past Medical History:   Diagnosis Date    Anxiety     Arthritis     Colitis     Depression     GERD (gastroesophageal reflux disease)     Gout     Heart murmur     History of EKG 12/22/2015    NORMAL    Hyperlipidemia     Hypertension     Obesity     Pancreatitis     history of    Prostate cancer     Renal failure     MILD one functioning kidney    Skin cancer     basal cell cancer on back   Actinic & Seborrheic keratoses - follows with dermatology - Dr. Barrett        Past Surgical History:   Procedure Laterality Date    COLONOSCOPY  03/2018    EYE SURGERY      FOOT SURGERY      HERNIA REPAIR      HYDROCELECTOMY  06/15/2015    PROSTATE BIOPSY  2018    PROSTATE FIDUCIAL MARKER PLACEMENT  09/14/2018    RHINOPLASTY      UPPER GASTROINTESTINAL ENDOSCOPY  03/24/2014    WITH BIOPSIES AND DILATION         Social History     Socioeconomic History    Marital status:    Tobacco Use    Smoking status: Former     Current packs/day: 0.00     Types:  Cigarettes     Quit date:      Years since quittin.4     Passive exposure: Never    Smokeless tobacco: Never   Vaping Use    Vaping status: Never Used   Substance and Sexual Activity    Alcohol use: Yes     Alcohol/week: 7.0 standard drinks of alcohol     Types: 7 Shots of liquor per week     Comment:  once per day    Drug use: No    Sexual activity: Defer         Family History   Problem Relation Age of Onset    Kidney disease Other     Other Other         CHRONIC DISABLING DISEASES URINARY TRACT DISEASE    Diabetes Other     Hypertension Other     Other Mother     Heart failure Father     Stroke Sister     Arthritis Sister     Heart disease Brother     No Known Problems Brother     No Known Problems Brother            Current Outpatient Medications:     albuterol sulfate  (90 Base) MCG/ACT inhaler, Inhale 2 puffs Every 4 (Four) Hours As Needed for Wheezing., Disp: 6.7 g, Rfl: 1    allopurinol (ZYLOPRIM) 100 MG tablet, Take 1 tablet by mouth Daily., Disp: 90 tablet, Rfl: 3    ALPRAZolam (XANAX) 0.25 MG tablet, Take 1 tablet by mouth 2 (Two) Times a Day As Needed for Anxiety or Sleep. for anxiety, Disp: 60 tablet, Rfl: 0    amLODIPine (NORVASC) 10 MG tablet, Take 1 tablet by mouth Daily., Disp: 90 tablet, Rfl: 3    aspirin 81 MG tablet, Take 1 tablet by mouth Daily., Disp: , Rfl:     bicalutamide (CASODEX) 50 MG chemo tablet, Casodex 50 mg tablet  Take 1 tablet every day by oral route for 30 days., Disp: , Rfl:     Blood Glucose Monitoring Suppl w/Device kit, Check blood sugar once a day.  DX: E11.9, Disp: 1 each, Rfl: 0    cholecalciferol (VITAMIN D3) 1000 UNITS tablet, Take 1 tablet by mouth Daily., Disp: , Rfl:     Cyanocobalamin (VITAMIN B12 PO), Take  by mouth daily., Disp: , Rfl:     fenofibrate (TRICOR) 145 MG tablet, Take 1 tablet by mouth Daily., Disp: 90 tablet, Rfl: 3    FLUoxetine (PROzac) 20 MG capsule, Take 1 capsule by mouth Daily., Disp: 90 capsule, Rfl: 3    folic acid (FOLVITE) 1 MG  tablet, TAKE 1 TABLET BY MOUTH DAILY, Disp: 90 tablet, Rfl: 3    gabapentin (NEURONTIN) 100 MG capsule, Take 1 capsule by mouth 2 (Two) Times a Day As Needed (moderate pain)., Disp: 60 capsule, Rfl: 0    glucose blood (Contour Next Test) test strip, Check blood glucose daily, Disp: 100 each, Rfl: 11    hydrALAZINE (APRESOLINE) 50 MG tablet, Take 1 tablet by mouth 3 (Three) Times a Day., Disp: 270 tablet, Rfl: 3    HYDROcodone-acetaminophen (NORCO) 7.5-325 MG per tablet, Take 1 tablet by mouth 3 (Three) Times a Day As Needed for Moderate Pain., Disp: 90 tablet, Rfl: 0    leuprolide (LUPRON) 30 MG injection, Inject 30 mg into the appropriate muscle as directed by prescriber Every 4 (Four) Months., Disp: , Rfl:     loratadine (Claritin) 10 MG tablet, Take 1 tablet by mouth Daily., Disp: 90 tablet, Rfl: 0    meclizine (ANTIVERT) 25 MG tablet, Take 1 tablet by mouth 3 (Three) Times a Day As Needed for Dizziness., Disp: 90 tablet, Rfl: 1    metFORMIN (GLUCOPHAGE) 500 MG tablet, Take 0.5 tablets by mouth Daily With Breakfast., Disp: 90 tablet, Rfl: 3    metoprolol tartrate (LOPRESSOR) 25 MG tablet, Take 1 tablet by mouth 2 (Two) Times a Day., Disp: 180 tablet, Rfl: 3    Microlet Lancets misc, 1 stick Daily., Disp: 100 each, Rfl: 11    Mirabegron ER (Myrbetriq) 50 MG tablet sustained-release 24 hour 24 hr tablet, Take 50 mg by mouth Daily., Disp: 28 tablet, Rfl: 0    montelukast (Singulair) 10 MG tablet, Take 1 tablet by mouth Every Night., Disp: 30 tablet, Rfl: 0    naloxone (NARCAN) 4 MG/0.1ML nasal spray, Call 911. Don't prime. [place] in 1 nostril for overdose. Repeat in 2-3 minutes in other nostril if no or minimal breathing/responsiveness., Disp: 2 each, Rfl: 0    pantoprazole (Protonix) 40 MG EC tablet, Take 1 tablet by mouth Daily., Disp: 30 tablet, Rfl: 0    Pyridoxine HCl (VITAMIN B-6 PO), Take  by mouth daily., Disp: , Rfl:     rosuvastatin (CRESTOR) 20 MG tablet, Take 1 tablet by mouth Every Night., Disp: 90  tablet, Rfl: 2    SITagliptin (Januvia) 100 MG tablet, Take 1 tablet by mouth Daily., Disp: 90 tablet, Rfl: 2    cefdinir (OMNICEF) 300 MG capsule, Take 1 capsule by mouth 2 (Two) Times a Day. (Patient not taking: Reported on 5/30/2025), Disp: 14 capsule, Rfl: 0    Continuous Glucose Sensor (Dexcom G7 Sensor) misc, Use 1 Application Every 10 (Ten) Days. (Patient not taking: Reported on 5/30/2025), Disp: 3 each, Rfl: 2    oxyCODONE (Roxicodone) 5 MG immediate release tablet, Take 1 tablet by mouth Every 6 (Six) Hours As Needed for Moderate Pain., Disp: 120 tablet, Rfl: 0    oxyCODONE-acetaminophen (Percocet)  MG per tablet, Take 1 tablet by mouth Every 6 (Six) Hours As Needed for Moderate Pain. (Patient not taking: Reported on 5/30/2025), Disp: 90 tablet, Rfl: 0          Review of Systems  Pertinent positives are listed as per history of present of illness, all other systems reviewed and are negative.  Back pain.          Physical Exam  Vital Signs: These were reviewed and listed as per patient’s electronic medical chart  Vitals:    05/30/25 1346   BP: 155/79   Pulse: 70   Resp: 20   Temp: 98 °F (36.7 °C)   SpO2: 95%     General: Patient is awake, alert, and in no acute distress.  Normal mood.  Head: Normocephalic, atraumatic  Eyes: EOMI. Conjunctivae and sclerae normal.  Ears: Ears appear intact with no abnormalities noted.   Neck: Trachea midline. No obvious JVD.  Lungs: Respirations appear to be regular, even and unlabored with no signs of respiratory distress. No audible wheezing.  Abdomen: No obvious abdominal distension.  MS: Muscle tone appears normal. No gross deformities.  Extremities: No clubbing, cyanosis or edema noted.  Skin: No visible bleeding, bruising, or rash.  Neurologic: Alert and oriented x3. No gross focal deficits.          Pain Score:  Pain Score    05/30/25 1346   PainSc: 8    PainLoc: Chest               PHQ-Score Total:  PHQ-9 Total Score:          IMAGING:   MRI Thoracic Spine With &  Without Contrast (04/11/2025 15:09)   FINDINGS:Vertebrae:  T4 vertebral body mildly depressed likely pathologic compression fracture.  T8 vertebral body mid level compression fracture, that appears to also be pathologic.  Enhancement of the T C7 vertebral body inferiorly may represent metastatic disease.  Scoliotic curvature of the thoracic spine is noted.  Discs/spinal canal/neural foramina:  C4-C5, C5-C6 and C6/C7 shows posterior disc osteophyte protrusions causing moderate to severe central canal stenosis.  Spinal cord:  Unremarkable as visualized.  Normal signal.  No abnormal enhancement.  Soft tissues:  Unremarkable as visualized.  IMPRESSION:1.  C4-C5, C5-C6 and C6/C7 shows posterior disc osteophyte protrusions causing moderate to severe central canal stenosis.2.  T4 vertebral body mildly depressed likely pathologic compression fracture.3.  T8 vertebral body mid level compression fracture, that appears to also be pathologic.4.  Enhancement of the T C7 vertebral body inferiorly may represent metastatic disease.    CT Abdomen Pelvis With Contrast (05/07/2025 10:24)   FINDINGS:Lung bases:  Unremarkable as visualized.  No mass.  No consolidation.ABDOMEN:Liver:  Peripherally enhancing 4.8 cm lesion of the right lobe of liver posteriorly that should be further evaluated with MRI liver mass protocol.  Gallbladder and bile ducts:  Unremarkable as visualized.  No calcified stones.  No ductal dilation.  Pancreas:  Unremarkable as visualized.  No mass.  No ductal dilation.  Spleen:  Unremarkable as visualized.  No splenomegaly.  Adrenals:  Unremarkable as visualized.  No mass.  Kidneys and ureters:  Horseshoe kidney again identified.  Simple bilateral renal cysts.  No hydronephrosis.  Stomach and bowel:  Scattered diverticula in the colon.  No diverticulitis.  No obstruction. PELVIS:  Appendix:  No findings to suggest acute appendicitis.  Bladder:  Unremarkable as visualized.  No mass.  Reproductive:  Unremarkable as  visualized. ABDOMEN and PELVIS:  Intraperitoneal space:  Unremarkable as visualized.  No free air.  No significant fluid collection.  Bones/joints:  Degenerative disc disease throughout the lumbar spine. Degenerative facet arthropathy throughout the lumbar spine, most prominent in the lower lumbar spine.  No acute fracture.  No dislocation.  Soft tissues:  Unremarkable as visualized.  Vasculature:  Unremarkable as visualized.  No abdominal aortic aneurysm.  Lymph nodes:  Unremarkable as visualized.  No enlarged lymph nodes.  IMPRESSION:1.  Peripherally enhancing 4.8 cm lesion of the right lobe of liver posteriorly that should be further evaluated with MRI liver mass protocol.2.  Degenerative changes lumbar spine as described.    CT Chest With Contrast Diagnostic (05/07/2025 10:25)   FINDINGS: Limitations:  Please note that reported measurements are made manually, as computer generated (AI) measurements can over measure lesions. Additionally, lesions identified by AI may have been present presently, significant nodules will be discussed in the report and the visual depictions of lesions provided by AI are for general reference only.  Lungs and pleural spaces:  Unremarkable as visualized.  No consolidation.  No pneumothorax.  No significant effusion.  No pulmonary nodule.  Heart:  Unremarkable as visualized.  No cardiomegaly.  No significant pericardial effusion.  No significant coronary artery calcifications.  Bones/joints:  Again pathologic fracture of lytic T4 vertebral body and more sclerotic appearing T8 vertebral body abnormalities noted.  No dislocation.  Soft tissues:  Unremarkable as visualized.  Vasculature:  Unremarkable as visualized.  No thoracic aortic aneurysm.  Lymph nodes:  Unremarkable as visualized.  No enlarged lymph nodes.  IMPRESSION:1.  No pulmonary nodule.2.  Again pathologic fracture of lytic T4 vertebral body and more sclerotic appearing T8 vertebral body abnormalities noted.    NM Bone Scan  Whole Body (05/12/2025 13:04)   FINDINGS:Skull/facial bones:  No focal increased or decreased uptake. Spine:  Osteoblastic lesion involving the T8 level of the thoracic spine and corresponds to abnormality on CT.  Ribs:  Osteoblastic lesion at approximately the left ninth posterior rib.  Osteoblastic lesion faintly of the left posterior 10th and 11th ribs.  Long bones:  Unremarkable.  No abnormal uptake.  Pelvis:  Unremarkable.  No focal increased or decreased uptake.  Joints:  Unremarkable.  No focal increased or decreased uptake.  Soft tissues:  Unremarkable.  Renal/bladder:  Within normal limits.  IMPRESSION:1.  Osteoblastic lesion involving the T8 level of the thoracic spine and corresponds to abnormality on CT.2.  Osteoblastic lesion at approximately the left ninth posterior rib. 3.  Osteoblastic lesion faintly of the left posterior 10th and 11th ribs.4. Above findings suspicious for osteoblastic metastatic disease.    MRI Abdomen With & Without Contrast (05/25/2025 08:55)   FINDINGS:  Lung bases:  Unremarkable.  No mass.  No consolidation.  Liver:  Somewhat lobulated appearing mass of the posterior right hepatic lobe, segment 6 is noted that is approximately 5.4 x 4.3 cm and demonstrates early avid contrast enhancement with retained contrast noted on subsequent multiphasic postcontrast sequences and may even demonstrate some areas of delayed centripetal enhancement. There is area of nonenhancement noted centrally with heterogeneous features.Background of relatively unremarkable liver parenchyma is noted. High ADC and DWI signal. More favorable for benign process such as atypical presentation of hemangioma. Malignancy felt less likely. Further characterization with ultrasound may be helpful.  There are 2 smaller T2 hyperintense lesions in the periphery of the left hepatic lobe, segment 4A that demonstrate early enhancement with persistent enhancement on delayed phase imaging and may represent 2 small  hemangiomas. 2.0 cm and 1.7 cm respectively.  Liver parenchyma is fairly homogeneous with no evidence of cirrhosis or intrahepatic biliary dilatation.  Gallbladder and bile ducts:  Unremarkable.  No calcified stones.  No ductal dilation.  Pancreas:  Unremarkable.  No ductal dilation.  No mass.  Spleen:  Unremarkable.  No splenomegaly.  Adrenals:  Unremarkable.  No mass.  Kidneys and ureters:  Horseshoe kidney noted with simple appearing renal cysts throughout. No hydronephrosis.  Stomach and bowel:  Unremarkable.  No obstruction.  Intraperitoneal space:  Unremarkable.  No significant fluid collection.  Soft tissues:  Unremarkable.  Vasculature:  Unremarkable.  No abdominal aortic aneurysm.  Lymph nodes:  Unremarkable.  No enlarged lymph nodes.  IMPRESSION:1. 5.4 x 4.3 cm enhancing lesion in segment 6 of the right lobe of liver with imaging features and MRI signal characteristics more favorable for benign process such as atypical presentation of hemangioma. Malignancy felt less likely. Further characterization with ultrasound may be helpful.  Continued imaging surveillance is recommended.2.  There are 2 smaller T2 hyperintense lesions in the periphery of the left hepatic lobe, segment 4A that demonstrate early enhancement with persistent enhancement on delayed phase imaging and may represent 2 small hemangiomas. 2.0 cm and 1.7 cm respectively.3.  Liver parenchyma is fairly homogeneous with no evidence of cirrhosis or intrahepatic biliary dilatation.4.  Horseshoe kidney noted with simple appearing renal cysts throughout. No hydronephrosis.5. Other incidental/nonacute findings above.        LABS/STUDIES:  Hospital Outpatient Visit on 05/25/2025   Component Date Value    Creatinine 05/25/2025 1.30    Consult on 04/24/2025   Component Date Value    Glucose 04/24/2025 136 (H)     BUN 04/24/2025 15     Creatinine 04/24/2025 1.58 (H)     Sodium 04/24/2025 144     Potassium 04/24/2025 4.2     Chloride 04/24/2025 108 (H)      CO2 04/24/2025 23.1     Calcium 04/24/2025 9.4     Total Protein 04/24/2025 7.0     Albumin 04/24/2025 4.5     ALT (SGPT) 04/24/2025 19     AST (SGOT) 04/24/2025 20     Alkaline Phosphatase 04/24/2025 80     Total Bilirubin 04/24/2025 0.4     Globulin 04/24/2025 2.5     A/G Ratio 04/24/2025 1.8     BUN/Creatinine Ratio 04/24/2025 9.5     Anion Gap 04/24/2025 12.9     eGFR 04/24/2025 44.2 (L)     C-Reactive Protein 04/24/2025 0.43     LDH 04/24/2025 203     PSA 04/24/2025 <0.014     WBC 04/24/2025 12.21 (H)     RBC 04/24/2025 4.39     Hemoglobin 04/24/2025 13.8     Hematocrit 04/24/2025 42.2     MCV 04/24/2025 96.1     MCH 04/24/2025 31.4     MCHC 04/24/2025 32.7     RDW 04/24/2025 13.7     RDW-SD 04/24/2025 48.4     MPV 04/24/2025 11.8     Platelets 04/24/2025 198     Neutrophil % 04/24/2025 40.9 (L)     Lymphocyte % 04/24/2025 23.6     Monocyte % 04/24/2025 6.2     Eosinophil % 04/24/2025 27.9 (H)     Basophil % 04/24/2025 1.1     Immature Grans % 04/24/2025 0.3     Neutrophils, Absolute 04/24/2025 4.98     Lymphocytes, Absolute 04/24/2025 2.88     Monocytes, Absolute 04/24/2025 0.76     Eosinophils, Absolute 04/24/2025 3.41 (H)     Basophils, Absolute 04/24/2025 0.14     Immature Grans, Absolute 04/24/2025 0.04     nRBC 04/24/2025 0.0     Anisocytosis 04/24/2025 Slight/1+     Platelet Morphology 04/24/2025 Normal    Consult on 04/17/2025   Component Date Value    PSA 04/17/2025 <0.014    Hospital Outpatient Visit on 04/11/2025   Component Date Value    Creatinine 04/11/2025 1.60 (H)    Lab on 02/19/2025   Component Date Value    proBNP 02/19/2025 149.1     Creatine Kinase 02/19/2025 77     Glucose 02/19/2025 153 (H)     BUN 02/19/2025 16     Creatinine 02/19/2025 1.20     Sodium 02/19/2025 143     Potassium 02/19/2025 4.2     Chloride 02/19/2025 107     CO2 02/19/2025 22.8     Calcium 02/19/2025 9.2     Total Protein 02/19/2025 7.0     Albumin 02/19/2025 4.4     ALT (SGPT) 02/19/2025 24     AST (SGOT) 02/19/2025  23     Alkaline Phosphatase 02/19/2025 74     Total Bilirubin 02/19/2025 0.3     Globulin 02/19/2025 2.6     A/G Ratio 02/19/2025 1.7     BUN/Creatinine Ratio 02/19/2025 13.3     Anion Gap 02/19/2025 13.2     eGFR 02/19/2025 61.5     Total Cholesterol 02/19/2025 232 (H)     Triglycerides 02/19/2025 489 (H)     HDL Cholesterol 02/19/2025 41     LDL Cholesterol  02/19/2025 107 (H)     VLDL Cholesterol 02/19/2025 84 (H)     LDL/HDL Ratio 02/19/2025 2.27     Hemoglobin A1C 02/19/2025 6.90 (H)     WBC 02/19/2025 9.51     RBC 02/19/2025 4.35     Hemoglobin 02/19/2025 14.2     Hematocrit 02/19/2025 42.0     MCV 02/19/2025 96.6     MCH 02/19/2025 32.6     MCHC 02/19/2025 33.8     RDW 02/19/2025 13.4     RDW-SD 02/19/2025 48.1     MPV 02/19/2025 12.1 (H)     Platelets 02/19/2025 197     Neutrophil % 02/19/2025 44.0     Lymphocyte % 02/19/2025 32.6     Monocyte % 02/19/2025 5.6     Eosinophil % 02/19/2025 16.4 (H)     Basophil % 02/19/2025 1.2     Immature Grans % 02/19/2025 0.2     Neutrophils, Absolute 02/19/2025 4.19     Lymphocytes, Absolute 02/19/2025 3.10     Monocytes, Absolute 02/19/2025 0.53     Eosinophils, Absolute 02/19/2025 1.56 (H)     Basophils, Absolute 02/19/2025 0.11     Immature Grans, Absolute 02/19/2025 0.02     nRBC 02/19/2025 0.0    Hospital Outpatient Visit on 02/18/2025   Component Date Value    Nuclear Prior Study 02/18/2025 3.0     BH CV REST NUCLEAR ISOTO* 02/18/2025 10.8     BH CV STRESS NUCLEAR ISO* 02/18/2025 31.5     BH CV STRESS PROTOCOL 1 02/18/2025 Pharmacologic     Stage 1 02/18/2025 1.0     HR Stage 1 02/18/2025 66     BP Stage 1 02/18/2025 181/72     Duration Min Stage 1 02/18/2025 0     Duration Sec Stage 1 02/18/2025 10     Stress Dose Regadenoson * 02/18/2025 0.40     Stress Comments Stage 1 02/18/2025 10 sec bolus injection     Baseline HR 02/18/2025 58     Baseline BP 02/18/2025 166/69     Peak HR 02/18/2025 66     Peak BP 02/18/2025 181/72     Recovery HR 02/18/2025 63     Recovery  BP 02/18/2025 166/70     Target HR (85%) 02/18/2025 120     Max. Pred. HR (100%) 02/18/2025 141     Percent Max Pred HR 02/18/2025 46.81     Percent Target HR 02/18/2025 55     Nuc Stress EF 02/18/2025 70    Hospital Outpatient Visit on 02/18/2025   Component Date Value    LVIDd 02/18/2025 4.8     LVIDs 02/18/2025 2.9     IVSd 02/18/2025 1.09     LVPWd 02/18/2025 0.98     FS 02/18/2025 39.5     IVS/LVPW 02/18/2025 1.11     ESV(cubed) 02/18/2025 23.9     LV Sys Vol (BSA correcte* 02/18/2025 17.4     EDV(cubed) 02/18/2025 107.9     LV Hoff Vol (BSA correct* 02/18/2025 43.2     LV mass(C)d 02/18/2025 176.3     LVOT area 02/18/2025 3.1     LVOT diam 02/18/2025 2.00     EDV(MOD-sp4) 02/18/2025 93.4     ESV(MOD-sp4) 02/18/2025 37.7     SV(MOD-sp4) 02/18/2025 55.7     SVi(MOD-SP4) 02/18/2025 25.8     EF(MOD-sp4) 02/18/2025 59.6     MV E max aurelio 02/18/2025 71.1     MV A max aurelio 02/18/2025 49.3     MV E/A 02/18/2025 1.44     LA ESV Index (BP) 02/18/2025 18.5     Med Peak E' Aurelio 02/18/2025 4.6     Lat Peak E' Aurelio 02/18/2025 5.7     TR max aurelio 02/18/2025 266.0     Avg E/e' ratio 02/18/2025 13.81     TAPSE (>1.6) 02/18/2025 2.7     LA dimension (2D)  02/18/2025 3.5     Ao pk aurelio 02/18/2025 147.0     Ao max PG 02/18/2025 8.6     Ao mean PG 02/18/2025 5.0     Ao V2 VTI 02/18/2025 30.6     TR max PG 02/18/2025 28.3     RVSP(TR) 02/18/2025 38.3     RAP systole 02/18/2025 10.0     PA acc time 02/18/2025 0.12     Ao root diam 02/18/2025 3.2     ACS 02/18/2025 1.70    Lab on 01/29/2025   Component Date Value    Color, UA 01/29/2025 Dark Yellow (A)     Appearance, UA 01/29/2025 Turbid (A)     pH, UA 01/29/2025 8.5 (H)     Specific Linch, UA 01/29/2025 1.024     Glucose, UA 01/29/2025 Negative     Ketones, UA 01/29/2025 Negative     Bilirubin, UA 01/29/2025 Negative     Blood, UA 01/29/2025 Small (1+) (A)     Protein, UA 01/29/2025 >=300 mg/dL (3+) (A)     Leuk Esterase, UA 01/29/2025 Large (3+) (A)     Nitrite, UA 01/29/2025 Negative      Urobilinogen, UA 01/29/2025 0.2 E.U./dL     RBC, UA 01/29/2025 Unable to determine due to loaded field (A)     WBC, UA 01/29/2025 Too Numerous to Count (A)     Bacteria, UA 01/29/2025 Unable to determine due to loaded field (A)     Squamous Epithelial Cell* 01/29/2025 Unable to determine due to loaded field (A)     Hyaline Casts, UA 01/29/2025 Unable to determine due to loaded field     Methodology 01/29/2025 Manual Light Microscopy     Urine Culture 01/29/2025 >100,000 CFU/mL Proteus mirabilis (A)    Lab on 01/20/2025   Component Date Value    Creatine Kinase 01/20/2025 121     Total Cholesterol 01/20/2025 175     Triglycerides 01/20/2025 266 (H)     HDL Cholesterol 01/20/2025 46     LDL Cholesterol  01/20/2025 85     VLDL Cholesterol 01/20/2025 44 (H)     LDL/HDL Ratio 01/20/2025 1.65     Glucose 01/20/2025 315 (H)     BUN 01/20/2025 16     Creatinine 01/20/2025 1.32 (H)     Sodium 01/20/2025 142     Potassium 01/20/2025 4.2     Chloride 01/20/2025 105     CO2 01/20/2025 22.6     Calcium 01/20/2025 9.1     Total Protein 01/20/2025 6.9     Albumin 01/20/2025 4.4     ALT (SGPT) 01/20/2025 20     AST (SGOT) 01/20/2025 21     Alkaline Phosphatase 01/20/2025 58     Total Bilirubin 01/20/2025 0.6     Globulin 01/20/2025 2.5     A/G Ratio 01/20/2025 1.8     BUN/Creatinine Ratio 01/20/2025 12.1     Anion Gap 01/20/2025 14.4     eGFR 01/20/2025 54.9 (L)     Hemoglobin A1C 01/20/2025 7.40 (H)     Microalbumin, Urine 01/29/2025 78.8     Uric Acid 01/20/2025 7.0     WBC 01/20/2025 11.94 (H)     RBC 01/20/2025 4.23     Hemoglobin 01/20/2025 13.9     Hematocrit 01/20/2025 40.8     MCV 01/20/2025 96.5     MCH 01/20/2025 32.9     MCHC 01/20/2025 34.1     RDW 01/20/2025 13.8     RDW-SD 01/20/2025 49.1     MPV 01/20/2025 12.3 (H)     Platelets 01/20/2025 171     Neutrophil % 01/20/2025 52.4     Lymphocyte % 01/20/2025 23.1     Monocyte % 01/20/2025 4.4 (L)     Eosinophil % 01/20/2025 18.6 (H)     Basophil % 01/20/2025 1.2      Immature Grans % 01/20/2025 0.3     Neutrophils, Absolute 01/20/2025 6.27     Lymphocytes, Absolute 01/20/2025 2.76     Monocytes, Absolute 01/20/2025 0.52     Eosinophils, Absolute 01/20/2025 2.22 (H)     Basophils, Absolute 01/20/2025 0.14     Immature Grans, Absolute 01/20/2025 0.03     nRBC 01/20/2025 0.0          PATHOLOGY:   10/14/16      11/29/16      8/31/20                 Assessment & Plan   Lei Stapleton is a very pleasant 79 y.o.  male who presents in follow up appointment at the request of Dr. Antione De Santiago for further management and evaluation of potential metastatic cancer to the bone.    Prostate cancer   Possible bone metastases  Liver lesion  - Patient was diagnosed with localized prostate cancer in 2018 which was treated with IMRT and remains on Lupron and Casodex. His PSAs since that time have been undetectable at <0.014. Next Lupron injection has been scheduled for August 2025 with his Urologist.  - He recently sustained a fall and began experiencing acute back pain in which imaging was performed to further evaluate. MRI spine was significant for compression fracture of T4 and T8 that was felt to be likely pathologic with enhancement at C7 that may represent metastatic disease. Patient has not had a biopsy to confirm if this is metastatic prostate cancer.  - PSA is <0.014 therefore I do not believe that PET PSMA would be helpful in this case. He was evaluated by Dr. Barcenas (orthopedics) however did not feel that intervention would be beneficial and seeking a second opinion. If they are considering surgical intervention would recommend biopsy of these potential lesions to further evaluate.  - Obtained CT chest, abdomen, pelvis to assess for metastatic disease which was performed on 5/7/25 and significant for pathologic fracture of lytic T4 vertebral body and more sclerotic appearing T8 vertebral body abnormality noted as well as a peripherally enhancing 4.8 cm lesion of the right lobe  of liver posteriorly.  After discussing with radiology this could potentially be benign such as a hemangioma and recommended MRI of the abdomen with liver protocol which was performed and appears to be consistent with hemangioma.  However bone scan is concerning for osteoblastic lesions in the ribs and spine.    - This was discussed with the patient today at length today. Unsure why patient was continued on Lupron/Casodex for the current amount of time since radiation. Will however continue as PSA continues to be undetectable.   - I will refer patient to radiation medicine for consideration of radiation to lesions for pain control and his case was discussed with Dr. Stinson.  Will obtain DEXA scan to assess for osteoporosis.  I spent some time discussing starting Xgeva and potential side effects which include but not limited to osteonecrosis of the jaw and he is agreeable to proceed once he has been cleared by dentistry and has an appointment on 6/11/2025.  He does want a second opinion from orthopedic surgery in the event that he may be a surgical candidate for his pain therefore we will also hold Xgeva until he is evaluated by orthopedics.    Neoplasm related pain  - Patient reports that his pain is currently uncontrolled with current dose of Norco.  Will change this to oxycodone 5 mg every 6 hours as needed for pain.  120 tablets dispensed today.  He was advised to discontinue Norco while on oxycodone and he understands the potential risks of narcotic use including respiratory depression and death.    ACO / ANDREW/Other  Quality measures  -  Lei Stapleton  reports that he quit smoking about 34 years ago. His smoking use included cigarettes. He has never been exposed to tobacco smoke. He has never used smokeless tobacco.   -  Lei Stapleton received 2024 flu vaccine.  -  Lei Stapleton reports a pain score of 8.  Given his pain assessment as noted, treatment options were discussed and the following options were  decided upon as a follow-up plan to address the patient's pain: referral to Primary Care for assistance in pain treatment guidance.  -  Current outpatient and discharge medications have been reconciled for the patient.  Reviewed by: Kassandra Wolf MD      I will have the patient return in follow up appointment to review test results in six weeks with DEXA scan and after dental and orthopedic evaluation. He understands that should he have any questions or concerns prior to his appointment he should give us a call at any time and I would be happy to see him sooner. It was a pleasure to see this patient in clinic today, thank you for allowing me to participate in the care of this patient.    I spent 41 minutes caring for patient on this date of service. This time includes time spent by me in the following activities: preparing for the visit, reviewing tests, obtaining and/or reviewing a separately obtained history, performing a medically appropriate examination and/or evaluation, counseling and educating the patient/family/caregiver, ordering medications, tests, or procedures, documenting information in the medical record, independently interpreting results and communicating that information with the patient/family/caregiver, and care coordination as well as answering any questions.      Kassandra Wolf MD  05/30/25   15:37 EDT

## 2025-05-30 ENCOUNTER — OFFICE VISIT (OUTPATIENT)
Dept: ONCOLOGY | Facility: CLINIC | Age: 80
End: 2025-05-30
Payer: MEDICARE

## 2025-05-30 ENCOUNTER — OFFICE VISIT (OUTPATIENT)
Dept: CARDIOLOGY | Facility: CLINIC | Age: 80
End: 2025-05-30
Payer: MEDICARE

## 2025-05-30 VITALS
WEIGHT: 211 LBS | OXYGEN SATURATION: 95 % | RESPIRATION RATE: 20 BRPM | SYSTOLIC BLOOD PRESSURE: 155 MMHG | HEIGHT: 71 IN | HEART RATE: 70 BPM | DIASTOLIC BLOOD PRESSURE: 79 MMHG | TEMPERATURE: 98 F | BODY MASS INDEX: 29.54 KG/M2

## 2025-05-30 VITALS
HEART RATE: 63 BPM | RESPIRATION RATE: 16 BRPM | DIASTOLIC BLOOD PRESSURE: 82 MMHG | OXYGEN SATURATION: 96 % | BODY MASS INDEX: 29.26 KG/M2 | HEIGHT: 71 IN | WEIGHT: 209 LBS | SYSTOLIC BLOOD PRESSURE: 140 MMHG

## 2025-05-30 DIAGNOSIS — M80.0B9A: ICD-10-CM

## 2025-05-30 DIAGNOSIS — I10 PRIMARY HYPERTENSION: ICD-10-CM

## 2025-05-30 DIAGNOSIS — C79.51 MALIGNANT NEOPLASM OF PROSTATE METASTATIC TO BONE: ICD-10-CM

## 2025-05-30 DIAGNOSIS — G89.29 CHRONIC BILATERAL THORACIC BACK PAIN: ICD-10-CM

## 2025-05-30 DIAGNOSIS — F41.1 GENERALIZED ANXIETY DISORDER: ICD-10-CM

## 2025-05-30 DIAGNOSIS — C61 MALIGNANT NEOPLASM OF PROSTATE METASTATIC TO BONE: ICD-10-CM

## 2025-05-30 DIAGNOSIS — M54.6 CHRONIC BILATERAL THORACIC BACK PAIN: ICD-10-CM

## 2025-05-30 DIAGNOSIS — M80.80XS OTHER OSTEOPOROSIS WITH CURRENT PATHOLOGICAL FRACTURE, SEQUELA: ICD-10-CM

## 2025-05-30 DIAGNOSIS — G89.3 CHRONIC PAIN DUE TO MALIGNANT NEOPLASTIC DISEASE: Primary | ICD-10-CM

## 2025-05-30 DIAGNOSIS — M51.369 DDD (DEGENERATIVE DISC DISEASE), LUMBAR: ICD-10-CM

## 2025-05-30 DIAGNOSIS — M51.360 DEGENERATION OF INTERVERTEBRAL DISC OF LUMBAR REGION WITH DISCOGENIC BACK PAIN: Primary | ICD-10-CM

## 2025-05-30 DIAGNOSIS — F41.9 ANXIETY: ICD-10-CM

## 2025-05-30 DIAGNOSIS — E78.2 MIXED HYPERLIPIDEMIA: ICD-10-CM

## 2025-05-30 RX ORDER — GABAPENTIN 100 MG/1
100 CAPSULE ORAL 2 TIMES DAILY PRN
Qty: 60 CAPSULE | Refills: 0 | Status: SHIPPED | OUTPATIENT
Start: 2025-05-30

## 2025-05-30 RX ORDER — OXYCODONE HYDROCHLORIDE 5 MG/1
5 TABLET ORAL EVERY 6 HOURS PRN
Qty: 120 TABLET | Refills: 0 | Status: SHIPPED | OUTPATIENT
Start: 2025-05-30

## 2025-05-30 RX ORDER — HYDROCODONE BITARTRATE AND ACETAMINOPHEN 7.5; 325 MG/1; MG/1
1 TABLET ORAL 3 TIMES DAILY PRN
Qty: 90 TABLET | Refills: 0 | Status: SHIPPED | OUTPATIENT
Start: 2025-05-30

## 2025-05-30 RX ORDER — ALPRAZOLAM 0.25 MG
0.25 TABLET ORAL 2 TIMES DAILY PRN
Qty: 60 TABLET | Refills: 0 | Status: SHIPPED | OUTPATIENT
Start: 2025-05-30

## 2025-05-30 NOTE — PROGRESS NOTES
subjective     Chief Complaint   Patient presents with    Follow-up       Problems Addressed This Visit  1. Degeneration of intervertebral disc of lumbar region with discogenic back pain    2. DDD (degenerative disc disease), lumbar    3. Generalized anxiety disorder    4. Chronic bilateral thoracic back pain    5. Primary hypertension    6. Mixed hyperlipidemia    7. Anxiety        History of Present Illness  History of Present Illness  The patient is a 79-year-old white male here for a follow-up visit.    He has a history of a compression fracture in the thoracic spine and has been experiencing significant pain in the rib cage and anterior chest. The pain is positional, improving in certain positions but becoming severe at night, disrupting his sleep. He has been consulting with orthopedic surgeons who have recommended conservative therapy. He is taking Norco for pain management.    He also suffers from anxiety disorder and is currently under a lot of stress due to his back pain. He is on Xanax for this condition.    For hyperlipidemia, he is on Crestor 20 mg daily and reports no side effects from the medication.    His hypertension is labile, but he manages to keep it under control with Norvasc, hydralazine, and Lopressor.    He is also diabetic and is taking metformin and Januvia.    MEDICATIONS  Norco, Neurontin, Xanax, Crestor, Norvasc, hydralazine, Lopressor, metformin, Januvia      Past Surgical History:   Procedure Laterality Date    COLONOSCOPY  03/2018    EYE SURGERY      FOOT SURGERY      HERNIA REPAIR      HYDROCELECTOMY  06/15/2015    PROSTATE BIOPSY  2018    PROSTATE FIDUCIAL MARKER PLACEMENT  09/14/2018    RHINOPLASTY      UPPER GASTROINTESTINAL ENDOSCOPY  03/24/2014    WITH BIOPSIES AND DILATION     Family History   Problem Relation Age of Onset    Kidney disease Other     Other Other         CHRONIC DISABLING DISEASES URINARY TRACT DISEASE    Diabetes Other     Hypertension Other     Other Mother      Heart failure Father     Stroke Sister     Arthritis Sister     Heart disease Brother     No Known Problems Brother     No Known Problems Brother      Past Medical History:   Diagnosis Date    Anxiety     Arthritis     Colitis     Depression     GERD (gastroesophageal reflux disease)     Gout     Heart murmur     History of EKG 2015    NORMAL    Hyperlipidemia     Hypertension     Obesity     Pancreatitis     history of    Prostate cancer     Renal failure     MILD one functioning kidney    Skin cancer     basal cell cancer on back     Patient Active Problem List   Diagnosis    Essential hypertension, labile    GERD (gastroesophageal reflux disease)    Renal failure    Hyperlipidemia    Anxiety    Depression    Type 2 diabetes mellitus    Periumbilical abdominal pain    DDD (degenerative disc disease), lumbar    Prostate cancer    Hyperuricemia    BURKS (dyspnea on exertion)    Bradycardia    Lower urinary tract symptoms due to benign prostatic hyperplasia    Raised prostate specific antigen    Chronic chest pain with low to moderate risk for CAD    Generalized anxiety disorder    Primary insomnia    Multiple rib fractures    Esophageal dysphagia    Cellulitis of left foot    Palpitations    Dysuria    Compression fracture of thoracic spine, non-traumatic    Chronic bilateral thoracic back pain    Acute bronchitis due to other specified organisms       Social History     Tobacco Use    Smoking status: Former     Current packs/day: 0.00     Types: Cigarettes     Quit date:      Years since quittin.4     Passive exposure: Never    Smokeless tobacco: Never   Vaping Use    Vaping status: Never Used   Substance Use Topics    Alcohol use: Yes     Alcohol/week: 7.0 standard drinks of alcohol     Types: 7 Shots of liquor per week     Comment:  once per day    Drug use: No       No Known Allergies    Current Outpatient Medications on File Prior to Visit   Medication Sig    albuterol sulfate  (90 Base)  MCG/ACT inhaler Inhale 2 puffs Every 4 (Four) Hours As Needed for Wheezing.    allopurinol (ZYLOPRIM) 100 MG tablet Take 1 tablet by mouth Daily.    amLODIPine (NORVASC) 10 MG tablet Take 1 tablet by mouth Daily.    aspirin 81 MG tablet Take 1 tablet by mouth Daily.    bicalutamide (CASODEX) 50 MG chemo tablet Casodex 50 mg tablet   Take 1 tablet every day by oral route for 30 days.    Blood Glucose Monitoring Suppl w/Device kit Check blood sugar once a day.    DX: E11.9    cefdinir (OMNICEF) 300 MG capsule Take 1 capsule by mouth 2 (Two) Times a Day.    cholecalciferol (VITAMIN D3) 1000 UNITS tablet Take 1 tablet by mouth Daily.    Continuous Glucose Sensor (Dexcom G7 Sensor) misc Use 1 Application Every 10 (Ten) Days.    Cyanocobalamin (VITAMIN B12 PO) Take  by mouth daily.    fenofibrate (TRICOR) 145 MG tablet Take 1 tablet by mouth Daily.    FLUoxetine (PROzac) 20 MG capsule Take 1 capsule by mouth Daily.    folic acid (FOLVITE) 1 MG tablet TAKE 1 TABLET BY MOUTH DAILY    glucose blood (Contour Next Test) test strip Check blood glucose daily    hydrALAZINE (APRESOLINE) 50 MG tablet Take 1 tablet by mouth 3 (Three) Times a Day.    leuprolide (LUPRON) 30 MG injection Inject 30 mg into the appropriate muscle as directed by prescriber Every 4 (Four) Months.    loratadine (Claritin) 10 MG tablet Take 1 tablet by mouth Daily.    meclizine (ANTIVERT) 25 MG tablet Take 1 tablet by mouth 3 (Three) Times a Day As Needed for Dizziness.    metFORMIN (GLUCOPHAGE) 500 MG tablet Take 0.5 tablets by mouth Daily With Breakfast.    metoprolol tartrate (LOPRESSOR) 25 MG tablet Take 1 tablet by mouth 2 (Two) Times a Day.    Microlet Lancets misc 1 stick Daily.    Mirabegron ER (Myrbetriq) 50 MG tablet sustained-release 24 hour 24 hr tablet Take 50 mg by mouth Daily.    montelukast (Singulair) 10 MG tablet Take 1 tablet by mouth Every Night.    naloxone (NARCAN) 4 MG/0.1ML nasal spray Call 911. Don't prime. Spray in 1 nostril for  overdose. Repeat in 2-3 minutes in other nostril if no or minimal breathing/responsiveness.    oxyCODONE-acetaminophen (Percocet)  MG per tablet Take 1 tablet by mouth Every 6 (Six) Hours As Needed for Moderate Pain.    pantoprazole (Protonix) 40 MG EC tablet Take 1 tablet by mouth Daily.    Pyridoxine HCl (VITAMIN B-6 PO) Take  by mouth daily.    rosuvastatin (CRESTOR) 20 MG tablet Take 1 tablet by mouth Every Night.    SITagliptin (Januvia) 100 MG tablet Take 1 tablet by mouth Daily.    [DISCONTINUED] ALPRAZolam (XANAX) 0.25 MG tablet Take 1 tablet by mouth 2 (Two) Times a Day As Needed for Anxiety or Sleep. for anxiety    [DISCONTINUED] HYDROcodone-acetaminophen (NORCO) 7.5-325 MG per tablet Take 1 tablet by mouth 3 (Three) Times a Day As Needed for Moderate Pain.     No current facility-administered medications on file prior to visit.     (Not in a hospital admission)      Results for orders placed during the hospital encounter of 02/18/25    Adult Transthoracic Echo Complete W/ Cont if Necessary Per Protocol    Interpretation Summary    Normal left ventricular cavity size, wall thickness and wall motion noted.    Left ventricular systolic function is normal. Left ventricular ejection fraction appears to be 61 - 65%.    Left ventricular diastolic function is consistent with (grade II w/high LAP) pseudonormalization.    No significant valvular heart disease detected    No pericardial effusion noted.    Results for orders placed during the hospital encounter of 02/18/25    Stress Test With Myocardial Perfusion One Day    Interpretation Summary  Images from the original result were not included.      A pharmacological stress test was performed using regadenoson without low-level exercise.    Resting EKG showed sinus bradycardia at a rate of 58 bpm.  Incomplete right bundle block and nonspecific T wave changes were noted.    ST segments did not show any diagnostic changes.  No significant arrhythmia  "detected.    Myocardial perfusion imaging indicates a normal myocardial perfusion study with no evidence of ischemia. Impressions are consistent with a low risk study.TID1.11    Left ventricular ejection fraction is normal (Calculated EF = 70%).    Compared to the prior study from 7/14/2021 the current study reveals no changes.          The following portions of the patient's history were reviewed and updated as appropriate: allergies, current medications, past family history, past medical history, past social history, past surgical history and problem list.    Review of Systems   Constitutional: Positive for malaise/fatigue and weight gain.   HENT: Negative.  Negative for congestion.    Eyes: Negative.    Cardiovascular: Negative.  Negative for chest pain, cyanosis, dyspnea on exertion, irregular heartbeat, leg swelling, near-syncope, orthopnea, palpitations, paroxysmal nocturnal dyspnea and syncope.   Respiratory: Negative.  Negative for shortness of breath.    Hematologic/Lymphatic: Negative.    Musculoskeletal:  Positive for arthritis, back pain and myalgias.   Gastrointestinal: Negative.    Neurological: Negative.  Negative for headaches.   Psychiatric/Behavioral:  The patient has insomnia and is nervous/anxious.           Objective:     /82 (BP Location: Left arm, Patient Position: Sitting, Cuff Size: Adult)   Pulse 63   Resp 16   Ht 180.3 cm (70.98\")   Wt 94.8 kg (209 lb)   SpO2 96%   BMI 29.16 kg/m²   Pulmonary:      Effort: Pulmonary effort is normal.      Breath sounds: Normal breath sounds. No stridor. No wheezing. No rhonchi. No rales.   Cardiovascular:      PMI at left midclavicular line. Normal rate. Regular rhythm. Normal S1. Normal S2.       Murmurs: There is no murmur.      No gallop.  No click. No rub.   Pulses:     Intact distal pulses.   Edema:     Peripheral edema absent.       Physical Exam        Lab Review  Lab Results   Component Value Date     04/24/2025    K 4.2 " "04/24/2025     (H) 04/24/2025    BUN 15 04/24/2025    CREATININE 1.30 05/25/2025    GLUCOSE 136 (H) 04/24/2025    CALCIUM 9.4 04/24/2025    ALT 19 04/24/2025    ALKPHOS 80 04/24/2025     Lab Results   Component Value Date    CKTOTAL 77 02/19/2025     Lab Results   Component Value Date    WBC 12.21 (H) 04/24/2025    HGB 13.8 04/24/2025    HCT 42.2 04/24/2025     04/24/2025     No results found for: \"INR\"  No results found for: \"MG\"  Lab Results   Component Value Date    PSA <0.014 04/24/2025    TSH 2.807 09/28/2015     No results found for: \"BNP\"  Lab Results   Component Value Date    CHLPL 227 (H) 03/07/2016    CHOL 232 (H) 02/19/2025    TRIG 489 (H) 02/19/2025    HDL 41 02/19/2025    VLDL 84 (H) 02/19/2025     (H) 02/19/2025     Lab Results   Component Value Date     (H) 02/19/2025    LDL 85 01/20/2025     Lab Results   Component Value Date    PROBNP 149.1 02/19/2025               Procedures    Results  Laboratory Studies  Last LDL had increased from 85 to 107.     I personally viewed and interpreted the patient's LAB data         Assessment:     1. Degeneration of intervertebral disc of lumbar region with discogenic back pain    2. DDD (degenerative disc disease), lumbar    3. Generalized anxiety disorder    4. Chronic bilateral thoracic back pain    5. Primary hypertension    6. Mixed hyperlipidemia    7. Anxiety        Assessment & Plan  1. Compression fracture of the thoracic spine.  He experiences severe back pain and neurogenic pain around the chest wall and rib cage. The pain is positional and not related to physical exertion. He is currently taking Norco. Neurontin 100 mg twice a day will be added to his regimen. Potential side effects and the risk of addiction were discussed.    2. Hypertension.  His blood pressure is better controlled but still fluctuates. He will continue with hydralazine 50 mg 3 times a day, Norvasc 10 mg daily, and Lopressor 25 mg twice a day.    3. " Hyperlipidemia.  He is taking Tricor and Crestor. His last LDL level increased from 85 to 107, and he has gained weight. A healthy lifestyle and dietary restrictions were discussed.    4. Anxiety.  He is taking Xanax. This medication will be continued.    Follow-up  Follow-up scheduled.            No follow-ups on file.

## 2025-06-10 ENCOUNTER — TELEPHONE (OUTPATIENT)
Dept: RADIATION ONCOLOGY | Facility: HOSPITAL | Age: 80
End: 2025-06-10
Payer: MEDICARE

## 2025-06-10 NOTE — TELEPHONE ENCOUNTER
Pts significant other, Shauna called to report that Lei seen Dr. Robbins this morning and has him scheduled for kyphoplasty and bone bx on Thursday 6-12-25. Rad Onc appointment for today will be cancelled until pathology results are available. Rad Onc team made aware and will schedule pt accordingly.

## 2025-06-20 ENCOUNTER — HOSPITAL ENCOUNTER (OUTPATIENT)
Dept: BONE DENSITY | Facility: HOSPITAL | Age: 80
Discharge: HOME | End: 2025-06-20
Payer: MEDICARE

## 2025-06-20 DIAGNOSIS — M80.80XS OTHER OSTEOPOROSIS WITH CURRENT PATHOLOGICAL FRACTURE, SEQUELA: ICD-10-CM

## 2025-06-20 DIAGNOSIS — M80.0B9A: ICD-10-CM

## 2025-06-20 PROCEDURE — 77080 DXA BONE DENSITY AXIAL: CPT

## 2025-06-25 ENCOUNTER — TRANSCRIBE ORDERS (OUTPATIENT)
Dept: ADMINISTRATIVE | Facility: HOSPITAL | Age: 80
End: 2025-06-25
Payer: MEDICARE

## 2025-06-25 DIAGNOSIS — Z87.81 HEALED FRACTURES: ICD-10-CM

## 2025-06-25 DIAGNOSIS — M54.50 LOW BACK PAIN, UNSPECIFIED BACK PAIN LATERALITY, UNSPECIFIED CHRONICITY, UNSPECIFIED WHETHER SCIATICA PRESENT: Primary | ICD-10-CM

## 2025-06-27 ENCOUNTER — TELEPHONE (OUTPATIENT)
Dept: ONCOLOGY | Facility: CLINIC | Age: 80
End: 2025-06-27

## 2025-06-27 NOTE — TELEPHONE ENCOUNTER
Caller: AROLDO    Relationship: Other    Best call back number: [phone]    What is the best time to reach you: ANYTIME    Who are you requesting to speak with (clinical staff, provider,  specific staff member): CLINICAL    What was the call regarding: AROLDO STATED PATIENT HAD A T4 AND T8 KYPHOPLASTY FOR FRACTURES IN HIS SPINE. BIOPSY WAS PERFORMED AND NEW PATHOLOGY WAS FOUND. AROLDO WILL FAX PATHOLOGY REPORT TO THE OFFICE FOR REVIEW.

## 2025-07-01 ENCOUNTER — OFFICE VISIT (OUTPATIENT)
Dept: RADIATION ONCOLOGY | Facility: HOSPITAL | Age: 80
End: 2025-07-01
Payer: MEDICARE

## 2025-07-01 ENCOUNTER — PATIENT OUTREACH (OUTPATIENT)
Dept: RADIATION ONCOLOGY | Facility: HOSPITAL | Age: 80
End: 2025-07-01
Payer: MEDICARE

## 2025-07-01 VITALS
TEMPERATURE: 96.9 F | HEART RATE: 68 BPM | DIASTOLIC BLOOD PRESSURE: 85 MMHG | SYSTOLIC BLOOD PRESSURE: 169 MMHG | BODY MASS INDEX: 28.87 KG/M2 | RESPIRATION RATE: 18 BRPM | OXYGEN SATURATION: 97 % | WEIGHT: 207 LBS

## 2025-07-01 DIAGNOSIS — C61 PROSTATE CANCER: Primary | ICD-10-CM

## 2025-07-01 PROCEDURE — G0463 HOSPITAL OUTPT CLINIC VISIT: HCPCS | Performed by: RADIOLOGY

## 2025-07-01 NOTE — SIGNIFICANT NOTE
RN met with patient and son during a new consult with Dr Stinson.  Radiation is not recommended at this time. Pet scan was ordered and appointment made for 7/9 prior to meeting with Dr. Wolf's again on 7/11. Instructions for scan were discussed along with appointment details. Patient and son understand the patient will have a MRI on 7/5 ordred by Dr. Leung and a Pet on 7/11. Dr. Wolf will give next steps for patient. Patient and son verbalized understanding and are in agreement with plan of care.

## 2025-07-01 NOTE — PROGRESS NOTES
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                                      History Summary:  Mr. Lei Stapleton is a 79-year-old white male with the above diagnosis.  The patient completed a course of definitive radiotherapy to the low pelvis administered at [facility] on 12/21/2018.  The prostate received 7560 cGy and the seminal vesicles/lower pelvic lymph nodes received 5040 cGy.  The patient remains on androgen deprivation therapy with Lupron and Casodex.    Mr. Stapleton was seen in consultation by this service on 04/17/2025.  The patient had suffered a back trauma.  MRI scan of the thoracic spine (04/11/2025) showed compression fractures of the T4 and T8 vertebrae which were worrisome for pathologic fractures.  An abnormality was also noted in the inferior portion of the C7 vertebral body.  Bone scans (05/12/2025) revealed an osteoblastic lesion at the T8 level, and osteoblastic lesions in the left 9th, 10th and 11th ribs.    PSA (04/17/2025 was < 0.014 ng/mL.  A Pylarify PET/CT scan was not deemed appropriate due to the very low PSA.    Interim History:  Mr. Stapleton underwent kyphoplasty of the T8 vertebral body at [facility] on 06/12/2025.  Bone biopsy material obtained during that procedure was negative for malignancy.  The biopsy material was PSA negative.  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    The patient returns to our center for a previously scheduled follow-up visit.    Review of Systems:  Positive for fatigue and mid back pain.  Negative for fever, chills, night sweats, pruritus, diminished appetite.  The patient reports a nonintentional 10 pound weight loss over the  past several months.  All other systems were reviewed and were negative.     Past Medical History:   Diagnosis Date    Anxiety     Arthritis     Colitis     Depression     GERD (gastroesophageal reflux disease)     Gout     Heart murmur     History of EKG 2015    NORMAL    Hyperlipidemia     Hypertension     Obesity     Pancreatitis     history of    Prostate cancer     Renal failure     MILD one functioning kidney    Skin cancer     basal cell cancer on back        Past Surgical History:   Procedure Laterality Date    COLONOSCOPY  2018    EYE SURGERY      FOOT SURGERY      HERNIA REPAIR      HYDROCELECTOMY  06/15/2015    PROSTATE BIOPSY  2018    PROSTATE FIDUCIAL MARKER PLACEMENT  2018    RHINOPLASTY      UPPER GASTROINTESTINAL ENDOSCOPY  2014    WITH BIOPSIES AND DILATION      Family History   Problem Relation Age of Onset    Kidney disease Other     Other Other         CHRONIC DISABLING DISEASES URINARY TRACT DISEASE    Diabetes Other     Hypertension Other     Other Mother     Heart failure Father     Stroke Sister     Arthritis Sister     Heart disease Brother     No Known Problems Brother     No Known Problems Brother         Social History     Socioeconomic History    Marital status:    Tobacco Use    Smoking status: Former     Current packs/day: 0.00     Types: Cigarettes     Quit date:      Years since quittin.5     Passive exposure: Never    Smokeless tobacco: Never   Vaping Use    Vaping status: Never Used   Substance and Sexual Activity    Alcohol use: Yes     Alcohol/week: 7.0 standard drinks of alcohol     Types: 7 Shots of liquor per week     Comment:  once per day    Drug use: No    Sexual activity: Defer     Allergies:  Patient has no known allergies.   Prior to Admission medications    Medication Sig Start Date End Date Taking? Authorizing Provider   albuterol sulfate  (90 Base) MCG/ACT inhaler Inhale 2 puffs Every 4 (Four) Hours As Needed for Wheezing.  5/2/25  Yes Antione De Santiago MD   allopurinol (ZYLOPRIM) 100 MG tablet Take 1 tablet by mouth Daily. 12/6/24  Yes Antione De Santiago MD   ALPRAZolam (XANAX) 0.25 MG tablet Take 1 tablet by mouth 2 (Two) Times a Day As Needed for Anxiety or Sleep. for anxiety 5/30/25  Yes Antione De Santiago MD   amLODIPine (NORVASC) 10 MG tablet Take 1 tablet by mouth Daily. 12/6/24  Yes Antione De Santiago MD   aspirin 81 MG tablet Take 1 tablet by mouth Daily. 7/7/15  Yes Tessa Chaudhry MD   bicalutamide (CASODEX) 50 MG chemo tablet Casodex 50 mg tablet   Take 1 tablet every day by oral route for 30 days.   Yes Tessa Chaudhry MD   Blood Glucose Monitoring Suppl w/Device kit Check blood sugar once a day.    DX: E11.9 3/7/19  Yes Antione De Santiago MD   cholecalciferol (VITAMIN D3) 1000 UNITS tablet Take 1 tablet by mouth Daily. 7/7/15  Yes Tessa Chaudhry MD   Cyanocobalamin (VITAMIN B12 PO) Take  by mouth daily. 7/7/13  Yes Tessa Chaudhry MD   fenofibrate (TRICOR) 145 MG tablet Take 1 tablet by mouth Daily. 2/20/25  Yes Antione De Santiago MD   FLUoxetine (PROzac) 20 MG capsule Take 1 capsule by mouth Daily. 12/6/24  Yes Antione De Santiago MD   folic acid (FOLVITE) 1 MG tablet TAKE 1 TABLET BY MOUTH DAILY 6/4/24  Yes Antione De Santiago MD   glucose blood (Contour Next Test) test strip Check blood glucose daily 12/8/20  Yes Antione De Santiago MD   hydrALAZINE (APRESOLINE) 50 MG tablet Take 1 tablet by mouth 3 (Three) Times a Day. 12/6/24  Yes Antione De Santiago MD   HYDROcodone-acetaminophen (NORCO) 7.5-325 MG per tablet Take 1 tablet by mouth 3 (Three) Times a Day As Needed for Moderate Pain. 5/30/25  Yes Antione De Santiago MD   leuprolide (LUPRON) 30 MG injection Inject 30 mg into the appropriate muscle as directed by prescriber Every 4 (Four) Months.   Yes Lee Mendez MD   loratadine (Claritin) 10 MG tablet Take 1 tablet by mouth Daily. 7/15/22   Yes Antione De Santiago MD   meclizine (ANTIVERT) 25 MG tablet Take 1 tablet by mouth 3 (Three) Times a Day As Needed for Dizziness. 9/2/22  Yes Antione De Santiago MD   metFORMIN (GLUCOPHAGE) 500 MG tablet Take 0.5 tablets by mouth Daily With Breakfast. 12/6/24  Yes Antione De Santiago MD   metoprolol tartrate (LOPRESSOR) 25 MG tablet Take 1 tablet by mouth 2 (Two) Times a Day. 12/6/24  Yes Antione De Santiago MD   Microlet Lancets misc 1 stick Daily. 12/8/20  Yes Antione De Santiago MD   Mirabegron ER (Myrbetriq) 50 MG tablet sustained-release 24 hour 24 hr tablet Take 50 mg by mouth Daily. 3/25/25  Yes Hellen Arndt APRN   montelukast (Singulair) 10 MG tablet Take 1 tablet by mouth Every Night. 5/2/25  Yes Antione De Santiago MD   pantoprazole (Protonix) 40 MG EC tablet Take 1 tablet by mouth Daily. 2/7/25  Yes Antione De Santiago MD   Pyridoxine HCl (VITAMIN B-6 PO) Take  by mouth daily. 7/7/15  Yes Tessa Chaudhry MD   rosuvastatin (CRESTOR) 20 MG tablet Take 1 tablet by mouth Every Night. 12/6/24  Yes Antione De Santiago MD   SITagliptin (Januvia) 100 MG tablet Take 1 tablet by mouth Daily. 12/6/24  Yes Antione De Santiago MD   cefdinir (OMNICEF) 300 MG capsule Take 1 capsule by mouth 2 (Two) Times a Day.  Patient not taking: Reported on 5/30/2025 5/2/25   Antione De Santiago MD   Continuous Glucose Sensor (Dexcom G7 Sensor) misc Use 1 Application Every 10 (Ten) Days.  Patient not taking: Reported on 5/30/2025 12/11/24   Antione De Santiago MD   gabapentin (NEURONTIN) 100 MG capsule Take 1 capsule by mouth 2 (Two) Times a Day As Needed (moderate pain). 5/30/25   Antione De Santiago MD   naloxone (NARCAN) 4 MG/0.1ML nasal spray Call 911. Don't prime. [place] in 1 nostril for overdose. Repeat in 2-3 minutes in other nostril if no or minimal breathing/responsiveness.  Patient not taking: Reported on 7/1/2025 4/17/25   Michelet Andrews MD   oxyCODONE  (Roxicodone) 5 MG immediate release tablet Take 1 tablet by mouth Every 6 (Six) Hours As Needed for Moderate Pain.  Patient not taking: Reported on 2025   Kassandra Wlof MD      Pain:(on a scale of 0-10)    Pain Score    25 0903   PainSc: 8    PainLoc: Back      Lei Stapleton reports a pain score of 8.  The patient has received prescriptions for Roxicodone 5 mg from the medical oncologist and [place] 7.5-325 mg from his cardiologist.  The patient states that Roxicodone caused weakness and nausea.  The patient will fill the Norco prescription and advise the prescribing physician of its efficacy.    Quality of Life:   KPS: 70  ECO    Physical Examination:  Vitals: Pressure 169/85 (patient advised), pulse 68, respirations 18, temperature 96.9 °F, pulse oximetry 97%  Height:    Weight: Weight: 93.9 kg (207 lb) Body mass index is 28.87 kg/m².    Constitutional: The patient is a well-developed, well-nourished elderly male in no acute distress.  Alert and oriented ×3.  Patient is ambulate with a cane.  HEENT: Atraumatic. Normocephalic. No abnormalities noted.  Lymphatics: No cervical, supraclavicular, or axillary, or inguinal lymphadenopathy is palpated.  CV: Regular rate and rhythm.  No murmurs, rubs, or gallops are appreciated.  Respiratory: Lungs clear to auscultation.  Breath sounds equal bilaterally.  GI: Abdomen soft, nontender, nondistended, with no hepatosplenomegaly or masses palpated.  Musculoskeletal: Mild tenderness elicited on fist percussion over the mid back.  No costal tenderness noted.  No swollen or erythematous joints.  Rectal: Not performed  Extremities: No clubbing, cyanosis, or edema.  Neurologic: Cranial nerves II through XII are grossly intact, with no focal neurological deficits noted on exam.  Psychiatric: Alert and oriented x3. Normal affect, with no anxiety or depression noted.    Radiographs:  Described above    Pathology:   Biopsy at T8 (2025) obtained during  "kyphoplasty procedure did not reveal malignancy.  The biopsy material was PSA negative    Biopsy at T4 (06/12/2025) revealed kappa restricted plasma cell neoplasm    Labs:   Lab Results   Component Value Date    GLUCOSE 136 (H) 04/24/2025    BUN 15 04/24/2025    CREATININE 1.30 05/25/2025     04/24/2025    K 4.2 04/24/2025     (H) 04/24/2025    CALCIUM 9.4 04/24/2025    PROTEINTOT 7.0 04/24/2025    ALBUMIN 4.5 04/24/2025    ALT 19 04/24/2025    AST 20 04/24/2025    ALKPHOS 80 04/24/2025    BILITOT 0.4 04/24/2025    GLOB 2.5 04/24/2025    AGRATIO 1.8 04/24/2025    BCR 9.5 04/24/2025    ANIONGAP 12.9 04/24/2025    EGFR 44.2 (L) 04/24/2025      WBC   Date Value Ref Range Status   04/24/2025 12.21 (H) 3.40 - 10.80 10*3/mm3 Final     Hemoglobin   Date Value Ref Range Status   04/24/2025 13.8 13.0 - 17.7 g/dL Final     Hematocrit   Date Value Ref Range Status   04/24/2025 42.2 37.5 - 51.0 % Final     Platelets   Date Value Ref Range Status   04/24/2025 198 140 - 450 10*3/mm3 Final      PSA   Date Value Ref Range Status   04/24/2025 <0.014 0.000 - 4.000 ng/mL Final   04/17/2025 <0.014 0.000 - 4.000 ng/mL Final   12/09/2019 <0.014 0.000 - 4.000 ng/mL Final   05/26/2016 3.00 0.0 - 4.0 ng/mL Final     Comment:       The concentration of ProstateSpecific Antigen (PSA) in a given   specimen can vary due to different assay methods and reagent   specificity. Values obtained with a different assay method cannot   be used interchangeably. It is recommended that only one assay   method be used consistently to monitor each patient's course of therapy.  Baseline values should be reestablished with any assay method change.  PSA results should be interpreted in conjunction with information  available from clinical evaluation and other diagnostic procedures.      No results found for: \"CEA\"     Assessment:  Stage IIC (cT2b cN0 M0) no carcinoma of prostate, [place] 8, grade group 4, with pretreatment PSA of 6.1 ng/mL.  Patient " is status post definitive radiotherapy completed 12/21/2018.  The patient remains on androgen deprivation therapy.  2.   Biopsy of T4 vertebral body revealed kappa restricted plasma cell          neoplasm    Recommendations:  The medical oncologist had suggested that CT scans of the thoracic and lumbar spines be obtained.  The studies will be performed on 07/05/2025.  A PET CT scan has been scheduled for 07/09/2025.  The patient will review imaging studies with Dr. Wolf on 07/11/2025.    I explained to the patient and his accompanying son that he will probably undergo additional laboratory studiesand possibly bone marrow biopsy/aspirate to determine the subtype of plasma cell neoplasm.  We will be ready to assist in Mr. Stapleton's management if radiotherapy is indicated.    Time spent with patient 45 minutes (the total time included previsit review of medical records and imaging studies, obtaining an updated history of present illness from the patient, performing an appropriate medical examination/evaluation, patient counseling, and coordination of care).    Thank you for allowing us to evaluate this very pleasant gentleman.    Electronically signed by Jamarcus Stinson MD 7/1/2025  09:44 EDT    cc:  Kassandra Wolf MD

## 2025-07-02 ENCOUNTER — TELEPHONE (OUTPATIENT)
Dept: ONCOLOGY | Facility: CLINIC | Age: 80
End: 2025-07-02
Payer: MEDICARE

## 2025-07-02 RX ORDER — HYDROCODONE BITARTRATE AND ACETAMINOPHEN 10; 325 MG/1; MG/1
1 TABLET ORAL EVERY 6 HOURS PRN
Qty: 60 TABLET | Refills: 0 | Status: SHIPPED | OUTPATIENT
Start: 2025-07-02

## 2025-07-02 NOTE — TELEPHONE ENCOUNTER
Caller: Shauna Guzman    Relationship: Emergency Contact    Best call back number: [phone]    What is the best time to reach you: ANYTIME     Who are you requesting to speak with (clinical staff, provider,  specific staff member): CLINICAL    What was the call regarding: CALLING TO CHECK THE STATUS OF PT'S PAIN MEDICATION BEING SENT OVER TO THE PHARMACY   REQUESTING A CALL BACK ONCE THE PRESCRIPTION IS CALLED IN     [facility] [address] - [phone] [facility] [phone] FX

## 2025-07-05 ENCOUNTER — HOSPITAL ENCOUNTER (OUTPATIENT)
Dept: MRI IMAGING | Facility: HOSPITAL | Age: 80
Discharge: HOME | End: 2025-07-05
Payer: MEDICARE

## 2025-07-05 DIAGNOSIS — Z87.81 HEALED FRACTURES: ICD-10-CM

## 2025-07-05 DIAGNOSIS — M54.50 LOW BACK PAIN, UNSPECIFIED BACK PAIN LATERALITY, UNSPECIFIED CHRONICITY, UNSPECIFIED WHETHER SCIATICA PRESENT: ICD-10-CM

## 2025-07-05 PROCEDURE — 72146 MRI CHEST SPINE W/O DYE: CPT

## 2025-07-05 PROCEDURE — 72148 MRI LUMBAR SPINE W/O DYE: CPT

## 2025-07-09 ENCOUNTER — HOSPITAL ENCOUNTER (OUTPATIENT)
Dept: PET IMAGING | Facility: HOSPITAL | Age: 80
Discharge: HOME | End: 2025-07-09
Admitting: RADIOLOGY
Payer: MEDICARE

## 2025-07-09 DIAGNOSIS — F41.9 ANXIETY: ICD-10-CM

## 2025-07-09 DIAGNOSIS — I10 PRIMARY HYPERTENSION: ICD-10-CM

## 2025-07-09 DIAGNOSIS — C61 PROSTATE CANCER: ICD-10-CM

## 2025-07-09 DIAGNOSIS — E11.21 TYPE 2 DIABETES MELLITUS WITH DIABETIC NEPHROPATHY, WITHOUT LONG-TERM CURRENT USE OF INSULIN: ICD-10-CM

## 2025-07-09 DIAGNOSIS — E78.2 MIXED HYPERLIPIDEMIA: ICD-10-CM

## 2025-07-09 PROCEDURE — A9552 F18 FDG: HCPCS | Performed by: RADIOLOGY

## 2025-07-09 PROCEDURE — 34310000005 FLUDEOXYGLUCOSE F18 SOLUTION: Performed by: RADIOLOGY

## 2025-07-09 PROCEDURE — 78815 PET IMAGE W/CT SKULL-THIGH: CPT

## 2025-07-09 RX ADMIN — FLUDEOXYGLUCOSE F 18 1 DOSE: 200 INJECTION, SOLUTION INTRAVENOUS at 08:54

## 2025-07-09 NOTE — PROGRESS NOTES
Lei Stapleton  [id number]  1945 7/11/2025      Referring Provider:   Anitone De Santiago MD     Reason for Follow Up:   Stage IV prostate cancer     Chief Complaint:  Stage IV prostate cancer      History of Present Illness   Lei Stapleton is a very pleasant 79 y.o.  male who presents in follow up appointment at the request of Dr. Antione De Santaigo for further management and evaluation of potential metastatic cancer to the bone.    Patient was diagnosed with prostate cancer in 2018 (T2,N0,M0) after he was found to have an elevated PSA of 6.1 ng/mL and a palpable prostate nodule. He had a prostate biopsy performed on 7/13/18 and pathology significant for adenocarcinoma of the prostate, Casa score 4+4 = 8 on the left side with perineural invasion and benign right side. The prostate nodule showed adenocarcinoma, [place] score 4+3 = 7.  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 Various treatment options were discussed with his Urologist - Dr. Lee Mendez, and due to his very high-grade disease and large prostrate it was felt that the best course would be primary IMRT in conjunction with LHRH therapy. He underwent gold seed fiducial markers on 9/14/18 and was treated with IMRT with Dr. Zimmer and LHRH. Has been receiving Lupron 45 mg every six months which he started on 8/14/18 as well as Casodex 50 mg daily. He has also undergone previous cystolitholapaxy on three separate occasions as well as TURP (August 31, 2020) of a very large obstructing prostate. Pathology from TURP procedure was negative for malignancy and significant for hypertrophic prostatic glands and stroma. Has also had placement of an artificial urinary sphincter placement 2024 for urinary incontinence.     He fell in March 2025 while trying to manipulate his gutter and began experiencing acute mid back pain. MRI  obtained significant for fracture at T4 and T8 that was felt to be likely pathologic with enhancement at C7 and may represent metastatic disease. PSA has been undetectable at <0.014. Patient reports that his pain has been under better control with Percocet but has not tolerated it well.       Interim History:  Mr. Stapleton presents today in follow up.  Since his last visit he had a MRI of the T-spine significant for acute or subacute compression fracture of T vertebral bodies that are new.  He reports worsening back pain and notes that oxycodone does control his pain however makes him lethargic and therefore only takes this at night while he takes Norco during the day.  He will be following up with his orthopedic spine surgeon next week.  He recently had bone biopsy as well as kyphoplasty last month.  Since his last visit he also had a DEXA scan and PET/CT which was also discussed with him and his wife today.        The following portions of the patient's history were reviewed and updated as appropriate: allergies, current medications, past family history, past medical history, past social history, past surgical history and problem list.      No Known Allergies      Past Medical History:   Diagnosis Date    Anxiety     Arthritis     Colitis     Depression     GERD (gastroesophageal reflux disease)     Gout     Heart murmur     History of EKG 12/22/2015    NORMAL    Hyperlipidemia     Hypertension     Obesity     Pancreatitis     history of    Prostate cancer     Renal failure     MILD one functioning kidney    Skin cancer     basal cell cancer on back   Actinic & Seborrheic keratoses - follows with dermatology - Dr. Barrett        Past Surgical History:   Procedure Laterality Date    COLONOSCOPY  03/2018    EYE SURGERY      FOOT SURGERY      HERNIA REPAIR      HYDROCELECTOMY  06/15/2015    PROSTATE BIOPSY  2018    PROSTATE FIDUCIAL MARKER PLACEMENT  09/14/2018    RHINOPLASTY      UPPER GASTROINTESTINAL ENDOSCOPY   2014    WITH BIOPSIES AND DILATION         Social History     Socioeconomic History    Marital status:    Tobacco Use    Smoking status: Former     Current packs/day: 0.00     Types: Cigarettes     Quit date:      Years since quittin.5     Passive exposure: Never    Smokeless tobacco: Never   Vaping Use    Vaping status: Never Used   Substance and Sexual Activity    Alcohol use: Yes     Alcohol/week: 7.0 standard drinks of alcohol     Types: 7 Shots of liquor per week     Comment:  once per day    Drug use: No    Sexual activity: Defer         Family History   Problem Relation Age of Onset    Kidney disease Other     Other Other         CHRONIC DISABLING DISEASES URINARY TRACT DISEASE    Diabetes Other     Hypertension Other     Other Mother     Heart failure Father     Stroke Sister     Arthritis Sister     Heart disease Brother     No Known Problems Brother     No Known Problems Brother            Current Outpatient Medications:     allopurinol (ZYLOPRIM) 100 MG tablet, Take 1 tablet by mouth Daily., Disp: 90 tablet, Rfl: 3    ALPRAZolam (XANAX) 0.25 MG tablet, Take 1 tablet by mouth 2 (Two) Times a Day As Needed for Anxiety or Sleep. for anxiety, Disp: 60 tablet, Rfl: 0    amLODIPine (NORVASC) 10 MG tablet, TAKE 1 TABLET BY MOUTH DAILY, Disp: 90 tablet, Rfl: 3    aspirin 81 MG tablet, Take 1 tablet by mouth Daily., Disp: , Rfl:     bicalutamide (CASODEX) 50 MG chemo tablet, Casodex 50 mg tablet  Take 1 tablet every day by oral route for 30 days., Disp: , Rfl:     Blood Glucose Monitoring Suppl w/Device kit, Check blood sugar once a day.  DX: E11.9, Disp: 1 each, Rfl: 0    cholecalciferol (VITAMIN D3) 1000 UNITS tablet, Take 1 tablet by mouth Daily., Disp: , Rfl:     Cyanocobalamin (VITAMIN B12 PO), Take  by mouth daily., Disp: , Rfl:     esomeprazole (nexIUM) 40 MG capsule, Take 1 capsule by mouth Every Morning Before Breakfast., Disp: , Rfl:     fenofibrate (TRICOR) 145 MG tablet, Take 1  tablet by mouth Daily., Disp: 90 tablet, Rfl: 3    FLUoxetine (PROzac) 20 MG capsule, TAKE 1 CAPSULE BY MOUTH DAILY, Disp: 90 capsule, Rfl: 3    folic acid (FOLVITE) 1 MG tablet, TAKE 1 TABLET BY MOUTH DAILY, Disp: 90 tablet, Rfl: 3    glucose blood (Contour Next Test) test strip, Check blood glucose daily, Disp: 100 each, Rfl: 11    hydrALAZINE (APRESOLINE) 50 MG tablet, TAKE 1 TABLET BY MOUTH 3 TIMES  DAILY, Disp: 270 tablet, Rfl: 3    HYDROcodone-acetaminophen (NORCO)  MG per tablet, Take 1 tablet by mouth Every 6 (Six) Hours As Needed for Moderate Pain., Disp: 60 tablet, Rfl: 0    leuprolide (LUPRON) 30 MG injection, Inject 30 mg into the appropriate muscle as directed by prescriber Every 4 (Four) Months., Disp: , Rfl:     loratadine (Claritin) 10 MG tablet, Take 1 tablet by mouth Daily., Disp: 90 tablet, Rfl: 0    meclizine (ANTIVERT) 25 MG tablet, Take 1 tablet by mouth 3 (Three) Times a Day As Needed for Dizziness., Disp: 90 tablet, Rfl: 1    metFORMIN (GLUCOPHAGE) 500 MG tablet, TAKE 1 TABLET BY MOUTH DAILY  WITH BREAKFAST, Disp: 90 tablet, Rfl: 3    Metoprolol Succinate 25 MG capsule extended-release 24 hour sprinkle, Take 1 capsule by mouth Daily., Disp: , Rfl:     metoprolol tartrate (LOPRESSOR) 25 MG tablet, TAKE 1 TABLET BY MOUTH TWICE  DAILY, Disp: 180 tablet, Rfl: 3    Microlet Lancets misc, 1 stick Daily., Disp: 100 each, Rfl: 11    montelukast (Singulair) 10 MG tablet, Take 1 tablet by mouth Every Night., Disp: 30 tablet, Rfl: 0    oxyCODONE (ROXICODONE) 5 MG immediate release tablet, Take 1 tablet by mouth Every 6 (Six) Hours As Needed for Moderate Pain., Disp: , Rfl:     pantoprazole (Protonix) 40 MG EC tablet, Take 1 tablet by mouth Daily., Disp: 30 tablet, Rfl: 0    Pyridoxine HCl (VITAMIN B-6 PO), Take  by mouth daily., Disp: , Rfl:     rosuvastatin (CRESTOR) 20 MG tablet, TAKE 1 TABLET BY MOUTH AT NIGHT, Disp: 90 tablet, Rfl: 3    SITagliptin (Januvia) 100 MG tablet, Take 1 tablet by  mouth Daily., Disp: 90 tablet, Rfl: 2    albuterol sulfate  (90 Base) MCG/ACT inhaler, Inhale 2 puffs Every 4 (Four) Hours As Needed for Wheezing. (Patient not taking: Reported on 7/11/2025), Disp: 6.7 g, Rfl: 1    Continuous Glucose Sensor (Dexcom G7 Sensor) misc, Use 1 Application Every 10 (Ten) Days. (Patient not taking: Reported on 7/11/2025), Disp: 3 each, Rfl: 2    Mirabegron ER (Myrbetriq) 50 MG tablet sustained-release 24 hour 24 hr tablet, Take 50 mg by mouth Daily. (Patient not taking: Reported on 7/11/2025), Disp: 28 tablet, Rfl: 0    naloxone (NARCAN) 4 MG/0.1ML nasal spray, Call 911. Don't prime. Rock in 1 nostril for overdose. Repeat in 2-3 minutes in other nostril if no or minimal breathing/responsiveness. (Patient not taking: Reported on 7/11/2025), Disp: 2 each, Rfl: 0          Review of Systems  Pertinent positives are listed as per history of present of illness, all other systems reviewed and are negative.  Back pain.          Physical Exam  Vital Signs: These were reviewed and listed as per patient’s electronic medical chart  Vitals:    07/11/25 1349   BP: 124/75   Pulse: 75   Resp: 20   Temp: 98 °F (36.7 °C)   SpO2: 94%     General: Patient is awake, alert, and in no acute distress.  Normal mood. In wheelchair.  Head: Normocephalic, atraumatic  Eyes: EOMI. Conjunctivae and sclerae normal.  Ears: Ears appear intact with no abnormalities noted.   Neck: Trachea midline. No obvious JVD.  Lungs: Respirations appear to be regular, even and unlabored with no signs of respiratory distress. No audible wheezing.  Abdomen: No obvious abdominal distension.  MS: Muscle tone appears normal. No gross deformities.  Extremities: No clubbing, cyanosis or edema noted.  Skin: No visible bleeding, bruising, or rash.  Neurologic: Alert and oriented x3. No gross focal deficits.          Pain Score:  Pain Score    07/11/25 1349   PainSc: 8    PainLoc: Back                 PHQ-Score Total:  PHQ-9 Total Score:           IMAGING:   MRI Thoracic Spine With & Without Contrast (04/11/2025 15:09)   FINDINGS:Vertebrae:  T4 vertebral body mildly depressed likely pathologic compression fracture.  T8 vertebral body mid level compression fracture, that appears to also be pathologic.  Enhancement of the T C7 vertebral body inferiorly may represent metastatic disease.  Scoliotic curvature of the thoracic spine is noted.  Discs/spinal canal/neural foramina:  C4-C5, C5-C6 and C6/C7 shows posterior disc osteophyte protrusions causing moderate to severe central canal stenosis.  Spinal cord:  Unremarkable as visualized.  Normal signal.  No abnormal enhancement.  Soft tissues:  Unremarkable as visualized.  IMPRESSION:1.  C4-C5, C5-C6 and C6/C7 shows posterior disc osteophyte protrusions causing moderate to severe central canal stenosis.2.  T4 vertebral body mildly depressed likely pathologic compression fracture.3.  T8 vertebral body mid level compression fracture, that appears to also be pathologic.4.  Enhancement of the T C7 vertebral body inferiorly may represent metastatic disease.    CT Abdomen Pelvis With Contrast (05/07/2025 10:24)   FINDINGS:Lung bases:  Unremarkable as visualized.  No mass.  No consolidation.ABDOMEN:Liver:  Peripherally enhancing 4.8 cm lesion of the right lobe of liver posteriorly that should be further evaluated with MRI liver mass protocol.  Gallbladder and bile ducts:  Unremarkable as visualized.  No calcified stones.  No ductal dilation.  Pancreas:  Unremarkable as visualized.  No mass.  No ductal dilation.  Spleen:  Unremarkable as visualized.  No splenomegaly.  Adrenals:  Unremarkable as visualized.  No mass.  Kidneys and ureters:  Horseshoe kidney again identified.  Simple bilateral renal cysts.  No hydronephrosis.  Stomach and bowel:  Scattered diverticula in the colon.  No diverticulitis.  No obstruction. PELVIS:  Appendix:  No findings to suggest acute appendicitis.  Bladder:  Unremarkable as visualized.   No mass.  Reproductive:  Unremarkable as visualized. ABDOMEN and PELVIS:  Intraperitoneal space:  Unremarkable as visualized.  No free air.  No significant fluid collection.  Bones/joints:  Degenerative disc disease throughout the lumbar spine. Degenerative facet arthropathy throughout the lumbar spine, most prominent in the lower lumbar spine.  No acute fracture.  No dislocation.  Soft tissues:  Unremarkable as visualized.  Vasculature:  Unremarkable as visualized.  No abdominal aortic aneurysm.  Lymph nodes:  Unremarkable as visualized.  No enlarged lymph nodes.  IMPRESSION:1.  Peripherally enhancing 4.8 cm lesion of the right lobe of liver posteriorly that should be further evaluated with MRI liver mass protocol.2.  Degenerative changes lumbar spine as described.    CT Chest With Contrast Diagnostic (05/07/2025 10:25)   FINDINGS: Limitations:  Please note that reported measurements are made manually, as computer generated (AI) measurements can over measure lesions. Additionally, lesions identified by AI may have been present presently, significant nodules will be discussed in the report and the visual depictions of lesions provided by AI are for general reference only.  Lungs and pleural spaces:  Unremarkable as visualized.  No consolidation.  No pneumothorax.  No significant effusion.  No pulmonary nodule.  Heart:  Unremarkable as visualized.  No cardiomegaly.  No significant pericardial effusion.  No significant coronary artery calcifications.  Bones/joints:  Again pathologic fracture of lytic T4 vertebral body and more sclerotic appearing T8 vertebral body abnormalities noted.  No dislocation.  Soft tissues:  Unremarkable as visualized.  Vasculature:  Unremarkable as visualized.  No thoracic aortic aneurysm.  Lymph nodes:  Unremarkable as visualized.  No enlarged lymph nodes.  IMPRESSION:1.  No pulmonary nodule.2.  Again pathologic fracture of lytic T4 vertebral body and more sclerotic appearing T8 vertebral  body abnormalities noted.    NM Bone Scan Whole Body (05/12/2025 13:04)   FINDINGS:Skull/facial bones:  No focal increased or decreased uptake. Spine:  Osteoblastic lesion involving the T8 level of the thoracic spine and corresponds to abnormality on CT.  Ribs:  Osteoblastic lesion at approximately the left ninth posterior rib.  Osteoblastic lesion faintly of the left posterior 10th and 11th ribs.  Long bones:  Unremarkable.  No abnormal uptake.  Pelvis:  Unremarkable.  No focal increased or decreased uptake.  Joints:  Unremarkable.  No focal increased or decreased uptake.  Soft tissues:  Unremarkable.  Renal/bladder:  Within normal limits.  IMPRESSION:1.  Osteoblastic lesion involving the T8 level of the thoracic spine and corresponds to abnormality on CT.2.  Osteoblastic lesion at approximately the left ninth posterior rib. 3.  Osteoblastic lesion faintly of the left posterior 10th and 11th ribs.4. Above findings suspicious for osteoblastic metastatic disease.    MRI Abdomen With & Without Contrast (05/25/2025 08:55)   FINDINGS:  Lung bases:  Unremarkable.  No mass.  No consolidation.  Liver:  Somewhat lobulated appearing mass of the posterior right hepatic lobe, segment 6 is noted that is approximately 5.4 x 4.3 cm and demonstrates early avid contrast enhancement with retained contrast noted on subsequent multiphasic postcontrast sequences and may even demonstrate some areas of delayed centripetal enhancement. There is area of nonenhancement noted centrally with heterogeneous features.Background of relatively unremarkable liver parenchyma is noted. High ADC and DWI signal. More favorable for benign process such as atypical presentation of hemangioma. Malignancy felt less likely. Further characterization with ultrasound may be helpful.  There are 2 smaller T2 hyperintense lesions in the periphery of the left hepatic lobe, segment 4A that demonstrate early enhancement with persistent enhancement on delayed phase  imaging and may represent 2 small hemangiomas. 2.0 cm and 1.7 cm respectively.  Liver parenchyma is fairly homogeneous with no evidence of cirrhosis or intrahepatic biliary dilatation.  Gallbladder and bile ducts:  Unremarkable.  No calcified stones.  No ductal dilation.  Pancreas:  Unremarkable.  No ductal dilation.  No mass.  Spleen:  Unremarkable.  No splenomegaly.  Adrenals:  Unremarkable.  No mass.  Kidneys and ureters:  Horseshoe kidney noted with simple appearing renal cysts throughout. No hydronephrosis.  Stomach and bowel:  Unremarkable.  No obstruction.  Intraperitoneal space:  Unremarkable.  No significant fluid collection.  Soft tissues:  Unremarkable.  Vasculature:  Unremarkable.  No abdominal aortic aneurysm.  Lymph nodes:  Unremarkable.  No enlarged lymph nodes.  IMPRESSION:1. 5.4 x 4.3 cm enhancing lesion in segment 6 of the right lobe of liver with imaging features and MRI signal characteristics more favorable for benign process such as atypical presentation of hemangioma. Malignancy felt less likely. Further characterization with ultrasound may be helpful.  Continued imaging surveillance is recommended.2.  There are 2 smaller T2 hyperintense lesions in the periphery of the left hepatic lobe, segment 4A that demonstrate early enhancement with persistent enhancement on delayed phase imaging and may represent 2 small hemangiomas. 2.0 cm and 1.7 cm respectively.3.  Liver parenchyma is fairly homogeneous with no evidence of cirrhosis or intrahepatic biliary dilatation.4.  Horseshoe kidney noted with simple appearing renal cysts throughout. No hydronephrosis.5. Other incidental/nonacute findings above.    DEXA Bone Density Axial (06/20/2025 09:40)   IMPRESSION:Left femur:  BMD left femur neck 0.680 with T score -1.8 consistent with osteopenia.        LABS/STUDIES:  Office Visit on 07/11/2025   Component Date Value    Glucose 07/11/2025 117 (H)     BUN 07/11/2025 21.2     Creatinine 07/11/2025 1.20      Sodium 07/11/2025 142     Potassium 07/11/2025 4.3     Chloride 07/11/2025 108 (H)     CO2 07/11/2025 21.3 (L)     Calcium 07/11/2025 9.9     Total Protein 07/11/2025 6.9     Albumin 07/11/2025 4.4     ALT (SGPT) 07/11/2025 14     AST (SGOT) 07/11/2025 19     Alkaline Phosphatase 07/11/2025 74     Total Bilirubin 07/11/2025 0.5     Globulin 07/11/2025 2.5     A/G Ratio 07/11/2025 1.8     BUN/Creatinine Ratio 07/11/2025 17.7     Anion Gap 07/11/2025 12.7     eGFR 07/11/2025 61.5     LDH 07/11/2025 183     WBC 07/11/2025 11.31 (H)     RBC 07/11/2025 4.60     Hemoglobin 07/11/2025 14.6     Hematocrit 07/11/2025 44.3     MCV 07/11/2025 96.3     MCH 07/11/2025 31.7     MCHC 07/11/2025 33.0     RDW 07/11/2025 13.6     RDW-SD 07/11/2025 48.4     MPV 07/11/2025 11.3     Platelets 07/11/2025 217     Neutrophil % 07/11/2025 48.9     Lymphocyte % 07/11/2025 26.5     Monocyte % 07/11/2025 6.2     Eosinophil % 07/11/2025 16.9 (H)     Basophil % 07/11/2025 1.1     Immature Grans % 07/11/2025 0.4     Neutrophils, Absolute 07/11/2025 5.54     Lymphocytes, Absolute 07/11/2025 3.00     Monocytes, Absolute 07/11/2025 0.70     Eosinophils, Absolute 07/11/2025 1.91 (H)     Basophils, Absolute 07/11/2025 0.12     Immature Grans, Absolute 07/11/2025 0.04     nRBC 07/11/2025 0.0    Hospital Outpatient Visit on 05/25/2025   Component Date Value    Creatinine 05/25/2025 1.30    Consult on 04/24/2025   Component Date Value    Glucose 04/24/2025 136 (H)     BUN 04/24/2025 15     Creatinine 04/24/2025 1.58 (H)     Sodium 04/24/2025 144     Potassium 04/24/2025 4.2     Chloride 04/24/2025 108 (H)     CO2 04/24/2025 23.1     Calcium 04/24/2025 9.4     Total Protein 04/24/2025 7.0     Albumin 04/24/2025 4.5     ALT (SGPT) 04/24/2025 19     AST (SGOT) 04/24/2025 20     Alkaline Phosphatase 04/24/2025 80     Total Bilirubin 04/24/2025 0.4     Globulin 04/24/2025 2.5     A/G Ratio 04/24/2025 1.8     BUN/Creatinine Ratio 04/24/2025 9.5     Anion Gap  04/24/2025 12.9     eGFR 04/24/2025 44.2 (L)     C-Reactive Protein 04/24/2025 0.43     LDH 04/24/2025 203     PSA 04/24/2025 <0.014     WBC 04/24/2025 12.21 (H)     RBC 04/24/2025 4.39     Hemoglobin 04/24/2025 13.8     Hematocrit 04/24/2025 42.2     MCV 04/24/2025 96.1     MCH 04/24/2025 31.4     MCHC 04/24/2025 32.7     RDW 04/24/2025 13.7     RDW-SD 04/24/2025 48.4     MPV 04/24/2025 11.8     Platelets 04/24/2025 198     Neutrophil % 04/24/2025 40.9 (L)     Lymphocyte % 04/24/2025 23.6     Monocyte % 04/24/2025 6.2     Eosinophil % 04/24/2025 27.9 (H)     Basophil % 04/24/2025 1.1     Immature Grans % 04/24/2025 0.3     Neutrophils, Absolute 04/24/2025 4.98     Lymphocytes, Absolute 04/24/2025 2.88     Monocytes, Absolute 04/24/2025 0.76     Eosinophils, Absolute 04/24/2025 3.41 (H)     Basophils, Absolute 04/24/2025 0.14     Immature Grans, Absolute 04/24/2025 0.04     nRBC 04/24/2025 0.0     Anisocytosis 04/24/2025 Slight/1+     Platelet Morphology 04/24/2025 Normal    Consult on 04/17/2025   Component Date Value    PSA 04/17/2025 <0.014    Hospital Outpatient Visit on 04/11/2025   Component Date Value    Creatinine 04/11/2025 1.60 (H)    Lab on 02/19/2025   Component Date Value    proBNP 02/19/2025 149.1     Creatine Kinase 02/19/2025 77     Glucose 02/19/2025 153 (H)     BUN 02/19/2025 16     Creatinine 02/19/2025 1.20     Sodium 02/19/2025 143     Potassium 02/19/2025 4.2     Chloride 02/19/2025 107     CO2 02/19/2025 22.8     Calcium 02/19/2025 9.2     Total Protein 02/19/2025 7.0     Albumin 02/19/2025 4.4     ALT (SGPT) 02/19/2025 24     AST (SGOT) 02/19/2025 23     Alkaline Phosphatase 02/19/2025 74     Total Bilirubin 02/19/2025 0.3     Globulin 02/19/2025 2.6     A/G Ratio 02/19/2025 1.7     BUN/Creatinine Ratio 02/19/2025 13.3     Anion Gap 02/19/2025 13.2     eGFR 02/19/2025 61.5     Total Cholesterol 02/19/2025 232 (H)     Triglycerides 02/19/2025 489 (H)     HDL Cholesterol 02/19/2025 41     LDL  Cholesterol  02/19/2025 107 (H)     VLDL Cholesterol 02/19/2025 84 (H)     LDL/HDL Ratio 02/19/2025 2.27     Hemoglobin A1C 02/19/2025 6.90 (H)     WBC 02/19/2025 9.51     RBC 02/19/2025 4.35     Hemoglobin 02/19/2025 14.2     Hematocrit 02/19/2025 42.0     MCV 02/19/2025 96.6     MCH 02/19/2025 32.6     MCHC 02/19/2025 33.8     RDW 02/19/2025 13.4     RDW-SD 02/19/2025 48.1     MPV 02/19/2025 12.1 (H)     Platelets 02/19/2025 197     Neutrophil % 02/19/2025 44.0     Lymphocyte % 02/19/2025 32.6     Monocyte % 02/19/2025 5.6     Eosinophil % 02/19/2025 16.4 (H)     Basophil % 02/19/2025 1.2     Immature Grans % 02/19/2025 0.2     Neutrophils, Absolute 02/19/2025 4.19     Lymphocytes, Absolute 02/19/2025 3.10     Monocytes, Absolute 02/19/2025 0.53     Eosinophils, Absolute 02/19/2025 1.56 (H)     Basophils, Absolute 02/19/2025 0.11     Immature Grans, Absolute 02/19/2025 0.02     nRBC 02/19/2025 0.0    Hospital Outpatient Visit on 02/18/2025   Component Date Value    Nuclear Prior Study 02/18/2025 3.0     BH CV REST NUCLEAR ISOTO* 02/18/2025 10.8     BH CV STRESS NUCLEAR ISO* 02/18/2025 31.5     BH CV STRESS PROTOCOL 1 02/18/2025 Pharmacologic     Stage 1 02/18/2025 1.0     HR Stage 1 02/18/2025 66     BP Stage 1 02/18/2025 181/72     Duration Min Stage 1 02/18/2025 0     Duration Sec Stage 1 02/18/2025 10     Stress Dose Regadenoson * 02/18/2025 0.40     Stress Comments Stage 1 02/18/2025 10 sec bolus injection     Baseline HR 02/18/2025 58     Baseline BP 02/18/2025 166/69     Peak HR 02/18/2025 66     Peak BP 02/18/2025 181/72     Recovery HR 02/18/2025 63     Recovery BP 02/18/2025 166/70     Target HR (85%) 02/18/2025 120     Max. Pred. HR (100%) 02/18/2025 141     Percent Max Pred HR 02/18/2025 46.81     Percent Target HR 02/18/2025 55     Nuc Stress EF 02/18/2025 70    Hospital Outpatient Visit on 02/18/2025   Component Date Value    LVIDd 02/18/2025 4.8     LVIDs 02/18/2025 2.9     IVSd 02/18/2025 1.09      LVPWd 02/18/2025 0.98     FS 02/18/2025 39.5     IVS/LVPW 02/18/2025 1.11     ESV(cubed) 02/18/2025 23.9     LV Sys Vol (BSA correcte* 02/18/2025 17.4     EDV(cubed) 02/18/2025 107.9     LV Hoff Vol (BSA correct* 02/18/2025 43.2     LV mass(C)d 02/18/2025 176.3     LVOT area 02/18/2025 3.1     LVOT diam 02/18/2025 2.00     EDV(MOD-sp4) 02/18/2025 93.4     ESV(MOD-sp4) 02/18/2025 37.7     SV(MOD-sp4) 02/18/2025 55.7     SVi(MOD-SP4) 02/18/2025 25.8     EF(MOD-sp4) 02/18/2025 59.6     MV E max aurelio 02/18/2025 71.1     MV A max aurelio 02/18/2025 49.3     MV E/A 02/18/2025 1.44     LA ESV Index (BP) 02/18/2025 18.5     Med Peak E' Aurelio 02/18/2025 4.6     Lat Peak E' Aurelio 02/18/2025 5.7     TR max aurelio 02/18/2025 266.0     Avg E/e' ratio 02/18/2025 13.81     TAPSE (>1.6) 02/18/2025 2.7     LA dimension (2D)  02/18/2025 3.5     Ao pk aurelio 02/18/2025 147.0     Ao max PG 02/18/2025 8.6     Ao mean PG 02/18/2025 5.0     Ao V2 VTI 02/18/2025 30.6     TR max PG 02/18/2025 28.3     RVSP(TR) 02/18/2025 38.3     RAP systole 02/18/2025 10.0     PA acc time 02/18/2025 0.12     Ao root diam 02/18/2025 3.2     ACS 02/18/2025 1.70    Lab on 01/29/2025   Component Date Value    Color, UA 01/29/2025 Dark Yellow (A)     Appearance, UA 01/29/2025 Turbid (A)     pH, UA 01/29/2025 8.5 (H)     Specific [place], UA 01/29/2025 1.024     Glucose, UA 01/29/2025 Negative     Ketones, UA 01/29/2025 Negative     Bilirubin, UA 01/29/2025 Negative     Blood, UA 01/29/2025 Small (1+) (A)     Protein, UA 01/29/2025 >=300 mg/dL (3+) (A)     Leuk Esterase, UA 01/29/2025 Large (3+) (A)     Nitrite, UA 01/29/2025 Negative     Urobilinogen, UA 01/29/2025 0.2 E.U./dL     RBC, UA 01/29/2025 Unable to determine due to loaded field (A)     WBC, UA 01/29/2025 Too Numerous to Count (A)     Bacteria, UA 01/29/2025 Unable to determine due to loaded field (A)     Squamous Epithelial Cell* 01/29/2025 Unable to determine due to loaded field (A)     Hyaline Casts, UA  01/29/2025 Unable to determine due to loaded field     Methodology 01/29/2025 Manual Light Microscopy     Urine Culture 01/29/2025 >100,000 CFU/mL Proteus mirabilis (A)    Lab on 01/20/2025   Component Date Value    Creatine Kinase 01/20/2025 121     Total Cholesterol 01/20/2025 175     Triglycerides 01/20/2025 266 (H)     HDL Cholesterol 01/20/2025 46     LDL Cholesterol  01/20/2025 85     VLDL Cholesterol 01/20/2025 44 (H)     LDL/HDL Ratio 01/20/2025 1.65     Glucose 01/20/2025 315 (H)     BUN 01/20/2025 16     Creatinine 01/20/2025 1.32 (H)     Sodium 01/20/2025 142     Potassium 01/20/2025 4.2     Chloride 01/20/2025 105     CO2 01/20/2025 22.6     Calcium 01/20/2025 9.1     Total Protein 01/20/2025 6.9     Albumin 01/20/2025 4.4     ALT (SGPT) 01/20/2025 20     AST (SGOT) 01/20/2025 21     Alkaline Phosphatase 01/20/2025 58     Total Bilirubin 01/20/2025 0.6     Globulin 01/20/2025 2.5     A/G Ratio 01/20/2025 1.8     BUN/Creatinine Ratio 01/20/2025 12.1     Anion Gap 01/20/2025 14.4     eGFR 01/20/2025 54.9 (L)     Hemoglobin A1C 01/20/2025 7.40 (H)     Microalbumin, Urine 01/29/2025 78.8     Uric Acid 01/20/2025 7.0     WBC 01/20/2025 11.94 (H)     RBC 01/20/2025 4.23     Hemoglobin 01/20/2025 13.9     Hematocrit 01/20/2025 40.8     MCV 01/20/2025 96.5     MCH 01/20/2025 32.9     MCHC 01/20/2025 34.1     RDW 01/20/2025 13.8     RDW-SD 01/20/2025 49.1     MPV 01/20/2025 12.3 (H)     Platelets 01/20/2025 171     Neutrophil % 01/20/2025 52.4     Lymphocyte % 01/20/2025 23.1     Monocyte % 01/20/2025 4.4 (L)     Eosinophil % 01/20/2025 18.6 (H)     Basophil % 01/20/2025 1.2     Immature Grans % 01/20/2025 0.3     Neutrophils, Absolute 01/20/2025 6.27     Lymphocytes, Absolute 01/20/2025 2.76     Monocytes, Absolute 01/20/2025 0.52     Eosinophils, Absolute 01/20/2025 2.22 (H)     Basophils, Absolute 01/20/2025 0.14     Immature Grans, Absolute 01/20/2025 0.03     nRBC 01/20/2025 0.0    There may be more visits  with results that are not included.         PATHOLOGY:   10/14/16      11/29/16      8/31/20       6/12/25            Assessment & Plan   Lei Stapleton is a very pleasant 79 y.o.  male who presents in follow up appointment at the request of Dr. Antione De Santiago for further management and evaluation of potential metastatic cancer to the bone.    Prostate cancer   Possible bone metastases  Liver lesion  - Patient was diagnosed with localized prostate cancer in 2018 which was treated with IMRT and remains on Lupron and Casodex. His PSAs since that time have been undetectable at <0.014. Next Lupron injection has been scheduled for August 2025 with his Urologist.  - He recently sustained a fall and began experiencing acute back pain in which imaging was performed to further evaluate. MRI spine was significant for compression fracture of T4 and T8 that was felt to be likely pathologic with enhancement at C7 that may represent metastatic disease. Patient has not had a biopsy to confirm if this is metastatic prostate cancer.  - PSA is <0.014 therefore I do not believe that PET PSMA would be helpful in this case. He was evaluated by Dr. Barcenas (orthopedics) however did not feel that intervention would be beneficial and seeking a second opinion. If they are considering surgical intervention would recommend biopsy of these potential lesions to further evaluate.  - Obtained CT chest, abdomen, pelvis to assess for metastatic disease which was performed on 5/7/25 and significant for pathologic fracture of lytic T4 vertebral body and more sclerotic appearing T8 vertebral body abnormality noted as well as a peripherally enhancing 4.8 cm lesion of the right lobe of liver posteriorly.  After discussing with radiology this could potentially be benign such as a hemangioma and recommended MRI of the abdomen with liver protocol which was performed and appears to be consistent with hemangioma.  However bone scan is concerning for  osteoblastic lesions in the ribs and spine.    - Mr. Stapleton underwent kyphoplasty of the T8 vertebral body at [facility] on 06/12/2025.  Bone biopsy material obtained during that procedure was negative for malignancy. The biopsy material was PSA negative. 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   - Given these findings we will obtain serum electrophoresis with immunofixation as well as serum free light chain studies and 24-hour urine electrophoresis and light chain studies.  Pending studies will likely require bone marrow biopsy and aspirate to further evaluate.  Today I spent time discussing potential plasma cell neoplasm with the patient and his wife today.  DEXA scan is significant for osteopenia and he remains on vitamin D.  He is currently not on calcium supplement as he was previously told by his urologist not to take calcium supplementation due to recurrent nephrolithiasis and bladder stones.  Today also spent some time discussing Xgeva and he will need dental clearance prior to start.    Neoplasm related pain  - Patient reports that his pain is currently uncontrolled with Norco. Therefore it was changed to oxycodone 5 mg every 6 hours as needed for pain.  120 tablets dispensed.  While this better controls his pain he reports increased lethargy therefore only takes this at night while he takes Norco during the day.      ACO / ANDREW/Other  Quality measures  -  Lei Stapleton  reports that he quit smoking about 34 years ago. His smoking use included cigarettes. He has never been exposed to tobacco smoke. He has never used smokeless tobacco.   -  Lei Stapleton received 2024 flu vaccine.  -  Lei Stapleton reports a pain score of 8.  Given his pain assessment as noted, treatment options were discussed and the following options were decided upon as a follow-up plan to address the patient's pain: referral to Primary Care for assistance in pain treatment guidance.  -   Current outpatient and discharge medications have been reconciled for the patient.  Reviewed by: Kassandra Wolf MD      I will have the patient return in follow up appointment to review test results in two weeks. He understands that should he have any questions or concerns prior to his appointment he should give us a call at any time and I would be happy to see him sooner. It was a pleasure to see this patient in clinic today, thank you for allowing me to participate in the care of this patient.    I spent 50 minutes caring for patient on this date of service. This time includes time spent by me in the following activities: preparing for the visit, reviewing tests, obtaining and/or reviewing a separately obtained history, performing a medically appropriate examination and/or evaluation, counseling and educating the patient/family/caregiver, ordering medications, tests, or procedures, documenting information in the medical record, independently interpreting results and communicating that information with the patient/family/caregiver, and care coordination as well as answering any questions.      Kassandra Wolf MD  07/11/25   17:01 EDT

## 2025-07-10 RX ORDER — ROSUVASTATIN CALCIUM 20 MG/1
20 TABLET, COATED ORAL
Qty: 90 TABLET | Refills: 3 | Status: SHIPPED | OUTPATIENT
Start: 2025-07-10

## 2025-07-10 RX ORDER — AMLODIPINE BESYLATE 10 MG/1
10 TABLET ORAL DAILY
Qty: 90 TABLET | Refills: 3 | Status: SHIPPED | OUTPATIENT
Start: 2025-07-10

## 2025-07-10 RX ORDER — HYDRALAZINE HYDROCHLORIDE 50 MG/1
50 TABLET, FILM COATED ORAL 3 TIMES DAILY
Qty: 270 TABLET | Refills: 3 | Status: SHIPPED | OUTPATIENT
Start: 2025-07-10

## 2025-07-10 RX ORDER — METOPROLOL TARTRATE 25 MG/1
25 TABLET, FILM COATED ORAL 2 TIMES DAILY
Qty: 180 TABLET | Refills: 3 | Status: SHIPPED | OUTPATIENT
Start: 2025-07-10

## 2025-07-10 RX ORDER — FOLIC ACID 1 MG/1
1000 TABLET ORAL DAILY
Qty: 90 TABLET | Refills: 3 | Status: SHIPPED | OUTPATIENT
Start: 2025-07-10

## 2025-07-11 ENCOUNTER — OFFICE VISIT (OUTPATIENT)
Dept: ONCOLOGY | Facility: CLINIC | Age: 80
End: 2025-07-11
Payer: MEDICARE

## 2025-07-11 VITALS
DIASTOLIC BLOOD PRESSURE: 75 MMHG | BODY MASS INDEX: 26.82 KG/M2 | HEIGHT: 71 IN | SYSTOLIC BLOOD PRESSURE: 124 MMHG | HEART RATE: 75 BPM | RESPIRATION RATE: 20 BRPM | TEMPERATURE: 98 F | OXYGEN SATURATION: 94 % | WEIGHT: 191.6 LBS

## 2025-07-11 DIAGNOSIS — C79.51 MALIGNANT NEOPLASM OF PROSTATE METASTATIC TO BONE: Primary | ICD-10-CM

## 2025-07-11 DIAGNOSIS — C61 PROSTATE CANCER: Primary | ICD-10-CM

## 2025-07-11 DIAGNOSIS — C61 PROSTATE CANCER: ICD-10-CM

## 2025-07-11 DIAGNOSIS — G89.3 CHRONIC PAIN DUE TO MALIGNANT NEOPLASTIC DISEASE: ICD-10-CM

## 2025-07-11 DIAGNOSIS — C61 MALIGNANT NEOPLASM OF PROSTATE METASTATIC TO BONE: ICD-10-CM

## 2025-07-11 DIAGNOSIS — C61 MALIGNANT NEOPLASM OF PROSTATE METASTATIC TO BONE: Primary | ICD-10-CM

## 2025-07-11 DIAGNOSIS — C79.51 MALIGNANT NEOPLASM OF PROSTATE METASTATIC TO BONE: ICD-10-CM

## 2025-07-11 LAB
ALBUMIN SERPL-MCNC: 4.4 G/DL (ref 3.5–5.2)
ALBUMIN/GLOB SERPL: 1.8 G/DL
ALP SERPL-CCNC: 74 U/L (ref 39–117)
ALT SERPL W P-5'-P-CCNC: 14 U/L (ref 1–41)
ANION GAP SERPL CALCULATED.3IONS-SCNC: 12.7 MMOL/L (ref 5–15)
AST SERPL-CCNC: 19 U/L (ref 1–40)
BASOPHILS # BLD AUTO: 0.12 10*3/MM3 (ref 0–0.2)
BASOPHILS NFR BLD AUTO: 1.1 % (ref 0–1.5)
BILIRUB SERPL-MCNC: 0.5 MG/DL (ref 0–1.2)
BUN SERPL-MCNC: 21.2 MG/DL (ref 8–23)
BUN/CREAT SERPL: 17.7 (ref 7–25)
CALCIUM SPEC-SCNC: 9.9 MG/DL (ref 8.6–10.5)
CHLORIDE SERPL-SCNC: 108 MMOL/L (ref 98–107)
CO2 SERPL-SCNC: 21.3 MMOL/L (ref 22–29)
CREAT SERPL-MCNC: 1.2 MG/DL (ref 0.76–1.27)
DEPRECATED RDW RBC AUTO: 48.4 FL (ref 37–54)
EGFRCR SERPLBLD CKD-EPI 2021: 61.5 ML/MIN/1.73
EOSINOPHIL # BLD AUTO: 1.91 10*3/MM3 (ref 0–0.4)
EOSINOPHIL NFR BLD AUTO: 16.9 % (ref 0.3–6.2)
ERYTHROCYTE [DISTWIDTH] IN BLOOD BY AUTOMATED COUNT: 13.6 % (ref 12.3–15.4)
GLOBULIN UR ELPH-MCNC: 2.5 GM/DL
GLUCOSE SERPL-MCNC: 117 MG/DL (ref 65–99)
HCT VFR BLD AUTO: 44.3 % (ref 37.5–51)
HGB BLD-MCNC: 14.6 G/DL (ref 13–17.7)
IMM GRANULOCYTES # BLD AUTO: 0.04 10*3/MM3 (ref 0–0.05)
IMM GRANULOCYTES NFR BLD AUTO: 0.4 % (ref 0–0.5)
LDH SERPL-CCNC: 183 U/L (ref 135–225)
LYMPHOCYTES # BLD AUTO: 3 10*3/MM3 (ref 0.7–3.1)
LYMPHOCYTES NFR BLD AUTO: 26.5 % (ref 19.6–45.3)
MCH RBC QN AUTO: 31.7 PG (ref 26.6–33)
MCHC RBC AUTO-ENTMCNC: 33 G/DL (ref 31.5–35.7)
MCV RBC AUTO: 96.3 FL (ref 79–97)
MONOCYTES # BLD AUTO: 0.7 10*3/MM3 (ref 0.1–0.9)
MONOCYTES NFR BLD AUTO: 6.2 % (ref 5–12)
NEUTROPHILS NFR BLD AUTO: 48.9 % (ref 42.7–76)
NEUTROPHILS NFR BLD AUTO: 5.54 10*3/MM3 (ref 1.7–7)
NRBC BLD AUTO-RTO: 0 /100 WBC (ref 0–0.2)
PLATELET # BLD AUTO: 217 10*3/MM3 (ref 140–450)
PMV BLD AUTO: 11.3 FL (ref 6–12)
POTASSIUM SERPL-SCNC: 4.3 MMOL/L (ref 3.5–5.2)
PROT SERPL-MCNC: 6.9 G/DL (ref 6–8.5)
RBC # BLD AUTO: 4.6 10*6/MM3 (ref 4.14–5.8)
SODIUM SERPL-SCNC: 142 MMOL/L (ref 136–145)
WBC NRBC COR # BLD AUTO: 11.31 10*3/MM3 (ref 3.4–10.8)

## 2025-07-11 PROCEDURE — 83521 IG LIGHT CHAINS FREE EACH: CPT | Performed by: INTERNAL MEDICINE

## 2025-07-11 PROCEDURE — 84165 PROTEIN E-PHORESIS SERUM: CPT | Performed by: INTERNAL MEDICINE

## 2025-07-11 PROCEDURE — 80053 COMPREHEN METABOLIC PANEL: CPT | Performed by: INTERNAL MEDICINE

## 2025-07-11 PROCEDURE — 83615 LACTATE (LD) (LDH) ENZYME: CPT | Performed by: INTERNAL MEDICINE

## 2025-07-11 PROCEDURE — 86334 IMMUNOFIX E-PHORESIS SERUM: CPT | Performed by: INTERNAL MEDICINE

## 2025-07-11 PROCEDURE — 85025 COMPLETE CBC W/AUTO DIFF WBC: CPT | Performed by: INTERNAL MEDICINE

## 2025-07-11 PROCEDURE — 82784 ASSAY IGA/IGD/IGG/IGM EACH: CPT | Performed by: INTERNAL MEDICINE

## 2025-07-11 RX ORDER — ESOMEPRAZOLE MAGNESIUM 40 MG/1
40 CAPSULE, DELAYED RELEASE ORAL
COMMUNITY
Start: 2025-07-02

## 2025-07-11 RX ORDER — OXYCODONE HYDROCHLORIDE 5 MG/1
5 TABLET ORAL EVERY 6 HOURS PRN
COMMUNITY

## 2025-07-11 NOTE — PROGRESS NOTES
Venipuncture Blood Specimen Collection  Venipuncture performed in right arm by Lien Hayden MA with good hemostasis. Patient tolerated the procedure well without complications.   07/11/25   Lien Hayden MA

## 2025-07-15 DIAGNOSIS — F41.1 GENERALIZED ANXIETY DISORDER: ICD-10-CM

## 2025-07-15 RX ORDER — ALPRAZOLAM 0.25 MG
0.25 TABLET ORAL 2 TIMES DAILY PRN
Qty: 60 TABLET | Refills: 0 | Status: SHIPPED | OUTPATIENT
Start: 2025-07-15

## 2025-07-16 ENCOUNTER — LAB (OUTPATIENT)
Dept: LAB | Facility: HOSPITAL | Age: 80
End: 2025-07-16
Payer: MEDICARE

## 2025-07-16 DIAGNOSIS — C79.51 MALIGNANT NEOPLASM OF PROSTATE METASTATIC TO BONE: ICD-10-CM

## 2025-07-16 DIAGNOSIS — C61 MALIGNANT NEOPLASM OF PROSTATE METASTATIC TO BONE: ICD-10-CM

## 2025-07-16 DIAGNOSIS — C61 PROSTATE CANCER: ICD-10-CM

## 2025-07-16 LAB
ALBUMIN SERPL ELPH-MCNC: 4.1 G/DL (ref 2.9–4.4)
ALBUMIN/GLOB SERPL: 1.6 {RATIO} (ref 0.7–1.7)
ALPHA1 GLOB SERPL ELPH-MCNC: 0.3 G/DL (ref 0–0.4)
ALPHA2 GLOB SERPL ELPH-MCNC: 0.9 G/DL (ref 0.4–1)
B-GLOBULIN SERPL ELPH-MCNC: 1 G/DL (ref 0.7–1.3)
GAMMA GLOB SERPL ELPH-MCNC: 0.4 G/DL (ref 0.4–1.8)
GLOBULIN SER-MCNC: 2.6 G/DL (ref 2.2–3.9)
IGA SERPL-MCNC: 22 MG/DL (ref 61–437)
IGG SERPL-MCNC: 506 MG/DL (ref 603–1613)
IGM SERPL-MCNC: 16 MG/DL (ref 15–143)
INTERPRETATION SERPL IEP-IMP: ABNORMAL
KAPPA LC FREE SER-MCNC: 15.4 MG/L (ref 3.3–19.4)
KAPPA LC FREE/LAMBDA FREE SER: 1.32 {RATIO} (ref 0.26–1.65)
LABORATORY COMMENT REPORT: ABNORMAL
LAMBDA LC FREE SERPL-MCNC: 11.7 MG/L (ref 5.7–26.3)
M PROTEIN SERPL ELPH-MCNC: ABNORMAL G/DL
PROT SERPL-MCNC: 6.7 G/DL (ref 6–8.5)

## 2025-07-16 PROCEDURE — 84166 PROTEIN E-PHORESIS/URINE/CSF: CPT

## 2025-07-16 PROCEDURE — 81050 URINALYSIS VOLUME MEASURE: CPT

## 2025-07-16 PROCEDURE — 83521 IG LIGHT CHAINS FREE EACH: CPT

## 2025-07-16 PROCEDURE — 84156 ASSAY OF PROTEIN URINE: CPT

## 2025-07-16 PROCEDURE — 86335 IMMUNFIX E-PHORSIS/URINE/CSF: CPT

## 2025-07-18 LAB
ALBUMIN 24H MFR UR ELPH: 50.2 %
ALPHA1 GLOB 24H MFR UR ELPH: 3.8 %
ALPHA2 GLOB 24H MFR UR ELPH: 11.9 %
B-GLOBULIN MFR UR ELPH: 19.7 %
GAMMA GLOB 24H MFR UR ELPH: 14.3 %
INTERPRETATION UR IFE-IMP: ABNORMAL
KAPPA LC FREE 24H UR-MRATE: 9.43 MG/24 HR
KAPPA LC FREE UR-MCNC: 18.86 MG/L (ref 1.17–86.46)
KAPPA LC FREE/LAMBDA FREE UR: 4.67 (ref 1.83–14.26)
LAMBDA LC FREE 24H UR-MRATE: 2.02 MG/24 HR
LAMBDA LC FREE UR-MCNC: 4.04 MG/L (ref 0.27–15.21)
M PROTEIN 24H MFR UR ELPH: ABNORMAL %
PROT 24H UR-MRATE: 250 MG/24 HR (ref 30–150)
PROT UR-MCNC: 50 MG/DL
SERVICE CMNT-IMP: ABNORMAL

## 2025-07-24 DIAGNOSIS — C61 PROSTATE CANCER: ICD-10-CM

## 2025-07-24 DIAGNOSIS — C61 MALIGNANT NEOPLASM OF PROSTATE METASTATIC TO BONE: Primary | ICD-10-CM

## 2025-07-24 DIAGNOSIS — C79.51 MALIGNANT NEOPLASM OF PROSTATE METASTATIC TO BONE: Primary | ICD-10-CM

## 2025-07-30 ENCOUNTER — OFFICE VISIT (OUTPATIENT)
Dept: ONCOLOGY | Facility: CLINIC | Age: 80
End: 2025-07-30
Payer: MEDICARE

## 2025-07-30 ENCOUNTER — TRANSCRIBE ORDERS (OUTPATIENT)
Dept: ADMINISTRATIVE | Facility: HOSPITAL | Age: 80
End: 2025-07-30
Payer: MEDICARE

## 2025-07-30 VITALS
RESPIRATION RATE: 18 BRPM | WEIGHT: 190 LBS | HEART RATE: 68 BPM | TEMPERATURE: 97.1 F | SYSTOLIC BLOOD PRESSURE: 161 MMHG | OXYGEN SATURATION: 94 % | DIASTOLIC BLOOD PRESSURE: 84 MMHG | HEIGHT: 71 IN | BODY MASS INDEX: 26.6 KG/M2

## 2025-07-30 DIAGNOSIS — M80.0B9A: ICD-10-CM

## 2025-07-30 DIAGNOSIS — C90.00 MULTIPLE MYELOMA, FAILED REMISSION: Primary | ICD-10-CM

## 2025-07-30 DIAGNOSIS — G89.3 CHRONIC PAIN DUE TO MALIGNANT NEOPLASTIC DISEASE: ICD-10-CM

## 2025-07-30 DIAGNOSIS — C61 MALIGNANT NEOPLASM OF PROSTATE METASTATIC TO BONE: ICD-10-CM

## 2025-07-30 DIAGNOSIS — M85.80 OSTEOPENIA, UNSPECIFIED LOCATION: Primary | ICD-10-CM

## 2025-07-30 DIAGNOSIS — C79.51 MALIGNANT NEOPLASM OF PROSTATE METASTATIC TO BONE: ICD-10-CM

## 2025-07-30 DIAGNOSIS — G89.3 NEOPLASM RELATED PAIN: Primary | ICD-10-CM

## 2025-07-30 DIAGNOSIS — R63.0 APPETITE LOSS: ICD-10-CM

## 2025-07-30 DIAGNOSIS — C61 PROSTATE CANCER: Primary | ICD-10-CM

## 2025-07-30 DIAGNOSIS — C90.30 PLASMACYTOMA NOT HAVING ACHIEVED REMISSION, UNSPECIFIED PLASMACYTOMA TYPE: ICD-10-CM

## 2025-07-30 DIAGNOSIS — M85.80 OSTEOPENIA, UNSPECIFIED LOCATION: ICD-10-CM

## 2025-07-30 RX ORDER — MEGESTROL ACETATE 20 MG/1
40 TABLET ORAL DAILY
Qty: 60 TABLET | Refills: 3 | Status: SHIPPED | OUTPATIENT
Start: 2025-07-30

## 2025-07-30 RX ORDER — OXYCODONE AND ACETAMINOPHEN 5; 325 MG/1; MG/1
1 TABLET ORAL EVERY 6 HOURS PRN
COMMUNITY
End: 2025-07-30

## 2025-07-30 RX ORDER — HYDROCODONE BITARTRATE AND ACETAMINOPHEN 10; 325 MG/1; MG/1
1 TABLET ORAL EVERY 6 HOURS PRN
Qty: 30 TABLET | Refills: 0 | Status: SHIPPED | OUTPATIENT
Start: 2025-07-30

## 2025-07-30 RX ORDER — MEGESTROL ACETATE 40 MG/ML
400 SUSPENSION ORAL DAILY
Qty: 300 ML | Refills: 3 | Status: SHIPPED | OUTPATIENT
Start: 2025-07-30 | End: 2025-07-30

## 2025-07-30 RX ORDER — OXYCODONE AND ACETAMINOPHEN 10; 325 MG/1; MG/1
1 TABLET ORAL EVERY 6 HOURS PRN
Qty: 120 TABLET | Refills: 0 | Status: SHIPPED | OUTPATIENT
Start: 2025-07-30

## 2025-07-30 NOTE — PROGRESS NOTES
Lei Stapleton  [id number]  1945 7/30/2025      Referring Provider:   Antione De Santiago MD     Reason for Follow Up:   Bone lesions  Prostate cancer     Chief Complaint:  Prostate cancer      History of Present Illness   Lei Stapleton is a very pleasant 79 y.o.  male who presents in follow up appointment at the request of Dr. Antione De Santiago for further management and evaluation of bone lesions.    Patient was diagnosed with prostate cancer in 2018 (T2,N0,M0) after he was found to have an elevated PSA of 6.1 ng/mL and a palpable prostate nodule. He had a prostate biopsy performed on 7/13/18 and pathology significant for adenocarcinoma of the prostate, [place] score 4+4 = 8 on the left side with perineural invasion and benign right side. The prostate nodule showed adenocarcinoma, Cas[place] score 4+3 = 7.  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 Various treatment options were discussed with his Urologist - Dr. Lee Mendez, and due to his very high-grade disease and large prostrate it was felt that the best course would be primary IMRT in conjunction with LHRH therapy. He underwent gold seed fiducial markers on 9/14/18 and was treated with IMRT with Dr. Zimmer and LHRH. Has been receiving Lupron 45 mg every six months which he started on 8/14/18 as well as Casodex 50 mg daily. He has also undergone previous cystolitholapaxy on three separate occasions as well as TURP (August 31, 2020) of a very large obstructing prostate. Pathology from TURP procedure was negative for malignancy and significant for hypertrophic prostatic glands and stroma. Has also had placement of an artificial urinary sphincter placement 2024 for urinary incontinence.     He fell in March 2025 while trying to manipulate his gutter and began experiencing acute mid back pain. MRI obtained significant for fracture  at T4 and T8 that was felt to be likely pathologic with enhancement at C7 and may represent metastatic disease. PSA has been undetectable at <0.014. He had a repeat MRI of the T-spine significant for acute or subacute compression fracture of T vertebral bodies that are new.  He reports worsening back pain and notes that oxycodone does control his pain however makes him lethargic and therefore only takes this at night while he takes Norco during the day. He also had bone biopsy as well as kyphoplasty in June 2025.  A DEXA scan performed in June 2025 is consistent with osteopenia, and PET/CT obtained in July 2025 showed nonspecific prostate hypermetabolism, PET hypermetabolism in regions of previously described thoracic vertebral bodies and not definitively appreciated within the ribs.       Interim History:  Mr. Stapleton presents today in follow up to discuss serum and urine studies. Since his last visit he was admitted to [facility] with urinary tract infection and also underwent T12 biopsy kyphoplasty with Dr. Robbins.  He continues to have significant lumbar pain.  He has been taking Percocet during the day however this does make him lethargic and takes Norco when he has to leave the house as this makes him less lethargic.  He also notes decreased appetite as well as ongoing weight loss since his last visit.  He was scheduled for bone marrow biopsy and aspirate today however he declines procedure as he reports pain.        The following portions of the patient's history were reviewed and updated as appropriate: allergies, current medications, past family history, past medical history, past social history, past surgical history and problem list.      No Known Allergies      Past Medical History:   Diagnosis Date    Anxiety     Arthritis     Colitis     Depression     GERD (gastroesophageal reflux disease)     Gout     Heart murmur     History of EKG 12/22/2015    NORMAL    Hyperlipidemia     Hypertension      Obesity     Pancreatitis     history of    Prostate cancer     Renal failure     MILD one functioning kidney    Skin cancer     basal cell cancer on back   Actinic & Seborrheic keratoses - follows with dermatology - Dr. Barrett        Past Surgical History:   Procedure Laterality Date    COLONOSCOPY  2018    EYE SURGERY      FOOT SURGERY      HERNIA REPAIR      HYDROCELECTOMY  06/15/2015    PROSTATE BIOPSY      PROSTATE FIDUCIAL MARKER PLACEMENT  2018    RHINOPLASTY      UPPER GASTROINTESTINAL ENDOSCOPY  2014    WITH BIOPSIES AND DILATION         Social History     Socioeconomic History    Marital status:    Tobacco Use    Smoking status: Former     Current packs/day: 0.00     Types: Cigarettes     Quit date:      Years since quittin.6     Passive exposure: Never    Smokeless tobacco: Never   Vaping Use    Vaping status: Never Used   Substance and Sexual Activity    Alcohol use: Yes     Alcohol/week: 7.0 standard drinks of alcohol     Types: 7 Shots of liquor per week     Comment:  once per day    Drug use: No    Sexual activity: Defer         Family History   Problem Relation Age of Onset    Kidney disease Other     Other Other         CHRONIC DISABLING DISEASES URINARY TRACT DISEASE    Diabetes Other     Hypertension Other     Other Mother     Heart failure Father     Stroke Sister     Arthritis Sister     Heart disease Brother     No Known Problems Brother     No Known Problems Brother            Current Outpatient Medications:     allopurinol (ZYLOPRIM) 100 MG tablet, Take 1 tablet by mouth Daily., Disp: 90 tablet, Rfl: 3    ALPRAZolam (XANAX) 0.25 MG tablet, Take 1 tablet by mouth 2 (Two) Times a Day As Needed for Anxiety or Sleep. for anxiety, Disp: 60 tablet, Rfl: 0    amLODIPine (NORVASC) 10 MG tablet, TAKE 1 TABLET BY MOUTH DAILY, Disp: 90 tablet, Rfl: 3    aspirin 81 MG tablet, Take 1 tablet by mouth Daily., Disp: , Rfl:     Blood Glucose Monitoring Suppl w/Device kit,  Check blood sugar once a day.  DX: E11.9, Disp: 1 each, Rfl: 0    cholecalciferol (VITAMIN D3) 1000 UNITS tablet, Take 1 tablet by mouth Daily., Disp: , Rfl:     Cyanocobalamin (VITAMIN B12 PO), Take  by mouth daily., Disp: , Rfl:     esomeprazole (nexIUM) 40 MG capsule, Take 1 capsule by mouth Every Morning Before Breakfast., Disp: , Rfl:     fenofibrate (TRICOR) 145 MG tablet, Take 1 tablet by mouth Daily., Disp: 90 tablet, Rfl: 3    FLUoxetine (PROzac) 20 MG capsule, TAKE 1 CAPSULE BY MOUTH DAILY, Disp: 90 capsule, Rfl: 3    folic acid (FOLVITE) 1 MG tablet, TAKE 1 TABLET BY MOUTH DAILY, Disp: 90 tablet, Rfl: 3    glucose blood (Contour Next Test) test strip, Check blood glucose daily, Disp: 100 each, Rfl: 11    hydrALAZINE (APRESOLINE) 50 MG tablet, TAKE 1 TABLET BY MOUTH 3 TIMES  DAILY, Disp: 270 tablet, Rfl: 3    HYDROcodone-acetaminophen (NORCO)  MG per tablet, Take 1 tablet by mouth Every 6 (Six) Hours As Needed for Moderate Pain., Disp: 60 tablet, Rfl: 0    leuprolide (LUPRON) 30 MG injection, Inject 30 mg into the appropriate muscle as directed by prescriber Every 4 (Four) Months., Disp: , Rfl:     loratadine (Claritin) 10 MG tablet, Take 1 tablet by mouth Daily., Disp: 90 tablet, Rfl: 0    meclizine (ANTIVERT) 25 MG tablet, Take 1 tablet by mouth 3 (Three) Times a Day As Needed for Dizziness., Disp: 90 tablet, Rfl: 1    metFORMIN (GLUCOPHAGE) 500 MG tablet, TAKE 1 TABLET BY MOUTH DAILY  WITH BREAKFAST, Disp: 90 tablet, Rfl: 3    Metoprolol Succinate 25 MG capsule extended-release 24 hour sprinkle, Take 1 capsule by mouth Daily., Disp: , Rfl:     metoprolol tartrate (LOPRESSOR) 25 MG tablet, TAKE 1 TABLET BY MOUTH TWICE  DAILY, Disp: 180 tablet, Rfl: 3    Microlet Lancets misc, 1 stick Daily., Disp: 100 each, Rfl: 11    montelukast (Singulair) 10 MG tablet, Take 1 tablet by mouth Every Night., Disp: 30 tablet, Rfl: 0    pantoprazole (Protonix) 40 MG EC tablet, Take 1 tablet by mouth Daily., Disp: 30  tablet, Rfl: 0    Pyridoxine HCl (VITAMIN B-6 PO), Take  by mouth daily., Disp: , Rfl:     rosuvastatin (CRESTOR) 20 MG tablet, TAKE 1 TABLET BY MOUTH AT NIGHT, Disp: 90 tablet, Rfl: 3    SITagliptin (Januvia) 100 MG tablet, Take 1 tablet by mouth Daily., Disp: 90 tablet, Rfl: 2    albuterol sulfate  (90 Base) MCG/ACT inhaler, Inhale 2 puffs Every 4 (Four) Hours As Needed for Wheezing. (Patient not taking: Reported on 7/11/2025), Disp: 6.7 g, Rfl: 1    bicalutamide (CASODEX) 50 MG chemo tablet, Casodex 50 mg tablet  Take 1 tablet every day by oral route for 30 days. (Patient not taking: Reported on 7/30/2025), Disp: , Rfl:     Continuous Glucose Sensor (Dexcom G7 Sensor) misc, Use 1 Application Every 10 (Ten) Days. (Patient not taking: Reported on 5/30/2025), Disp: 3 each, Rfl: 2    Mirabegron ER (Myrbetriq) 50 MG tablet sustained-release 24 hour 24 hr tablet, Take 50 mg by mouth Daily. (Patient not taking: Reported on 7/11/2025), Disp: 28 tablet, Rfl: 0    naloxone (NARCAN) 4 MG/0.1ML nasal spray, Call 911. Don't prime. [place] in 1 nostril for overdose. Repeat in 2-3 minutes in other nostril if no or minimal breathing/responsiveness. (Patient not taking: Reported on 7/1/2025), Disp: 2 each, Rfl: 0    oxyCODONE (ROXICODONE) 5 MG immediate release tablet, Take 1 tablet by mouth Every 6 (Six) Hours As Needed for Moderate Pain., Disp: , Rfl:           Review of Systems  A comprehensive 14-point review of systems performed.  Significant findings as mentioned above.  All other systems reviewed and are negative.  Acute back pain.          Physical Exam  Vital Signs: These were reviewed and listed as per patient’s electronic medical chart  Vitals:    07/30/25 0808   BP: 161/84   Pulse: 68   Resp: 18   Temp: 97.1 °F (36.2 °C)   SpO2: 94%     General: Patient is awake, alert, and in no acute distress.  Normal mood. In wheelchair.  Head: Normocephalic, atraumatic  Eyes: EOMI. Conjunctivae and sclerae normal.  Ears: Ears  appear intact with no abnormalities noted.   Neck: Trachea midline. No obvious JVD.  Lungs: Respirations appear to be regular, even and unlabored with no signs of respiratory distress. No audible wheezing.  Abdomen: No obvious abdominal distension.  MS: Muscle tone appears normal. No gross deformities. L spine tenderness on palpation.  Extremities: No clubbing, cyanosis or edema noted.  Skin: No visible bleeding, bruising, or rash. Warm to touch.  Neurologic: Alert and oriented x3. No gross focal deficits.          Pain Score:  Pain Score    07/30/25 0808   PainSc: 9    PainLoc: Back                 PHQ-Score Total:  PHQ-9 Total Score:          IMAGING:   MRI Thoracic Spine With & Without Contrast (04/11/2025 15:09)   FINDINGS:Vertebrae:  T4 vertebral body mildly depressed likely pathologic compression fracture.  T8 vertebral body mid level compression fracture, that appears to also be pathologic.  Enhancement of the T C7 vertebral body inferiorly may represent metastatic disease.  Scoliotic curvature of the thoracic spine is noted.  Discs/spinal canal/neural foramina:  C4-C5, C5-C6 and C6/C7 shows posterior disc osteophyte protrusions causing moderate to severe central canal stenosis.  Spinal cord:  Unremarkable as visualized.  Normal signal.  No abnormal enhancement.  Soft tissues:  Unremarkable as visualized.  IMPRESSION:1.  C4-C5, C5-C6 and C6/C7 shows posterior disc osteophyte protrusions causing moderate to severe central canal stenosis.2.  T4 vertebral body mildly depressed likely pathologic compression fracture.3.  T8 vertebral body mid level compression fracture, that appears to also be pathologic.4.  Enhancement of the T C7 vertebral body inferiorly may represent metastatic disease.    CT Abdomen Pelvis With Contrast (05/07/2025 10:24)   FINDINGS:Lung bases:  Unremarkable as visualized.  No mass.  No consolidation.ABDOMEN:Liver:  Peripherally enhancing 4.8 cm lesion of the right lobe of liver posteriorly  that should be further evaluated with MRI liver mass protocol.  Gallbladder and bile ducts:  Unremarkable as visualized.  No calcified stones.  No ductal dilation.  Pancreas:  Unremarkable as visualized.  No mass.  No ductal dilation.  Spleen:  Unremarkable as visualized.  No splenomegaly.  Adrenals:  Unremarkable as visualized.  No mass.  Kidneys and ureters:  Horseshoe kidney again identified.  Simple bilateral renal cysts.  No hydronephrosis.  Stomach and bowel:  Scattered diverticula in the colon.  No diverticulitis.  No obstruction. PELVIS:  Appendix:  No findings to suggest acute appendicitis.  Bladder:  Unremarkable as visualized.  No mass.  Reproductive:  Unremarkable as visualized. ABDOMEN and PELVIS:  Intraperitoneal space:  Unremarkable as visualized.  No free air.  No significant fluid collection.  Bones/joints:  Degenerative disc disease throughout the lumbar spine. Degenerative facet arthropathy throughout the lumbar spine, most prominent in the lower lumbar spine.  No acute fracture.  No dislocation.  Soft tissues:  Unremarkable as visualized.  Vasculature:  Unremarkable as visualized.  No abdominal aortic aneurysm.  Lymph nodes:  Unremarkable as visualized.  No enlarged lymph nodes.  IMPRESSION:1.  Peripherally enhancing 4.8 cm lesion of the right lobe of liver posteriorly that should be further evaluated with MRI liver mass protocol.2.  Degenerative changes lumbar spine as described.    CT Chest With Contrast Diagnostic (05/07/2025 10:25)   FINDINGS: Limitations:  Please note that reported measurements are made manually, as computer generated (AI) measurements can over measure lesions. Additionally, lesions identified by AI may have been present presently, significant nodules will be discussed in the report and the visual depictions of lesions provided by AI are for general reference only.  Lungs and pleural spaces:  Unremarkable as visualized.  No consolidation.  No pneumothorax.  No significant  effusion.  No pulmonary nodule.  Heart:  Unremarkable as visualized.  No cardiomegaly.  No significant pericardial effusion.  No significant coronary artery calcifications.  Bones/joints:  Again pathologic fracture of lytic T4 vertebral body and more sclerotic appearing T8 vertebral body abnormalities noted.  No dislocation.  Soft tissues:  Unremarkable as visualized.  Vasculature:  Unremarkable as visualized.  No thoracic aortic aneurysm.  Lymph nodes:  Unremarkable as visualized.  No enlarged lymph nodes.  IMPRESSION:1.  No pulmonary nodule.2.  Again pathologic fracture of lytic T4 vertebral body and more sclerotic appearing T8 vertebral body abnormalities noted.    NM Bone Scan Whole Body (05/12/2025 13:04)   FINDINGS:Skull/facial bones:  No focal increased or decreased uptake. Spine:  Osteoblastic lesion involving the T8 level of the thoracic spine and corresponds to abnormality on CT.  Ribs:  Osteoblastic lesion at approximately the left ninth posterior rib.  Osteoblastic lesion faintly of the left posterior 10th and 11th ribs.  Long bones:  Unremarkable.  No abnormal uptake.  Pelvis:  Unremarkable.  No focal increased or decreased uptake.  Joints:  Unremarkable.  No focal increased or decreased uptake.  Soft tissues:  Unremarkable.  Renal/bladder:  Within normal limits.  IMPRESSION:1.  Osteoblastic lesion involving the T8 level of the thoracic spine and corresponds to abnormality on CT.2.  Osteoblastic lesion at approximately the left ninth posterior rib. 3.  Osteoblastic lesion faintly of the left posterior 10th and 11th ribs.4. Above findings suspicious for osteoblastic metastatic disease.    MRI Abdomen With & Without Contrast (05/25/2025 08:55)   FINDINGS:  Lung bases:  Unremarkable.  No mass.  No consolidation.  Liver:  Somewhat lobulated appearing mass of the posterior right hepatic lobe, segment 6 is noted that is approximately 5.4 x 4.3 cm and demonstrates early avid contrast enhancement with retained  contrast noted on subsequent multiphasic postcontrast sequences and may even demonstrate some areas of delayed centripetal enhancement. There is area of nonenhancement noted centrally with heterogeneous features.Background of relatively unremarkable liver parenchyma is noted. High ADC and DWI signal. More favorable for benign process such as atypical presentation of hemangioma. Malignancy felt less likely. Further characterization with ultrasound may be helpful.  There are 2 smaller T2 hyperintense lesions in the periphery of the left hepatic lobe, segment 4A that demonstrate early enhancement with persistent enhancement on delayed phase imaging and may represent 2 small hemangiomas. 2.0 cm and 1.7 cm respectively.  Liver parenchyma is fairly homogeneous with no evidence of cirrhosis or intrahepatic biliary dilatation.  Gallbladder and bile ducts:  Unremarkable.  No calcified stones.  No ductal dilation.  Pancreas:  Unremarkable.  No ductal dilation.  No mass.  Spleen:  Unremarkable.  No splenomegaly.  Adrenals:  Unremarkable.  No mass.  Kidneys and ureters:  Horseshoe kidney noted with simple appearing renal cysts throughout. No hydronephrosis.  Stomach and bowel:  Unremarkable.  No obstruction.  Intraperitoneal space:  Unremarkable.  No significant fluid collection.  Soft tissues:  Unremarkable.  Vasculature:  Unremarkable.  No abdominal aortic aneurysm.  Lymph nodes:  Unremarkable.  No enlarged lymph nodes.  IMPRESSION:1. 5.4 x 4.3 cm enhancing lesion in segment 6 of the right lobe of liver with imaging features and MRI signal characteristics more favorable for benign process such as atypical presentation of hemangioma. Malignancy felt less likely. Further characterization with ultrasound may be helpful.  Continued imaging surveillance is recommended.2.  There are 2 smaller T2 hyperintense lesions in the periphery of the left hepatic lobe, segment 4A that demonstrate early enhancement with persistent enhancement  on delayed phase imaging and may represent 2 small hemangiomas. 2.0 cm and 1.7 cm respectively.3.  Liver parenchyma is fairly homogeneous with no evidence of cirrhosis or intrahepatic biliary dilatation.4.  Horseshoe kidney noted with simple appearing renal cysts throughout. No hydronephrosis.5. Other incidental/nonacute findings above.    DEXA Bone Density Axial (06/20/2025 09:40)   IMPRESSION:Left femur:  BMD left femur neck 0.680 with T score -1.8 consistent with osteopenia.        LABS/STUDIES:  Lab on 07/16/2025   Component Date Value    Total Protein, Urine 07/16/2025 50.0     Protein, 24H Urine 07/16/2025 250 (H)     Albumin, U 07/16/2025 50.2     Alpha-1-Globulin, U 07/16/2025 3.8     Alpha-2-Globulin, U 07/16/2025 11.9     Beta Globulin, U 07/16/2025 19.7     Gamma Globulin, Urine 07/16/2025 14.3     M-Patrick, % U 07/16/2025 Not Observed     Immunofixation Result, U* 07/16/2025 Comment     Note: 07/16/2025 Comment     Free Kappa Lt Chains,Ur 07/16/2025 18.86     Free Kappa Lt Chains, 24* 07/16/2025 9.43     Free Lambda Lt Chains,Ur 07/16/2025 4.04     Free Lambda Lt Chains, 2* 07/16/2025 2.02     Kappa/Lambda Ratio,U 07/16/2025 4.67    Office Visit on 07/11/2025   Component Date Value    Glucose 07/11/2025 117 (H)     BUN 07/11/2025 21.2     Creatinine 07/11/2025 1.20     Sodium 07/11/2025 142     Potassium 07/11/2025 4.3     Chloride 07/11/2025 108 (H)     CO2 07/11/2025 21.3 (L)     Calcium 07/11/2025 9.9     Total Protein 07/11/2025 6.9     Albumin 07/11/2025 4.4     ALT (SGPT) 07/11/2025 14     AST (SGOT) 07/11/2025 19     Alkaline Phosphatase 07/11/2025 74     Total Bilirubin 07/11/2025 0.5     Globulin 07/11/2025 2.5     A/G Ratio 07/11/2025 1.8     BUN/Creatinine Ratio 07/11/2025 17.7     Anion Gap 07/11/2025 12.7     eGFR 07/11/2025 61.5     IgG 07/11/2025 506 (L)     IgA 07/11/2025 22 (L)     IgM 07/11/2025 16     Total Protein 07/11/2025 6.7     Albumin 07/11/2025 4.1     Alpha-1-Globulin  07/11/2025 0.3     Alpha-2-Globulin 07/11/2025 0.9     Beta Globulin 07/11/2025 1.0     Gamma Globulin 07/11/2025 0.4     M-Patrick 07/11/2025 Comment:     Globulin 07/11/2025 2.6     A/G Ratio 07/11/2025 1.6     Immunofixation Reflex, S* 07/11/2025 Comment:     Please note 07/11/2025 Comment     Free Light Chain, [place] 07/11/2025 15.4     Free Lambda Light Chains 07/11/2025 11.7     Kappa/Lambda Ratio 07/11/2025 1.32     LDH 07/11/2025 183     WBC 07/11/2025 11.31 (H)     RBC 07/11/2025 4.60     Hemoglobin 07/11/2025 14.6     Hematocrit 07/11/2025 44.3     MCV 07/11/2025 96.3     MCH 07/11/2025 31.7     MCHC 07/11/2025 33.0     RDW 07/11/2025 13.6     RDW-SD 07/11/2025 48.4     MPV 07/11/2025 11.3     Platelets 07/11/2025 217     Neutrophil % 07/11/2025 48.9     Lymphocyte % 07/11/2025 26.5     Monocyte % 07/11/2025 6.2     Eosinophil % 07/11/2025 16.9 (H)     Basophil % 07/11/2025 1.1     Immature Grans % 07/11/2025 0.4     Neutrophils, Absolute 07/11/2025 5.54     Lymphocytes, Absolute 07/11/2025 3.00     Monocytes, Absolute 07/11/2025 0.70     Eosinophils, Absolute 07/11/2025 1.91 (H)     Basophils, Absolute 07/11/2025 0.12     Immature Grans, Absolute 07/11/2025 0.04     nRBC 07/11/2025 0.0    Hospital Outpatient Visit on 05/25/2025   Component Date Value    Creatinine 05/25/2025 1.30    Consult on 04/24/2025   Component Date Value    Glucose 04/24/2025 136 (H)     BUN 04/24/2025 15     Creatinine 04/24/2025 1.58 (H)     Sodium 04/24/2025 144     Potassium 04/24/2025 4.2     Chloride 04/24/2025 108 (H)     CO2 04/24/2025 23.1     Calcium 04/24/2025 9.4     Total Protein 04/24/2025 7.0     Albumin 04/24/2025 4.5     ALT (SGPT) 04/24/2025 19     AST (SGOT) 04/24/2025 20     Alkaline Phosphatase 04/24/2025 80     Total Bilirubin 04/24/2025 0.4     Globulin 04/24/2025 2.5     A/G Ratio 04/24/2025 1.8     BUN/Creatinine Ratio 04/24/2025 9.5     Anion Gap 04/24/2025 12.9     eGFR 04/24/2025 44.2 (L)     C-Reactive  Protein 04/24/2025 0.43     LDH 04/24/2025 203     PSA 04/24/2025 <0.014     WBC 04/24/2025 12.21 (H)     RBC 04/24/2025 4.39     Hemoglobin 04/24/2025 13.8     Hematocrit 04/24/2025 42.2     MCV 04/24/2025 96.1     MCH 04/24/2025 31.4     MCHC 04/24/2025 32.7     RDW 04/24/2025 13.7     RDW-SD 04/24/2025 48.4     MPV 04/24/2025 11.8     Platelets 04/24/2025 198     Neutrophil % 04/24/2025 40.9 (L)     Lymphocyte % 04/24/2025 23.6     Monocyte % 04/24/2025 6.2     Eosinophil % 04/24/2025 27.9 (H)     Basophil % 04/24/2025 1.1     Immature Grans % 04/24/2025 0.3     Neutrophils, Absolute 04/24/2025 4.98     Lymphocytes, Absolute 04/24/2025 2.88     Monocytes, Absolute 04/24/2025 0.76     Eosinophils, Absolute 04/24/2025 3.41 (H)     Basophils, Absolute 04/24/2025 0.14     Immature Grans, Absolute 04/24/2025 0.04     nRBC 04/24/2025 0.0     Anisocytosis 04/24/2025 Slight/1+     Platelet Morphology 04/24/2025 Normal    Consult on 04/17/2025   Component Date Value    PSA 04/17/2025 <0.014    Hospital Outpatient Visit on 04/11/2025   Component Date Value    Creatinine 04/11/2025 1.60 (H)    Lab on 02/19/2025   Component Date Value    proBNP 02/19/2025 149.1     Creatine Kinase 02/19/2025 77     Glucose 02/19/2025 153 (H)     BUN 02/19/2025 16     Creatinine 02/19/2025 1.20     Sodium 02/19/2025 143     Potassium 02/19/2025 4.2     Chloride 02/19/2025 107     CO2 02/19/2025 22.8     Calcium 02/19/2025 9.2     Total Protein 02/19/2025 7.0     Albumin 02/19/2025 4.4     ALT (SGPT) 02/19/2025 24     AST (SGOT) 02/19/2025 23     Alkaline Phosphatase 02/19/2025 74     Total Bilirubin 02/19/2025 0.3     Globulin 02/19/2025 2.6     A/G Ratio 02/19/2025 1.7     BUN/Creatinine Ratio 02/19/2025 13.3     Anion Gap 02/19/2025 13.2     eGFR 02/19/2025 61.5     Total Cholesterol 02/19/2025 232 (H)     Triglycerides 02/19/2025 489 (H)     HDL Cholesterol 02/19/2025 41     LDL Cholesterol  02/19/2025 107 (H)     VLDL Cholesterol  02/19/2025 84 (H)     LDL/HDL Ratio 02/19/2025 2.27     Hemoglobin A1C 02/19/2025 6.90 (H)     WBC 02/19/2025 9.51     RBC 02/19/2025 4.35     Hemoglobin 02/19/2025 14.2     Hematocrit 02/19/2025 42.0     MCV 02/19/2025 96.6     MCH 02/19/2025 32.6     MCHC 02/19/2025 33.8     RDW 02/19/2025 13.4     RDW-SD 02/19/2025 48.1     MPV 02/19/2025 12.1 (H)     Platelets 02/19/2025 197     Neutrophil % 02/19/2025 44.0     Lymphocyte % 02/19/2025 32.6     Monocyte % 02/19/2025 5.6     Eosinophil % 02/19/2025 16.4 (H)     Basophil % 02/19/2025 1.2     Immature Grans % 02/19/2025 0.2     Neutrophils, Absolute 02/19/2025 4.19     Lymphocytes, Absolute 02/19/2025 3.10     Monocytes, Absolute 02/19/2025 0.53     Eosinophils, Absolute 02/19/2025 1.56 (H)     Basophils, Absolute 02/19/2025 0.11     Immature Grans, Absolute 02/19/2025 0.02     nRBC 02/19/2025 0.0    Hospital Outpatient Visit on 02/18/2025   Component Date Value    Nuclear Prior Study 02/18/2025 3.0     BH CV REST NUCLEAR ISOTO* 02/18/2025 10.8     BH CV STRESS NUCLEAR ISO* 02/18/2025 31.5     BH CV STRESS PROTOCOL 1 02/18/2025 Pharmacologic     Stage 1 02/18/2025 1.0     HR Stage 1 02/18/2025 66     BP Stage 1 02/18/2025 181/72     Duration Min Stage 1 02/18/2025 0     Duration Sec Stage 1 02/18/2025 10     Stress Dose Regadenoson * 02/18/2025 0.40     Stress Comments Stage 1 02/18/2025 10 sec bolus injection     Baseline HR 02/18/2025 58     Baseline BP 02/18/2025 166/69     Peak HR 02/18/2025 66     Peak BP 02/18/2025 181/72     Recovery HR 02/18/2025 63     Recovery BP 02/18/2025 166/70     Target HR (85%) 02/18/2025 120     Max. Pred. HR (100%) 02/18/2025 141     Percent Max Pred HR 02/18/2025 46.81     Percent Target HR 02/18/2025 55     Nuc Stress EF 02/18/2025 70    Hospital Outpatient Visit on 02/18/2025   Component Date Value    LVIDd 02/18/2025 4.8     LVIDs 02/18/2025 2.9     IVSd 02/18/2025 1.09     LVPWd 02/18/2025 0.98     FS 02/18/2025 39.5      IVS/LVPW 02/18/2025 1.11     ESV(cubed) 02/18/2025 23.9     LV Sys Vol (BSA correcte* 02/18/2025 17.4     EDV(cubed) 02/18/2025 107.9     LV Hoff Vol (BSA correct* 02/18/2025 43.2     LV mass(C)d 02/18/2025 176.3     LVOT area 02/18/2025 3.1     LVOT diam 02/18/2025 2.00     EDV(MOD-sp4) 02/18/2025 93.4     ESV(MOD-sp4) 02/18/2025 37.7     SV(MOD-sp4) 02/18/2025 55.7     SVi(MOD-SP4) 02/18/2025 25.8     EF(MOD-sp4) 02/18/2025 59.6     MV E max aurelio 02/18/2025 71.1     MV A max aurelio 02/18/2025 49.3     MV E/A 02/18/2025 1.44     LA ESV Index (BP) 02/18/2025 18.5     Med Peak E' Aurelio 02/18/2025 4.6     Lat Peak E' Aurelio 02/18/2025 5.7     TR max aurelio 02/18/2025 266.0     Avg E/e' ratio 02/18/2025 13.81     TAPSE (>1.6) 02/18/2025 2.7     LA dimension (2D)  02/18/2025 3.5     Ao pk aurelio 02/18/2025 147.0     Ao max PG 02/18/2025 8.6     Ao mean PG 02/18/2025 5.0     Ao V2 VTI 02/18/2025 30.6     TR max PG 02/18/2025 28.3     RVSP(TR) 02/18/2025 38.3     RAP systole 02/18/2025 10.0     PA acc time 02/18/2025 0.12     Ao root diam 02/18/2025 3.2     ACS 02/18/2025 1.70          PATHOLOGY:   10/14/16      11/29/16      8/31/20       6/12/25            Assessment & Plan   Lei Stapleton is a very pleasant 79 y.o.  male who presents in follow up appointment at the request of Dr. Antione De Santiago for further management and evaluation of potential metastatic cancer to the bone.    Prostate cancer   Possible bone metastases  Liver lesion  - Patient was diagnosed with localized prostate cancer in 2018 which was treated with IMRT and remains on Lupron and Casodex. His PSAs since that time have been undetectable at <0.014. Next Lupron injection has been scheduled for August 2025 with his Urologist. Patient today reports that he would like a second Urology opinion therefore will place referral at request of patient.   - He had a fall in March 2025 and began experiencing acute back pain. MRI spine was significant for compression  fracture of T4 and T8 that was felt to be likely pathologic with enhancement at C7 that may represent metastatic disease.   - PSA is <0.014 therefore I did not believe that PET PSMA would be helpful in this case. CT chest, abdomen, pelvis to assess for metastatic disease which was performed on 5/7/25 and significant for pathologic fracture of lytic T4 vertebral body and more sclerotic appearing T8 vertebral body abnormality noted as well as a peripherally enhancing 4.8 cm lesion of the right lobe of liver posteriorly.    - After discussing with radiology this could potentially be benign such as a hemangioma and recommended MRI of the abdomen with liver protocol which was performed and appears to be consistent with hemangioma.  However bone scan was concerning for osteoblastic lesions in the ribs and spine.    - He was evaluated by Dr. Barcenas (orthopedics) however did not feel that intervention would be beneficial and sought a second opinion with Dr. Washington Sen in [place], [place].   - Mr. Stapleton underwent bone biopsy and kyphoplasty of T4 & T8 vertebral bodies on 06/12/2025. No tumor was seen at T8 however biopsy from T4 was positive. Material was PSA negative. 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  -There after patient had a repeat MRI of the thoracic spine on 7/5/2025 that demonstrated probable acute versus subacute compression fracture of T12 vertebral body as well as persistent edema and possible small abnormality lesion within the T5 vertebral body not clearly present on previous exam.  T6 and T7 also with mild compressive deformity. At L4 and L5 advanced disc desiccation with disc space narrowing arthropathy and mild stenosis was noted.  Patient underwent T6-T7 T12 kyphoplasty with biopsy of T7 last week.  Will obtain reports.  - The presence of a monoclonal protein is unclear on immunofixation and M spike with asymmetrical gamma.  IgG and  IgA levels are low and IgM is normal at 16.  Serum free light chain studies show a normal kappa and lambda light chain with a normal ratio.  24-hour urine light chains are also normal and 24-hour urine electrophoresis did not reveal an M spike and no monoclonality detected on immunofixation.  Total 24-hour urine protein elevated at 250.  -Given that bone biopsy shows a kappa restricted plasma cell neoplasm would recommend a bone marrow biopsy and aspirate to further evaluate.  This was gradual for today however patient is in significant pain and is unable to tolerate bone marrow therefore I discussed is case with Dr. Robbins who is not planning for any further procedures therefore will place orders for this to be performed under sedation with IR.  - DEXA scan is significant for osteopenia and he remains on vitamin D.  He is currently not on calcium supplement as he was previously told by his urologist not to take calcium supplementation due to recurrent nephrolithiasis and bladder stones.  Also spent some time discussing Xgeva and he will need dental clearance prior to start.    Neoplasm related pain  - Patient reports that his pain is currently uncontrolled with Norco 5 mg. He was recently prescribed Oxycodone 10 mg every 6 hours as needed for pain by Dr. Estuardo Sen. Will re-prescribe today. 120 tablets dispensed.  While this better controls his pain he reports increased lethargy therefore would like Norco to take when he has to travel to doctors appointments and will dispense 30 tablets.  If pain remains uncontrolled and patient is requiring oxycodone 10 mg every 6 hours can increase to every 4 hours versus morphine extended release 15 mg twice daily for better pain control.    Appetite loss  - Unfortunately Marinol is currently on backorder and therefore will start Megace.     ACO / ANDREW/Other  Quality measures  -  Lei Stapleton  reports that he quit smoking about 34 years ago. His smoking use included  cigarettes. He has never been exposed to tobacco smoke. He has never used smokeless tobacco.   -  Lei Stapleton received 2024 flu vaccine.  -  Lei Stapleton reports a pain score of 9.  Given his pain assessment as noted, treatment options were discussed and the following options were decided upon as a follow-up plan to address the patient's pain: referral to Primary Care for assistance in pain treatment guidance.  -  Current outpatient and discharge medications have been reconciled for the patient.  Reviewed by: Kassandra Wolf MD      I will have the patient return in follow up appointment in one month. He understands that should he have any questions or concerns prior to his appointment he should give us a call at any time and I would be happy to see him sooner. It was a pleasure to see this patient in clinic today, thank you for allowing me to participate in the care of this patient.    I spent 45 minutes caring for patient on this date of service. This time includes time spent by me in the following activities: preparing for the visit, reviewing tests, obtaining and/or reviewing a separately obtained history, performing a medically appropriate examination and/or evaluation, counseling and educating the patient/family/caregiver, ordering medications, tests, or procedures, documenting information in the medical record, independently interpreting results and communicating that information with the patient/family/caregiver, and care coordination as well as answering any questions.      Kassandra Wolf MD  07/30/25   09:02 EDT

## 2025-07-31 ENCOUNTER — TELEPHONE (OUTPATIENT)
Dept: ONCOLOGY | Facility: CLINIC | Age: 80
End: 2025-07-31
Payer: MEDICARE

## 2025-08-04 RX ORDER — CYCLOBENZAPRINE HCL 5 MG
5 TABLET ORAL 3 TIMES DAILY PRN
Qty: 90 TABLET | Refills: 0 | Status: SHIPPED | OUTPATIENT
Start: 2025-08-04

## 2025-08-12 ENCOUNTER — HOSPITAL ENCOUNTER (OUTPATIENT)
Dept: MRI IMAGING | Facility: HOSPITAL | Age: 80
Discharge: HOME | End: 2025-08-12
Admitting: NEUROLOGICAL SURGERY
Payer: MEDICARE

## 2025-08-12 DIAGNOSIS — C90.00 MULTIPLE MYELOMA, FAILED REMISSION: ICD-10-CM

## 2025-08-12 PROCEDURE — 72148 MRI LUMBAR SPINE W/O DYE: CPT

## 2025-08-15 ENCOUNTER — OFFICE VISIT (OUTPATIENT)
Dept: CARDIOLOGY | Facility: CLINIC | Age: 80
End: 2025-08-15
Payer: MEDICARE

## 2025-08-15 VITALS
WEIGHT: 187 LBS | SYSTOLIC BLOOD PRESSURE: 144 MMHG | DIASTOLIC BLOOD PRESSURE: 86 MMHG | HEIGHT: 71 IN | BODY MASS INDEX: 26.18 KG/M2 | OXYGEN SATURATION: 98 % | HEART RATE: 66 BPM

## 2025-08-15 DIAGNOSIS — E78.2 MIXED HYPERLIPIDEMIA: ICD-10-CM

## 2025-08-15 DIAGNOSIS — F41.9 ANXIETY: ICD-10-CM

## 2025-08-15 DIAGNOSIS — C61 PROSTATE CANCER: ICD-10-CM

## 2025-08-15 DIAGNOSIS — M54.6 CHRONIC BILATERAL THORACIC BACK PAIN: ICD-10-CM

## 2025-08-15 DIAGNOSIS — I10 PRIMARY HYPERTENSION: Primary | ICD-10-CM

## 2025-08-15 DIAGNOSIS — F41.1 GENERALIZED ANXIETY DISORDER: ICD-10-CM

## 2025-08-15 DIAGNOSIS — G89.29 CHRONIC BILATERAL THORACIC BACK PAIN: ICD-10-CM

## 2025-08-15 RX ORDER — ALPRAZOLAM 0.25 MG
0.25 TABLET ORAL 3 TIMES DAILY PRN
Qty: 90 TABLET | Refills: 1 | Status: SHIPPED | OUTPATIENT
Start: 2025-08-15

## 2025-08-19 ENCOUNTER — HOSPITAL ENCOUNTER (OUTPATIENT)
Dept: NUCLEAR MEDICINE | Facility: HOSPITAL | Age: 80
Discharge: HOME | End: 2025-08-19
Payer: MEDICARE

## 2025-08-19 DIAGNOSIS — M85.80 OSTEOPENIA, UNSPECIFIED LOCATION: ICD-10-CM

## 2025-08-19 DIAGNOSIS — C79.51 MALIGNANT NEOPLASM OF PROSTATE METASTATIC TO BONE: ICD-10-CM

## 2025-08-19 DIAGNOSIS — M80.0B9A: ICD-10-CM

## 2025-08-19 DIAGNOSIS — C61 MALIGNANT NEOPLASM OF PROSTATE METASTATIC TO BONE: ICD-10-CM

## 2025-08-19 PROCEDURE — 34310000005 TECHNETIUM SESTAMIBI: Performed by: INTERNAL MEDICINE

## 2025-08-19 PROCEDURE — 78306 BONE IMAGING WHOLE BODY: CPT

## 2025-08-19 PROCEDURE — A9561 TC99M OXIDRONATE: HCPCS | Performed by: INTERNAL MEDICINE

## 2025-08-19 PROCEDURE — A9500 TC99M SESTAMIBI: HCPCS | Performed by: INTERNAL MEDICINE

## 2025-08-19 PROCEDURE — 78306 BONE IMAGING WHOLE BODY: CPT | Performed by: RADIOLOGY

## 2025-08-19 PROCEDURE — 34310000005 TECHNETIUM OXIDRONATE KIT: Performed by: INTERNAL MEDICINE

## 2025-08-19 RX ADMIN — TECHNETIUM TC 99M OXIDRONATE 1 DOSE: 3.15 INJECTION, POWDER, LYOPHILIZED, FOR SOLUTION INTRAVENOUS at 10:47

## 2025-08-20 ENCOUNTER — TELEPHONE (OUTPATIENT)
Dept: CARDIOLOGY | Facility: CLINIC | Age: 80
End: 2025-08-20
Payer: MEDICARE

## 2025-08-21 ENCOUNTER — TELEPHONE (OUTPATIENT)
Dept: INFUSION THERAPY | Facility: HOSPITAL | Age: 80
End: 2025-08-21
Payer: MEDICARE

## 2025-08-21 ENCOUNTER — LAB REQUISITION (OUTPATIENT)
Dept: LAB | Facility: HOSPITAL | Age: 80
End: 2025-08-21
Payer: MEDICARE

## 2025-08-22 ENCOUNTER — HOSPITAL ENCOUNTER (OUTPATIENT)
Dept: CT IMAGING | Facility: HOSPITAL | Age: 80
Discharge: HOME | End: 2025-08-22
Payer: MEDICARE

## 2025-08-22 VITALS
RESPIRATION RATE: 20 BRPM | OXYGEN SATURATION: 96 % | SYSTOLIC BLOOD PRESSURE: 191 MMHG | TEMPERATURE: 97 F | HEIGHT: 71 IN | BODY MASS INDEX: 26.32 KG/M2 | HEART RATE: 80 BPM | WEIGHT: 188 LBS | DIASTOLIC BLOOD PRESSURE: 101 MMHG

## 2025-08-22 DIAGNOSIS — C90.30 PLASMACYTOMA NOT HAVING ACHIEVED REMISSION, UNSPECIFIED PLASMACYTOMA TYPE: ICD-10-CM

## 2025-08-22 DIAGNOSIS — C61 PROSTATE CANCER: ICD-10-CM

## 2025-08-22 DIAGNOSIS — G89.3 CHRONIC PAIN DUE TO MALIGNANT NEOPLASTIC DISEASE: ICD-10-CM

## 2025-08-22 DIAGNOSIS — R63.0 APPETITE LOSS: ICD-10-CM

## 2025-08-22 DIAGNOSIS — M85.80 OSTEOPENIA, UNSPECIFIED LOCATION: ICD-10-CM

## 2025-08-22 PROBLEM — N39.0 UTI (URINARY TRACT INFECTION): Status: ACTIVE | Noted: 2025-08-22

## 2025-08-22 PROBLEM — R41.0 DELIRIUM: Status: ACTIVE | Noted: 2025-08-22

## 2025-08-26 PROBLEM — E43 SEVERE PROTEIN-CALORIE MALNUTRITION: Status: ACTIVE | Noted: 2025-08-26
